# Patient Record
Sex: FEMALE | Race: BLACK OR AFRICAN AMERICAN | Employment: OTHER | URBAN - METROPOLITAN AREA
[De-identification: names, ages, dates, MRNs, and addresses within clinical notes are randomized per-mention and may not be internally consistent; named-entity substitution may affect disease eponyms.]

---

## 2017-01-05 ENCOUNTER — HOSPITAL ENCOUNTER (OUTPATIENT)
Dept: NUCLEAR MEDICINE | Age: 52
Discharge: HOME | End: 2017-01-05
Attending: INTERNAL MEDICINE
Payer: MEDICARE

## 2017-01-05 DIAGNOSIS — R10.9 ABDOMINAL PAIN: ICD-10-CM

## 2017-01-05 DIAGNOSIS — R10.13 EPIGASTRIC PAIN: ICD-10-CM

## 2017-01-05 DIAGNOSIS — K59.00 CONSTIPATION: ICD-10-CM

## 2017-01-05 DIAGNOSIS — R11.0 NAUSEA: ICD-10-CM

## 2017-01-05 DIAGNOSIS — R68.81 EARLY SATIETY: ICD-10-CM

## 2017-01-05 PROCEDURE — 78264 GASTRIC EMPTYING IMG STUDY: CPT

## 2017-01-09 ENCOUNTER — OFFICE VISIT (OUTPATIENT)
Dept: SURGERY | Age: 52
End: 2017-01-09

## 2017-01-09 VITALS
BODY MASS INDEX: 29.06 KG/M2 | HEIGHT: 63 IN | WEIGHT: 164 LBS | RESPIRATION RATE: 14 BRPM | OXYGEN SATURATION: 98 % | HEART RATE: 82 BPM | TEMPERATURE: 98.2 F | SYSTOLIC BLOOD PRESSURE: 122 MMHG | DIASTOLIC BLOOD PRESSURE: 57 MMHG

## 2017-01-09 DIAGNOSIS — L02.419 AXILLARY ABSCESS: Primary | ICD-10-CM

## 2017-01-09 RX ORDER — OXYCODONE AND ACETAMINOPHEN 5; 325 MG/1; MG/1
1 TABLET ORAL
Qty: 30 TAB | Refills: 0 | Status: SHIPPED | OUTPATIENT
Start: 2017-01-09 | End: 2017-04-13 | Stop reason: ALTCHOICE

## 2017-01-09 RX ORDER — CLINDAMYCIN HYDROCHLORIDE 300 MG/1
300 CAPSULE ORAL 3 TIMES DAILY
Qty: 30 CAP | Refills: 0 | Status: SHIPPED | OUTPATIENT
Start: 2017-01-09 | End: 2017-01-19

## 2017-01-09 NOTE — PROGRESS NOTES
Subjective:      Tamika Valdez is a 54 y.o. female  Well know to me who presents with a recurrent abscess in her right axilla. Came on suddenly 4 days ago. She complains of 8/10 pain. She had spontaneous drainage this morning. Feels better. Denies fevers and chills    Objective:     Visit Vitals    /57 (BP 1 Location: Left arm, BP Patient Position: Sitting)    Pulse 82    Temp 98.2 °F (36.8 °C) (Oral)    Resp 14    Ht 5' 3\" (1.6 m)    Wt 164 lb (74.4 kg)    SpO2 98%    BMI 29.05 kg/m2       General:  alert, cooperative, no distress, appears stated age   Right axilla : 1 cm raised tended subcutaneous mass with and open sinus. Minimal drainage. Tender. NO erythema, no heat     Assessment:     54year old diabetic female with hydradenitis who presents with recurrent abscess. The abscess is small and has drained on its own. There is no cellulitis or signs of systemic infection. Will try antibiotics and symptomatic treatment an see how she does. Don't think she needs I & D    Plan:     1. Clindamycin   2.  Percocet  3.  follow up next week or sooner if worsens

## 2017-01-09 NOTE — PROGRESS NOTES
1. Have you been to the ER, urgent care clinic since your last visit? Hospitalized since your last visit? [facility] for pain on side last month     2. Have you seen or consulted any other health care providers outside of the [address] [place] [address] since your last visit? Include any pap smears or colon screening.  no

## 2017-01-31 ENCOUNTER — HOSPITAL ENCOUNTER (OUTPATIENT)
Dept: LAB | Age: 52
Discharge: HOME | End: 2017-01-31
Payer: MEDICARE

## 2017-01-31 ENCOUNTER — OFFICE VISIT (OUTPATIENT)
Dept: MIDWIFE SERVICES | Age: 52
End: 2017-01-31

## 2017-01-31 VITALS
SYSTOLIC BLOOD PRESSURE: 139 MMHG | BODY MASS INDEX: 26.52 KG/M2 | HEART RATE: 78 BPM | HEIGHT: 66 IN | DIASTOLIC BLOOD PRESSURE: 80 MMHG | WEIGHT: 165 LBS

## 2017-01-31 DIAGNOSIS — Z01.419 WELL WOMAN EXAM WITH ROUTINE GYNECOLOGICAL EXAM: ICD-10-CM

## 2017-01-31 DIAGNOSIS — N95.1 HOT FLASHES, MENOPAUSAL: ICD-10-CM

## 2017-01-31 DIAGNOSIS — N89.8 VAGINAL DISCHARGE: ICD-10-CM

## 2017-01-31 DIAGNOSIS — Z12.4 SCREENING FOR MALIGNANT NEOPLASM OF CERVIX: ICD-10-CM

## 2017-01-31 DIAGNOSIS — Z12.4 SCREENING FOR CERVICAL CANCER: Primary | ICD-10-CM

## 2017-01-31 DIAGNOSIS — N76.0 ACUTE VAGINITIS: ICD-10-CM

## 2017-01-31 PROCEDURE — 87624 HPV HI-RISK TYP POOLED RSLT: CPT

## 2017-01-31 PROCEDURE — 87480 CANDIDA DNA DIR PROBE: CPT

## 2017-01-31 PROCEDURE — 88175 CYTOPATH C/V AUTO FLUID REDO: CPT

## 2017-01-31 NOTE — PROGRESS NOTES
Chief Complaint   Patient presents with    Well Woman     last pap - in 3/2015     Visit Vitals    /80    Pulse 78    Ht 5' 6\" (1.676 m)    Wt 165 lb (74.8 kg)    BMI 26.63 kg/m2     1. Have you been to the ER, urgent care clinic since your last visit? Hospitalized since your last visit? No    2. Have you seen or consulted any other health care providers outside of the [address] [place] [address] since your last visit? Include any pap smears or colon screening.  No     Here today for well woman exam    Verbal orders for pap and nuswab per dr Jania Dickens

## 2017-01-31 NOTE — PROGRESS NOTES
Subjective:   54 y.o. female for Well Woman Check. No LMP recorded (lmp unknown). Patient is postmenopausal.    Social History: single partner, contraception - post menopausal status. Pertinent past medical hstory: no history of  DVT, CAD,, liver disease, or smoking. Patient Active Problem List   Diagnosis Code    Hypertension I10    Kidney disease N28.9    Migraines G43.909    Diabetes mellitus ([facility] E11.9    Anemia D64.9    Hyperkalemia E87.5    [facility]di[facility] mellitus[facility] E11.9    CKD (chronic kidney disease) stage V requiring chronic dialysis (HCC) N18.6, Z99.2    Depression F32.9    Asthma J45.909    Dyslipidemia E78.5    Mitral regurgitation I34.0    Pre-kidney transplant, listed Z76.82        ROS:  Feeling well. No dyspnea or chest pain on exertion. No abdominal pain, change in bowel habits, black or bloody stools. No urinary tract symptoms. GYN ROS: no side effects of hormonal medications, no vaginal bleeding, she complains of mild hot flushes twice daily on HRT and itchy vaginal discharge. No neurological complaints. Objective:     Visit Vitals    /80    Pulse 78    Ht 5' 6\" (1.676 m)    Wt 165 lb (74.8 kg)    LMP  (LMP Unknown)    BMI 26.63 kg/m2     The patient appears well, alert, oriented x 3, in no distress. ENT normal.  Neck supple. No adenopathy or thyromegaly. Abdomen soft without tenderness, guarding, mass or organomegaly. Extremities show no edema, normal peripheral pulses. Neurological is normal, no focal findings.     BREAST EXAM: breasts appear normal, no suspicious masses, no skin or nipple changes or axillary nodes, right breast normal without mass, skin or nipple changes or axillary nodes, left breast normal without mass, skin or nipple changes or axillary nodes    PELVIC EXAM: VULVA: normal appearing vulva with no masses, tenderness or lesions, VAGINA: normal appearing vagina with normal color and discharge, no lesions, CERVIX: normal appearing cervix without discharge or lesions, cervical stenosis present, UTERUS: uterus is normal size, shape, consistency and nontender, retroverted, ADNEXA: non palpable ovaries, PAP: Pap smear done today, thin-prep method, saw liquid based cytology, HPV test    Assessment/Plan:   well woman  no contraindication to continue hormonal therapy  pap smear with HPV screen  counseled on breast self exam, mammography screening, use and side effects of HRT and menopause  return annually or prn  current treatment plan is effective, no change in therapy.

## 2017-02-03 ENCOUNTER — TELEPHONE (OUTPATIENT)
Dept: MIDWIFE SERVICES | Age: 52
End: 2017-02-03

## 2017-02-03 NOTE — TELEPHONE ENCOUNTER
Spoke with michael and let her know that we do not have her results of her nuswab but that if it is bothering her too bad to go ahead and start using monistat 7.

## 2017-02-03 NOTE — TELEPHONE ENCOUNTER
Patient called to follow up from phone call this morning. Read the email to the patient but she still would like to speak with someone to see how long test results take to come back.

## 2017-02-03 NOTE — PROGRESS NOTES
Normal pap smear and negative high risk HPV screen, age >30 yrs.  Please inform patient of normal pap and next pap due in 3 years

## 2017-02-04 LAB
A VAGINAE DNA VAG QL NAA+PROBE: ABNORMAL SCORE
BVAB2 DNA VAG QL NAA+PROBE: ABNORMAL SCORE
C ALBICANS DNA VAG QL NAA+PROBE: POSITIVE
C GLABRATA DNA VAG QL NAA+PROBE: NEGATIVE
C TRACH RRNA SPEC QL NAA+PROBE: NEGATIVE
MEGA1 DNA VAG QL NAA+PROBE: ABNORMAL SCORE
N GONORRHOEA RRNA SPEC QL NAA+PROBE: NEGATIVE
T VAGINALIS RRNA SPEC QL NAA+PROBE: NEGATIVE

## 2017-02-06 ENCOUNTER — TELEPHONE (OUTPATIENT)
Dept: MIDWIFE SERVICES | Age: 52
End: 2017-02-06

## 2017-02-06 RX ORDER — FLUCONAZOLE 150 MG/1
150 TABLET ORAL
Qty: 1 TAB | Refills: 1 | Status: SHIPPED | OUTPATIENT
Start: 2017-02-06 | End: 2017-02-07

## 2017-03-20 ENCOUNTER — TELEPHONE (OUTPATIENT)
Dept: INTERNAL MEDICINE CLINIC | Age: 52
End: 2017-03-20

## 2017-03-20 ENCOUNTER — OFFICE VISIT (OUTPATIENT)
Dept: INTERNAL MEDICINE CLINIC | Age: 52
End: 2017-03-20

## 2017-03-20 VITALS
HEART RATE: 90 BPM | WEIGHT: 165 LBS | DIASTOLIC BLOOD PRESSURE: 90 MMHG | RESPIRATION RATE: 16 BRPM | BODY MASS INDEX: 26.52 KG/M2 | TEMPERATURE: 98.6 F | HEIGHT: 66 IN | SYSTOLIC BLOOD PRESSURE: 180 MMHG

## 2017-03-20 DIAGNOSIS — J06.9 VIRAL UPPER RESPIRATORY TRACT INFECTION: ICD-10-CM

## 2017-03-20 DIAGNOSIS — J00 ACUTE NASOPHARYNGITIS: Primary | ICD-10-CM

## 2017-03-20 RX ORDER — HYDROCODONE POLISTIREX AND CHLORPHENIRAMINE POLISTIREX 10; 8 MG/5ML; MG/5ML
1 SUSPENSION, EXTENDED RELEASE ORAL
Qty: 473 ML | Refills: 0 | Status: SHIPPED | OUTPATIENT
Start: 2017-03-20 | End: 2017-04-13 | Stop reason: ALTCHOICE

## 2017-03-20 NOTE — PROGRESS NOTES
Chief Complaint   Patient presents with    Cold Symptoms     since saturday coughing, congestion, wheezing, body aches,      Subjective:      Patient : This patient is a .age, .sex that presents with a chief complaint of cough:  non productive, sore throat : which increases with swallowing  and hoarseness and sinus pain:  runny noses. The symptoms have been present for 3 days. They have remained unchanged. .     Objective:     Visit Vitals    /90    Pulse 90    Temp 98.6 °F (37 °C) (Oral)    Resp 16    Ht 5' 6\" (1.676 m)    Wt 165 lb (74.8 kg)    LMP  (LMP Unknown)    BMI 26.63 kg/m2          Skin:  warm  HEENT:  mucosal edema  Heart regular rhythm  Lungs: clear to auscultation and percussion throughout both lung fields  CV:normal  Abdomen  normal  Extremeties:no clubbing, cyanosis, edema  Neuro alert, oriented x 3      Past Medical History:   Diagnosis Date    Anemia     Arthritis     Asthma 4/8/2014    Rx for same    Beta blocker therapy     Chronic kidney disease     ESRD - dialysis MWF    Diabetes ([facility] 28yo    Rx to control    Dialysis patient ([facility]     M-W-F    GERD (gastroesophageal reflux disease)     Headache(784.0)     Hepatitis B infection     Hypertension     Rx for same    Pre-kidney transplant, listed 4/30/2015     Family History   Problem Relation Age of Onset    Hypertension Mother     Hypertension Sister     Diabetes Maternal Grandfather     Cancer Father      UNKNOWN    Hypertension Maternal Grandmother     Diabetes Son     Asthma Son     Cancer Maternal Aunt      UNKNOWN    Anesth Problems Neg Hx      Current Outpatient Prescriptions   Medication Sig Dispense Refill    acetaminophen (TYLENOL) 325 mg tablet Take 1 Tab by mouth every four (4) hours as needed for Pain. 30 Tab 0    acetaminophen-codeine (TYLENOL #3) 300-30 mg per tablet Take 1 Tab by mouth every four (4) hours as needed for Pain. Max Daily Amount: 6 Tabs.  40 Tab 0    chlorpheniramine-HYDROcodone (TUSSIONEX) 10-8 mg/5 mL suspension Take 5 mL by mouth every twelve (12) hours as needed for Cough. Max Daily Amount: 10 mL. 115 mL 1    pantoprazole (PROTONIX) 40 mg tablet TAKE ONE TABLET BY MOUTH ONCE DAILY 30 Tab 5    Insulin Needles, Disposable, (SUZY PEN NEEDLE) 32 gauge x 5/32\" ndle 1 Each by Does Not Apply route daily. 100 Pen Needle 3    insulin detemir (LEVEMIR FLEXTOUCH) 100 unit/mL (3 mL) inpn 0.1 mL by SubCUTAneous route daily. 15 mL 5    sitaGLIPtin (JANUVIA) 25 mg tablet Take 1 Tab by mouth daily. 30 Tab 10    albuterol (PROVENTIL VENTOLIN) 2.5 mg /3 mL (0.083 %) nebulizer solution USE ONE VIAL IN NEBULIZER EVERY 4 HOURS AS NEEDED FOR WHEEZING 300 Each 5    estrogen, conjugated,-medroxyPROGESTERone (PREMPRO) 0.625-2.5 mg per tablet Take 1 Tab by mouth daily. 30 Tab 6    oxyCODONE-acetaminophen (PERCOCET) 5-325 mg per tablet Take 1 Tab by mouth every four (4) hours as needed for Pain. Max Daily Amount: 6 Tabs. 30 Tab 0    linaclotide (LINZESS) 290 mcg cap capsule Take 290 mcg by mouth Daily (before breakfast).  ferric citrate (AURYXIA) 210 mg iron tablet Take  by mouth three (3) times daily (with meals).  cyclobenzaprine (FLEXERIL) 10 mg tablet TAKE 1 TABLET BY MOUTH 3 TIMES A DAY  0    LOPERAMIDE HCL (IMODIUM PO) Take  by mouth as needed.  amLODIPine-Olmesartan 10-40 mg tab Take 1 Tab by mouth daily.  bisacodyl (DULCOLAX) 5 mg EC tablet Take 5 mg by mouth daily as needed for Constipation.  labetalol (NORMODYNE) 200 mg tablet Take 400 mg by mouth two (2) times a day.  atorvastatin (LIPITOR) 10 mg tablet Take 1 Tab by mouth daily. 90 Tab 3    albuterol (PROVENTIL HFA, VENTOLIN HFA, PROAIR HFA) 90 mcg/actuation inhaler Take 2 Puffs by inhalation every four (4) hours as needed for Wheezing.  aspirin 81 mg chewable tablet Take 81 mg by mouth daily.  doxazosin (CARDURA) 8 mg tablet Take 8 mg by mouth nightly.       lidocaine 5 % topical cream Apply  to affected area as needed (prior to dialysis).  cinacalcet (SENSIPAR) 30 mg tablet Take 60 mg by mouth nightly.  furosemide (LASIX) 40 mg tablet Take 40 mg by mouth daily. Allergies   Allergen Reactions    Latex Shortness of Breath    Tramadol Itching     Social History     Social History    Marital status: SINGLE     Spouse name: N/A    Number of children: N/A    Years of education: N/A     Occupational History    Not on file. Social History Main Topics    Smoking status: Never Smoker    Smokeless tobacco: Never Used    Alcohol use No    Drug use: No    Sexual activity: Not Currently     Birth control/ protection: None     Other Topics Concern    Not on file     Social History Narrative           Assessment:     URI  Asthma  Can use nebulizer  Plan:     The patient seems to have a viral process. Will institute symptomatic therapy      Aurelio Bullock was seen today for cold symptoms. Diagnoses and all orders for this visit:    Acute nasopharyngitis    Viral upper respiratory tract infection  -     chlorpheniramine-HYDROcodone (TUSSIONEX) 10-8 mg/5 mL suspension; Take 5 mL by mouth every twelve (12) hours as needed for Cough. Max Daily Amount: 10 mL.

## 2017-03-20 NOTE — MR AVS SNAPSHOT
Visit Information     Date & Time Provider Department Dept.  Phone Encounter #    3/20/2017  2:45 PM Layo Castaneda MD [facility] [phone] [id number]      Your Appointments     5/23/2017 11:00 AM   ECHO CARDIOGRAMS 2D with HATTIE ECHAVARRIA   [facility] ([facility] SCHEDULING)   Appt Note: Per Nick Panda Echo dx MR, See Daly Hartmann after    [address] [facility] [address]           [address] [place] [address]            5/23/2017 11:40 AM   ESTABLISHED PATIENT with Anoop Andrade MD   [facility] ([facility] SCHEDULING)   Appt Note: Per Nick Amarilys Echo dx MR, See Daly Hartmann after    [address] [facility] [address]           [address] [place] [address]            7/20/2017 10:30 AM   ROUTINE CARE with Brennan Forde MD   [facility] [address] [facility])   Appt Note: 1yr f/u diabetes cp0.00    [address]  [address]  [place] [address]           [address] [place] [id number] Maintenance        Date Due    DTaP/Tdap/Td series (1 - Tdap) 4/26/1988    Pneumococcal 19-64 Highest Risk (2 of 3 - PCV13) 2/1/2014    HEMOGLOBIN A1C Q6M 7/16/2016    INFLUENZA AGE 9 TO ADULT 8/1/2016    FOOT EXAM Q1 1/19/2017    LIPID PANEL Q1 7/6/2017    EYE EXAM RETINAL OR DILATED Q1 11/22/2017    PAP AKA CERVICAL CYTOLOGY 1/31/2020      Allergies as of 3/20/2017  Review Complete On: 3/20/2017 By: Toby Dillon LPN       Severity Noted Reaction Type Reactions    Latex High 07/17/2012   Systemic Shortness of Breath    Tramadol  07/27/2015    Itching      Current Immunizations  Reviewed on 12/27/2013    Name Date    Influenza Vaccine 9/3/2014    Pneumococcal Polysaccharide (PPSV-23) 2/1/2013  1:47 PM    TB Skin Test (PPD) Intradermal 12/27/2013, 8/26/2013       Not reviewed this visit   You Were Diagnosed With        Codes Comments    Acute nasopharyngitis    -  Primary ICD-10-CM: J00  ICD-9-CM: 498     Viral upper respiratory tract infection     ICD-10-CM: J06.9, B97.89  ICD-9-CM: 465.9       Vitals     BP Pulse Temp Resp Height(growth percentile) Weight(growth percentile)    180/90 90 98.6 °F (37 °C) (Oral) 16 5' 6\" (1.676 m) 165 lb (74.8 kg)    LMP BMI OB Status Smoking Status          (LMP Unknown) 26.63 kg/m2 Postmenopausal Never Smoker      Vitals History      BMI and BSA Data     Body Mass Index Body Surface Area    26.63 kg/m 2 1.87 m 2         Preferred Pharmacy       Pharmacy Name Phone    [facility], [address] [place] [address] [phone]         Your Updated Medication List          This list is accurate as of: 3/20/17  3:07 PM.  Always use your most recent med list.                acetaminophen 325 mg tablet   Commonly known as:  TYLENOL   Take 1 Tab by mouth every four (4) hours as needed for Pain. acetaminophen-codeine 300-30 mg per tablet   Commonly known as:  TYLENOL #3   Take 1 Tab by mouth every four (4) hours as needed for Pain. Max Daily Amount: 6 Tabs. * albuterol 90 mcg/actuation inhaler   Commonly known as:  PROVENTIL HFA, VENTOLIN HFA, PROAIR HFA   Take 2 Puffs by inhalation every four (4) hours as needed for Wheezing. * albuterol 2.5 mg /3 mL (0.083 %) nebulizer solution   Commonly known as:  PROVENTIL VENTOLIN   USE ONE VIAL IN NEBULIZER EVERY 4 HOURS AS NEEDED FOR WHEEZING       amLODIPine-Olmesartan 10-40 mg Tab   Take 1 Tab by mouth daily. aspirin 81 mg chewable tablet   Take 81 mg by mouth daily. atorvastatin 10 mg tablet   Commonly known as:  LIPITOR   Take 1 Tab by mouth daily. AURYXIA 210 mg iron tablet   Generic drug:  ferric citrate   Take  by mouth three (3) times daily (with meals). bisacodyl 5 mg EC tablet   Commonly known as:  DULCOLAX   Take 5 mg by mouth daily as needed for Constipation. chlorpheniramine-HYDROcodone 10-8 mg/5 mL suspension   Commonly known as:  TUSSIONEX   Take 5 mL by mouth every twelve (12) hours as needed for Cough. Max Daily Amount: 10 mL. cinacalcet 30 mg tablet   Commonly known as:  SENSIPAR   Take 60 mg by mouth nightly. cyclobenzaprine 10 mg tablet   Commonly known as:  FLEXERIL   TAKE 1 TABLET BY MOUTH 3 TIMES A DAY       doxazosin 8 mg tablet   Commonly known as:  CARDURA   Take 8 mg by mouth nightly. estrogen (conjugated)-medroxyPROGESTERone 0.625-2.5 mg per tablet   Commonly known as:  PREMPRO   Take 1 Tab by mouth daily. IMODIUM PO   Take  by mouth as needed. insulin detemir 100 unit/mL (3 mL) Inpn   Commonly known as:  LEVEMIR FLEXTOUCH   0.1 mL by SubCUTAneous route daily. Insulin Needles (Disposable) 32 gauge x 5/32\" Ndle   Commonly known as:  Wanda Pen Needle   1 Each by Does Not Apply route daily. labetalol 200 mg tablet   Commonly known as:  NORMODYNE   Take 400 mg by mouth two (2) times a day. LASIX 40 mg tablet   Generic drug:  furosemide   Take 40 mg by mouth daily. lidocaine 5 % topical cream   Apply  to affected area as needed (prior to dialysis). LINZESS 290 mcg Cap capsule   Generic drug:  linaclotide   Take 290 mcg by mouth Daily (before breakfast). oxyCODONE-acetaminophen 5-325 mg per tablet   Commonly known as:  PERCOCET   Take 1 Tab by mouth every four (4) hours as needed for Pain. Max Daily Amount: 6 Tabs. pantoprazole 40 mg tablet   Commonly known as:  PROTONIX   TAKE ONE TABLET BY MOUTH ONCE DAILY       SITagliptin 25 mg tablet   Commonly known as:  JANUVIA   Take 1 Tab by mouth daily. * Notice: This list has 2 medication(s) that are the same as other medications prescribed for you. Read the directions carefully, and ask your doctor or other care provider to review them with you.             Prescriptions Printed        Refills    chlorpheniramine-HYDROcodone (TUSSIONEX) 10-8 mg/5 mL suspension 0    Sig: Take 5 mL by mouth every twelve (12) hours as needed for Cough. Max Daily Amount: 10 mL. Class: Print    Route: Oral      Introducing [facility]! Dear Fanny Foss: Thank you for requesting a [employer] account. Our records indicate that you already have an active [employer] account. You can access your account anytime at [email or web address]     Did you know that you can access your hospital and ER discharge instructions at any time in [employer]? You can also review all of your test results from your hospital stay or ER visit. Additional Information    If you have questions, please visit the Frequently Asked Questions section of the [employer] website at [email or web address] Remember, [employer] is NOT to be used for urgent needs. For medical emergencies, dial 911. Now available from your iPhone and Android! Please provide this summary of care documentation to your next provider. Your primary care clinician is listed as Caitlyn Vazquez. If you have any questions after today's visit, please call [phone].

## 2017-03-21 ENCOUNTER — TELEPHONE (OUTPATIENT)
Dept: INTERNAL MEDICINE CLINIC | Age: 52
End: 2017-03-21

## 2017-03-21 NOTE — TELEPHONE ENCOUNTER
Confirmed with pharmacist that patient is to take 5ml BID as written and the quantity was written 473ML.  No further concerns

## 2017-03-21 NOTE — TELEPHONE ENCOUNTER
Patient called stating she was unable to get Chlorpheniramine-Hydrocodone from pharmacy because the pharmacy states they need to speak with Dr. Mercy Rivera first. Please advise [phone]    She used [facility] on [address] [place] [address]

## 2017-04-05 ENCOUNTER — PATIENT OUTREACH (OUTPATIENT)
Dept: INTERNAL MEDICINE CLINIC | Age: 52
End: 2017-04-05

## 2017-04-05 NOTE — PROGRESS NOTES
Patient is listed on today's Passport report for admission to [facility] for kidney transplant. Plan to follow up with patient after discharge.

## 2017-04-13 ENCOUNTER — PATIENT OUTREACH (OUTPATIENT)
Dept: INTERNAL MEDICINE CLINIC | Age: 52
End: 2017-04-13

## 2017-04-13 RX ORDER — INSULIN LISPRO 100 [IU]/ML
1-5 INJECTION, SOLUTION INTRAVENOUS; SUBCUTANEOUS
COMMUNITY
End: 2017-04-20 | Stop reason: SDUPTHER

## 2017-04-13 RX ORDER — PRAVASTATIN SODIUM 20 MG/1
20 TABLET ORAL DAILY
COMMUNITY
End: 2018-05-17 | Stop reason: ALTCHOICE

## 2017-04-13 RX ORDER — ACETAMINOPHEN 325 MG/1
650 TABLET ORAL
COMMUNITY
End: 2017-07-25

## 2017-04-13 RX ORDER — TACROLIMUS 1 MG/1
2 CAPSULE ORAL
COMMUNITY

## 2017-04-13 RX ORDER — AMLODIPINE BESYLATE 5 MG/1
5 TABLET ORAL DAILY
COMMUNITY
End: 2017-06-30 | Stop reason: SDUPTHER

## 2017-04-13 RX ORDER — ACYCLOVIR 400 MG/1
400 TABLET ORAL 4 TIMES DAILY
COMMUNITY
End: 2017-07-25

## 2017-04-13 RX ORDER — HYDROCODONE BITARTRATE AND ACETAMINOPHEN 5; 325 MG/1; MG/1
1 TABLET ORAL
COMMUNITY
End: 2017-07-25

## 2017-04-13 RX ORDER — NYSTATIN 100000 [USP'U]/ML
1 SUSPENSION ORAL 4 TIMES DAILY
COMMUNITY
End: 2017-07-25

## 2017-04-13 RX ORDER — SULFAMETHOXAZOLE AND TRIMETHOPRIM 800; 160 MG/1; MG/1
1 TABLET ORAL
COMMUNITY
End: 2018-02-05

## 2017-04-13 RX ORDER — PREDNISONE 5 MG/1
5 TABLET ORAL DAILY
COMMUNITY
End: 2018-05-29

## 2017-04-13 RX ORDER — MYCOPHENOLATE MOFETIL 500 MG/1
1000 TABLET ORAL 2 TIMES DAILY
COMMUNITY
End: 2017-07-25

## 2017-04-13 RX ORDER — PROMETHAZINE HYDROCHLORIDE 25 MG/1
25 TABLET ORAL
COMMUNITY
End: 2017-04-20

## 2017-04-13 RX ORDER — ASPIRIN 325 MG
81 TABLET ORAL DAILY
COMMUNITY
End: 2017-07-25

## 2017-04-13 NOTE — PROGRESS NOTES
NNTOCIP    Patient listed on Passport report for admission to [facility] 4/4-4/11 for right kidney transplant. CKD: right renal transplant with left kidney from cadaver, tacro better at 6.3    Pulmonary Edema: resolved, mobilize    HTN: add amlodipine to labetolol    Chronic Anemia: overall stable, s/p 2 units intraop    PLAN: Discharged home where she resides with her spouse and 2 adult children. Daughter to provide primary care and son to transport to appointments. She denied any DME or HH needs. Instructed not to drive x 4 weeks, no lifting >5 lbs x 4 wks; diabetic heart healthy diet, no grapefruit or grapefruit juice; keep incision clean and dry, open to air, no lotions/powders/creams; no showering until staples are removed; ambulate daily; call for fevers, N/V/D, abd pain or painful urination; patient to have [facility]. Appointment with [facility]plant [facility] 4/13 at 9:15 am, nephrology Dr. Tashi Garcia 4/19 at 1 pm, Dr. Yudi Olguin 5/15 at 11 am, call to move up appt with endocrine Dr. Rizwan Ang. Stent removal scheduled with clinic next few weeks. NEW MEDICATIONS: amlodipine, acyclovir, nystatin, acetaminophen, prednisone, Cellcept, tacrolimus, Lortab, K-phos Neutral, Bactrim  MEDICATION CHANGES: Levemir increased from 8 to 10 units daily, labetalol increased from 150 mg to 400 mg BID, ASA increased from 81 mg to 325 mg  MEDICATIONS DISCONTINUED: furosemide, Sensipar, estrogen, Zofran, Cardura, losartan, albuterol nebs, lanthanum  OTHER MEDICATIONS LISTED AS CONTINUED, BUT NOT IN CURRENT MED RED: pravastatin in place of atorvastatin, Humalog, promethazine prn      Contacted patient for [facility] follow up. She is familiar with this NN from working with her son in the past, who is a patient of Dr. Fabian Barron as well. Reports she is doing well, just a little sore. Staples still intact. She is moving around and followed up with Transplant Clinic today.  Reports discharge instructions were reviewed with her in detail and various people have reviewed her medications. She has all medications at home, but the list is not within reach at present. Explained that the Transplant Clinic will manage her medications and care and will eventually transition her care back to Dr. Brett Cerda. For now, defer questions and medication needs to Transplant Clinic. She verbalized understanding. Denies any questions or concerns. Updated Med Rec according to discharge summary. Verbal order from Dr. Brett Cerda for NN to discontinue medications that are not current according to discharge summary.     Medications Discontinued During This Encounter   Medication Reason    atorvastatin (LIPITOR) 10 mg tablet Discontinued by Another Clinician    acetaminophen (TYLENOL) 325 mg tablet Dose Adjustment    acetaminophen-codeine (TYLENOL #3) 300-30 mg per tablet Discontinued by Another Clinician    albuterol (PROVENTIL VENTOLIN) 2.5 mg /3 mL (0.083 %) nebulizer solution Discontinued at Discharge    chlorpheniramine-HYDROcodone (TUSSIONEX) 10-8 mg/5 mL suspension Discontinued by Another Clinician    estrogen, conjugated,-medroxyPROGESTERone (PREMPRO) 0.625-2.5 mg per tablet Discontinued at Discharge    oxyCODONE-acetaminophen (PERCOCET) 5-325 mg per tablet Discontinued by Another Clinician    amLODIPine-Olmesartan 10-40 mg tab Discontinued by Another Clinician    aspirin 81 mg chewable tablet Discontinued at Discharge    cinacalcet (SENSIPAR) 30 mg tablet Discontinued at Discharge    doxazosin (CARDURA) 8 mg tablet Discontinued at Discharge    furosemide (LASIX) 40 mg tablet Discontinued at Discharge    LEVEMIR FLEXTOUCH 100 unit/mL (3 mL) inpn Dose Adjustment

## 2017-04-20 ENCOUNTER — OFFICE VISIT (OUTPATIENT)
Dept: ENDOCRINOLOGY | Age: 52
End: 2017-04-20

## 2017-04-20 VITALS
WEIGHT: 159 LBS | HEART RATE: 92 BPM | DIASTOLIC BLOOD PRESSURE: 49 MMHG | HEIGHT: 66 IN | BODY MASS INDEX: 25.55 KG/M2 | SYSTOLIC BLOOD PRESSURE: 148 MMHG

## 2017-04-20 DIAGNOSIS — E11.8 TYPE 2 DIABETES MELLITUS WITH COMPLICATION, WITH LONG-TERM CURRENT USE OF INSULIN (HCC): Primary | ICD-10-CM

## 2017-04-20 DIAGNOSIS — Z79.52 ON PREDNISONE THERAPY: ICD-10-CM

## 2017-04-20 DIAGNOSIS — Z79.4 TYPE 2 DIABETES MELLITUS WITH COMPLICATION, WITH LONG-TERM CURRENT USE OF INSULIN (HCC): Primary | ICD-10-CM

## 2017-04-20 DIAGNOSIS — Z94.0 H/O KIDNEY TRANSPLANT: ICD-10-CM

## 2017-04-20 RX ORDER — INSULIN LISPRO 100 [IU]/ML
INJECTION, SOLUTION INTRAVENOUS; SUBCUTANEOUS
Qty: 1 PACKAGE | Refills: 3 | Status: SHIPPED | OUTPATIENT
Start: 2017-04-20 | End: 2017-10-02 | Stop reason: ALTCHOICE

## 2017-04-20 RX ORDER — LANCETS 33 GAUGE
EACH MISCELLANEOUS
Qty: 100 LANCET | Refills: 10 | Status: SHIPPED | OUTPATIENT
Start: 2017-04-20 | End: 2017-04-21 | Stop reason: SDUPTHER

## 2017-04-20 RX ORDER — PEN NEEDLE, DIABETIC 31 GX3/16"
1 NEEDLE, DISPOSABLE MISCELLANEOUS
Qty: 150 PEN NEEDLE | Refills: 10 | Status: SHIPPED | OUTPATIENT
Start: 2017-04-20 | End: 2018-05-09 | Stop reason: SDUPTHER

## 2017-04-20 NOTE — MR AVS SNAPSHOT
Visit Information     Date & Time Provider Department Dept. Phone Encounter #    4/20/2017  2:10 PM Juma Reyes MD [facility] [phone] [id number]      Follow-up Instructions     Return as scheduled.       Your Appointments     5/23/2017 11:00 AM   ECHO CARDIOGRAMS 2D with HATTIE ECHAVARRIA   [facility] ([facility] SCHEDULING)   Appt Note: Per Merlin Pabon Echo dx MR, See Daryl Conway after    [address]           [address] [facility] [address]            5/23/2017 11:40 AM   ESTABLISHED PATIENT with Jair Gonzalez MD   [facility] ([facility] SCHEDULING)   Appt Note: Per Merlin Pabon Echo dx MR, See Daryl Conway after    [address] [facility] [address]           [address] [address] Maintenance        Date Due    DTaP/Tdap/Td series (1 - Tdap) 4/26/1988    Pneumococcal 19-64 Highest Risk (2 of 3 - PCV13) 2/1/2014    HEMOGLOBIN A1C Q6M 7/16/2016    INFLUENZA AGE 9 TO ADULT 8/1/2016    FOOT EXAM Q1 1/19/2017    LIPID PANEL Q1 7/6/2017    EYE EXAM RETINAL OR DILATED Q1 11/22/2017    PAP AKA CERVICAL CYTOLOGY 1/31/2020      Allergies as of 4/20/2017  Review Complete On: 4/20/2017 By: Juma Reyes MD       Severity Noted Reaction Type Reactions    Latex High 07/17/2012   Systemic Shortness of Breath    Tramadol  07/27/2015    Itching      Current Immunizations  Reviewed on 12/27/2013    Name Date    Influenza Vaccine 9/3/2014    Pneumococcal Polysaccharide (PPSV-23) 2/1/2013  1:47 PM    TB Skin Test (PPD) Intradermal 12/27/2013, 8/26/2013       Not reviewed this visit   You Were Diagnosed With        Codes Comments    Type 2 diabetes mellitus with complication, with long-term current use of insulin ([facility]    -  Primary ICD-10-CM: E11.8, Z79.4  ICD-9-CM: 250.90, V58.67     H/O kidney transplant ICD-10-CM: Z94.0  ICD-9-CM: V42.0     On prednisone therapy     ICD-10-CM: Z79.52  ICD-9-CM: V58.65       Vitals     BP Pulse Height(growth percentile) Weight(growth percentile) LMP BMI    148/49 92 5' 6\" (1.676 m) 159 lb (72.1 kg) (LMP Unknown) 25.66 kg/m2    OB Status Smoking Status                Postmenopausal Never Smoker        Vitals History      BMI and BSA Data     Body Mass Index Body Surface Area    25.66 kg/m 2 1.83 m 2         Preferred Pharmacy       Pharmacy Name Phone    [facility] [address] [facility] [phone]         Your Updated Medication List          This list is accurate as of: 4/20/17  3:24 PM.  Always use your most recent med list.                acetaminophen 325 mg tablet   Commonly known as:  TYLENOL   Take 650 mg by mouth every four (4) hours as needed for Pain. acyclovir 400 mg tablet   Commonly known as:  ZOVIRAX   Take 400 mg by mouth four (4) times daily. albuterol 90 mcg/actuation inhaler   Commonly known as:  PROVENTIL HFA, VENTOLIN HFA, PROAIR HFA   Take 2 Puffs by inhalation every four (4) hours as needed for Wheezing. amLODIPine 5 mg tablet   Commonly known as:  NORVASC   Take 5 mg by mouth daily. aspirin 325 mg tablet   Commonly known as:  ASPIRIN   Take 325 mg by mouth daily. BACTRIM -800 mg per tablet   Generic drug:  trimethoprim-sulfamethoxazole   Take 1 Tab by mouth every Monday, Wednesday, Friday. bisacodyl 5 mg EC tablet   Commonly known as:  DULCOLAX   Take 5 mg by mouth daily as needed for Constipation. CELLCEPT 500 mg tablet   Generic drug:  mycophenolate   Take 1,000 mg by mouth two (2) times a day. glucose blood VI test strips strip   Commonly known as:  ONETOUCH ULTRA TEST   4 times daily       HYDROcodone-acetaminophen 5-325 mg per tablet   Commonly known as:  NORCO   Take 1 Tab by mouth every four (4) hours as needed for Pain.        insulin detemir 100 unit/mL (3 mL) Inpn Commonly known as:  LEVEMIR FLEXPEN   20 Units by SubCUTAneous route Daily (before breakfast). insulin lispro 100 unit/mL kwikpen   Commonly known as:  HumaLOG KwikPen   As directed for high glucoses. 1 unit to lower glucose 30 mg/dl with target of < 150. Taking up to 20 units/day       Insulin Needles (Disposable) 32 gauge x 5/32\" Ndle   Commonly known as:  Wanda Pen Needle   1 Each by Does Not Apply route five (5) times daily. insulin  unit/mL (3 mL) Inpn   Commonly known as:  HUMULIN N   Inject 20 units once daily. Adjust dose as directed while taking prednisone. labetalol 200 mg tablet   Commonly known as:  NORMODYNE   Take 400 mg by mouth two (2) times a day. lancets 33 gauge Misc   Commonly known as: One Touch Delica   4 times daily       nystatin 100,000 unit/mL suspension   Commonly known as:  MYCOSTATIN   Take 1 tsp by mouth four (4) times daily. swish and spit       pantoprazole 40 mg tablet   Commonly known as:  PROTONIX   TAKE ONE TABLET BY MOUTH ONCE DAILY       pravastatin 20 mg tablet   Commonly known as:  PRAVACHOL   Take 20 mg by mouth daily. predniSONE 5 mg dose pack   Commonly known as:  STERAPRED   Take 25 mg by mouth See Admin Instructions. See administration instruction per 5mg dose pack       SITagliptin 100 mg tablet   Commonly known as:  JANUVIA   Take 1 Tab by mouth daily. tacrolimus 1 mg capsule   Commonly known as:  PROGRAF   Take 4 mg by mouth every twelve (12) hours. Prescriptions Sent to Pharmacy        Refills    SITagliptin (JANUVIA) 100 mg tablet 10    Sig: Take 1 Tab by mouth daily. Class: Normal    Pharmacy: [facility], [address] [facility] Ph #: [phone]    Route: Oral    insulin NPH (HUMULIN N) 100 unit/mL (3 mL) inpn 10    Sig: Inject 20 units once daily. Adjust dose as directed while taking prednisone.     Class: Normal    Pharmacy: [facility], [address] [facility] [place] Ph #: [phone]    Insulin Needles, Disposable, (SUZY PEN NEEDLE) 32 gauge x 5/32\" ndle 10    Si Each by Does Not Apply route five (5) times daily. Class: Normal    Pharmacy: [address] [place] [address]. Rd[address] [place] [address] Ph #: [phone]    Route: Does Not Apply    insulin lispro (HUMALOG KWIKPEN) 100 unit/mL kwikpen 3    Sig: As directed for high glucoses. 1 unit to lower glucose 30 mg/dl with target of < 150. Taking up to 20 units/day    Class: Normal    Pharmacy: [address] [place] [address]. Rd.,[address] [place] [address] 36, [address] [facility] Ph #: [phone]    glucose blood VI test strips (ONETOUCH ULTRA TEST) strip 10    Si times daily    Class: Normal    Pharmacy: [address] [place] [address]. Rd.,[address] [place] [address] 36, [address] [facility] Ph #: [phone]    lancets (ONE TOUCH DELICA) 33 gauge misc 10    Si times daily    Class: Normal    Pharmacy: [address] [place] [address]. Rd.,[address] [place] [address] 36, [address] [facility] Ph #: [phone]    insulin detemir (LEVEMIR FLEXPEN) 100 unit/mL (3 mL) inpn 10    Si Units by SubCUTAneous route Daily (before breakfast). Class: Normal    Pharmacy: [address] [place] [address]. Rd.,[address] [place] [address] Ph #: [phone]    Route: SubCUTAneous      Follow-up Instructions     Return as scheduled. Patient Instructions    Diabetes; Increase Januvia to 100 mg daily - based on improved kidney function and GFR  Is > 50 (56)    Levemir - increase to 20 units once daily in AM    NPH insulin (cloudy) Adjust dose based on prednisone dose    Prednisone dose NPH dose  25 mg   20 units  20 mg   15 units  15 mg   12 units  10 mg   10 units  5   Mg    Stop NPH. Humalog for high glucoses  151-180: 1 unit  181-210: 2 units  211-240: 3 units  241-270: 4 units  271-300: 5 units  301 +    : 6 units    Goals for blood glucose:  Fasting  (less than 150)  Lunch, Dinner, Bedtime -  (less than 180). Introducing [facility]! Dear Christa Cintron:   Thank you for requesting a [employer] account. Our records indicate that you already have an active [employer] account. You can access your account anytime at [email or web address]     Did you know that you can access your hospital and ER discharge instructions at any time in [employer]? You can also review all of your test results from your hospital stay or ER visit. Additional Information    If you have questions, please visit the Frequently Asked Questions section of the [employer] website at [email or web address] Remember, [employer] is NOT to be used for urgent needs. For medical emergencies, dial 911. Now available from your iPhone and Android! Please provide this summary of care documentation to your next provider. Your primary care clinician is listed as Kateryna Raza. If you have any questions after today's visit, please call [phone].

## 2017-04-20 NOTE — PROGRESS NOTES
History of Present Illness: Chris Sanchez is a 54 y.o. female presents for follow-up of diabetes. She has a history of ESRD and is now s/p renal transplant on 4/11/2017  Glucoses are high on prednisone taper    Currently taking 25 mg prednisone. To decrease to prednisone 20 mg daily on 4/22, then 15 mg on 5/2, t hen 10 mg on 5/12/2017  Spoke with renal transplant nurse, Chava, during visit. She confirmed GFR is currently 56. Metformin will be considered 6 months after transplant. Current diabetes regimen:  Levemir 10 units daily. Of note, Mrs Debria Ormond is often taking up to 30-35 units of Levemir daily - by taking extra 10-15 unit doses for hyperglycemia  januvia 25 mg daily. Glucoses:  Brings log. Lowest ones are fasting - 180s. Highest are in evening and can be in 300s. No substantial lows     Diet:continuing with smaller portions. Weight is stable    HTN  - managed by transplant team. Labetalol and amlodipine. Social:  Has son with diabetes who is 32years of age who is following with me        Past Medical History:   Diagnosis Date    Anemia     Arthritis     Asthma 4/8/2014    Rx for same    Beta blocker therapy     Chronic kidney disease     ESRD - dialysis MWF    Diabetes ([facility] 28yo    Rx to control    Dialysis patient ([facility]     M-W-F    GERD (gastroesophageal reflux disease)     Headache(784.0)     Hepatitis B infection     Hypertension     Rx for same    Pre-kidney transplant, listed 4/30/2015     Current Outpatient Prescriptions   Medication Sig    pravastatin (PRAVACHOL) 20 mg tablet Take 20 mg by mouth daily.  insulin lispro (HUMALOG KWIKPEN) 100 unit/mL kwikpen 1-5 Units by SubCUTAneous route.  promethazine (PHENERGAN) 25 mg tablet Take 25 mg by mouth every six (6) hours as needed for Nausea.  amLODIPine (NORVASC) 5 mg tablet Take 5 mg by mouth daily.  acyclovir (ZOVIRAX) 400 mg tablet Take 400 mg by mouth four (4) times daily.     nystatin (MYCOSTATIN) 100,000 unit/mL suspension Take 1 tsp by mouth four (4) times daily. swish and spit    acetaminophen (TYLENOL) 325 mg tablet Take 650 mg by mouth every four (4) hours as needed for Pain.  aspirin (ASPIRIN) 325 mg tablet Take 325 mg by mouth daily.  predniSONE (STERAPRED) 5 mg dose pack Take 25 mg by mouth See Admin Instructions. See administration instruction per 5mg dose pack     mycophenolate (CELLCEPT) 500 mg tablet Take 1,000 mg by mouth two (2) times a day.  tacrolimus (PROGRAF) 1 mg capsule Take 4 mg by mouth every twelve (12) hours.  HYDROcodone-acetaminophen (NORCO) 5-325 mg per tablet Take 1 Tab by mouth every four (4) hours as needed for Pain.  phosphorus (K PHOS NEUTRAL) 250 mg tablet Take 1 Tab by mouth two (2) times a day.  trimethoprim-sulfamethoxazole (BACTRIM DS) 160-800 mg per tablet Take 1 Tab by mouth every Monday, Wednesday, Friday.  insulin detemir (LEVEMIR FLEXPEN) 100 unit/mL (3 mL) inpn 10 Units by SubCUTAneous route Daily (before breakfast).  ferric citrate (AURYXIA) 210 mg iron tablet Take  by mouth three (3) times daily (with meals).  pantoprazole (PROTONIX) 40 mg tablet TAKE ONE TABLET BY MOUTH ONCE DAILY    bisacodyl (DULCOLAX) 5 mg EC tablet Take 5 mg by mouth daily as needed for Constipation.  Insulin Needles, Disposable, (SUZY PEN NEEDLE) 32 gauge x 5/32\" ndle 1 Each by Does Not Apply route daily.  sitaGLIPtin (JANUVIA) 25 mg tablet Take 1 Tab by mouth daily.  labetalol (NORMODYNE) 200 mg tablet Take 400 mg by mouth two (2) times a day.  albuterol (PROVENTIL HFA, VENTOLIN HFA, PROAIR HFA) 90 mcg/actuation inhaler Take 2 Puffs by inhalation every four (4) hours as needed for Wheezing.  linaclotide (LINZESS) 290 mcg cap capsule Take 290 mcg by mouth Daily (before breakfast).  cyclobenzaprine (FLEXERIL) 10 mg tablet TAKE 1 TABLET BY MOUTH 3 TIMES A DAY    LOPERAMIDE HCL (IMODIUM PO) Take  by mouth as needed.     lidocaine 5 % topical cream Apply  to affected area as needed (prior to dialysis). No current facility-administered medications for this visit. Allergies   Allergen Reactions    Latex Shortness of Breath    Tramadol Itching         Physical Examination:  Visit Vitals    /49    Pulse 92    Ht 5' 6\" (1.676 m)    Wt 159 lb (72.1 kg)    LMP  (LMP Unknown)    BMI 25.66 kg/m2   -   - General: pleasant, no distress, normal gait   HEENT: hearing intact, EOMI, clear sclera without icterus  - Cardiovascular: regular, normal rate   - Respiratory: normal effort  - Integumentary: no edema  - Psychiatric: normal mood and affect    Data Reviewed:   See HPi    Assessment/Plan:   1. Type 2 diabetes mellitus with complication, with long-term current use of insulin [facility])   - reviewed with Mrs Hari Hay that improved renal function increases insulin clearance and insulin needs. - Also discussed effect of prednisone on insulin needs. She will likely need about 7-10 extra units of insulin per 10 mg of prednisone used. Explained how prednisone's hyperglycemic effects last about 12 hours. For this reason, pairing the prednisone with 12 hour NPH insulin often works well  - Increase Levemir to 20 units to provide basal insulin needs. Encouraged monitoring from bedtime to fasting to assess  - Add NPH insulin to be dosed at same time as insulin  Prednisone dose NPH dose  25 mg   20 units  20 mg   15 units  15 mg   12 units  10 mg   10 units  5   Mg    Stop NPH.     - Humalog correctional scale - 1:50 for values > 150.  - recommended monitoring before meals and at bedtime  - encouraged her to contact me early next week for an update as adjsutments will likely be needed. - long term, think metformin will help to substantially decrease insulin needs and help with glucose control    2. H/O kidney transplant    3.  On prednisone therapy    Greater than 50% of 40 minute visit was spent counseling the patient about above, reviewing and discussing glucoses, talking with transplant team with Mrs Mimi Le present, reviewing and explaining rationale for above treatment plan. Patient Instructions   Diabetes; Increase Januvia to 100 mg daily - based on improved kidney function and GFR  Is > 50 (56)    Levemir - increase to 20 units once daily in AM    NPH insulin (cloudy) Adjust dose based on prednisone dose    Prednisone dose NPH dose  25 mg   20 units  20 mg   15 units  15 mg   12 units  10 mg   10 units  5   Mg    Stop NPH. Humalog for high glucoses  151-180: 1 unit  181-210: 2 units  211-240: 3 units  241-270: 4 units  271-300: 5 units  301 +    : 6 units    Goals for blood glucose:  Fasting  (less than 150)  Lunch, Dinner, Bedtime -  (less than 180). Follow-up Disposition:  Return as scheduled.     Copy sent to:

## 2017-04-20 NOTE — PATIENT INSTRUCTIONS
Diabetes; Increase Januvia to 100 mg daily - based on improved kidney function and GFR  Is > 50 (56)    Levemir - increase to 20 units once daily in AM    NPH insulin (cloudy) Adjust dose based on prednisone dose    Prednisone dose NPH dose  25 mg   20 units  20 mg   15 units  15 mg   12 units  10 mg   10 units  5   Mg    Stop NPH. Humalog for high glucoses  151-180: 1 unit  181-210: 2 units  211-240: 3 units  241-270: 4 units  271-300: 5 units  301 +    : 6 units    Goals for blood glucose:  Fasting  (less than 150)  Lunch, Dinner, Bedtime -  (less than 180).

## 2017-04-21 RX ORDER — LANCETS 33 GAUGE
EACH MISCELLANEOUS
Qty: 125 LANCET | Refills: 10 | Status: SHIPPED | OUTPATIENT
Start: 2017-04-21 | End: 2017-08-08 | Stop reason: SDUPTHER

## 2017-04-24 ENCOUNTER — PATIENT OUTREACH (OUTPATIENT)
Dept: INTERNAL MEDICINE CLINIC | Age: 52
End: 2017-04-24

## 2017-04-24 NOTE — PROGRESS NOTES
Patient was readmitted <30 days to [facility] 4/23 for diarrhea s/p recent kidney transplant. Plan to follow up with patient after discharge. Resolving current FRANCESCA episode.

## 2017-04-25 ENCOUNTER — TELEPHONE (OUTPATIENT)
Dept: ENDOCRINOLOGY | Age: 52
End: 2017-04-25

## 2017-04-25 RX ORDER — NAPROXEN SODIUM 220 MG
1 TABLET ORAL DAILY
Qty: 100 SYRINGE | Refills: 3 | Status: SHIPPED | OUTPATIENT
Start: 2017-04-25 | End: 2021-04-12

## 2017-04-25 NOTE — TELEPHONE ENCOUNTER
I returned patient's call. She needs a prescription for test strips and insulin needles and syringes sent to   [address]. Patient stated she transferred all her prescriptions from [facility] to [facility]. She stated they did not receive prescription for NPH insulin. I called Mary, they did receive this prescription but are waiting on her \"ok\" to process the prescriptions. Patient notified.

## 2017-04-26 ENCOUNTER — TELEPHONE (OUTPATIENT)
Dept: ENDOCRINOLOGY | Age: 52
End: 2017-04-26

## 2017-04-26 ENCOUNTER — PATIENT OUTREACH (OUTPATIENT)
Dept: INTERNAL MEDICINE CLINIC | Age: 52
End: 2017-04-26

## 2017-04-26 NOTE — TELEPHONE ENCOUNTER
I returned the call. Lluvia stated she was able to transfer the prescription for needles from [place] and to disregard the call.

## 2017-04-26 NOTE — TELEPHONE ENCOUNTER
Lluvia from [address] [place] [address] is requesting a call back in regards to a prescription for insulin syringes. She stated that she can be reached at [phone].

## 2017-05-23 ENCOUNTER — CLINICAL SUPPORT (OUTPATIENT)
Dept: CARDIOLOGY CLINIC | Age: 52
End: 2017-05-23

## 2017-05-23 ENCOUNTER — OFFICE VISIT (OUTPATIENT)
Dept: CARDIOLOGY CLINIC | Age: 52
End: 2017-05-23

## 2017-05-23 VITALS
OXYGEN SATURATION: 100 % | WEIGHT: 164 LBS | BODY MASS INDEX: 26.36 KG/M2 | SYSTOLIC BLOOD PRESSURE: 120 MMHG | HEART RATE: 89 BPM | DIASTOLIC BLOOD PRESSURE: 70 MMHG | RESPIRATION RATE: 16 BRPM | HEIGHT: 66 IN

## 2017-05-23 DIAGNOSIS — I10 ESSENTIAL HYPERTENSION: Primary | ICD-10-CM

## 2017-05-23 DIAGNOSIS — E78.5 DYSLIPIDEMIA: ICD-10-CM

## 2017-05-23 DIAGNOSIS — I10 ESSENTIAL HYPERTENSION: ICD-10-CM

## 2017-05-23 DIAGNOSIS — I34.0 MITRAL VALVE INSUFFICIENCY, UNSPECIFIED ETIOLOGY: ICD-10-CM

## 2017-05-23 DIAGNOSIS — I82.401 ACUTE DEEP VEIN THROMBOSIS (DVT) OF RIGHT LOWER EXTREMITY, UNSPECIFIED VEIN (HCC): ICD-10-CM

## 2017-05-23 DIAGNOSIS — I34.0 MITRAL VALVE INSUFFICIENCY, UNSPECIFIED ETIOLOGY: Primary | ICD-10-CM

## 2017-05-23 NOTE — MR AVS SNAPSHOT
Visit Information     Date & Time Provider Department Dept.  Phone Encounter #    5/23/2017 11:40 AM Geo Zimmer MD [facility] [phone] [id number]      Your Appointments     7/25/2017 10:30 AM   ROUTINE CARE with Nickie Tse MD   [facility] [address] [facility])   Appt Note: f/u diabetes cp0.00    [address]  [address] [address] Maintenance        Date Due    DTaP/Tdap/Td series (1 - Tdap) 4/26/1988    Pneumococcal 19-64 Highest Risk (2 of 3 - PCV13) 2/1/2014    HEMOGLOBIN A1C Q6M 7/16/2016    FOOT EXAM Q1 1/19/2017    FOBT Q 1 YEAR AGE 50-75 4/26/2017    LIPID PANEL Q1 7/6/2017    INFLUENZA AGE 9 TO ADULT 8/1/2017    EYE EXAM RETINAL OR DILATED Q1 11/22/2017    BREAST CANCER SCRN MAMMOGRAM 12/5/2018    PAP AKA CERVICAL CYTOLOGY 1/31/2020      Allergies as of 5/23/2017  Review Complete On: 5/23/2017 By: Abdelrahman Olvera       Severity Noted Reaction Type Reactions    Latex High 07/17/2012   Systemic Shortness of Breath    Tramadol  07/27/2015    Itching      Current Immunizations  Reviewed on 12/27/2013    Name Date    Influenza Vaccine 9/3/2014    Pneumococcal Polysaccharide (PPSV-23) 2/1/2013  1:47 PM    TB Skin Test (PPD) Intradermal 12/27/2013, 8/26/2013       Not reviewed this visit   You Were Diagnosed With        Codes Comments    Mitral valve insufficiency, unspecified etiology    -  Primary ICD-10-CM: I34.0  ICD-9-CM: 424.0     Essential hypertension     ICD-10-CM: I10  ICD-9-CM: 401.9     Acute deep vein thrombosis (DVT) of right lower extremity, unspecified vein (HCC)     ICD-10-CM: I82.401  ICD-9-CM: 453.40       Vitals     BP Pulse Resp Height(growth percentile) Weight(growth percentile) LMP    120/70 (BP 1 Location: Left arm, BP Patient Position: Sitting) 89 16 5' 6\" (1.676 m) 164 lb (74.4 kg) (LMP Unknown) SpO2 BMI OB Status Smoking Status          100% 26.47 kg/m2 Postmenopausal Never Smoker      Vitals History      BMI and BSA Data     Body Mass Index Body Surface Area    26.47 kg/m 2 1.86 m 2         Preferred Pharmacy       Pharmacy Name Phone    [facility], [address] [facility] [phone]         Your Updated Medication List          This list is accurate as of: 5/23/17 12:19 PM.  Always use your most recent med list.                acetaminophen 325 mg tablet   Commonly known as:  TYLENOL   Take 650 mg by mouth every four (4) hours as needed for Pain. acyclovir 400 mg tablet   Commonly known as:  ZOVIRAX   Take 400 mg by mouth four (4) times daily. albuterol 90 mcg/actuation inhaler   Commonly known as:  PROVENTIL HFA, VENTOLIN HFA, PROAIR HFA   Take 2 Puffs by inhalation every four (4) hours as needed for Wheezing. amLODIPine 5 mg tablet   Commonly known as:  NORVASC   Take 5 mg by mouth daily. aspirin 325 mg tablet   Commonly known as:  ASPIRIN   Take 81 mg by mouth daily. BACTRIM -800 mg per tablet   Generic drug:  trimethoprim-sulfamethoxazole   Take 1 Tab by mouth every Monday, Wednesday, Friday. bisacodyl 5 mg EC tablet   Commonly known as:  DULCOLAX   Take 5 mg by mouth daily as needed for Constipation. CELLCEPT 500 mg tablet   Generic drug:  mycophenolate   Take 1,000 mg by mouth two (2) times a day. glucose blood VI test strips strip   Commonly known as:  ONETOUCH ULTRA TEST   4 times daily. Diagnosis code: E11.8       HYDROcodone-acetaminophen 5-325 mg per tablet   Commonly known as:  Kat Schlichter   Take 1 Tab by mouth every four (4) hours as needed for Pain. insulin detemir 100 unit/mL (3 mL) Inpn   Commonly known as:  LEVEMIR FLEXPEN   20 Units by SubCUTAneous route Daily (before breakfast). insulin lispro 100 unit/mL kwikpen   Commonly known as:  HumaLOG KwikPen   As directed for high glucoses.   1 unit to lower glucose 30 mg/dl with target of < 150. Taking up to 20 units/day       Insulin Needles (Disposable) 32 gauge x 5/32\" Ndle   Commonly known as:  Wanda Pen Needle   1 Each by Does Not Apply route five (5) times daily. insulin  unit/mL (3 mL) Inpn   Commonly known as:  HUMULIN N   Inject 20 units once daily. Adjust dose as directed while taking prednisone. Insulin Syringe-Needle U-100 0.5 mL 31 gauge x 5/16 Syrg   1 Each by SubCUTAneous route daily. labetalol 200 mg tablet   Commonly known as:  NORMODYNE   Take 400 mg by mouth two (2) times a day. lancets 33 gauge Misc   Commonly known as: One Touch Delica   4 times daily. Diagnosis code: E11.8       nystatin 100,000 unit/mL suspension   Commonly known as:  MYCOSTATIN   Take 1 tsp by mouth four (4) times daily. swish and spit       pantoprazole 40 mg tablet   Commonly known as:  PROTONIX   TAKE ONE TABLET BY MOUTH ONCE DAILY       pravastatin 20 mg tablet   Commonly known as:  PRAVACHOL   Take 20 mg by mouth daily. predniSONE 5 mg dose pack   Commonly known as:  STERAPRED   Take 25 mg by mouth See Admin Instructions. See administration instruction per 5mg dose pack       SITagliptin 100 mg tablet   Commonly known as:  JANUVIA   Take 1 Tab by mouth daily. tacrolimus 1 mg capsule   Commonly known as:  PROGRAF   Take 4 mg by mouth every twelve (12) hours. We Performed the Following     AMB POC EKG ROUTINE W/ 12 LEADS, INTER & REP [46470 CPT(R)]       Introducing [employer]! Dear ePdro Steele: Thank you for requesting a [employer] account. Our records indicate that you already have an active [employer] account. You can access your account anytime at [email or web address]     Did you know that you can access your hospital and ER discharge instructions at any time in [employer]? You can also review all of your test results from your hospital stay or ER visit.     Additional Information    If you have questions, please visit the Frequently Asked Questions section of the [employer] website at [email or web address] Remember, [employer] is NOT to be used for urgent needs. For medical emergencies, dial 911. Now available from your iPhone and Android! Please provide this summary of care documentation to your next provider. Your primary care clinician is listed as Gunnar Bustillos. If you have any questions after today's visit, please call [phone].

## 2017-05-23 NOTE — PROGRESS NOTES
[facility] [place]:   [phone]    HPI: Guido Young, a 50y.o. year-old who presents for follow up regarding her uncontrolled HTN and mitral regurgitation. TTE today showed moderate MR which is stable  She had right sided kidney transplant on 4/5/17, currently followed by Dr. Mirna Mercado and Dr. Leo Barton   Feeling good and doing well   She developed a right leg DVT on 5/15/17 and was started on Coumadin per Dr. Raúl Blunt at transplant center  INR 2.0 on last check, INR being followed by transplant center  Denies any blood in her stool or urine  Still having some right leg edema and pain  Denies any dyspnea with exertion or chest pain  Says she has some mild heart racing with her cellcept but no syncope  Only has dizziness with position changes  Sleeps better now, can sleep flat if needed  Had sleep study which did not show LOUIS  Energy level is doing ok    Still has outstanding bill with [facility] so cannot get labs there    **Office note dated 5/31/17 received from Dr. Leo Barton after patient's last visit - scanned into [facility]    **Office noted dated 8/2/17 received from transplant surgery - scanned into [facility]  Hgb 10.7  K 4.2, BUN 23, CR 1.2  Mg 1.5  INR 2.2   Hgb A1c 7.2%    **Office note dated 8/23/17 received from Dr. Leo Barton - scanned into [facility]  /100 but BP ok at home, weight gain plateau, walking BID    Assessment/Plan:  1. Overweight Body mass index is 26.47 kg/(m^2). encouraged regular exercise    2. HTN- malignant, better controlled post renal transplant on labetalol 400mg BID and amlodipine 5mg daily  3. Dyslipidemia - Lipids 10/16 - , TG 68, LDL 52, HDL 41, continue pravastatin 20mg daily   4. Diabetes Mellitus - Hgb A1C 5.7%in 10/16, on insulin and Januvia, managed by PCP  5. Mitral Regurgitation - moderate on echo today, will repeat TTE in 1 year at follow up for surveillance     6.  Stage V CKD - now s/p right renal transplant 4/5/17, last creatinine 1.2 on 5/18/17, on cellcept, prograf, prednisone, followed by Dr. Gisselle Campos, Dr. Kirk Fairbanks and Dr. Dary Dawson  7. CAD prevention- on ASA 81mg daily and atorvastatin, no angina symptoms  8. Vitamin D deficiency -on 2000 units daily now  9. Right leg DVT - on coumadin, INR 2.0 on 5/18/17, followed by Dr. Dary Dawson at transplant center  10. Hx of gastritis/esophageal stricture  - on protonix, last EGD and colonoscopy showed diverticulosis, gastritis without hemorrhage and her esophagus was dilated, followed by Dr. Huey Ledesma    5/17 TTE - normal LVEF, mod MR (prelim)  2/16 TTE - LVEF 55 % to 60 %, no WMA, grade 1 dd, mod MR  3/14 TTE LVEF 55 % to 60 %, no WMA, mild cLVH, mild to mod MR  1/13 TTE LVEF 55%, no WMA, moderate increased wall thickness, grade 1 diastolic dysfunction, moderate MR    Soc no tob, no etoh  Fhx no early CAD    She  has a past medical history of Anemia; Arthritis; Asthma (4/8/2014); Beta blocker therapy; Chronic kidney disease; Diabetes ([facility] [address] (26yo); Dialysis patient [facility]); GERD (gastroesophageal reflux disease); Headache; Hepatitis B infection; Hypertension; and Pre-kidney transplant, listed (4/30/2015). She also has no past medical history of Adverse effect of anesthesia; Glomerulosclerosis due to secondary diabetes ([facility] [address] or Sleep apnea. Cardiovascular ROS: no chest pain or dyspnea  Respiratory ROS: denies cough, wheezing  Neurological ROS: no TIA or stroke symptoms  All other systems negative except as above. PE  Vitals:    05/23/17 1143   BP: 120/70   Pulse: 89   Resp: 16   SpO2: 100%   Weight: 164 lb (74.4 kg)   Height: 5' 6\" (1.676 m)    Body mass index is 26.47 kg/(m^2).   General appearance - alert, well appearing, and in no distress  Mental status - affect appropriate to mood  Eyes - sclera anicteric, moist mucous membranes  Neck - supple  Lymphatics - not assessed   Chest - clear to auscultation, no wheezes, rales or rhonchi  Heart - normal rate, regular rhythm, normal S1, S2, 2/6 TIANNA   Abdomen - soft, nontender, nondistended  Back exam - full range of motion, no tenderness  Neurological - cranial nerves II through XII grossly intact, no focal deficit  Musculoskeletal - no muscular tenderness noted, normal strength  Extremities - peripheral pulses normal, right leg with 1+ LE edema, left leg without edema    Skin - normal coloration  no rashes    12 lead ECG: NSR    Recent Labs:  Lab Results   Component Value Date/Time    Cholesterol, total 177 12/06/2013 11:05 AM     No results found for: ZANE  Lab Results   Component Value Date/Time    BUN 25 12/05/2016 08:29 PM     Lab Results   Component Value Date/Time    Potassium 4.1 12/05/2016 08:29 PM     Lab Results   Component Value Date/Time    Hemoglobin A1c 6.1 12/28/2015 08:01 PM    Hemoglobin A1c, External 5.7 01/16/2016     No components found for: HGBI,  IHGB,  HGB,  HGBP,  WBHGB  Lab Results   Component Value Date/Time    PLATELET 568 69/85/1086 08:29 PM       Reviewed:  Past Medical History:   Diagnosis Date    Anemia     Arthritis     Asthma 4/8/2014    Rx for same    Beta blocker therapy     Chronic kidney disease     ESRD - dialysis MWF    Diabetes ([facility] [address] 28yo    Rx to control    Dialysis patient ([facility] [address]     M-W-F    GERD (gastroesophageal reflux disease)     Headache     Hepatitis B infection     Hypertension     Rx for same    Pre-kidney transplant, listed 4/30/2015     History   Smoking Status    Never Smoker   Smokeless Tobacco    Never Used     History   Alcohol Use No     Allergies   Allergen Reactions    Latex Shortness of Breath    Tramadol Itching       Current Outpatient Prescriptions   Medication Sig    glucose blood VI test strips (ONETOUCH ULTRA TEST) strip 4 times daily. Diagnosis code: E11.8    Insulin Syringe-Needle U-100 0.5 mL 31 gauge x 5/16 syrg 1 Each by SubCUTAneous route daily.  lancets (ONE TOUCH DELICA) 33 gauge misc 4 times daily.  Diagnosis code: E11.8    SITagliptin (JANUVIA) 100 mg tablet Take 1 Tab by mouth daily.    insulin NPH (HUMULIN N) 100 unit/mL (3 mL) inpn Inject 20 units once daily. Adjust dose as directed while taking prednisone.  Insulin Needles, Disposable, (SUZY PEN NEEDLE) 32 gauge x 5/32\" ndle 1 Each by Does Not Apply route five (5) times daily.  insulin lispro (HUMALOG KWIKPEN) 100 unit/mL kwikpen As directed for high glucoses. 1 unit to lower glucose 30 mg/dl with target of < 150. Taking up to 20 units/day    insulin detemir (LEVEMIR FLEXPEN) 100 unit/mL (3 mL) inpn 20 Units by SubCUTAneous route Daily (before breakfast). (Patient taking differently: 30 Units by SubCUTAneous route Daily (before breakfast). )    pravastatin (PRAVACHOL) 20 mg tablet Take 20 mg by mouth daily.  amLODIPine (NORVASC) 5 mg tablet Take 5 mg by mouth daily.  acyclovir (ZOVIRAX) 400 mg tablet Take 400 mg by mouth four (4) times daily.  nystatin (MYCOSTATIN) 100,000 unit/mL suspension Take 1 tsp by mouth four (4) times daily. swish and spit    acetaminophen (TYLENOL) 325 mg tablet Take 650 mg by mouth every four (4) hours as needed for Pain.  aspirin (ASPIRIN) 325 mg tablet Take 81 mg by mouth daily.  predniSONE (STERAPRED) 5 mg dose pack Take 25 mg by mouth See Admin Instructions. See administration instruction per 5mg dose pack     mycophenolate (CELLCEPT) 500 mg tablet Take 1,000 mg by mouth two (2) times a day.  tacrolimus (PROGRAF) 1 mg capsule Take 4 mg by mouth every twelve (12) hours.  HYDROcodone-acetaminophen (NORCO) 5-325 mg per tablet Take 1 Tab by mouth every four (4) hours as needed for Pain.  trimethoprim-sulfamethoxazole (BACTRIM DS) 160-800 mg per tablet Take 1 Tab by mouth every Monday, Wednesday, Friday.  pantoprazole (PROTONIX) 40 mg tablet TAKE ONE TABLET BY MOUTH ONCE DAILY    bisacodyl (DULCOLAX) 5 mg EC tablet Take 5 mg by mouth daily as needed for Constipation.  labetalol (NORMODYNE) 200 mg tablet Take 400 mg by mouth two (2) times a day.     albuterol (PROVENTIL HFA, VENTOLIN HFA, PROAIR HFA) 90 mcg/actuation inhaler Take 2 Puffs by inhalation every four (4) hours as needed for Wheezing. No current facility-administered medications for this visit.         Pamala Bumpers, MD [facility] and [facility] [place]  [facility] [address] [facility] [address]  [address]

## 2017-06-09 RX ORDER — LABETALOL 200 MG/1
400 TABLET, FILM COATED ORAL 2 TIMES DAILY
Qty: 360 TAB | Refills: 1 | Status: SHIPPED | OUTPATIENT
Start: 2017-06-09 | End: 2017-12-12 | Stop reason: SDUPTHER

## 2017-06-30 RX ORDER — AMLODIPINE BESYLATE 5 MG/1
5 TABLET ORAL DAILY
Qty: 90 TAB | Refills: 3 | Status: SHIPPED | OUTPATIENT
Start: 2017-06-30 | End: 2018-07-03 | Stop reason: SDUPTHER

## 2017-07-25 ENCOUNTER — OFFICE VISIT (OUTPATIENT)
Dept: ENDOCRINOLOGY | Age: 52
End: 2017-07-25

## 2017-07-25 VITALS
HEART RATE: 78 BPM | SYSTOLIC BLOOD PRESSURE: 116 MMHG | WEIGHT: 182 LBS | BODY MASS INDEX: 29.25 KG/M2 | HEIGHT: 66 IN | DIASTOLIC BLOOD PRESSURE: 64 MMHG

## 2017-07-25 DIAGNOSIS — Z94.0 S/P KIDNEY TRANSPLANT: ICD-10-CM

## 2017-07-25 DIAGNOSIS — I10 ESSENTIAL HYPERTENSION: ICD-10-CM

## 2017-07-25 RX ORDER — CINACALCET 30 MG/1
30 TABLET, FILM COATED ORAL DAILY
COMMUNITY

## 2017-07-25 RX ORDER — MYCOPHENOLATE MOFETIL 250 MG/1
250 CAPSULE ORAL
COMMUNITY

## 2017-07-25 RX ORDER — WARFARIN 1 MG/1
1 TABLET ORAL DAILY
COMMUNITY
End: 2018-09-10

## 2017-07-25 RX ORDER — LANOLIN ALCOHOL/MO/W.PET/CERES
800 CREAM (GRAM) TOPICAL 2 TIMES DAILY
COMMUNITY
End: 2018-05-08

## 2017-07-25 RX ORDER — CEPHALEXIN 500 MG/1
500 CAPSULE ORAL 2 TIMES DAILY
COMMUNITY
End: 2017-07-25

## 2017-07-25 RX ORDER — ASPIRIN 81 MG/1
TABLET ORAL DAILY
COMMUNITY

## 2017-07-25 NOTE — MR AVS SNAPSHOT
Visit Information     Date & Time Provider Department Dept. Phone Encounter #    7/25/2017 10:30 AM Idalia Moore MD [facility] [phone] [id number]      Follow-up Instructions     Return in about 2 months (around 9/25/2017).       Your Appointments     5/29/2018 11:00 AM   ECHO CARDIOGRAMS 2D with HATTIE ECHAVARRIA   [facility] ([facility])   Appt Note: Per Dr. Kike Villela Echo dx MR, See Kike Beverage after    [address] [facility] [address]           [address] [id number]            5/29/2018 11:40 AM   ESTABLISHED PATIENT with Juliana Lu MD   [facility] ([facility])   Appt Note: Per Dr. Kike Villela Echo dx MR, See Kike Beverage after    [address] [facility] [address]           [address] Maintenance        Date Due    DTaP/Tdap/Td series (1 - Tdap) 4/26/1988    Pneumococcal 19-64 Highest Risk (2 of 3 - PCV13) 2/1/2014    HEMOGLOBIN A1C Q6M 7/16/2016    FOOT EXAM Q1 1/19/2017    FOBT Q 1 YEAR AGE 50-75 4/26/2017    LIPID PANEL Q1 7/6/2017    INFLUENZA AGE 9 TO ADULT 8/1/2017    EYE EXAM RETINAL OR DILATED Q1 11/22/2017    BREAST CANCER SCRN MAMMOGRAM 12/5/2018    PAP AKA CERVICAL CYTOLOGY 1/31/2020      Allergies as of 7/25/2017  Review Complete On: 7/25/2017 By: Idalia Moore MD       Severity Noted Reaction Type Reactions    Latex High 07/17/2012   Systemic Shortness of Breath    Tramadol  07/27/2015    Itching      Current Immunizations  Reviewed on 12/27/2013    Name Date    Influenza Vaccine 9/3/2014    Pneumococcal Polysaccharide (PPSV-23) 2/1/2013  1:47 PM    TB Skin Test (PPD) Intradermal 12/27/2013, 8/26/2013       Not reviewed this visit   You Were Diagnosed With        Codes Comments    Uncontrolled type 1 diabetes mellitus with complication ([address]    - Primary ICD-10-CM: E10.8, E10.65  ICD-9-CM: 250.93     Essential hypertension     ICD-10-CM: I10  ICD-9-CM: 401.9     S/P kidney transplant     ICD-10-CM: Z94.0  ICD-9-CM: V42.0       Vitals     BP Pulse Height(growth percentile) Weight(growth percentile) LMP BMI    116/64 78 5' 6\" (1.676 m) 182 lb (82.6 kg) (LMP Unknown) 29.38 kg/m2    OB Status Smoking Status                Postmenopausal Never Smoker        Vitals History      BMI and BSA Data     Body Mass Index Body Surface Area    29.38 kg/m 2 1.96 m 2         Preferred Pharmacy       Pharmacy Name Phone    [address], [place] [phone]         Your Updated Medication List          This list is accurate as of: 17 11:18 AM.  Always use your most recent med list.                albuterol 90 mcg/actuation inhaler   Commonly known as:  PROVENTIL HFA, VENTOLIN HFA, PROAIR HFA   Take 2 Puffs by inhalation every four (4) hours as needed for Wheezing. amLODIPine 5 mg tablet   Commonly known as:  NORVASC   Take 1 Tab by mouth daily. aspirin delayed-release 81 mg tablet   Take  by mouth daily. BACTRIM -800 mg per tablet   Generic drug:  trimethoprim-sulfamethoxazole   Take 1 Tab by mouth every Monday, Wednesday, Friday. CELLCEPT 250 mg capsule   Generic drug:  mycophenolate mofetil   Take 750 mg by mouth two (2) times a day. COUMADIN 1 mg tablet   Generic drug:  warfarin   Take 1 mg by mouth daily. glucose blood VI test strips strip   Commonly known as:  ONETOUCH ULTRA TEST   4 times daily. Diagnosis code: E11.8       insulin detemir 100 unit/mL (3 mL) Inpn   Commonly known as:  LEVEMIR FLEXTOUCH   Use up to 60 units daily       insulin lispro 100 unit/mL kwikpen   Commonly known as:  HumaLOG KwikPen   As directed for high glucoses. 1 unit to lower glucose 30 mg/dl with target of < 150.  Taking up to 20 units/day       Insulin Needles (Disposable) 32 gauge x /32\" Ndle   Commonly known as:  Wanda Pen Needle   1 Each by Does Not Apply route five (5) times daily. Insulin Syringe-Needle U-100 0.5 mL 31 gauge x 5/16 Syrg   1 Each by SubCUTAneous route daily. labetalol 200 mg tablet   Commonly known as:  NORMODYNE   Take 2 Tabs by mouth two (2) times a day. lancets 33 gauge Misc   Commonly known as: One Touch Delica   4 times daily. Diagnosis code: E11.8       magnesium oxide 400 mg tablet   Commonly known as:  MAG-OX   Take 800 mg by mouth two (2) times a day. pantoprazole 40 mg tablet   Commonly known as:  PROTONIX   TAKE ONE TABLET BY MOUTH ONCE DAILY       PHOSPHA 250 NEUTRAL PO   Take  by mouth.       pravastatin 20 mg tablet   Commonly known as:  PRAVACHOL   Take 20 mg by mouth daily. predniSONE 5 mg dose pack   Commonly known as:  STERAPRED   Take 5 mg by mouth daily. See administration instruction per 5mg dose pack       SENSIPAR 30 mg tablet   Generic drug:  cinacalcet   Take 30 mg by mouth daily. SITagliptin 100 mg tablet   Commonly known as:  JANUVIA   Take 1 Tab by mouth daily. tacrolimus 1 mg capsule   Commonly known as:  PROGRAF   Take 2 mg by mouth two (2) times a day. Prescriptions Sent to Pharmacy        Refills    insulin detemir (LEVEMIR FLEXTOUCH) 100 unit/mL (3 mL) inpn 10    Sig: Use up to 60 units daily    Class: Normal    Pharmacy: [address] [place] [address], [address] [place] [address] #: [phone]      Follow-up Instructions     Return in about 2 months (around 9/25/2017). Patient Instructions    Diabetes;    Continue Januvia to 100 mg daily - based on improved kidney function and GFR  Is > 50 (56)    Stop NPH insulin    Increase Levemir to 50 units daily. Follow trend overnight to assess.     Humalog for high glucoses  151-180: 1 unit  181-210: 2 units  211-240: 3 units  241-270: 4 units  271-300: 5 units  301 +    : 6 units    Goals for blood glucose:  Fasting  (less than 150)  Lunch, Dinner, Bedtime -  (less than 180). ** Ask about resuming metformin -> Dr Sara Peace. Metformin may substantially decrease insulin needs, which would allow you to lose some of the weight you had gained from the insulin           Introducing hospitals & HEALTH SERVICES! Dear Zach Huang: Thank you for requesting a [employer] account. Our records indicate that you already have an active [employer] account. You can access your account anytime at [email or web address]     Did you know that you can access your hospital and ER discharge instructions at any time in [employer]? You can also review all of your test results from your hospital stay or ER visit. Additional Information    If you have questions, please visit the Frequently Asked Questions section of the [employer] website at [email or web address] Remember, [employer] is NOT to be used for urgent needs. For medical emergencies, dial 911. Now available from your iPhone and Android! Please provide this summary of care documentation to your next provider. Your primary care clinician is listed as Mandy Woodard. If you have any questions after today's visit, please call [phone].

## 2017-07-25 NOTE — PATIENT INSTRUCTIONS
Diabetes;    Continue Januvia to 100 mg daily - based on improved kidney function and GFR  Is > 50 (56)    Stop NPH insulin    Increase Levemir to 50 units daily. Follow trend overnight to assess. Humalog for high glucoses  151-180: 1 unit  181-210: 2 units  211-240: 3 units  241-270: 4 units  271-300: 5 units  301 +    : 6 units    Goals for blood glucose:  Fasting  (less than 150)  Lunch, Dinner, Bedtime -  (less than 180). ** Ask about resuming metformin -> Dr Danae Fiore.  Metformin may substantially decrease insulin needs, which would allow you to lose some of the weight you had gained from the insulin

## 2017-07-25 NOTE — PROGRESS NOTES
History of Present Illness: Estevan Mclean is a 50 y.o. female presents for follow-up of diabetes. She has a history of ESRD and is now s/p renal transplant on 4/11/2017  Glucoses are high on prednisone taper  Last week prednisone decreased to 5 mg daily    Current diabetes regimen:  Levemir 30 units daily  NPH 20 units daily - still taking. Was to decrease and stop as prednisone dose was decreased, but has not done so  januvia 100 mg daily. Humalog 1:30 for highs    Glucoses: reviewed meter  Lowest was 77 - around 11:30 am after having value in 200s at breakfast  Glucoses are often best in evening. Glucoses tend to increase overnight most of the time. Fasting values can often be in low 200s. Best fasting values were in the 150-160ss around July 3rd-5th. Diet:h has had some trouble adjusting to increased options for food after coming off dialysis. Plans to decrease portions and improve diet. .     Weight has increased by about 20 lbs. HTN  - managed by transplant team. Labetalol and amlodipine. BP controlled    Social:  Has son with diabetes who is 32years of age who is following with me      Past Medical History:   Diagnosis Date    Anemia     Arthritis     Asthma 4/8/2014    Rx for same    Beta blocker therapy     Chronic kidney disease     ESRD - dialysis MWF    Diabetes ([facility] [address] 26yo    Rx to control    Dialysis patient ([facility] [address]     M-W-F    GERD (gastroesophageal reflux disease)     Headache     Hepatitis B infection     Hypertension     Rx for same    Pre-kidney transplant, listed 4/30/2015     Current Outpatient Prescriptions   Medication Sig    warfarin (COUMADIN) 1 mg tablet Take 1 mg by mouth daily.  SOD PHOS DI, MONO/K PHOS MONO (PHOSPHA 250 NEUTRAL PO) Take  by mouth.  cinacalcet (SENSIPAR) 30 mg tablet Take 30 mg by mouth daily.  magnesium oxide (MAG-OX) 400 mg tablet Take 800 mg by mouth two (2) times a day.  aspirin delayed-release 81 mg tablet Take  by mouth daily.  mycophenolate mofetil (CELLCEPT) 250 mg capsule Take 750 mg by mouth two (2) times a day.  amLODIPine (NORVASC) 5 mg tablet Take 1 Tab by mouth daily.  labetalol (NORMODYNE) 200 mg tablet Take 2 Tabs by mouth two (2) times a day.  glucose blood VI test strips (ONETOUCH ULTRA TEST) strip 4 times daily. Diagnosis code: E11.8    Insulin Syringe-Needle U-100 0.5 mL 31 gauge x 5/16 syrg 1 Each by SubCUTAneous route daily.  lancets (ONE TOUCH DELICA) 33 gauge misc 4 times daily. Diagnosis code: E11.8    SITagliptin (JANUVIA) 100 mg tablet Take 1 Tab by mouth daily.  insulin NPH (HUMULIN N) 100 unit/mL (3 mL) inpn Inject 20 units once daily. Adjust dose as directed while taking prednisone.  Insulin Needles, Disposable, (SUZY PEN NEEDLE) 32 gauge x 5/32\" ndle 1 Each by Does Not Apply route five (5) times daily.  insulin lispro (HUMALOG KWIKPEN) 100 unit/mL kwikpen As directed for high glucoses. 1 unit to lower glucose 30 mg/dl with target of < 150. Taking up to 20 units/day    insulin detemir (LEVEMIR FLEXPEN) 100 unit/mL (3 mL) inpn 20 Units by SubCUTAneous route Daily (before breakfast). (Patient taking differently: 30 Units by SubCUTAneous route Daily (before breakfast). )    pravastatin (PRAVACHOL) 20 mg tablet Take 20 mg by mouth daily.  predniSONE (STERAPRED) 5 mg dose pack Take 5 mg by mouth daily. See administration instruction per 5mg dose pack     tacrolimus (PROGRAF) 1 mg capsule Take 2 mg by mouth two (2) times a day.  trimethoprim-sulfamethoxazole (BACTRIM DS) 160-800 mg per tablet Take 1 Tab by mouth every Monday, Wednesday, Friday.  pantoprazole (PROTONIX) 40 mg tablet TAKE ONE TABLET BY MOUTH ONCE DAILY    albuterol (PROVENTIL HFA, VENTOLIN HFA, PROAIR HFA) 90 mcg/actuation inhaler Take 2 Puffs by inhalation every four (4) hours as needed for Wheezing. No current facility-administered medications for this visit.       Allergies   Allergen Reactions    Latex Shortness of Breath    Tramadol Itching     Physical Examination:  Visit Vitals    /64    Pulse 78    Ht 5' 6\" (1.676 m)    Wt 182 lb (82.6 kg)    LMP  (LMP Unknown)    BMI 29.38 kg/m2   -   - General: pleasant, no distress, normal gait   HEENT: hearing intact, EOMI, clear sclera without icterus  - Cardiovascular: regular, normal rate   - Respiratory: normal effort  - Integumentary: no edema  - Psychiatric: normal mood and affect    Data Reviewed:   Creatinine has been around 1.1 with recent labs. GFR is in the 55 range. Assessment/Plan:   1. Uncontrolled type 1 diabetes mellitus with complication ([facility] [address]   - unsure truly if she has type I or long-standing type II diabetes. - check fasting c-peptide with next labs. If she has considerable beta-cell function, may consider other medication options  As she appears to have considerable insulin resistance currently, would add metformin  mg twice daily, if approved by renal transplant team.  GFR has consistently been > 45 and this is considered safe for starting. This would hopefully decrease insulin needs substantially and help her lose some of the weight she has gained with insulin increases. - stop NPH as this is no longer needed with 5 mg prednisone daily   - Increase Levemir to 50 units to better control insulin needs overnight - follow trend overnight to assess. - continue Januvia and Humalog correction. Glucose monitoring needed to determine if Humalog is needed for higher carbohydrate meals. 2. Essential hypertension   - controlled. Per transplant team   3. S/P kidney transplant   - will verify if metformin is acceptable to start. * Appears empagliflozin (brand name Jardiance) - SGLT2 inhibitor used for type II diabetes may be helpful to improve diabetes control and renal function after transplant:  Jcarlos Castillo would help with weight loss via glucosuric effect and would also help to decrease insulin needs.   Would be happy to start this as well if ok from Dr Filiberto Hernández. Study below was in lab animals, but Jasmin Acosta has been shown to also have favorable CV and renal effects in large cardiovascular outcomes trial in humans - EMPA-REG trial - NEJM 2016    Am J Transplant. 2017 Apr 19. doi: 10.1111/ajt.64743. [Epub ahead of print]   Effect of Empagliflozin on Tacrolimus-Induced Pancreas Islet Dysfunction and Renal Injury. Ajit Trejo K1,2, Lino SW2, Rafy BH1,2, Keiry Ambrosio [facility] information  Abstract  An inhibitor of sodium glucose co-transporter type 2 (SGLT-2) is recommended in type 2 diabetes mellitus (DM) but its use is still undetermined in tacrolimus (TAC)-induced DM. We evaluated the effect of empagliflozin (Em) on TAC-induced pancreatic islet dysfunction and renal injury in an experimental model of TAC-induced DM and in vitro. TAC induced a twofold increase in SGLT-2 expression, while Em decreased SGLT-2 expression and further increased urinary glucose excretion compared to the TAC group. Em reduced hyperglycemia and increased plasma insulin level, pancreatic islet size, and glucose-stimulated insulin secretion compared to the TAC group. In kidney, Em alleviated TAC-induced renal dysfunction and decreased albumin excretion and histological injury compared with the TAC group. Increased oxidative stress and apoptotic cell death by TAC was remarkably decreased with Em in serum and pancreatic and renal tissues. In in vitro study, TAC decreased cell viability and increased reactive oxygen species (ROS) production in both insulin-secreting beta-cell derived (INS-1) and human kidney-2 (HK-2) cell lines. Addition of Em increased cell viability and decreased ROS production in HK-2 but not in INS-1 cell lines. This suggests that Em is effective in controlling TAC-induced hyperglycemia and has direct protective effect on TAC-induced renal injury.       Study in humans - type II diabetics  N Engl J Med. 2016 Jul 28;375(4):323-34. doi: 10.1056/XBRYot4521221. Epub 2016 Jun 14. Empagliflozin and Progression of Kidney Disease in Type 2 Diabetes. Rick C1, Silver SE1, Mainor REIS1, Tobias D1, shelbie Monahan M1, Yehuda M1, Rick Parisamary OE1, Sunny HJ1, Davis UC1, Pattie B1; EMPA-REG OUTCOME Investigators. Author information  Abstract  BACKGROUND:   Diabetes confers an increased risk of adverse cardiovascular and renal events. In the EMPA-REG OUTCOME trial, empagliflozin, a sodium-glucose cotransporter 2 inhibitor, reduced the risk of major adverse cardiovascular events in patients with type 2 diabetes at high risk for cardiovascular events. We wanted to determine the long-term renal effects of empagliflozin, an analysis that was a prespecified component of the secondary microvascular outcome of that trial.  METHODS:   We randomly assigned patients with type 2 diabetes and an estimated glomerular filtration rate of at least 30 ml per minute per 1.73 m(2) of body-surface area to receive either empagliflozin (at a dose of 10 mg or 25 mg) or placebo once daily. Prespecified renal outcomes included incident or worsening nephropathy (progression to macroalbuminuria, doubling of the serum creatinine level, initiation of renal-replacement therapy, or death from renal disease) and incident albuminuria. RESULTS:   Incident or worsening nephropathy occurred in 525 of 4124 patients (12.7%) in the empagliflozin group and in 388 of 2061 (18.8%) in the placebo group (hazard ratio in the empagliflozin group, 0.61; 95% confidence interval, 0.53 to 0.70; P<0.001). Doubling of the serum creatinine level occurred in 70 of 4645 patients (1.5%) in the empagliflozin group and in 60 of 2323 (2.6%) in the placebo group, a significant relative risk reduction of 44%.  Renal-replacement therapy was initiated in 13 of 4687 patients (0.3%) in the empagliflozin group and in 14 of 2333 patients (0.6%) in the placebo group, representing a 55% lower relative risk in the empagliflozin group. There was no significant between-group difference in the rate of incident albuminuria. The adverse-event profile of empagliflozin in patients with impaired kidney function at baseline was similar to that reported in the overall trial population. CONCLUSIONS:   In patients with type 2 diabetes at high cardiovascular risk, empagliflozin was associated with slower progression of kidney disease and lower rates of clinically relevant renal events than was placebo when added to standard care. (Funded by the [employer]; [address] [facility]. gov number, [id number].). Greater than 50% of 25 minute visit was spent counseling the patient about above. Patient Instructions   Diabetes;    Continue Januvia to 100 mg daily - based on improved kidney function and GFR  Is > 50 (56)    Stop NPH insulin    Increase Levemir to 50 units daily. Follow trend overnight to assess. Humalog for high glucoses  151-180: 1 unit  181-210: 2 units  211-240: 3 units  241-270: 4 units  271-300: 5 units  301 +    : 6 units    Goals for blood glucose:  Fasting  (less than 150)  Lunch, Dinner, Bedtime -  (less than 180). ** Ask about resuming metformin -> Dr Michele France. Metformin may substantially decrease insulin needs, which would allow you to lose some of the weight you had gained from the insulin      Follow-up Disposition:  Return in about 2 months (around 9/25/2017).     Copy sent to:

## 2017-08-04 ENCOUNTER — OFFICE VISIT (OUTPATIENT)
Dept: INTERNAL MEDICINE CLINIC | Age: 52
End: 2017-08-04

## 2017-08-04 VITALS
WEIGHT: 182 LBS | HEART RATE: 86 BPM | DIASTOLIC BLOOD PRESSURE: 55 MMHG | BODY MASS INDEX: 29.25 KG/M2 | SYSTOLIC BLOOD PRESSURE: 114 MMHG | HEIGHT: 66 IN | RESPIRATION RATE: 16 BRPM | OXYGEN SATURATION: 98 %

## 2017-08-04 DIAGNOSIS — G44.219 EPISODIC TENSION-TYPE HEADACHE, NOT INTRACTABLE: Primary | ICD-10-CM

## 2017-08-04 PROBLEM — I82.431 ACUTE DEEP VEIN THROMBOSIS (DVT) OF POPLITEAL VEIN OF RIGHT LOWER EXTREMITY (HCC): Status: ACTIVE | Noted: 2017-08-04

## 2017-08-04 RX ORDER — ALBUTEROL SULFATE 90 UG/1
2 AEROSOL, METERED RESPIRATORY (INHALATION)
Qty: 1 INHALER | Refills: 3 | Status: SHIPPED | OUTPATIENT
Start: 2017-08-04 | End: 2018-08-30 | Stop reason: SDUPTHER

## 2017-08-04 RX ORDER — ACETAMINOPHEN AND CODEINE PHOSPHATE 300; 30 MG/1; MG/1
1 TABLET ORAL
Qty: 40 TAB | Refills: 0 | Status: SHIPPED | OUTPATIENT
Start: 2017-08-04 | End: 2017-12-12 | Stop reason: ALTCHOICE

## 2017-08-04 NOTE — MR AVS SNAPSHOT
Visit Information     Date & Time Provider Department Dept.  Phone Encounter #    8/4/2017  8:15 AM Bryanna Soria MD [facility] [phone] [id number]      Your Appointments     10/2/2017 10:10 AM   ROUTINE CARE with Kinsey Crystal MD   [facility] [address] [facility])   Appt Note: f/u diabetes cp0.00    [address]  [address]  [address]           [address] [facility] [address]            5/29/2018 11:00 AM   ECHO CARDIOGRAMS 2D with HATTIE ECHAVARRIA   [facility] ([facility] SCHEDULING)   Appt Note: Per Dr. Caryle Chol Echo dx MR, See Caryle Chol after    [address] [facility] [address]           [address] [address]            5/29/2018 11:40 AM   ESTABLISHED PATIENT with Donald Arvizu MD   [facility] ([facility] [address])   Appt Note: Per Dr. Caryle Chol Echo dx MR, See Caryle Chol after    [address] [facility] [address]           [address] [address] Maintenance        Date Due    MEDICARE [place]  4/26/1985    DTaP/Tdap/Td series (1 - Tdap) 4/26/1988    Pneumococcal 19-64 Highest Risk (2 of 3 - PCV13) 2/1/2014    HEMOGLOBIN A1C Q6M 7/16/2016    FOOT EXAM Q1 1/19/2017    FOBT Q 1 YEAR AGE 50-75 4/26/2017    LIPID PANEL Q1 7/6/2017    INFLUENZA AGE 9 TO ADULT 8/1/2017    EYE EXAM RETINAL OR DILATED Q1 11/22/2017    BREAST CANCER SCRN MAMMOGRAM 12/5/2018    PAP AKA CERVICAL CYTOLOGY 1/31/2020      Allergies as of 8/4/2017  Review Complete On: 7/25/2017 By: Kinsey Crystal MD       Severity Noted Reaction Type Reactions    Latex High 07/17/2012   Systemic Shortness of Breath    Tramadol  07/27/2015    Itching      Current Immunizations  Reviewed on 12/27/2013    Name Date    Influenza Vaccine 9/3/2014    Pneumococcal Polysaccharide (PPSV-23) 2/1/2013  1:47 PM    TB Skin Test (PPD) Intradermal 12/27/2013, 8/26/2013       Not reviewed this visit   You Were Diagnosed With        Codes Comments    Episodic tension-type headache, not intractable    -  Primary ICD-10-CM: G44.219  ICD-9-CM: 339.11       Vitals     BP Pulse Resp Height(growth percentile) Weight(growth percentile) LMP    114/55 86 16 5' 6\" (1.676 m) 182 lb (82.6 kg) (LMP Unknown)    SpO2 BMI OB Status Smoking Status          98% 29.38 kg/m2 Postmenopausal Never Smoker      Vitals History      BMI and BSA Data     Body Mass Index Body Surface Area    29.38 kg/m 2 1.96 m 2         Preferred Pharmacy       Pharm[facility] Name Phone    [facility] [address] [place] [address] [phone]         Your Updated Medication List          This list is accurate as of: 8/4/17  9:04 AM.  Always use your most recent med list.                acetaminophen-codeine 300-30 mg per tablet   Commonly known as:  TYLENOL #3   Take 1 Tab by mouth every four (4) hours as needed for Pain. Max Daily Amount: 6 Tabs. albuterol 90 mcg/actuation inhaler   Commonly known as:  PROVENTIL HFA, VENTOLIN HFA, PROAIR HFA   Take 2 Puffs by inhalation every four (4) hours as needed for Wheezing. amLODIPine 5 mg tablet   Commonly known as:  NORVASC   Take 1 Tab by mouth daily. aspirin delayed-release 81 mg tablet   Take  by mouth daily. BACTRIM -800 mg per tablet   Generic drug:  trimethoprim-sulfamethoxazole   Take 1 Tab by mouth every Monday, Wednesday, Friday. CELLCEPT 250 mg capsule   Generic drug:  mycophenolate mofetil   Take 750 mg by mouth two (2) times a day. COUMADIN 1 mg tablet   Generic drug:  warfarin   Take 1 mg by mouth daily. glucose blood VI test strips strip   Commonly known as:  ONETOUCH ULTRA TEST   4 times daily.  Diagnosis code: E11.8       insulin detemir 100 unit/mL (3 mL) Inpn   Commonly known as:  LEVEMIR FLEXTOUCH   Use up to 60 units daily       insulin lispro 100 unit/mL kwikpen   Commonly known as:  HumaLOG KwikPen   As directed for high glucoses. 1 unit to lower glucose 30 mg/dl with target of < 150. Taking up to 20 units/day       Insulin Needles (Disposable) 32 gauge x 5/32\" Ndle   Commonly known as:  Wanda Pen Needle   1 Each by Does Not Apply route five (5) times daily. Insulin Syringe-Needle U-100 0.5 mL 31 gauge x 5/16 Syrg   1 Each by SubCUTAneous route daily. labetalol 200 mg tablet   Commonly known as:  NORMODYNE   Take 2 Tabs by mouth two (2) times a day. lancets 33 gauge Misc   Commonly known as: One Touch Delica   4 times daily. Diagnosis code: E11.8       magnesium oxide 400 mg tablet   Commonly known as:  MAG-OX   Take 800 mg by mouth two (2) times a day. pantoprazole 40 mg tablet   Commonly known as:  PROTONIX   TAKE ONE TABLET BY MOUTH ONCE DAILY       PHOSPHA 250 NEUTRAL PO   Take  by mouth.       pravastatin 20 mg tablet   Commonly known as:  PRAVACHOL   Take 20 mg by mouth daily. predniSONE 5 mg dose pack   Commonly known as:  STERAPRED   Take 5 mg by mouth daily. See administration instruction per 5mg dose pack       SENSIPAR 30 mg tablet   Generic drug:  cinacalcet   Take 30 mg by mouth daily. SITagliptin 100 mg tablet   Commonly known as:  JANUVIA   Take 1 Tab by mouth daily. tacrolimus 1 mg capsule   Commonly known as:  PROGRAF   Take 2 mg by mouth two (2) times a day. Prescriptions Printed        Refills    albuterol (PROVENTIL HFA, VENTOLIN HFA, PROAIR HFA) 90 mcg/actuation inhaler 3    Sig: Take 2 Puffs by inhalation every four (4) hours as needed for Wheezing. Class: Print    Route: Inhalation    acetaminophen-codeine (TYLENOL #3) 300-30 mg per tablet 0    Sig: Take 1 Tab by mouth every four (4) hours as needed for Pain. Max Daily Amount: 6 Tabs. Class: Print    Route: Oral      Introducing hospitals & HEALTH SERVICES! Dear Castillo Avilez:   Thank you for requesting a [employer] account. Our records indicate that you already have an active [employer] account. You can access your account anytime at [email or web address]     Did you know that you can access your hospital and ER discharge instructions at any time in [employer]? You can also review all of your test results from your hospital stay or ER visit. Additional Information    If you have questions, please visit the Frequently Asked Questions section of the [employer] website at [email or web address] Remember, [employer] is NOT to be used for urgent needs. For medical emergencies, dial 911. Now available from your iPhone and Android! Please provide this summary of care documentation to your next provider. Your primary care clinician is listed as Nicho Flood. If you have any questions after today's visit, please call [phone].

## 2017-08-04 NOTE — PROGRESS NOTES
Coordination of Care Questions    1. Have you been to the ER, urgent care clinic since your last visit? No       Hospitalized since your last visit? No    2. Have you seen or consulted any other health care providers outside of the [address] [place] [address] since your last visit? Include any pap smears or colon screening.  No

## 2017-08-04 NOTE — PROGRESS NOTES
Chief Complaint   Patient presents with    Cold Symptoms    Headache     Rock Adame is a 50 y.o. female with the following Problems and Medications. Patient Active Problem List   Diagnosis Code    Hypertension I10    Kidney disease N28.9    Migraines G43.909    Diabetes mellitus ([facility] [address] E11.9    Anemia D64.9    Hyperkalemia E87.5    DM (diabetes mellitus) ([facility] [address] E11.9    CKD (chronic kidney disease) stage V requiring chronic dialysis (HCC) N18.6, Z99.2    Depression F32.9    Asthma J45.909    Dyslipidemia E78.5    Mitral regurgitation I34.0    Acute deep vein thrombosis (DVT) of popliteal vein of right lower extremity ([facility]) I82.431     Current Outpatient Prescriptions   Medication Sig Dispense Refill    warfarin (COUMADIN) 1 mg tablet Take 1 mg by mouth daily.  SOD PHOS DI, MONO/K PHOS MONO (PHOSPHA 250 NEUTRAL PO) Take  by mouth.  cinacalcet (SENSIPAR) 30 mg tablet Take 30 mg by mouth daily.  magnesium oxide (MAG-OX) 400 mg tablet Take 800 mg by mouth two (2) times a day.  aspirin delayed-release 81 mg tablet Take  by mouth daily.  mycophenolate mofetil (CELLCEPT) 250 mg capsule Take 750 mg by mouth two (2) times a day.  insulin detemir (LEVEMIR FLEXTOUCH) 100 unit/mL (3 mL) inpn Use up to 60 units daily 30 mL 10    amLODIPine (NORVASC) 5 mg tablet Take 1 Tab by mouth daily. 90 Tab 3    labetalol (NORMODYNE) 200 mg tablet Take 2 Tabs by mouth two (2) times a day. 360 Tab 1    glucose blood VI test strips (ONETOUCH ULTRA TEST) strip 4 times daily. Diagnosis code: E11.8 125 Strip 10    Insulin Syringe-Needle U-100 0.5 mL 31 gauge x 5/16 syrg 1 Each by SubCUTAneous route daily. 100 Syringe 3    lancets (ONE TOUCH DELICA) 33 gauge misc 4 times daily. Diagnosis code: E11.8 125 Lancet 10    SITagliptin (JANUVIA) 100 mg tablet Take 1 Tab by mouth daily.  30 Tab 10    Insulin Needles, Disposable, (SUZY PEN NEEDLE) 32 gauge x 5/32\" ndle 1 Each by Does Not Apply route five (5) times daily. 150 Pen Needle 10    insulin lispro (HUMALOG KWIKPEN) 100 unit/mL kwikpen As directed for high glucoses. 1 unit to lower glucose 30 mg/dl with target of < 150. Taking up to 20 units/day 1 Package 3    pravastatin (PRAVACHOL) 20 mg tablet Take 20 mg by mouth daily.  predniSONE (STERAPRED) 5 mg dose pack Take 5 mg by mouth daily. See administration instruction per 5mg dose pack       tacrolimus (PROGRAF) 1 mg capsule Take 2 mg by mouth two (2) times a day.  trimethoprim-sulfamethoxazole (BACTRIM DS) 160-800 mg per tablet Take 1 Tab by mouth every Monday, Wednesday, Friday.  pantoprazole (PROTONIX) 40 mg tablet TAKE ONE TABLET BY MOUTH ONCE DAILY 30 Tab 5    albuterol (PROVENTIL HFA, VENTOLIN HFA, PROAIR HFA) 90 mcg/actuation inhaler Take 2 Puffs by inhalation every four (4) hours as needed for Wheezing. Headaches last 2 days vision sensitive    Diabetes controlled      BP controlled  Subjective:  Doing well post renal transplant. Had a DVT about a month post renal transplant in the right lower leg. She's on Coumadin now. She's not sure how long they're going to leave her on it, that's up to Dr. Darion Hernandez, the vascular surgeon. She is doing well. She's been having some headaches that respond only to Tylenol with codeine. Plain Tylenol doesn't help. She cannot take NSAIDs. They are more migrainous and she gets photophobia with them. Her diabetes control has been good. Blood pressure control has been good. Stress levels are lower. She is going to be going on vacation soon. She also wanted me to know about the DVT and Coumadin. We went over her meds. Physical Examination:  /55. S1, S2 was regular. Lungs clear. Pupils reactive to light. She was somewhat photophobic as she was having a right sided slight headaches at the time. Plan:  1. I refilled Tylenol with codeine, which she's used in the past for headaches.         Vitals:    08/04/17 0831   BP: 114/55   Pulse: 86   Resp: 16   SpO2: 98%   Weight: 182 lb (82.6 kg)   Height: 5' 6\" (1.676 m)     Nasal congestion and allergies I advise using Flonase OTC as a symptomatic reliever  Diagnoses and all orders for this visit:    1. Episodic tension-type headache, not intractable    Other orders  -     albuterol (PROVENTIL HFA, VENTOLIN HFA, PROAIR HFA) 90 mcg/actuation inhaler; Take 2 Puffs by inhalation every four (4) hours as needed for Wheezing.  -     acetaminophen-codeine (TYLENOL #3) 300-30 mg per tablet; Take 1 Tab by mouth every four (4) hours as needed for Pain. Max Daily Amount: 6 Tabs.       Nasal congestion and allergies I advise using Flonase OTC as a symptomatic reliever

## 2017-08-08 RX ORDER — LANCETS 33 GAUGE
EACH MISCELLANEOUS
Qty: 125 LANCET | Refills: 10 | Status: SHIPPED | OUTPATIENT
Start: 2017-08-08 | End: 2018-08-21 | Stop reason: SDUPTHER

## 2017-08-09 ENCOUNTER — TELEPHONE (OUTPATIENT)
Dept: ENDOCRINOLOGY | Age: 52
End: 2017-08-09

## 2017-08-09 RX ORDER — METFORMIN HYDROCHLORIDE 500 MG/1
500 TABLET, EXTENDED RELEASE ORAL
Qty: 60 TAB | Refills: 10 | Status: SHIPPED | OUTPATIENT
Start: 2017-08-09 | End: 2018-09-11 | Stop reason: SDUPTHER

## 2017-08-09 NOTE — TELEPHONE ENCOUNTER
I did talk to her renal transplant team. They were ok with starting metformin. I'll send metformin  mg twice daily to her pharmacy. Recommend she start by taking this twice daily after breakfast and supper. She should follow glucoses closely for first 2 weeks after starting as insulin adjustments may be needed . I recommend she lower Levemir dose by 10-15 units when starting.

## 2017-08-09 NOTE — TELEPHONE ENCOUNTER
----- Message from Noe Carrington sent at 8/9/2017 10:49 AM EDT -----  Regarding: Dr. Lincoln Deras telephone  Contact: [phone]  Pt is requesting a call back regarding a prescription for metformin that's suppose to be sent to [facility] pharmacy on [facility] and they still have not received it .  Pt's best contact number is [phone]

## 2017-08-09 NOTE — TELEPHONE ENCOUNTER
Patient would like prescription for metformin sent to [id number] [place] [address]. This is not on her medication list. Please advise.

## 2017-09-14 ENCOUNTER — OFFICE VISIT (OUTPATIENT)
Dept: OBGYN CLINIC | Age: 52
End: 2017-09-14

## 2017-09-14 VITALS — HEART RATE: 94 BPM | RESPIRATION RATE: 18 BRPM | WEIGHT: 183.4 LBS | BODY MASS INDEX: 29.6 KG/M2

## 2017-09-14 DIAGNOSIS — N95.1 VASOMOTOR SYMPTOMS DUE TO MENOPAUSE: Primary | ICD-10-CM

## 2017-09-14 RX ORDER — PAROXETINE 7.5 MG/1
7.5 CAPSULE ORAL DAILY
Qty: 30 CAP | Refills: 12 | Status: SHIPPED | OUTPATIENT
Start: 2017-09-14 | End: 2017-10-02

## 2017-09-14 NOTE — PROGRESS NOTES
Problem Visit    Eusebio Romero is a 50 y.o. postmenopausal female presenting for discussion of hot flashes. LMP 2008, no PMB, denies vaginal dryness. Reports VMS are extremely bothersome to her. She was on HRT in the past, however, this was discontinued prior to kidney transplant and other medical comorbidities (DM, HTN, etc). Pt reports she has had thyroid function checked and it has been normal. Pt would like to discuss other options. Also transferring routine GYN care, as she previously saw Dr. Peace Party. Ob/Gyn Hx:    Menopause- age ~38 ()  ? VMS- yes  ? Vag dryness-denies  ? HRT-denies  STI- denies  ? SA-denies    Health maintenance:  Pap- NILM HPV neg --> repeat   Mammo-2016  Colonoscopy- , normal   Dexa-denies      Past Medical History:   Diagnosis Date    Anemia     Arthritis     Asthma 2014    Rx for same    Beta blocker therapy     Chronic kidney disease     ESRD - dialysis MWF    Diabetes ([facility] [address] 28[facility]    Rx to control    Dialysis patient ([facility] [address]     M-W-F    GERD (gastroesophageal reflux disease)     Headache     Hepatitis B infection     Hypertension     Rx for same    Pre-kidney transplant, listed 2015       Past Surgical History:   Procedure Laterality Date    COLONOSCOPY N/A 2016    COLONOSCOPY performed by Mario Rios MD at [address]    laparoscopic    HX COLONOSCOPY      HX ENDOSCOPY      HX MOHS PROCEDURES Left     HX OTHER SURGICAL  7/30/15    excision of left Axilla Hidradenitis    HX RENAL BIOPSY      HX RENAL TRANSPLANT  2017    HX UROLOGICAL  2012    KIDNEY BX RIGHT - pt states she has only had one kidney bx as of 10/31/2016    HX VASCULAR ACCESS  2013    CHEST CATHETER FOR DIALYSIS    HX VASCULAR ACCESS Right     A-V fistula    HX WISDOM TEETH EXTRACTION         Family History   Problem Relation Age of Onset    Hypertension Mother     Hypertension Sister  Diabetes Maternal Grandfather     Cancer Father      UNKNOWN    Hypertension Maternal Grandmother     Diabetes Son     Asthma Son     Cancer Maternal Aunt      UNKNOWN    Anesth Problems Neg Hx        Social History     Social History    Marital status: SINGLE     Spouse name: N/A    Number of children: N/A    Years of education: N/A     Occupational History    Not on file. Social History Main Topics    Smoking status: Never Smoker    Smokeless tobacco: Never Used    Alcohol use No    Drug use: No    Sexual activity: Not Currently     Birth control/ protection: None     Other Topics Concern    Not on file     Social History Narrative       Current Outpatient Prescriptions   Medication Sig Dispense Refill    lancets (ONE TOUCH DELICA) 33 gauge misc 4 times daily. Diagnosis code: E11.8 125 Lancet 10    albuterol (PROVENTIL HFA, VENTOLIN HFA, PROAIR HFA) 90 mcg/actuation inhaler Take 2 Puffs by inhalation every four (4) hours as needed for Wheezing. 1 Inhaler 3    warfarin (COUMADIN) 1 mg tablet Take 1 mg by mouth daily.  SOD PHOS DI, MONO/K PHOS MONO (PHOSPHA 250 NEUTRAL PO) Take  by mouth.  cinacalcet (SENSIPAR) 30 mg tablet Take 30 mg by mouth daily.  magnesium oxide (MAG-OX) 400 mg tablet Take 800 mg by mouth two (2) times a day.  aspirin delayed-release 81 mg tablet Take  by mouth daily.  mycophenolate mofetil (CELLCEPT) 250 mg capsule Take 750 mg by mouth two (2) times a day.  insulin detemir (LEVEMIR FLEXTOUCH) 100 unit/mL (3 mL) inpn Use up to 60 units daily 30 mL 10    amLODIPine (NORVASC) 5 mg tablet Take 1 Tab by mouth daily. 90 Tab 3    labetalol (NORMODYNE) 200 mg tablet Take 2 Tabs by mouth two (2) times a day. 360 Tab 1    glucose blood VI test strips (ONETOUCH ULTRA TEST) strip 4 times daily. Diagnosis code: E11.8 125 Strip 10    Insulin Syringe-Needle U-100 0.5 mL 31 gauge x 5/16 syrg 1 Each by SubCUTAneous route daily.  100 Syringe 3    SITagliptin (JANUVIA) 100 mg tablet Take 1 Tab by mouth daily. 30 Tab 10    Insulin Needles, Disposable, (SUZY PEN NEEDLE) 32 gauge x 5/32\" ndle 1 Each by Does Not Apply route five (5) times daily. 150 Pen Needle 10    insulin lispro (HUMALOG KWIKPEN) 100 unit/mL kwikpen As directed for high glucoses. 1 unit to lower glucose 30 mg/dl with target of < 150. Taking up to 20 units/day 1 Package 3    pravastatin (PRAVACHOL) 20 mg tablet Take 20 mg by mouth daily.  predniSONE (STERAPRED) 5 mg dose pack Take 5 mg by mouth daily. See administration instruction per 5mg dose pack       tacrolimus (PROGRAF) 1 mg capsule Take 2 mg by mouth two (2) times a day.  trimethoprim-sulfamethoxazole (BACTRIM DS) 160-800 mg per tablet Take 1 Tab by mouth every Monday, Wednesday, Friday.  pantoprazole (PROTONIX) 40 mg tablet TAKE ONE TABLET BY MOUTH ONCE DAILY 30 Tab 5    metFORMIN ER (GLUCOPHAGE XR) 500 mg tablet Take 1 Tab by mouth two (2) times daily (after meals). 60 Tab 10    acetaminophen-codeine (TYLENOL #3) 300-30 mg per tablet Take 1 Tab by mouth every four (4) hours as needed for Pain. Max Daily Amount: 6 Tabs.  40 Tab 0       Allergies   Allergen Reactions    Latex Shortness of Breath    Tramadol Itching       Review of Systems - History obtained from the patient  Constitutional: negative for weight loss, fever, +hot flashes/night sweats  HEENT: negative for hearing loss, earache, congestion, snoring, sorethroat  CV: negative for chest pain, palpitations, edema  Resp: negative for cough, shortness of breath, wheezing  GI: negative for change in bowel habits, abdominal pain, black or bloody stools  : negative for frequency, dysuria, hematuria, vaginal discharge  MSK: negative for back pain, joint pain, muscle pain  Breast: negative for breast lumps, nipple discharge, galactorrhea  Skin :negative for itching, rash, hives  Neuro: negative for dizziness, headache, confusion, weakness  Psych: negative for anxiety, depression, change in mood  Heme/lymph: negative for bleeding, bruising, pallor    Physical Exam    Visit Vitals    Pulse 94    Resp 18    Wt 183 lb 6.4 oz (83.2 kg)    LMP  (LMP Unknown)    BMI 29.6 kg/m2       Constitutional  · Appearance: well-nourished, well developed, alert, in no acute distress    HENT  · Head and Face: appears normal    Chest  · Respiratory Effort: non-labored breathing    Skin  · General Inspection: no rash, no lesions identified    Neurologic/Psychiatric  · Mental Status:  · Orientation: grossly oriented to person, place and time  · Mood and Affect: mood normal, affect appropriate      Assessment/Plan:  50 y.o. postmenopausal female presenting for management of VMS.     -discussed pt is not good candidate for HRT given kidney transplant and other medical comorbidities  -feel that non-hormonal options more appropriate in this patient  -Rx Brisdelle provided (pt to check with kidney transplant doctor for approval prior to starting this medication)    RTC: 4 months for follow up on [address] [place] [address] and for annual wellness assessment, or sooner prn for problems or concerns  -handouts and instructions provided    Roshan Perez MD  9/14/2017  11:19 AM

## 2017-09-21 ENCOUNTER — TELEPHONE (OUTPATIENT)
Dept: OBGYN CLINIC | Age: 52
End: 2017-09-21

## 2017-09-21 RX ORDER — PAROXETINE 7.5 MG/1
7.5 CAPSULE ORAL
Qty: 30 CAP | Refills: 2 | Status: SHIPPED | OUTPATIENT
Start: 2017-09-21 | End: 2017-10-02

## 2017-09-21 NOTE — TELEPHONE ENCOUNTER
Voicemail received from [id number] stating Linell Roche is not covered by insurance, they prefer paroxetine, citalopram or venlafaxine.

## 2017-09-21 NOTE — TELEPHONE ENCOUNTER
Abdirashid Burgess is being denied by patients insurance. Offered alternative of venlafaxine tablet or reasons as to why patient cannot this medication. Please advise.

## 2017-10-02 ENCOUNTER — OFFICE VISIT (OUTPATIENT)
Dept: ENDOCRINOLOGY | Age: 52
End: 2017-10-02

## 2017-10-02 VITALS
SYSTOLIC BLOOD PRESSURE: 113 MMHG | HEIGHT: 66 IN | WEIGHT: 188 LBS | HEART RATE: 76 BPM | BODY MASS INDEX: 30.22 KG/M2 | DIASTOLIC BLOOD PRESSURE: 63 MMHG

## 2017-10-02 DIAGNOSIS — I10 ESSENTIAL HYPERTENSION: ICD-10-CM

## 2017-10-02 DIAGNOSIS — Z79.4 TYPE 2 DIABETES MELLITUS WITH COMPLICATION, WITH LONG-TERM CURRENT USE OF INSULIN (HCC): Primary | ICD-10-CM

## 2017-10-02 DIAGNOSIS — E11.8 TYPE 2 DIABETES MELLITUS WITH COMPLICATION, WITH LONG-TERM CURRENT USE OF INSULIN (HCC): Primary | ICD-10-CM

## 2017-10-02 DIAGNOSIS — Z94.0 HX OF KIDNEY TRANSPLANT: ICD-10-CM

## 2017-10-02 RX ORDER — INSULIN ASPART 100 [IU]/ML
0-10 INJECTION, SOLUTION INTRAVENOUS; SUBCUTANEOUS
Refills: 3 | COMMUNITY
Start: 2017-06-27 | End: 2018-06-06 | Stop reason: SDUPTHER

## 2017-10-02 NOTE — PROGRESS NOTES
Patient had labs done 2 weeks ago at kidney specialist, a1c last checked in August and was 7.0 per patient. Patient brings paperwork for diabetic shoes to be filled out today. Last eye exam scanned to chart, upcoming appointment November/December.

## 2017-10-02 NOTE — PROGRESS NOTES
History of Present Illness: Luz Brantley is a 50 y.o. female presents for follow-up of diabetes. She has a history of ESRD and is now s/p renal transplant on 4/11/2017    Current diabetes regimen:  Levemir 20 units  januvia 100 mg daily. Metformin  mg twice daily. Novolog as directed for hyperglycemia. Glucoses: reviewed meter  Fasting values - higher. 160-270 fasting  AC dinner: 225. Does not monitor at bedtime. Diet: trying to watch carbohydrate intake. Weight gain continues. Current BMI is 30. She does not feel weight gain is justified based on her dietary efforts. She does feel hungrier than she did when she was on dialysis. HTN  - managed by transplant team. Labetalol and amlodipine. BP controlled    Social:  Has son with diabetes who is 32years of age who is following with me        Past Medical History:   Diagnosis Date    Anemia     Arthritis     Asthma 4/8/2014    Rx for same    Beta blocker therapy     Chronic kidney disease     ESRD - dialysis MWF    Diabetes ([facility] [address] 26yo    Rx to control    Dialysis patient ([facility] [address]     M-W-F    GERD (gastroesophageal reflux disease)     Headache(784.0)     Hepatitis B infection     Hypertension     Rx for same    Pre-kidney transplant, listed 4/30/2015     Current Outpatient Prescriptions   Medication Sig    metFORMIN ER (GLUCOPHAGE XR) 500 mg tablet Take 1 Tab by mouth two (2) times daily (after meals).  lancets (ONE TOUCH DELICA) 33 gauge misc 4 times daily. Diagnosis code: E11.8    acetaminophen-codeine (TYLENOL #3) 300-30 mg per tablet Take 1 Tab by mouth every four (4) hours as needed for Pain. Max Daily Amount: 6 Tabs.  warfarin (COUMADIN) 1 mg tablet Take 1 mg by mouth daily.  SOD PHOS DI, MONO/K PHOS MONO (PHOSPHA 250 NEUTRAL PO) Take  by mouth.  cinacalcet (SENSIPAR) 30 mg tablet Take 30 mg by mouth daily.  magnesium oxide (MAG-OX) 400 mg tablet Take 800 mg by mouth two (2) times a day.     aspirin delayed-release 81 mg tablet Take  by mouth daily.  mycophenolate mofetil (CELLCEPT) 250 mg capsule Take 750 mg by mouth two (2) times a day.  insulin detemir (LEVEMIR FLEXTOUCH) 100 unit/mL (3 mL) inpn Use up to 60 units daily (Patient taking differently: 20 units in the morning)    amLODIPine (NORVASC) 5 mg tablet Take 1 Tab by mouth daily.  labetalol (NORMODYNE) 200 mg tablet Take 2 Tabs by mouth two (2) times a day.  glucose blood VI test strips (ONETOUCH ULTRA TEST) strip 4 times daily. Diagnosis code: E11.8    Insulin Syringe-Needle U-100 0.5 mL 31 gauge x 5/16 syrg 1 Each by SubCUTAneous route daily.  SITagliptin (JANUVIA) 100 mg tablet Take 1 Tab by mouth daily.  Insulin Needles, Disposable, (SUZY PEN NEEDLE) 32 gauge x 5/32\" ndle 1 Each by Does Not Apply route five (5) times daily.  insulin lispro (HUMALOG KWIKPEN) 100 unit/mL kwikpen As directed for high glucoses. 1 unit to lower glucose 30 mg/dl with target of < 150. Taking up to 20 units/day    pravastatin (PRAVACHOL) 20 mg tablet Take 20 mg by mouth daily.  predniSONE (STERAPRED) 5 mg dose pack Take 5 mg by mouth daily. See administration instruction per 5mg dose pack     tacrolimus (PROGRAF) 1 mg capsule Take 2 mg by mouth two (2) times a day.  trimethoprim-sulfamethoxazole (BACTRIM DS) 160-800 mg per tablet Take 1 Tab by mouth every Monday, Wednesday, Friday.  pantoprazole (PROTONIX) 40 mg tablet TAKE ONE TABLET BY MOUTH ONCE DAILY    PARoxetine mesylate 7.5 mg cap Take 7.5 mg by mouth nightly.  PARoxetine mesylate (BRISDELLE) 7.5 mg cap Take 7.5 Caps by mouth daily for 30 days.  albuterol (PROVENTIL HFA, VENTOLIN HFA, PROAIR HFA) 90 mcg/actuation inhaler Take 2 Puffs by inhalation every four (4) hours as needed for Wheezing. No current facility-administered medications for this visit.       Allergies   Allergen Reactions    Latex Shortness of Breath    Tramadol Itching       Review of Systems:  - Eyes: no blurry vision or double vision  - Cardiovascular: no chest pain  - Respiratory: no shortness of breath  - Musculoskeletal: no myalgias  - Neurological: She does have some symptoms of neuropathy in the evening sometimes and when she walks. Physical Examination:  Visit Vitals    /63    Pulse 76    Ht 5' 6\" (1.676 m)    Wt 188 lb (85.3 kg)    LMP  (LMP Unknown)    BMI 30.34 kg/m2   -   - General: pleasant, no distress, normal gait   HEENT: hearing intact, EOMI, clear sclera without icterus  - Cardiovascular: regular, normal rate   - Respiratory: normal effort  - Integumentary: no edema   Diabetic foot exam:     Left: Filament test normal sensation with micro filament   Pulse DP: 2+ (normal)   Deformities: Mild - flat foot deformity  For diabetic shoes/orthotics. Required documentation: I am treating the patient under a comprehensive plan of care for diabetes. The patient would benefit from diabetic footwear to protect her feet    - Psychiatric: normal mood and affect    Data Reviewed:   C-peptide 1.4    Assessment/Plan:   1. Type 2 diabetes mellitus with complication, with long-term current use of insulin (HCC)   Not controlled  Encourage dietary and exercise efforts  Levemir 20 units once daily does not appear adequate. Add Levemir 10 units at bedtime as well. Recommend she follow glucoses overnight and increase Levemir in the evening until fasting glucoses are less than 130 mg deciliter.,  Or she is taking 20 units twice daily. Continue metformin  Continue Januvia, but we will see if we can change this to Victoza, which would be more potent, associated weight loss, and associated with decreased cardiovascular and renal events. In time, also feel that Jardiance would be helpful with glucose and weight control. This medication has also shown to decrease cardiovascular events and substantially decreased renal events as well. 2. Essential hypertension   Controlled.   Continue current medications 3. BMI 30.0-30.9,adult   See above. Encourage dietary and exercise efforts. Trying to optimize medications for her weight. 4. Hx of kidney transplant   Medications for immunosuppression likely are increasing insulin resistance. Greater than 50% of 25 minute visit was spent counseling the patient about above. Patient Instructions   Diabetes; Watch carbohydrate intake with meals and try to keep this less than 30 grams    Continue Januvia 100 mg daily    Increase levemir to 20 units in AM and 10 units in PM.   - follow glucose trend overnight to adjust.  Would increase PM dose to 15 and then 20 units every few days to achieve fasting glucose. Once you achieve 20 units twice daily, would increase each dose evenly. Novolog for high glucoses  151-175: 1 unit  176-200: 2 units  201-225: 3 units  226-250: 4 units  251-275: 5 units  276-300: 6 units  301 +     : 7 units    Goals for blood glucose:  Fasting  (less than 150)  Lunch, Dinner, Bedtime -  (less than 180). Ask about using Victoza (once daily injection) in place of Januvia as Victoza is more potent and associated with less insulin needs and weight loss. Ask about adding Jardiance to help with glucose control and lower insulin needs. Follow-up Disposition:  Return in about 3 months (around 1/2/2018).     Copy sent to:

## 2017-10-02 NOTE — PATIENT INSTRUCTIONS
Diabetes; Watch carbohydrate intake with meals and try to keep this less than 30 grams    Continue Januvia 100 mg daily    Increase levemir to 20 units in AM and 10 units in PM.   - follow glucose trend overnight to adjust.  Would increase PM dose to 15 and then 20 units every few days to achieve fasting glucose. Once you achieve 20 units twice daily, would increase each dose evenly. Novolog for high glucoses  151-175: 1 unit  176-200: 2 units  201-225: 3 units  226-250: 4 units  251-275: 5 units  276-300: 6 units  301 +     : 7 units    Goals for blood glucose:  Fasting  (less than 150)  Lunch, Dinner, Bedtime -  (less than 180). Ask about using Victoza (once daily injection) in place of Januvia as Victoza is more potent and associated with less insulin needs and weight loss. Ask about adding Jardiance to help with glucose control and lower insulin needs.

## 2017-10-02 NOTE — MR AVS SNAPSHOT
Visit Information     Date & Time Provider Department Dept. Phone Encounter #    10/2/2017 10:10 AM Salazar Soares MD [facility] [phone] [id number]      Follow-up Instructions     Return in about 3 months (around 1/2/2018).       Your Appointments     5/29/2018 11:00 AM   ECHO CARDIOGRAMS 2D with HATTIE ECHAVARRIA   [facility] ([facility])   Appt Note: Per Dr. Coletta Krabbe Echo dx MR, See Coletta Krabbe after    [address] [facility] [address]           [address] [address]            5/29/2018 11:40 AM   ESTABLISHED PATIENT with Blessing Solorzano MD   [facility] ([facility]   Appt Note: Per Dr. Coletta Krabbe Echo dx MR, See Coletta Krabbe after    [address] [facility] [address]           [id number] Maintenance        Date Due    MEDICARE Briseida Sury 4/26/1985    DTaP/Tdap/Td series (1 - Tdap) 4/26/1988    Pneumococcal 19-64 Highest Risk (2 of 3 - PCV13) 2/1/2014    FOOT EXAM Q1 1/19/2017    FOBT Q 1 YEAR AGE 50-75 4/26/2017    LIPID PANEL Q1 7/6/2017    INFLUENZA AGE 9 TO ADULT 8/1/2017    EYE EXAM RETINAL OR DILATED Q1 11/22/2017    HEMOGLOBIN A1C Q6M 11/30/2017    BREAST CANCER SCRN MAMMOGRAM 12/5/2018    PAP AKA CERVICAL CYTOLOGY 1/31/2020      Allergies as of 10/2/2017  Review Complete On: 10/2/2017 By: Salazar Soares MD       Severity Noted Reaction Type Reactions    Latex High 07/17/2012   Systemic Shortness of Breath    Tramadol  07/27/2015    Itching      Current Immunizations  Reviewed on 12/27/2013    Name Date    Influenza Vaccine 9/3/2014    Pneumococcal Polysaccharide (PPSV-23) 2/1/2013  1:47 PM    TB Skin Test (PPD) Intradermal 12/27/2013, 8/26/2013       Not reviewed this visit   You Were Diagnosed With        Codes Comments    Type 2 diabetes mellitus with complication, with long-term current use of insulin (HCC)    -  Primary ICD-10-CM: E11.8, Z79.4  ICD-9-CM: 250.90, V58.67     Essential hypertension     ICD-10-CM: I10  ICD-9-CM: 401.9     BMI 30.0-30.9,adult     ICD-10-CM: Z68.30  ICD-9-CM: V85.30     Hx of kidney transplant     ICD-10-CM: Z94.0  ICD-9-CM: V42.0       Vitals     BP Pulse Height(growth percentile) Weight(growth percentile) LMP BMI    113/63 76 5' 6\" (1.676 m) 188 lb (85.3 kg) (LMP Unknown) 30.34 kg/m2    OB Status Smoking Status                Postmenopausal Never Smoker        Vitals History      BMI and BSA Data     Body Mass Index Body Surface Area    30.34 kg/m 2 1.99 m 2         Preferred Pharmacy       Ph[address] Name Phone    [address] [place] [address] 540 [phone]         Your Updated Medication List          This list is accurate as of: 10/2/17 11:22 AM.  Always use your most recent med list.                acetaminophen-codeine 300-30 mg per tablet   Commonly known as:  TYLENOL #3   Take 1 Tab by mouth every four (4) hours as needed for Pain. Max Daily Amount: 6 Tabs. albuterol 90 mcg/actuation inhaler   Commonly known as:  PROVENTIL HFA, VENTOLIN HFA, PROAIR HFA   Take 2 Puffs by inhalation every four (4) hours as needed for Wheezing. amLODIPine 5 mg tablet   Commonly known as:  NORVASC   Take 1 Tab by mouth daily. aspirin delayed-release 81 mg tablet   Take  by mouth daily. BACTRIM -800 mg per tablet   Generic drug:  trimethoprim-sulfamethoxazole   Take 1 Tab by mouth every Monday, Wednesday, Friday. CELLCEPT 250 mg capsule   Generic drug:  mycophenolate mofetil   Take 750 mg by mouth two (2) times a day. COUMADIN 1 mg tablet   Generic drug:  warfarin   Take 1 mg by mouth daily. glucose blood VI test strips strip   Commonly known as:  ONETOUCH ULTRA TEST   4 times daily.  Diagnosis code: E11.8       insulin detemir 100 unit/mL (3 mL) Inpn   Commonly known as: LEVEMIR FLEXTOUCH   Use up to 60 units daily       Insulin Needles (Disposable) 32 gauge x 5/32\" Ndle   Commonly known as:  Wanda Pen Needle   1 Each by Does Not Apply route five (5) times daily. Insulin Syringe-Needle U-100 0.5 mL 31 gauge x 5/16 Syrg   1 Each by SubCUTAneous route daily. labetalol 200 mg tablet   Commonly known as:  NORMODYNE   Take 2 Tabs by mouth two (2) times a day. lancets 33 gauge Misc   Commonly known as: One Touch Delica   4 times daily. Diagnosis code: E11.8       magnesium oxide 400 mg tablet   Commonly known as:  MAG-OX   Take 800 mg by mouth two (2) times a day. metFORMIN  mg tablet   Commonly known as:  GLUCOPHAGE XR   Take 1 Tab by mouth two (2) times daily (after meals). NovoLOG Flexpen 100 unit/mL Inpn   Generic drug:  insulin aspart   0-10 Units by SubCUTAneous route three (3) times daily as needed. pantoprazole 40 mg tablet   Commonly known as:  PROTONIX   TAKE ONE TABLET BY MOUTH ONCE DAILY       PHOSPHA 250 NEUTRAL PO   Take  by mouth.       pravastatin 20 mg tablet   Commonly known as:  PRAVACHOL   Take 20 mg by mouth daily. predniSONE 5 mg dose pack   Commonly known as:  STERAPRED   Take 5 mg by mouth daily. See administration instruction per 5mg dose pack       SENSIPAR 30 mg tablet   Generic drug:  cinacalcet   Take 30 mg by mouth daily. SITagliptin 100 mg tablet   Commonly known as:  JANUVIA   Take 1 Tab by mouth daily. tacrolimus 1 mg capsule   Commonly known as:  PROGRAF   Take 2 mg by mouth two (2) times a day. We Performed the Following      DIABETES FOOT EXAM [[facility]]       Follow-up Instructions     Return in about 3 months (around 1/2/2018). Patient Instructions    Diabetes;   Watch carbohydrate intake with meals and try to keep this less than 30 grams    Continue Januvia 100 mg daily    Increase levemir to 20 units in AM and 10 units in PM.   - follow glucose trend overnight to adjust.  Would increase PM dose to 15 and then 20 units every few days to achieve fasting glucose. Once you achieve 20 units twice daily, would increase each dose evenly. Novolog for high glucoses  151-175: 1 unit  176-200: 2 units  201-225: 3 units  226-250: 4 units  251-275: 5 units  276-300: 6 units  301 +     : 7 units    Goals for blood glucose:  Fasting  (less than 150)  Lunch, Dinner, Bedtime -  (less than 180). Ask about using Victoza (once daily injection) in place of Januvia as Victoza is more potent and associated with less insulin needs and weight loss. Ask about adding Jardiance to help with glucose control and lower insulin needs. Introducing [facility]! Dear Oralee Files: Thank you for requesting a [employer] account. Our records indicate that you already have an active [employer] account. You can access your account anytime at [email or web address]     Did you know that you can access your hospital and ER discharge instructions at any time in [employer]? You can also review all of your test results from your hospital stay or ER visit. Additional Information    If you have questions, please visit the Frequently Asked Questions section of the [employer] website at [email or web address] Remember, [employer] is NOT to be used for urgent needs. For medical emergencies, dial 911. Now available from your iPhone and Android! Please provide this summary of care documentation to your next provider. Your primary care clinician is listed as Mavis Cox. If you have any questions after today's visit, please call [phone].

## 2017-10-04 NOTE — TELEPHONE ENCOUNTER
Rambo Neely MD   to Zan Hinojosa        9/21/17 5:38 PM   That's ok, we sent an alternative medication (paroxitine 7.5mg daily - generic) to patient's pharmacy. Thanks!      Rambo Neely MD  9/21/2017  5:39 PM

## 2017-10-04 NOTE — TELEPHONE ENCOUNTER
Patient calling stating that she was not aware that a second medication was called in and was told that her insurance was not covering her medication and patient states that she will contact her pharmacy and then contact her nephrologist to determine if the medication will be safe to take since patient is a kidney transplant recipient and will let us know.

## 2017-11-07 ENCOUNTER — TELEPHONE (OUTPATIENT)
Dept: ENDOCRINOLOGY | Age: 52
End: 2017-11-07

## 2017-11-07 NOTE — TELEPHONE ENCOUNTER
Spoke to patient. Blood sugars have been around 120 in the morning, and 200-300 throughout the day. Confirmed she is taking Levemir 25 units, and Novolog 8-12 units with meals. She asks about increasing metformin to 2 tabs twice daily. Currently taking 1 tab twice daily.

## 2017-11-07 NOTE — TELEPHONE ENCOUNTER
This would be fine with me. Does she need to have this cleared with nephrologists or transplant doctors? Would those doctors be ok with adding Victoza. If she has a number I can call I can discuss with the appropriate physician. Is she taking Levemir in AM or PM, or is she taking 25 units twice daily?

## 2017-11-07 NOTE — TELEPHONE ENCOUNTER
----- Message from Nemo Marques MD sent at 10/31/2017  4:48 PM EDT -----  Carmina Johnson,  Please see how Mrs Crowley's glucoses are doing. Her nephrologist, Dr Gina Craven they were elevated and around 200.     Thank you

## 2017-11-08 NOTE — TELEPHONE ENCOUNTER
I called patient. She takes 25 units of Levemir at 8am prior to eating. If blood sugar is 300+, she will take another 10 units Levemir at dinner time. She does not do this often. For nephrology she follows with Dr. Anamaria Loco [phone]. For kidney transplant, she sees nurse practitioner Genevieve Sweet [phone]. Please let me know if you would like either of these providers to contact you.

## 2017-11-08 NOTE — TELEPHONE ENCOUNTER
Spoke Alex harris. Dr Paul Self preferred to avoid SGLT2 inhibitors due to potential concerns about increased risk for bladder infections, but was agreeable to using Victoza  Will start victoza 0.6 with her to increase to 1.2 mg daily    Dejaha December,  Please tell her she can start Victoza  Directions for her: Add Victoza. Start with 0.6 mg daily x 7 days. Increase to 1.2 mg dose in a week. When you increase to the 1.2 mg dose, this will lower your blood sugars some and may cause nausea. Adding Victoza should help glucoses not increase as much during the daytime. She should no longer need Novolog once the victoza is started.

## 2017-12-12 ENCOUNTER — HOSPITAL ENCOUNTER (OUTPATIENT)
Dept: MAMMOGRAPHY | Age: 52
Discharge: HOME | End: 2017-12-12
Attending: INTERNAL MEDICINE
Payer: MEDICARE

## 2017-12-12 ENCOUNTER — OFFICE VISIT (OUTPATIENT)
Dept: INTERNAL MEDICINE CLINIC | Age: 52
End: 2017-12-12

## 2017-12-12 VITALS
TEMPERATURE: 98.5 F | HEIGHT: 66 IN | OXYGEN SATURATION: 99 % | RESPIRATION RATE: 20 BRPM | BODY MASS INDEX: 30.05 KG/M2 | DIASTOLIC BLOOD PRESSURE: 76 MMHG | HEART RATE: 90 BPM | WEIGHT: 187 LBS | SYSTOLIC BLOOD PRESSURE: 128 MMHG

## 2017-12-12 DIAGNOSIS — E78.5 DYSLIPIDEMIA: ICD-10-CM

## 2017-12-12 DIAGNOSIS — Z00.00 INITIAL MEDICARE ANNUAL WELLNESS VISIT: ICD-10-CM

## 2017-12-12 DIAGNOSIS — Z12.39 SCREENING BREAST EXAMINATION: ICD-10-CM

## 2017-12-12 DIAGNOSIS — Z23 ENCOUNTER FOR IMMUNIZATION: Primary | ICD-10-CM

## 2017-12-12 DIAGNOSIS — Z94.0 RENAL TRANSPLANT RECIPIENT: ICD-10-CM

## 2017-12-12 DIAGNOSIS — N28.9 KIDNEY DISEASE: ICD-10-CM

## 2017-12-12 DIAGNOSIS — G47.00 INSOMNIA, UNSPECIFIED TYPE: ICD-10-CM

## 2017-12-12 PROCEDURE — 77067 SCR MAMMO BI INCL CAD: CPT

## 2017-12-12 RX ORDER — TRAZODONE HYDROCHLORIDE 50 MG/1
50 TABLET ORAL
Qty: 30 TAB | Refills: 3 | Status: SHIPPED | OUTPATIENT
Start: 2017-12-12 | End: 2019-06-25 | Stop reason: SDUPTHER

## 2017-12-12 RX ORDER — SITAGLIPTIN 100 MG/1
TABLET, FILM COATED ORAL
Refills: 9 | COMMUNITY
Start: 2017-10-10 | End: 2018-05-08

## 2017-12-12 RX ORDER — MUPIROCIN 20 MG/G
OINTMENT TOPICAL
Refills: 2 | COMMUNITY
Start: 2017-11-02 | End: 2018-02-05

## 2017-12-12 RX ORDER — LABETALOL 200 MG/1
400 TABLET, FILM COATED ORAL 2 TIMES DAILY
Qty: 360 TAB | Refills: 1 | Status: SHIPPED | OUTPATIENT
Start: 2017-12-12 | End: 2018-06-06 | Stop reason: SDUPTHER

## 2017-12-12 NOTE — MR AVS SNAPSHOT
Visit Information     Date & Time Provider Department Dept.  Phone Encounter #    12/12/2017  8:30 AM Jennifer Roth MD [facility] [phone] [id number]      Your Appointments     1/4/2018  9:50 AM   ROUTINE CARE with Nemo Marques MD   [facility] [address] [facility])   Appt Note: f/u diabetes cp0.00    [address]  [address]  [address]           [address] [facility] [address]            5/29/2018 11:00 AM   ECHO CARDIOGRAMS 2D with HATTIE ECHAVARRIA   [facility] ([facility])   Appt Note: Per Dr. Yash Degroot Echo dx MR, See Yash Degroot after    [address] [facility] [address]           [address] [address]            5/29/2018 11:40 AM   ESTABLISHED PATIENT with India Houston MD   [facility] ([facility] [place])   Appt Note: Per Dr. Yash Degroot Echo dx MR, See Yash Degroot after    [address] [facility] [address]           [address] [address] Maintenance        Date Due    MEDICARE [place] Solid 4/26/1985    DTaP/Tdap/Td series (1 - Tdap) 4/26/1988    Pneumococcal 19-64 Highest Risk (2 of 3 - PCV13) 2/1/2014    FOBT Q 1 YEAR AGE 50-75 4/26/2017    LIPID PANEL Q1 7/6/2017    Influenza Age 5 to Adult 8/1/2017    EYE EXAM RETINAL OR DILATED Q1 11/22/2017    HEMOGLOBIN A1C Q6M 11/30/2017    FOOT EXAM Q1 10/2/2018    BREAST CANCER SCRN MAMMOGRAM 12/5/2018    PAP AKA CERVICAL CYTOLOGY 1/31/2020      Allergies as of 12/12/2017  In Progress On: 12/12/2017 By: Rajiv Hicks LPN       Severity Noted Reaction Type Reactions    Latex High 07/17/2012   Systemic Shortness of Breath    Tramadol  07/27/2015    Itching      Current Immunizations  Reviewed on 12/27/2013    Name Date    Influenza Vaccine 9/3/2014    Influenza Vaccine (Quad) PF  Incomplete    Pneumococcal Polysaccharide (PPSV-23) 2013  1:47 PM    TB Skin Test (PPD) Intradermal 2013, 2013       Not reviewed this visit   You Were Diagnosed With        Codes Comments    CKD (chronic kidney disease) stage V requiring chronic dialysis ([facility] [address]    -  Primary ICD-10-CM: N18.6, Z99.2  ICD-9-CM: 585.6, V45.11     Encounter for immunization     ICD-10-CM: Z23  ICD-9-CM: V03.89     Initial Medicare annual wellness visit     ICD-10-CM: Z00.00  ICD-9-CM: V70.0     Insomnia, unspecified type     ICD-10-CM: G47.00  ICD-9-CM: 780.52       Vitals     BP Pulse Temp Resp Height(growth percentile) Weight(growth percentile)    128/76 90 98.5 °F (36.9 °C) (Oral) 20 5' 6\" (1.676 m) 187 lb (84.8 kg)    LMP SpO2 BMI OB Status Smoking Status       (LMP Unknown) 99% 30.18 kg/m2 Postmenopausal Never Smoker     Vitals History      BMI and BSA Data     Body Mass Index Body Surface Area    30.18 kg/m 2 1.99 m 2         Preferred Pharmacy       Pharmacy Name Phone    [address], Norma Schmidt [address] [place] [address] [phone]         Your Updated Medication List          This list is accurate as of: 17  9:06 AM.  Always use your most recent med list.                albuterol 90 mcg/actuation inhaler   Commonly known as:  PROVENTIL HFA, VENTOLIN HFA, PROAIR HFA   Take 2 Puffs by inhalation every four (4) hours as needed for Wheezing. amLODIPine 5 mg tablet   Commonly known as:  NORVASC   Take 1 Tab by mouth daily. aspirin delayed-release 81 mg tablet   Take  by mouth daily. BACTRIM -800 mg per tablet   Generic drug:  trimethoprim-sulfamethoxazole   Take 1 Tab by mouth every Monday, Wednesday, Friday. CELLCEPT 250 mg capsule   Generic drug:  mycophenolate mofetil   Take 750 mg by mouth two (2) times a day. COUMADIN 1 mg tablet   Generic drug:  warfarin   Take 1 mg by mouth daily.        glucose blood VI test strips strip   Commonly known as: ONETOUCH ULTRA TEST   4 times daily. Diagnosis code: E11.8       insulin detemir 100 unit/mL (3 mL) Inpn   Commonly known as:  LEVEMIR FLEXTOUCH   Use up to 60 units daily       Insulin Needles (Disposable) 32 gauge x 5/32\" Ndle   Commonly known as:  Wanda Pen Needle   1 Each by Does Not Apply route five (5) times daily. Insulin Syringe-Needle U-100 0.5 mL 31 gauge x 5/16 Syrg   1 Each by SubCUTAneous route daily. JANUVIA 100 mg tablet   Generic drug:  SITagliptin   TAKE 1 TABLET BY MOUTH EVERY DAY       labetalol 200 mg tablet   Commonly known as:  NORMODYNE   Take 2 Tabs by mouth two (2) times a day. lancets 33 gauge Misc   Commonly known as: One Touch Delica   4 times daily. Diagnosis code: E11.8       Liraglutide 0.6 mg/0.1 mL (18 mg/3 mL) Pnij   Commonly known as:  VICTOZA   1.2 mg by SubCUTAneous route daily. magnesium oxide 400 mg tablet   Commonly known as:  MAG-OX   Take 800 mg by mouth two (2) times a day. metFORMIN  mg tablet   Commonly known as:  GLUCOPHAGE XR   Take 1 Tab by mouth two (2) times daily (after meals). mupirocin 2 % ointment   Commonly known as:  BACTROBAN   APPLY THIN LAYER ONCE A DAY TO RIGHT HEEL       NovoLOG Flexpen 100 unit/mL Inpn   Generic drug:  insulin aspart   0-10 Units by SubCUTAneous route three (3) times daily as needed. pantoprazole 40 mg tablet   Commonly known as:  PROTONIX   TAKE ONE TABLET BY MOUTH ONCE DAILY       PHOSPHA 250 NEUTRAL PO   Take  by mouth.       pravastatin 20 mg tablet   Commonly known as:  PRAVACHOL   Take 20 mg by mouth daily. predniSONE 5 mg dose pack   Commonly known as:  STERAPRED   Take 5 mg by mouth daily. See administration instruction per 5mg dose pack       SENSIPAR 30 mg tablet   Generic drug:  cinacalcet   Take 30 mg by mouth daily. tacrolimus 1 mg capsule   Commonly known as:  PROGRAF   Take 2 mg by mouth two (2) times a day.        traZODone 50 mg tablet   Commonly known as: DESYREL   Take 1 Tab by mouth nightly. Prescriptions Sent to Pharmacy        Refills    traZODone (DESYREL) 50 mg tablet 3    Sig: Take 1 Tab by mouth nightly. Class: Normal    Pharmacy: [address] [place] [address] Norm[address]  #: [phone]    Route: Oral      We Performed the Following     INFLUENZA VIRUS VAC QUAD,SPLIT,PRESV FREE SYRINGE IM [89892 CPT(R)]       To-Do List     2017 10:30 AM     Appointment with [facility] 6 at [facility] ([phone])   Shower or bathe using soap and water. Do not use deodorant, powder, perfumes, or lotion the day of your exam.  If your prior mammograms were not performed at [place] [address] please bring films with you or forward prior images 2 days before your procedure. Check in at registration 15min before your appointment time unless you were instructed to do otherwise. A script is not necessary, but if you have one, please bring it on the day of the mammogram or have it faxed to the department. [facility] [phone] [facility]  [phone] [facility] [phone] [facility]  [phone] [facility] [phone] [address] [phone] [facility]         Patient Instructions      Medicare Wellness Visit, Female    The best way to live healthy is to have a healthy lifestyle by eating a well-balanced diet, exercising regularly, limiting alcohol and stopping smoking. Regular physical exams and screening tests are another way to keep healthy. Preventive exams provided by your health care provider can find health problems before they become diseases or illnesses. Preventive services including immunizations, screening tests, monitoring and exams can help you take care of your own health. All people over age 72 should have a pneumovax  and and a prevnar shot to prevent pneumonia. These are once in a lifetime unless you and your provider decide differently.     All people over 65 should have a yearly flu shot and a tetanus vaccine every 10 years.    A bone mass density to screen for osteoporosis or thinning of the bones should be done every 2 years after 65. Screening for diabetes mellitus with a blood sugar test should be done every year. Glaucoma is a disease of the eye due to increased ocular pressure that can lead to blindness and it should be done every year by an eye professional.    Cardiovascular screening tests that check for elevated lipids (fatty part of blood) which can lead to heart disease and strokes should be done every 5 years. Colorectal screening that evaluates for blood or polyps in your colon should be done yearly as a stool test or every five years as a flexible sigmoidoscope or every 10 years as a colonoscopy up to age 76. Breast cancer screening with a mammogram is recommended biennially  for women age 54-69. Screening for cervical cancer with a pap smear and pelvic exam is recommended for women after age 72 years every 2 years up to age 79 or when the provider and patient decide to stop. If there is a history of cervical abnormalities or other increased risk for cancer then the test is recommended yearly. Hepatitis C screening is also recommended for anyone born between 80 through [address]. A shingles vaccine is also recommended once in a lifetime after age 61. Your Medicare Wellness Exam is recommended annually. Here is a list of your current Health Maintenance items with a due date:  Health Maintenance Due   Topic Date Due    Annual Well Visit  04/26/1985    DTaP/Tdap/Td  (1 - Tdap) 04/26/1988    Pneumococcal Vaccine (2 of 3 - PCV13) 02/01/2014    Stool testing for trace blood  04/26/2017    Cholesterol Test   07/06/2017    Flu Vaccine  08/01/2017    Eye Exam  11/22/2017    Hemoglobin A1C    11/30/2017              Introducing Dashbook! Dear Ethan Bernal: Thank you for requesting a [employer] account. Our records indicate that you already have an active [employer] account.   You can access your account anytime at [email or web address]     Did you know that you can access your hospital and ER discharge instructions at any time in [email or web address]? You can also review all of your test results from your hospital stay or ER visit. Additional Information    If you have questions, please visit the Frequently Asked Questions section of the [email or web address] website at [email or web address] Remember, [email or web address] is NOT to be used for urgent needs. For medical emergencies, dial 911. Now available from your iPhone and Android! Please provide this summary of care documentation to your next provider. Your primary care clinician is listed as Sandra Jiang. If you have any questions after today's visit, please call [phone].

## 2017-12-12 NOTE — PATIENT INSTRUCTIONS

## 2017-12-12 NOTE — PROGRESS NOTES
This is an Initial Medicare Annual Wellness Exam (AWV) (Performed 12 months after IPPE or effective date of Medicare Part B enrollment, Once in a lifetime)    I have reviewed the patient's medical history in detail and updated the computerized patient record. History   We get no information from transplant unit  Past Medical History:   Diagnosis Date    Anemia     Arthritis     Asthma 4/8/2014    Rx for same    Beta blocker therapy     Chronic kidney disease     ESRD - dialysis MWF    Diabetes ([facility] [address] 26yo    Rx to control    Dialysis patient ([facility] [address]     M-W-F    GERD (gastroesophageal reflux disease)     Headache(784.0)     Hepatitis B infection     Hypertension     Rx for same    Pre-kidney transplant, listed 4/30/2015      Past Surgical History:   Procedure Laterality Date    COLONOSCOPY N/A 11/1/2016    COLONOSCOPY performed by Tamara Nelson MD at [address]    laparoscopic    HX COLONOSCOPY      HX ENDOSCOPY      HX MOHS PROCEDURES Left     HX OTHER SURGICAL  7/30/15    excision of left Axilla Hidradenitis    HX RENAL BIOPSY  2011    HX RENAL TRANSPLANT  04/05/2017    HX UROLOGICAL  2012    KIDNEY BX RIGHT - pt states she has only had one kidney bx as of 10/31/2016    HX UROLOGICAL  2017    transplant    HX VASCULAR ACCESS  1/2013    CHEST CATHETER FOR DIALYSIS    HX VASCULAR ACCESS Right     A-V fistula    HX WISDOM TEETH EXTRACTION       Current Outpatient Prescriptions   Medication Sig Dispense Refill    JANUVIA 100 mg tablet TAKE 1 TABLET BY MOUTH EVERY DAY  9    mupirocin (BACTROBAN) 2 % ointment APPLY THIN LAYER ONCE A DAY TO RIGHT HEEL  2    Liraglutide (VICTOZA) 0.6 mg/0.1 mL (18 mg/3 mL) pnij 1.2 mg by SubCUTAneous route daily. 2 Pen 10    NOVOLOG FLEXPEN 100 unit/mL inpn 0-10 Units by SubCUTAneous route three (3) times daily as needed.   3    metFORMIN ER (GLUCOPHAGE XR) 500 mg tablet Take 1 Tab by mouth two (2) times daily (after meals). 60 Tab 10    lancets (ONE TOUCH DELICA) 33 gauge misc 4 times daily. Diagnosis code: E11.8 125 Lancet 10    albuterol (PROVENTIL HFA, VENTOLIN HFA, PROAIR HFA) 90 mcg/actuation inhaler Take 2 Puffs by inhalation every four (4) hours as needed for Wheezing. 1 Inhaler 3    warfarin (COUMADIN) 1 mg tablet Take 1 mg by mouth daily.  SOD PHOS DI, MONO/K PHOS MONO (PHOSPHA 250 NEUTRAL PO) Take  by mouth.  cinacalcet (SENSIPAR) 30 mg tablet Take 30 mg by mouth daily.  magnesium oxide (MAG-OX) 400 mg tablet Take 800 mg by mouth two (2) times a day.  aspirin delayed-release 81 mg tablet Take  by mouth daily.  mycophenolate mofetil (CELLCEPT) 250 mg capsule Take 750 mg by mouth two (2) times a day.  insulin detemir (LEVEMIR FLEXTOUCH) 100 unit/mL (3 mL) inpn Use up to 60 units daily (Patient taking differently: 20 units in the morning) 30 mL 10    amLODIPine (NORVASC) 5 mg tablet Take 1 Tab by mouth daily. 90 Tab 3    labetalol (NORMODYNE) 200 mg tablet Take 2 Tabs by mouth two (2) times a day. 360 Tab 1    glucose blood VI test strips (ONETOUCH ULTRA TEST) strip 4 times daily. Diagnosis code: E11.8 125 Strip 10    Insulin Syringe-Needle U-100 0.5 mL 31 gauge x 5/16 syrg 1 Each by SubCUTAneous route daily. 100 Syringe 3    Insulin Needles, Disposable, (SUZY PEN NEEDLE) 32 gauge x 5/32\" ndle 1 Each by Does Not Apply route five (5) times daily. 150 Pen Needle 10    pravastatin (PRAVACHOL) 20 mg tablet Take 20 mg by mouth daily.  predniSONE (STERAPRED) 5 mg dose pack Take 5 mg by mouth daily. See administration instruction per 5mg dose pack       tacrolimus (PROGRAF) 1 mg capsule Take 2 mg by mouth two (2) times a day.  trimethoprim-sulfamethoxazole (BACTRIM DS) 160-800 mg per tablet Take 1 Tab by mouth every Monday, Wednesday, Friday.       pantoprazole (PROTONIX) 40 mg tablet TAKE ONE TABLET BY MOUTH ONCE DAILY 30 Tab 5     Allergies   Allergen Reactions    Latex Shortness of Breath    Tramadol Itching     Family History   Problem Relation Age of Onset    Hypertension Mother     Hypertension Sister     Diabetes Maternal Grandfather     Cancer Father      UNKNOWN    Hypertension Maternal Grandmother     Diabetes Son     Asthma Son     Cancer Maternal Aunt      UNKNOWN    Anesth Problems Neg Hx      Social History   Substance Use Topics    Smoking status: Never Smoker    Smokeless tobacco: Never Used    Alcohol use No     Patient Active Problem List   Diagnosis Code    Hypertension I10    Kidney disease N28.9    Migraines G43.909    Diabetes mellitus ([facility] [address] E11.9    Anemia D64.9    Hyperkalemia E87.5    DM (diabetes mellitus) ([address] E11.9    CKD (chronic kidney disease) stage V requiring chronic dialysis (HCC) N18.6, Z99.2    Depression F32.9    Asthma J45.909    Dyslipidemia E78.5    Mitral regurgitation I34.0    Acute deep vein thrombosis (DVT) of popliteal vein of right lower extremity ([facility]) I82.431    Renal transplant recipient Z94.0       Depression Risk Factor Screening:   No flowsheet data found. Alcohol Risk Factor Screening: You do not drink alcohol or very rarely. Functional Ability and Level of Safety:     Hearing Loss  Hearing is good. Activities of Daily Living  The home contains: no safety equipment. Patient does total self care    Fall RiskNo flowsheet data found.     Abuse Screen  Patient is not abused    Cognitive Screening   Evaluation of Cognitive Function:  Has your family/caregiver stated any concerns about your memory: no  Normal    Patient Care Team   Patient Care Team:  Cristy Homlan MD as PCP - General (Internal Medicine)  Rupinder Barnes MD as Physician (Cardiology)  Angel Romero RN as Nurse Navigator (Cardiology)  Baldemar Braga RN as Ambulatory Care Navigator (Internal Medicine)  Charles Mera MD as Surgeon (General Surgery)  Ani Krishnamurthy MD as Consulting Provider (Endocrinology)  Jaimee Segovia NP (Nurse Practitioner)  Deedee Duenas MD (Obstetrics & Gynecology)    Assessment/Plan   Education and counseling provided:  Are appropriate based on today's review and evaluation    Diagnoses and all orders for this visit:    1. CKD (chronic kidney disease) stage V requiring chronic dialysis ([facility] [address]    2. Encounter for immunization  -     Influenza virus vaccine (QUADRIVALENT PRES FREE SYRINGE) IM (91565)    3. Initial Medicare annual wellness visit         Health Maintenance Due   Topic Date Due    MEDICARE YEARLY EXAM  04/26/1985    DTaP/Tdap/Td series (1 - Tdap) 04/26/1988    Pneumococcal 19-64 Highest Risk (2 of 3 - PCV13) 02/01/2014    FOBT Q 1 YEAR AGE 50-75  04/26/2017    LIPID PANEL Q1  07/06/2017    Influenza Age 5 to Adult  08/01/2017    EYE EXAM RETINAL OR DILATED Q1  11/22/2017    HEMOGLOBIN A1C Q6M  11/30/2017   she needs to discuss prevnar 15 with transplant team      Patient Active Problem List    Diagnosis    Renal transplant recipient    Acute deep vein thrombosis (DVT) of popliteal vein of right lower extremity (HCC)    Dyslipidemia    Mitral regurgitation    Asthma    Depression    CKD (chronic kidney disease) stage V requiring chronic dialysis ([facility] [address]    Anemia    Hyperkalemia    DM (diabetes mellitus) ([facility] [address] [facility]    Diabetes mellitus ([facility] [address]    Hypertension    Kidney disease    Migraines     Has myalgia  Wants pain meds sleeps poorly  All else managed by transplant team    Vitals:    12/12/17 0837 12/12/17 0850   BP: 161/75 128/76   Pulse: 90    Resp: 20    Temp: 98.5 °F (36.9 °C)    TempSrc: Oral    SpO2: 99%    Weight: 187 lb (84.8 kg)    Height: 5' 6\" (1.676 m)        S1 and S2 normal, no murmurs, clicks, gallops or rubs. Regular rate and rhythm. Chest is clear; no wheezes or rales. No edema or JVD. 1. Encounter for immunization    - Influenza virus vaccine (QUADRIVALENT PRES FREE SYRINGE) IM (95049)    2.  Initial Medicare annual wellness visit  Status reviewed    3. Insomnia, unspecified type  trazadone    4. Kidney disease      5. Renal transplant recipient  Followed [facility]    6. Dyslipidemia  Followed [facility]  Advance Care Planning (ACP) Provider Conversation Snapshot    Date of ACP Conversation: 12/12/17  Persons included in Conversation:  patient  Length of ACP Conversation in minutes:  <16 minutes (Non-Billable)    Authorized Decision Maker (if patient is incapable of making informed decisions): This person is:   Court-Appointed Guardian          For Patients with Decision Making Capacity:   Values/Goals: Exploration of values, goals, and preferences if recovery is not expected, even with continued medical treatment in the event of:  Imminent death    Conversation Outcomes / Follow-Up Plan:   Recommended completion of Advance Directive form after review of ACP materials and conversation with prospective healthcare agent       Diagnoses and all orders for this visit:    1. Encounter for immunization  -     Influenza virus vaccine (QUADRIVALENT PRES FREE SYRINGE) IM (82950)    2. Initial Medicare annual wellness visit    3. Insomnia, unspecified type    4. Kidney disease    5. Renal transplant recipient    6. Dyslipidemia    Other orders  -     traZODone (DESYREL) 50 mg tablet; Take 1 Tab by mouth nightly.       No opiods indicated

## 2017-12-20 ENCOUNTER — HOSPITAL ENCOUNTER (OUTPATIENT)
Dept: MAMMOGRAPHY | Age: 52
Discharge: HOME | End: 2017-12-20
Attending: INTERNAL MEDICINE
Payer: MEDICARE

## 2017-12-20 DIAGNOSIS — R92.8 ABNORMAL MAMMOGRAM OF LEFT BREAST: ICD-10-CM

## 2017-12-20 PROCEDURE — 76642 ULTRASOUND BREAST LIMITED: CPT

## 2017-12-20 PROCEDURE — 77065 DX MAMMO INCL CAD UNI: CPT

## 2018-01-08 DIAGNOSIS — N95.1 HOT FLASHES DUE TO MENOPAUSE: Primary | ICD-10-CM

## 2018-01-08 RX ORDER — PAROXETINE 7.5 MG/1
7.5 CAPSULE ORAL DAILY
Qty: 30 CAP | Refills: 12 | Status: SHIPPED | OUTPATIENT
Start: 2018-01-08 | End: 2019-02-05

## 2018-01-08 NOTE — PROGRESS NOTES
Rx for [facility] sent to pharmacy for management of vasomotor symptoms associated with menopause. This medication was cleared by her transplant physician.     Mimi Jimenez MD  1/8/2018  9:21 AM

## 2018-01-12 ENCOUNTER — TELEPHONE (OUTPATIENT)
Dept: OBGYN CLINIC | Age: 53
End: 2018-01-12

## 2018-01-12 NOTE — TELEPHONE ENCOUNTER
Patient is calling to say that the [facility] needs authorization [address]. Awaiting rejection from pharmacy to send to [facility] to start Auth process. Patient aware.

## 2018-02-05 ENCOUNTER — OFFICE VISIT (OUTPATIENT)
Dept: ENDOCRINOLOGY | Age: 53
End: 2018-02-05

## 2018-02-05 VITALS
BODY MASS INDEX: 29.41 KG/M2 | DIASTOLIC BLOOD PRESSURE: 65 MMHG | WEIGHT: 183 LBS | HEIGHT: 66 IN | HEART RATE: 94 BPM | SYSTOLIC BLOOD PRESSURE: 137 MMHG

## 2018-02-05 DIAGNOSIS — Z79.4 TYPE 2 DIABETES MELLITUS WITH OTHER SPECIFIED COMPLICATION, WITH LONG-TERM CURRENT USE OF INSULIN (HCC): Primary | ICD-10-CM

## 2018-02-05 DIAGNOSIS — Z94.0 RENAL TRANSPLANT RECIPIENT: ICD-10-CM

## 2018-02-05 DIAGNOSIS — I10 ESSENTIAL HYPERTENSION: ICD-10-CM

## 2018-02-05 DIAGNOSIS — E11.69 TYPE 2 DIABETES MELLITUS WITH OTHER SPECIFIED COMPLICATION, WITH LONG-TERM CURRENT USE OF INSULIN (HCC): Primary | ICD-10-CM

## 2018-02-05 LAB
CREATININE, EXTERNAL: 1.15
INR, EXTERNAL: 2.8
PT, EXTERNAL: 31.3

## 2018-02-05 NOTE — MR AVS SNAPSHOT
[address] [facility]  [address]  [address] [address]  [phone]     Patient: Vianey Vanegas  MRN: [id number]  :1965               Visit Information     Date & Time Provider Department Dept. Phone Encounter #    2018  3:30 PM MD Lawrence [facility] [phone] [id number]      Follow-up Instructions     Return in about 3 months (around 2018).       Your Appointments     2018  8:00 AM   ESTABLISHED PATIENT with Sujatha Call MD   [facility] AT [address] [place] [address])   Appt Note: ae    [address] [place] [address] [facility] 53           [address], [address] [place] [address]            2018 11:00 AM   ECHO CARDIOGRAMS 2D with HATTIE ECHAVARRIA   [facility] ([facility])   Appt Note: Per Dr. Aquiles Vidal Echo dx MR, See Aquiles Vidal after    [address] [place] [id number]  [address]           [address] [address]            2018 11:40 AM   ESTABLISHED PATIENT with Michelle Massey MD   [facility] ([facility])   Appt Note: Per Dr. Aquiles Vidal Echo dx MR, See Aquiles Vidal after    [address]  Formerly [facility], [facility] [address]           [address] [address] Maintenance        Date Due    DTaP/Tdap/Td series (1 - Tdap) 1988    Pneumococcal 19-64 Highest Risk (2 of 3 - PCV13) 2014    EYE EXAM RETINAL OR DILATED Q1 2017    HEMOGLOBIN A1C Q6M 2017    FOOT EXAM Q1 10/2/2018    LIPID PANEL Q1 2018    MEDICARE YEARLY EXAM 2018    BREAST CANCER SCRN MAMMOGRAM 2019    PAP AKA CERVICAL CYTOLOGY 2020    COLONOSCOPY 2026      Allergies as of 2018  Review Complete On: 2018 By: Kaleigh Isabel MD       Severity Noted Reaction Type Reactions    Latex High 2012 Systemic Shortness of Breath    Tramadol  07/27/2015    Itching      Current Immunizations  Reviewed on 12/27/2013    Name Date    Influenza Vaccine 9/3/2014    Influenza Vaccine (Quad) PF 12/12/2017    Pneumococcal Polysaccharide (PPSV-23) 2/1/2013  1:47 PM    TB Skin Test (PPD) Intradermal 12/27/2013, 8/26/2013       Not reviewed this visit   Vitals     BP Pulse Height(growth percentile) Weight(growth percentile) LMP BMI    137/65 94 5' 6\" (1.676 m) 183 lb (83 kg) (LMP Unknown) 29.54 kg/m2    OB Status Smoking Status                Postmenopausal Never Smoker        Vitals History      BMI and BSA Data     Body Mass Index Body Surface Area    29.54 kg/m 2 1.97 m 2         Preferred Pharmacy       Pharmacy Name Phone    [address], [address] [place] [address] [phone]         Your Updated Medication List          This list is accurate as of: 2/5/18  4:12 PM.  Always use your most recent med list.                albuterol 90 mcg/actuation inhaler   Commonly known as:  PROVENTIL HFA, VENTOLIN HFA, PROAIR HFA   Take 2 Puffs by inhalation every four (4) hours as needed for Wheezing. amLODIPine 5 mg tablet   Commonly known as:  NORVASC   Take 1 Tab by mouth daily. aspirin delayed-release 81 mg tablet   Take  by mouth daily. CELLCEPT 250 mg capsule   Generic drug:  mycophenolate mofetil   Take 750 mg by mouth two (2) times a day. COUMADIN 1 mg tablet   Generic drug:  warfarin   Take 1 mg by mouth daily. glucose blood VI test strips strip   Commonly known as:  ONETOUCH ULTRA TEST   4 times daily. Diagnosis code: E11.8       insulin detemir 100 unit/mL (3 mL) Inpn   Commonly known as:  LEVEMIR FLEXTOUCH   Use up to 60 units daily       Insulin Needles (Disposable) 32 gauge x 5/32\" Ndle   Commonly known as:  Wanda Pen Needle   1 Each by Does Not Apply route five (5) times daily.        Insulin Syringe-Needle U-100 0.5 mL 31 gauge x 5/16 Syrg   1 Each by SubCUTAneous route daily.       JANUVIA 100 mg tablet   Generic drug:  SITagliptin   TAKE 1 TABLET BY MOUTH EVERY DAY       labetalol 200 mg tablet   Commonly known as:  NORMODYNE   Take 2 Tabs by mouth two (2) times a day. lancets 33 gauge Misc   Commonly known as: One Touch Delica   4 times daily. Diagnosis code: E11.8       Liraglutide 0.6 mg/0.1 mL (18 mg/3 mL) Pnij   Commonly known as:  VICTOZA   1.2 mg by SubCUTAneous route daily. magnesium oxide 400 mg tablet   Commonly known as:  MAG-OX   Take 800 mg by mouth two (2) times a day. metFORMIN  mg tablet   Commonly known as:  GLUCOPHAGE XR   Take 1 Tab by mouth two (2) times daily (after meals). NovoLOG Flexpen 100 unit/mL Inpn   Generic drug:  insulin aspart   0-10 Units by SubCUTAneous route three (3) times daily as needed. pantoprazole 40 mg tablet   Commonly known as:  PROTONIX   TAKE ONE TABLET BY MOUTH ONCE DAILY       PARoxetine mesylate 7.5 mg Cap   Commonly known as:  BRISDELLE   Take 7.5 Caps by mouth daily. pravastatin 20 mg tablet   Commonly known as:  PRAVACHOL   Take 20 mg by mouth daily. predniSONE 5 mg dose pack   Commonly known as:  STERAPRED   Take 5 mg by mouth daily. See administration instruction per 5mg dose pack       SENSIPAR 30 mg tablet   Generic drug:  cinacalcet   Take 30 mg by mouth daily. tacrolimus 1 mg capsule   Commonly known as:  PROGRAF   Take 2 mg by mouth two (2) times a day. traZODone 50 mg tablet   Commonly known as:  DESYREL   Take 1 Tab by mouth nightly. Follow-up Instructions     Return in about 3 months (around 5/5/2018). Patient Instructions    Diabetes; Watch carbohydrate intake with meals and try to keep this less than 30 grams    Victoza 1.2 mg.   Could increase this to 1.8 mg, but I am afraid nausea may become worse    Increase levemir to 20 units in AM and 10 units in PM.   - follow glucose trend overnight (bedtime to fasting) to adjust.  Would increase PM dose to 15 and then 20 units every few days to achieve fasting glucose goal (100-150). Once you achieve 20 units twice daily    Novolog for high glucoses  151-175: 1 unit  176-200: 2 units  201-225: 3 units  226-250: 4 units  251-275: 5 units  276-300: 6 units  301 +     : 7 units    Goals for blood glucose:  Fasting  (less than 150)  Lunch, Dinner, Bedtime -  (less than 180). Introducing [facility] & HEALTH SERVICES! Dear Viraj Cox: Thank you for requesting a [employer] account. Our records indicate that you already have an active [employer] account. You can access your account anytime at [email or web address]. Join The [email or web address]     Did you know that you can access your hospital and ER discharge instructions at any time in [employer]? You can also review all of your test results from your hospital stay or ER visit. Additional Information    If you have questions, please visit the Frequently Asked Questions section of the [employer] website at [email or web address]. Join The [email or web address] Remember, [employer] is NOT to be used for urgent needs. For medical emergencies, dial 911. Now available from your iPhone and Android! Please provide this summary of care documentation to your next provider. Your primary care clinician is listed as Eather Goodell. If you have any questions after today's visit, please call [phone].

## 2018-02-05 NOTE — PATIENT INSTRUCTIONS
Diabetes; Watch carbohydrate intake with meals and try to keep this less than 30 grams    Victoza 1.2 mg. Could increase this to 1.8 mg, but I am afraid nausea may become worse    Increase levemir to 20 units in AM and 10 units in PM.   - follow glucose trend overnight (bedtime to fasting) to adjust.  Would increase PM dose to 15 and then 20 units every few days to achieve fasting glucose goal (100-150). Once you achieve 20 units twice daily    Novolog for high glucoses  151-175: 1 unit  176-200: 2 units  201-225: 3 units  226-250: 4 units  251-275: 5 units  276-300: 6 units  301 +     : 7 units    Goals for blood glucose:  Fasting  (less than 150)  Lunch, Dinner, Bedtime -  (less than 180).

## 2018-02-05 NOTE — PROGRESS NOTES
History of Present Illness: Sharlene Buckley is a 50 y.o. female presents for follow-up of diabetes. She has a history of ESRD and is now s/p renal transplant on 4/11/2017  Diabetes control worsened after transplant due to increased clearance of insulin, anti-rejection medications, and increase in appetite. Current diabetes regimen:  Levemir 20 units - did not increase dose. Victoza 1.2 mg daily. This was added since last visit. She remains on Januvia. She does have some nausea. Metformin  mg twice daily. Glucoses: reviewed meter  Monitors breakfast and bedtime  189 yesterday AM.  199 last night  - most in high 100s. Lowest 89. She does have some values in the mid 200s and will take Novolog. Diet:  Cut back on her bread intake  Breakfast: turkey sinha and grits or eggs and turkey sinha. 2 pancakes and eggs yesterday. Evening: sandwich. May not eat in evening. Cuts back on bread. Weight is down 5 lbs since last visit. HTN  - managed by transplant team. Labetalol and amlodipine. BP controlled     Social:  Has son with diabetes who is 32years of age who is following with me    Past Medical History:   Diagnosis Date    Anemia     Arthritis     Asthma 4/8/2014    Rx for same    Beta blocker therapy     Chronic kidney disease     ESRD - dialysis MWF    Diabetes ([facility] [address] 26yo    Rx to control    Dialysis patient ([facility] [address]     M-W-F    GERD (gastroesophageal reflux disease)     Headache(784.0)     Hepatitis B infection     Hypertension     Rx for same    Pre-kidney transplant, listed 4/30/2015     Current Outpatient Prescriptions   Medication Sig    PARoxetine mesylate (BRISDELLE) 7.5 mg cap Take 7.5 Caps by mouth daily.  JANUVIA 100 mg tablet TAKE 1 TABLET BY MOUTH EVERY DAY    traZODone (DESYREL) 50 mg tablet Take 1 Tab by mouth nightly.  labetalol (NORMODYNE) 200 mg tablet Take 2 Tabs by mouth two (2) times a day.     Liraglutide (VICTOZA) 0.6 mg/0.1 mL (18 mg/3 mL) pnij 1.2 mg by SubCUTAneous route daily.  NOVOLOG FLEXPEN 100 unit/mL inpn 0-10 Units by SubCUTAneous route three (3) times daily as needed.  metFORMIN ER (GLUCOPHAGE XR) 500 mg tablet Take 1 Tab by mouth two (2) times daily (after meals).  lancets (ONE TOUCH DELICA) 33 gauge misc 4 times daily. Diagnosis code: E11.8    albuterol (PROVENTIL HFA, VENTOLIN HFA, PROAIR HFA) 90 mcg/actuation inhaler Take 2 Puffs by inhalation every four (4) hours as needed for Wheezing.  warfarin (COUMADIN) 1 mg tablet Take 1 mg by mouth daily.  cinacalcet (SENSIPAR) 30 mg tablet Take 30 mg by mouth daily.  magnesium oxide (MAG-OX) 400 mg tablet Take 800 mg by mouth two (2) times a day.  aspirin delayed-release 81 mg tablet Take  by mouth daily.  mycophenolate mofetil (CELLCEPT) 250 mg capsule Take 750 mg by mouth two (2) times a day.  insulin detemir (LEVEMIR FLEXTOUCH) 100 unit/mL (3 mL) inpn Use up to 60 units daily (Patient taking differently: 20 units in the morning)    amLODIPine (NORVASC) 5 mg tablet Take 1 Tab by mouth daily.  glucose blood VI test strips (ONETOUCH ULTRA TEST) strip 4 times daily. Diagnosis code: E11.8    Insulin Syringe-Needle U-100 0.5 mL 31 gauge x 5/16 syrg 1 Each by SubCUTAneous route daily.  Insulin Needles, Disposable, (SUZY PEN NEEDLE) 32 gauge x 5/32\" ndle 1 Each by Does Not Apply route five (5) times daily.  pravastatin (PRAVACHOL) 20 mg tablet Take 20 mg by mouth daily.  predniSONE (STERAPRED) 5 mg dose pack Take 5 mg by mouth daily. See administration instruction per 5mg dose pack     tacrolimus (PROGRAF) 1 mg capsule Take 2 mg by mouth two (2) times a day.  pantoprazole (PROTONIX) 40 mg tablet TAKE ONE TABLET BY MOUTH ONCE DAILY     No current facility-administered medications for this visit.       Allergies   Allergen Reactions    Latex Shortness of Breath    Tramadol Itching       Physical Examination:  Visit Vitals    /65    Pulse 94    Ht 5' 6\" (1.676 m)    Wt 183 lb (83 kg)    LMP  (LMP Unknown)    BMI 29.54 kg/m2   -   - General: pleasant, no distress, normal gait   HEENT: hearing intact, EOMI, clear sclera without icterus  - Cardiovascular: regular, normal rate   - Respiratory: normal effort  - Integumentary: no edema  - Psychiatric: normal mood and affect    Data Reviewed:   Do not have recent labs      Assessment/Plan:   1. Type 2 diabetes mellitus with other specified complication, with long-term current use of insulin (HCC)   - not at goal, but improving  - Recommend she increase Levemir to 30 units/day by adding second dose of 10 units at bedtime (20 units in AM, 10 units in PM). Advised her to increase dose if glucoses remain > 150 fasting in several days. Continue Victoza 1.2 mg daily. Do not feel she would tolerate 1.8 mg dose  Continue metformin 500 mg twice daily   2. Essential hypertension   - controlled. Defer to transplant team and nephrologists   3. Renal transplant recipient   - she is aware of importance of glucose control to maintain renal function/kidney health    4. BMI 29.0-29.9,adult   - encouraged continued efforts with diet and encouraged her to increase activity level   Greater than 50% of 25 minute visit was spent counseling the patient about above, discussing diet, medications and glucose goals    Patient Instructions   Diabetes; Watch carbohydrate intake with meals and try to keep this less than 30 grams    Victoza 1.2 mg. Could increase this to 1.8 mg, but I am afraid nausea may become worse    Increase levemir to 20 units in AM and 10 units in PM.   - follow glucose trend overnight (bedtime to fasting) to adjust.  Would increase PM dose to 15 and then 20 units every few days to achieve fasting glucose goal (100-150).  Once you achieve 20 units twice daily    Novolog for high glucoses  151-175: 1 unit  176-200: 2 units  201-225: 3 units  226-250: 4 units  251-275: 5 units  276-300: 6 units  301 +     : 7 units    Goals for blood glucose:  Fasting  (less than 150)  Lunch, Dinner, Bedtime -  (less than 180). Follow-up Disposition:  Return in about 3 months (around 5/5/2018).     Copy sent to:

## 2018-02-15 ENCOUNTER — TELEPHONE (OUTPATIENT)
Dept: OBGYN CLINIC | Age: 53
End: 2018-02-15

## 2018-02-15 NOTE — TELEPHONE ENCOUNTER
Mj Navarro MD  [facility], RN        Caller: Unspecified (Today, 10:01 AM)                     Please advise her to discontinue the Brisdelle at this time and see if symptoms resolve. If not, she probably should follow up with her transplant doctor. Unfortunately given her other medical comorbidities there are not a lot of other options available for management of hot flashes. Thanks.      Mj Navarro MD  2/15/2018  10:38 AM

## 2018-02-15 NOTE — TELEPHONE ENCOUNTER
Call received at 9:55am      50year old patient last seen in the office on 9/14/17 and was prescribed Brisdelle on 1/8/18. Patient calling to say she has the following side effects:  1) stomach upset, nausea  2) light headed ness  3)loss of appetite  4) weak, want to lay in bed all day.        Patient seems to think the side effects are related to the [facility]        Patient is wondering how to proceed      Please advise

## 2018-02-27 ENCOUNTER — OFFICE VISIT (OUTPATIENT)
Dept: OBGYN CLINIC | Age: 53
End: 2018-02-27

## 2018-02-27 ENCOUNTER — TELEPHONE (OUTPATIENT)
Dept: INTERNAL MEDICINE CLINIC | Age: 53
End: 2018-02-27

## 2018-02-27 VITALS
SYSTOLIC BLOOD PRESSURE: 128 MMHG | BODY MASS INDEX: 29.25 KG/M2 | WEIGHT: 182 LBS | DIASTOLIC BLOOD PRESSURE: 82 MMHG | HEIGHT: 66 IN

## 2018-02-27 DIAGNOSIS — N90.89 VULVAR LESION: Primary | ICD-10-CM

## 2018-02-27 DIAGNOSIS — Z01.411 ENCOUNTER FOR GYNECOLOGICAL EXAMINATION WITH ABNORMAL FINDING: ICD-10-CM

## 2018-02-27 NOTE — PATIENT INSTRUCTIONS
Pelvic Exam: Care Instructions  Your Care Instructions    When your doctor examines all of your pelvic organs, it's called a pelvic exam. Two good reasons to have this kind of exam are to check for sexually transmitted infections (STIs) and to get a Pap test. A Pap test is also called a Pap smear. It checks for early changes that can lead to cancer of the cervix. Sometimes a pelvic exam is part of a regular checkup. In this case, you can do some things to make your test results as accurate as possible. · Try to schedule the exam when you don't have your period. · Don't use douches, tampons, or vaginal medicines, sprays, or powders for 24 hours before your exam.  · Don't have sex for 24 hours before your exam.  Other times, women have this kind of exam at any time of the month. This is because they have pelvic pain, bleeding, or discharge. Or they may have another pelvic problem. Before your exam, it's important to share some information with your doctor. For example, if you are a survivor of rape or sexual abuse, you can talk about any concerns you may have. Your doctor will also want to know if you are pregnant or use birth control. And he or she will want to hear about any problems, surgeries, or procedures you have had in your pelvic area. You will also need to tell your doctor when your last period was. Follow-up care is a key part of your treatment and safety. Be sure to make and go to all appointments, and call your doctor if you are having problems. It's also a good idea to know your test results and keep a list of the medicines you take. How is a pelvic exam done? · During a pelvic exam, you will:  ¨ Take off your clothes below the waist. You will get a paper or cloth cover to put over the lower half of your body. Larinda Sifuentes on your back on an exam table. Your feet will be raised above you. Stirrups will support your feet. · The doctor will:  Nathaniel Dance you to relax your knees.  Your knees need to lean out, toward the walls. ¨ Check the opening of your vagina for sores or swelling. ¨ Gently put a tool called a speculum into your vagina. It opens the vagina a little bit. You will feel some pressure. But if you are relaxed, it will not hurt. It lets your doctor see inside the vagina. ¨ Use a small brush, spatula, or swab to get a sample of cells, if you are having a Pap test or culture. The doctor then removes the speculum. ¨ Put on gloves and put one or two fingers of one hand into your vagina. The other hand goes on your lower belly. This lets your doctor feel your pelvic organs. You will probably feel some pressure. Try to stay relaxed. ¨ Put one gloved finger into your rectum and one into your vagina, if needed. This can also help check your pelvic organs. This exam takes about 10 minutes. At the end, you will get a washcloth or tissue to clean your vaginal area. It's normal to have some discharge after this exam. You can then get dressed. Some test results may be ready right away. But results from a culture or a Pap test may take several days or a few weeks. Why should you have a pelvic exam?  · You want to have recommended screening tests. This includes a Pap test.  · You think you have a vaginal infection. Signs include itching, burning, or unusual discharge. · You might have been exposed to a sexually transmitted infection (STI), such as chlamydia or herpes. · You have vaginal bleeding that is not part of your normal menstrual period. · You have pain in your belly or pelvis. · You have been sexually assaulted. A pelvic exam lets your doctor collect evidence and check for STIs. · You are pregnant. · You are having trouble getting pregnant. What are the risks of a pelvic exam?  There are no risks from a pelvic exam.  When should you call for help? Watch closely for changes in your health, and be sure to contact your doctor if you have any problems. Where can you learn more?   Go to http://bruce-halima.info/. Enter H799 in the search box to learn more about \"Pelvic Exam: Care Instructions. \"  Current as of: October 13, 2016  Content Version: 11.4  © 2974-4245 [employer]. Care instructions adapted under license by [employer] (which disclaims liability or warranty for this information). If you have questions about a medical condition or this instruction, always ask your healthcare professional. [place] [address] any warranty or liability for your use of this information.

## 2018-02-27 NOTE — TELEPHONE ENCOUNTER
Patient recently had neck surgery and would like to see Dr. Jose Nayak asap. Would like to be contacted for possible appt or prescription for neck pain.

## 2018-02-27 NOTE — PROGRESS NOTES
Annual exam    Anni Gomez is a 50 y.o. postmenopausal female presenting for annual exam. On Brisdelle for VMS, but stopped taking this due to symptoms of nausea. She would like to try taking it every other day for relief of hot flashes, but to minimize side effects. +small lesion on left labia minora present x 1 week. She is not sexually active, no new partners in last year. Does not shave. Denies issues with vaginal dryness. Ob/Gyn Hx:    Menopause- age ~38 (), no PMB  ? VMS- yes  ? Vag dryness-denies  ? HRT-denies  STI- denies  ? SA-denies     Health maintenance:  Pap- NILM HPV neg --> repeat   Mammo-17, normal  Colonoscopy- , normal   Dexa-denies    Past Medical History:   Diagnosis Date    Anemia     Arthritis     Asthma 2014    Rx for same    Beta blocker therapy     Chronic kidney disease     ESRD - dialysis MWF    Diabetes ([facility] 28yo    Rx to control    Dialysis patient ([facility]     M-W-F    GERD (gastroesophageal reflux disease)     Headache(784.0)     Hepatitis B infection     Hypertension     Rx for same    Pre-kidney transplant, listed 2015       Past Surgical History:   Procedure Laterality Date    COLONOSCOPY N/A 2016    COLONOSCOPY performed by Delon Manuel MD at [address]    laparoscopic    HX COLONOSCOPY      HX ENDOSCOPY      HX MOHS PROCEDURES Left     HX OTHER SURGICAL  7/30/15    excision of left Axilla Hidradenitis    HX RENAL BIOPSY      HX RENAL TRANSPLANT  2017    HX UROLOGICAL      KIDNEY BX RIGHT - pt states she has only had one kidney bx as of 10/31/2016    HX UROLOGICAL  2017    transplant    HX VASCULAR ACCESS  2013    CHEST CATHETER FOR DIALYSIS    HX VASCULAR ACCESS Right     A-V fistula    HX WISDOM TEETH EXTRACTION         Family History   Problem Relation Age of Onset    Hypertension Mother     Hypertension Sister     Diabetes Maternal Grandfather     Cancer Father      UNKNOWN    Hypertension Maternal Grandmother     Diabetes Son     Asthma Son     Cancer Maternal Aunt      UNKNOWN    Anesth Problems Neg Hx        Social History     Social History    Marital status: SINGLE     Spouse name: N/A    Number of children: N/A    Years of education: N/A     Occupational History    Not on file. Social History Main Topics    Smoking status: Never Smoker    Smokeless tobacco: Never Used    Alcohol use No    Drug use: No    Sexual activity: Not Currently     Birth control/ protection: None     Other Topics Concern    Not on file     Social History Narrative       Current Outpatient Prescriptions   Medication Sig Dispense Refill    PARoxetine mesylate (BRISDELLE) 7.5 mg cap Take 7.5 Caps by mouth daily. 30 Cap 12    traZODone (DESYREL) 50 mg tablet Take 1 Tab by mouth nightly. 30 Tab 3    labetalol (NORMODYNE) 200 mg tablet Take 2 Tabs by mouth two (2) times a day. 360 Tab 1    Liraglutide (VICTOZA) 0.6 mg/0.1 mL (18 mg/3 mL) pnij 1.2 mg by SubCUTAneous route daily. 2 Pen 10    NOVOLOG FLEXPEN 100 unit/mL inpn 0-10 Units by SubCUTAneous route three (3) times daily as needed. 3    metFORMIN ER (GLUCOPHAGE XR) 500 mg tablet Take 1 Tab by mouth two (2) times daily (after meals). 60 Tab 10    lancets (ONE TOUCH DELICA) 33 gauge misc 4 times daily. Diagnosis code: E11.8 125 Lancet 10    albuterol (PROVENTIL HFA, VENTOLIN HFA, PROAIR HFA) 90 mcg/actuation inhaler Take 2 Puffs by inhalation every four (4) hours as needed for Wheezing. 1 Inhaler 3    warfarin (COUMADIN) 1 mg tablet Take 1 mg by mouth daily.  cinacalcet (SENSIPAR) 30 mg tablet Take 30 mg by mouth daily.  magnesium oxide (MAG-OX) 400 mg tablet Take 800 mg by mouth two (2) times a day.  aspirin delayed-release 81 mg tablet Take  by mouth daily.  mycophenolate mofetil (CELLCEPT) 250 mg capsule Take 750 mg by mouth two (2) times a day.       insulin detemir (LEVEMIR FLEXTOUCH) 100 unit/mL (3 mL) inpn Use up to 60 units daily (Patient taking differently: 20 units in the morning) 30 mL 10    amLODIPine (NORVASC) 5 mg tablet Take 1 Tab by mouth daily. 90 Tab 3    glucose blood VI test strips (ONETOUCH ULTRA TEST) strip 4 times daily. Diagnosis code: E11.8 125 Strip 10    Insulin Syringe-Needle U-100 0.5 mL 31 gauge x 5/16 syrg 1 Each by SubCUTAneous route daily. 100 Syringe 3    Insulin Needles, Disposable, (SUZY PEN NEEDLE) 32 gauge x 5/32\" ndle 1 Each by Does Not Apply route five (5) times daily. 150 Pen Needle 10    pravastatin (PRAVACHOL) 20 mg tablet Take 20 mg by mouth daily.  predniSONE (STERAPRED) 5 mg dose pack Take 5 mg by mouth daily. See administration instruction per 5mg dose pack       tacrolimus (PROGRAF) 1 mg capsule Take 2 mg by mouth two (2) times a day.       pantoprazole (PROTONIX) 40 mg tablet TAKE ONE TABLET BY MOUTH ONCE DAILY 30 Tab 5    JANUVIA 100 mg tablet TAKE 1 TABLET BY MOUTH EVERY DAY  9       Allergies   Allergen Reactions    Latex Shortness of Breath    Tramadol Itching       Review of Systems - History obtained from the patient  Constitutional: negative for weight loss, fever, night sweats, +hot flashes  HEENT: negative for hearing loss, earache, congestion, snoring, sorethroat  CV: negative for chest pain, palpitations, edema  Resp: negative for cough, shortness of breath, wheezing  GI: negative for change in bowel habits, abdominal pain, black or bloody stools  : negative for frequency, dysuria, hematuria, vaginal discharge  MSK: negative for back pain, joint pain, muscle pain  Breast: negative for breast lumps, nipple discharge, galactorrhea  Skin :negative for itching, rash, hives  Neuro: negative for dizziness, headache, confusion, weakness  Psych: negative for anxiety, depression, change in mood  Heme/lymph: negative for bleeding, bruising, pallor    Physical Exam    Visit Vitals    /82 (BP 1 Location: Left arm, BP Patient Position: Sitting)    Ht 5' 6\" (1.676 m)    Wt 182 lb (82.6 kg)    LMP  (LMP Unknown)    BMI 29.38 kg/m2       Constitutional  · Appearance: well-nourished, well developed, alert, in no acute distress    HENT  · Head and Face: appears normal    Neck  · Inspection/Palpation: normal appearance, no masses or tenderness  · Lymph Nodes: no lymphadenopathy present  · Thyroid: gland size normal, nontender, no nodules or masses present on palpation    Chest  · Respiratory Effort: non-labored breathing  · Auscultation: CTAB, normal breath sounds    Cardiovascular  · Heart:  · Auscultation: regular rate and rhythm without murmur  · Extremities: no peripheral edema    Breasts  · Inspection of Breasts: breasts symmetrical, no skin changes, no discharge present, nipple appearance normal, no skin retraction present  · Palpation of Breasts and Axillae: no masses present on palpation, no breast tenderness  · Axillary Lymph Nodes: no lymphadenopathy present    Gastrointestinal  · Abdominal Examination: abdomen non-tender to palpation, normal bowel sounds, no masses present, mildly distended  · Liver and spleen: no hepatomegaly present, spleen not palpable  · Hernias: no hernias identified    Genitourinary  · External Genitalia: normal appearance for age, no discharge present, no tenderness present, +small ulcerative lesion left labia minora measuring 0.5 x 0.5cm (HSV swab obtained)  · Vagina: normal vaginal vault without central or paravaginal defects, no discharge present, no inflammatory lesions present, no masses present, +mild UG atrophy  · Bladder: non-tender to palpation  · Urethra: appears normal  · Cervix: normal   · Uterus: normal size, shape and consistency, small, mobile  · Adnexa: no adnexal tenderness present, no adnexal masses present  · Perineum: perineum within normal limits, no evidence of trauma, no rashes or skin lesions present    Skin  · General Inspection: no rash, no lesions identified    Neurologic/Psychiatric  · Mental Status:  · Orientation: grossly oriented to person, place and time  · Mood and Affect: mood normal, affect appropriate      Assessment/Plan:  50 y.o. postmenopausal female presenting for annual exam. Overall doing well.      Health Maintenance:  -counseled re: diet, exercise, healthy lifestyle  -pap - next cotesting due in 2022  -declines STI screening  -HSV swab performed of ulcerated area left labia  -mammo in Dec wnl, repeat 1 year  -colonscopy utd  -continue Brisdelle every other day for management of hot flashes    RTC: 1 year for AE, or sooner prjovi Mejia MD  2/27/2018  8:55 AM

## 2018-03-01 NOTE — TELEPHONE ENCOUNTER
Writer spoke with patient and she states she did not have neck surgery she was having muscle spasms , but she did go to the ER yesterday and was given muscle relaxer.  Patient is scheduled for March 20th with Dr Alvino Kaplan

## 2018-03-02 LAB — HSV SPEC CULT: ABNORMAL

## 2018-03-07 RX ORDER — VALACYCLOVIR HYDROCHLORIDE 1 G/1
1000 TABLET, FILM COATED ORAL 2 TIMES DAILY
Qty: 14 TAB | Refills: 0 | Status: SHIPPED | OUTPATIENT
Start: 2018-03-07 | End: 2018-03-14

## 2018-03-07 NOTE — PROGRESS NOTES
Patient advised of results and recommendations. Patient verbalized understanding. Requests that prescription be sent to [facility].

## 2018-03-20 ENCOUNTER — OFFICE VISIT (OUTPATIENT)
Dept: INTERNAL MEDICINE CLINIC | Age: 53
End: 2018-03-20

## 2018-03-20 VITALS
HEART RATE: 94 BPM | WEIGHT: 183 LBS | RESPIRATION RATE: 16 BRPM | DIASTOLIC BLOOD PRESSURE: 72 MMHG | BODY MASS INDEX: 29.41 KG/M2 | HEIGHT: 66 IN | OXYGEN SATURATION: 98 % | SYSTOLIC BLOOD PRESSURE: 134 MMHG

## 2018-03-20 DIAGNOSIS — R51.9 CHRONIC NONINTRACTABLE HEADACHE, UNSPECIFIED HEADACHE TYPE: ICD-10-CM

## 2018-03-20 DIAGNOSIS — R23.2 HOT FLASHES NOT DUE TO MENOPAUSE: Primary | ICD-10-CM

## 2018-03-20 DIAGNOSIS — G89.29 CHRONIC NONINTRACTABLE HEADACHE, UNSPECIFIED HEADACHE TYPE: ICD-10-CM

## 2018-03-20 RX ORDER — BUTALBITAL, ACETAMINOPHEN AND CAFFEINE 50; 325; 40 MG/1; MG/1; MG/1
1 TABLET ORAL
Qty: 60 TAB | Refills: 1 | Status: SHIPPED | OUTPATIENT
Start: 2018-03-20 | End: 2019-06-25 | Stop reason: SDUPTHER

## 2018-03-20 NOTE — PROGRESS NOTES
Coordination of Care Questions    1. Have you been to the ER, urgent care clinic since your last visit? No       Hospitalized since your last visit? No    2. Have you seen or consulted any other health care providers outside of the [address] [place] [address] since your last visit? Include any pap smears or colon screening.  No

## 2018-03-20 NOTE — MR AVS SNAPSHOT
[address] [place] [id number]  [phone]     Patient: Kenton Priest  MRN: [id number]  :1965               Visit Information     Date & Time Provider Department Dept.  Phone Encounter #    3/20/2018  8:30 AM Nato Foss MD [facility] [phone] [id number]      Your Appointments     2018  9:50 AM   ROUTINE CARE with Yue Flores MD   [facility]   Appt Note: f/u diabetes cp0.00    [address]  [address]           [address] [place] [address]            2018 11:00 AM   ECHO CARDIOGRAMS 2D with HATTIE ECHAVARRIA   [facility]   Appt Note: Per Dr. Mary Davis Echo dx MR, See Mary Davis after    [address] [facility] [address]           [address] [address]            2018 11:40 AM   ESTABLISHED PATIENT with Neal Robles MD   [facility] ([facility])   Appt Note: Per Dr. Mary Davis Echo dx MR, See Mary Davis after    [address]  [facility] [address]           [address] [address] Maintenance        Date Due    DTaP/Tdap/Td series (1 - Tdap) 1988    Pneumococcal 19-64 Highest Risk (2 of 3 - PCV13) 2014    EYE EXAM RETINAL OR DILATED Q1 2017    HEMOGLOBIN A1C Q6M 2017    FOOT EXAM Q1 10/2/2018    LIPID PANEL Q1 2018    MEDICARE YEARLY EXAM 2018    BREAST CANCER SCRN MAMMOGRAM 2019    PAP AKA CERVICAL CYTOLOGY 2020    COLONOSCOPY 2026      Allergies as of 3/20/2018  Review Complete On: 3/20/2018 By: Fernanda Huntley LPN       Severity Noted Reaction Type Reactions    Latex High 2012   Systemic Shortness of Breath    Tramadol 07/27/2015    Itching      Current Immunizations  Reviewed on 12/27/2013    Name Date    Influenza Vaccine 9/3/2014    Influenza Vaccine (Quad) PF 12/12/2017    Pneumococcal Polysaccharide (PPSV-23) 2/1/2013  1:47 PM    TB Skin Test (PPD) Intradermal 12/27/2013, 8/26/2013       Not reviewed this visit   You Were Diagnosed With        Codes Comments    Hot flashes not due to menopause    -  Primary ICD-10-CM: R23.2  ICD-9-CM: 782.62     Chronic nonintractable headache, unspecified headache type     ICD-10-CM: R51  ICD-9-CM: 688. 0       Vitals     BP Pulse Resp Height(growth percentile) Weight(growth percentile) LMP    134/72 94 16 5' 6\" (1.676 m) 183 lb (83 kg) (LMP Unknown)    SpO2 BMI OB Status Smoking Status          98% 29.54 kg/m2 Postmenopausal Never Smoker      Vitals History      BMI and BSA Data     Body Mass Index Body Surface Area    29.54 kg/m 2 1.97 m 2         Preferred Pharmacy       Pharmacy Name Phone    [address], [address] [address] [phone]         Your Updated Medication List          This list is accurate as of 3/20/18  8:59 AM.  Always use your most recent med list.                albuterol 90 mcg/actuation inhaler   Commonly known as:  PROVENTIL HFA, VENTOLIN HFA, PROAIR HFA   Take 2 Puffs by inhalation every four (4) hours as needed for Wheezing. amLODIPine 5 mg tablet   Commonly known as:  NORVASC   Take 1 Tab by mouth daily. aspirin delayed-release 81 mg tablet   Take  by mouth daily. butalbital-acetaminophen-caffeine -40 mg per tablet   Commonly known as:  FIORICET, ESGIC   Take 1 Tab by mouth three (3) times daily as needed for Pain. Indications: TENSION-TYPE HEADACHE       CELLCEPT 250 mg capsule   Generic drug:  mycophenolate mofetil   Take 750 mg by mouth two (2) times a day. COUMADIN 1 mg tablet   Generic drug:  warfarin   Take 1 mg by mouth daily.        glucose blood VI test strips strip   Commonly known as:  ONETOUCH ULTRA TEST   4 times daily. Diagnosis code: E11.8       insulin detemir U-100 100 unit/mL (3 mL) Inpn   Commonly known as:  LEVEMIR FLEXTOUCH   Use up to 60 units daily       Insulin Needles (Disposable) 32 gauge x 5/32\" Ndle   Commonly known as:  Wanda Pen Needle   1 Each by Does Not Apply route five (5) times daily. Insulin Syringe-Needle U-100 0.5 mL 31 gauge x 5/16 Syrg   1 Each by SubCUTAneous route daily. JANUVIA 100 mg tablet   Generic drug:  SITagliptin   TAKE 1 TABLET BY MOUTH EVERY DAY       labetalol 200 mg tablet   Commonly known as:  NORMODYNE   Take 2 Tabs by mouth two (2) times a day. lancets 33 gauge Misc   Commonly known as: One Touch Delica   4 times daily. Diagnosis code: E11.8       Liraglutide 0.6 mg/0.1 mL (18 mg/3 mL) Pnij   Commonly known as:  VICTOZA   1.2 mg by SubCUTAneous route daily. magnesium oxide 400 mg tablet   Commonly known as:  MAG-OX   Take 800 mg by mouth two (2) times a day. metFORMIN  mg tablet   Commonly known as:  GLUCOPHAGE XR   Take 1 Tab by mouth two (2) times daily (after meals). NovoLOG Flexpen U-100 Insulin 100 unit/mL Inpn   Generic drug:  insulin aspart U-100   0-10 Units by SubCUTAneous route three (3) times daily as needed. pantoprazole 40 mg tablet   Commonly known as:  PROTONIX   TAKE ONE TABLET BY MOUTH ONCE DAILY       PARoxetine mesylate(menop.sym) 7.5 mg Cap   Commonly known as:  BRISDELLE   Take 7.5 Caps by mouth daily. pravastatin 20 mg tablet   Commonly known as:  PRAVACHOL   Take 20 mg by mouth daily. predniSONE 5 mg dose pack   Commonly known as:  STERAPRED   Take 5 mg by mouth daily. See administration instruction per 5mg dose pack       SENSIPAR 30 mg tablet   Generic drug:  cinacalcet   Take 30 mg by mouth daily. tacrolimus 1 mg capsule   Commonly known as:  PROGRAF   Take 2 mg by mouth two (2) times a day.        traZODone 50 mg tablet   Commonly known as:  DESYREL   Take 1 Tab by mouth nightly. Prescriptions Printed        Refills    butalbital-acetaminophen-caffeine (FIORICET, ESGIC) -40 mg per tablet 1    Sig: Take 1 Tab by mouth three (3) times daily as needed for Pain. Indications: TENSION-TYPE HEADACHE    Class: Print    Route: Oral      Introducing MYCHART! Dear Alba Mejia: Thank you for requesting a [facility] account. Our records indicate that you already have an active [facility] account. You can access your account anytime at [email or web address]     Did you know that you can access your hospital and ER discharge instructions at any time in [facility]? You can also review all of your test results from your hospital stay or ER visit. Additional Information    If you have questions, please visit the Frequently Asked Questions section of the [facility] website at [email or web address] Remember, [facility] is NOT to be used for urgent needs. For medical emergencies, dial 911. Now available from your iPhone and Android! Please provide this summary of care documentation to your next provider. Your primary care clinician is listed as Pam Larkin. If you have any questions after today's visit, please call [phone].

## 2018-03-20 NOTE — PROGRESS NOTES
Chief Complaint   Patient presents with    Follow-up     Patient Active Problem List    Diagnosis    Renal transplant recipient    Acute deep vein thrombosis (DVT) of popliteal vein of right lower extremity (HCC)    Dyslipidemia    Mitral regurgitation    Asthma    Depression    CKD (chronic kidney disease) stage V requiring chronic [facility] ([facility]    Anemia    Hyperkalemia    DM (diabetes mellitus) ([facility]    Diabetes mellitus ([facility]    Hypertension    Kidney disease    Migraines     On brisdelle for hot flashes, now every other day for a few weeks  Still nausea and daily headache  Neg study for sleep apnea  Nausea is multifactorial from victoza and metformin    No migraines  Sleep fair  Stress from son still ongoing      Vitals:    03/20/18 0834   BP: 134/72   Pulse: 94   Resp: 16   SpO2: 98%   Weight: 183 lb (83 kg)   Height: 5' 6\" (1.676 m)     no apparent distress  S1 and S2 normal, no murmurs, clicks, gallops or rubs. Regular rate and rhythm. Chest is clear; no wheezes or rales. No edema or JVD. No TA tenderness  rom c spine OK      Neuro intact      Hypertension controlled for age based on guidelines  Diabetes controlled adequately  Lab Results   Component Value Date/Time    Hemoglobin A1c 6.1 12/28/2015 08:01 PM    Hemoglobin A1c, External 5.7 01/16/2016     1. Hot flashes not due to menopause  Cannot use estrogens due to DVT  paxil every other day   Poor alternate    2. Chronic nonintractable headache, unspecified headache type  Try 2 ttylenol with cup of coffee  Stress reduction  Sleep hygiene    Rare use fioricet n warn drug interaction warfarin and tacrolimus        Diagnoses and all orders for this visit:    1. Hot flashes not due to menopause    2. Chronic nonintractable headache, unspecified headache type    Other orders  -     butalbital-acetaminophen-caffeine (FIORICET, ESGIC) -40 mg per tablet; Take 1 Tab by mouth three (3) times daily as needed for Pain.  Indications: TENSION-TYPE HEADACHE

## 2018-05-08 ENCOUNTER — OFFICE VISIT (OUTPATIENT)
Dept: ENDOCRINOLOGY | Age: 53
End: 2018-05-08

## 2018-05-08 VITALS
DIASTOLIC BLOOD PRESSURE: 74 MMHG | WEIGHT: 182 LBS | HEART RATE: 85 BPM | BODY MASS INDEX: 29.25 KG/M2 | SYSTOLIC BLOOD PRESSURE: 132 MMHG | HEIGHT: 66 IN

## 2018-05-08 DIAGNOSIS — I10 ESSENTIAL HYPERTENSION: ICD-10-CM

## 2018-05-08 DIAGNOSIS — Z79.4 TYPE 2 DIABETES MELLITUS WITH COMPLICATION, WITH LONG-TERM CURRENT USE OF INSULIN (HCC): Primary | ICD-10-CM

## 2018-05-08 DIAGNOSIS — E11.8 TYPE 2 DIABETES MELLITUS WITH COMPLICATION, WITH LONG-TERM CURRENT USE OF INSULIN (HCC): Primary | ICD-10-CM

## 2018-05-08 DIAGNOSIS — E78.5 DYSLIPIDEMIA: ICD-10-CM

## 2018-05-08 DIAGNOSIS — Z94.0 RENAL TRANSPLANT RECIPIENT: ICD-10-CM

## 2018-05-08 NOTE — PROGRESS NOTES
Has labs routinely at [facility]. Believes a1c was checked in April. Will call for results ([phone]). Had recent eye exam, release form signed.

## 2018-05-08 NOTE — MR AVS SNAPSHOT
[address] [facility]  [address]  [address] [address]  [phone]     Patient: Kya Mix  MRN: [id number]  :1965               Visit Information     Date & Time Provider Department Dept. Phone Encounter #    2018  9:50 AM Cody Peterson MD [facility] [phone] [id number]      Follow-up Instructions     Return in about 3 months (around 2018).       Your Appointments     5/10/2018  8:15 AM   PHYSICAL PRE OP with Harpal Bang NP   [facility] [facility] ([address])   Appt Note: NP EST PCP    [address] [address]           [address] [address]            5/15/2018  4:30 PM   ROUTINE CARE with Umesh Castillo MD   [facility])   Appt Note: Hair thinning\falling out from poss meds; R/s appointment $0CP, $0PB eyt 2018    [address] [address]            2018 11:00 AM   ECHO CARDIOGRAMS 2D with HATTIE ECHAVARRIA   [facility] ([facility])   Appt Note: Per Dr. Elder Doe Echo dx MR, See Elder Doe after    [address] [facility] [phone]           [address] [address]            2018 11:40 AM   ESTABLISHED PATIENT with Jessica Posada MD   [facility] ([facility])   Appt Note: Per Dr. Elder Doe Echo dx MR, See Elder Doe after    [address]  [place] [phone]           [address] [facility] [address] [address] Maintenance        Date Due    DTaP/Tdap/Td series (1 - Tdap) 1988    Pneumococcal 19-64 Highest Risk (2 of 3 - PCV13) 2014    EYE EXAM RETINAL OR DILATED Q1 2017    HEMOGLOBIN A1C Q6M 2017    Influenza Age 9 to Adult 2018    FOOT EXAM Q1 10/2/2018    LIPID PANEL Q1 2018    MEDICARE YEARLY EXAM 2018    BREAST CANCER SCRN MAMMOGRAM 2019    PAP AKA CERVICAL CYTOLOGY 2020    COLONOSCOPY 2026      Allergies as of 2018  Review Complete On: 2018 By: Jericho Moncada MD       Severity Noted Reaction Type Reactions    Latex High 2012   Systemic Shortness of Breath    Tramadol  2015    Itching      Current Immunizations  Reviewed on 2013    Name Date    Influenza Vaccine 9/3/2014    Influenza Vaccine (Quad) PF 2017    Pneumococcal Polysaccharide (PPSV-23) 2013  1:47 PM    TB Skin Test (PPD) Intradermal 2013, 2013       Not reviewed this visit   You Were Diagnosed With        Codes Comments    Type 2 diabetes mellitus with complication, with long-term current use of insulin ([facility]    -  Primary ICD-10-CM: E11.8, Z79.4  ICD-9-CM: 250.90, V58.67     Essential hypertension     ICD-10-CM: I10  ICD-9-CM: 401.9     Dyslipidemia     ICD-10-CM: E78.5  ICD-9-CM: 272.4     Renal transplant recipient     ICD-10-CM: Z94.0  ICD-9-CM: V42.0     BMI 29.0-29.9,adult     ICD-10-CM: Z68.29  ICD-9-CM: V85.25       Vitals     BP Pulse Height(growth percentile) Weight(growth percentile) LMP BMI    132/74 85 5' 6\" (1.676 m) 182 lb (82.6 kg) (LMP Unknown) 29.38 kg/m2    OB Status Smoking Status                Postmenopausal Never Smoker        Vitals History      BMI and BSA Data     Body Mass Index Body Surface Area    29.38 kg/m 2 1.96 m 2         Preferred Pharmacy       Pharmacy Name Phone    [address], [place] [address] [facility] 108 [phone]         Your Updated Medication List          This list is accurate as of 18 10:53 AM.  Always use your most recent med list.                albuterol 90 mcg/actuation inhaler   Commonly known as:  PROVENTIL HFA, VENTOLIN HFA, PROAIR HFA   Take 2 Puffs by inhalation every four (4) hours as needed for Wheezing. amLODIPine 5 mg tablet   Commonly known as:  NORVASC   Take 1 Tab by mouth daily. aspirin delayed-release 81 mg tablet   Take  by mouth daily. butalbital-acetaminophen-caffeine -40 mg per tablet   Commonly known as:  FIORICET, ESGIC   Take 1 Tab by mouth three (3) times daily as needed for Pain. Indications: TENSION-TYPE HEADACHE       CELLCEPT 250 mg capsule   Generic drug:  mycophenolate mofetil   Take 750 mg by mouth two (2) times a day. COUMADIN 1 mg tablet   Generic drug:  warfarin   Take 1 mg by mouth daily. glucose blood VI test strips strip   Commonly known as:  ONETOUCH ULTRA TEST   4 times daily. Diagnosis code: E11.8       insulin detemir U-100 100 unit/mL (3 mL) Inpn   Commonly known as:  LEVEMIR FLEXTOUCH   Use up to 60 units daily       Insulin Needles (Disposable) 32 gauge x 5/32\" Ndle   Commonly known as:  Wanda Pen Needle   1 Each by Does Not Apply route five (5) times daily. Insulin Syringe-Needle U-100 0.5 mL 31 gauge x 5/16 Syrg   1 Each by SubCUTAneous route daily. labetalol 200 mg tablet   Commonly known as:  NORMODYNE   Take 2 Tabs by mouth two (2) times a day. lancets 33 gauge Misc   Commonly known as: One Touch Delica   4 times daily. Diagnosis code: E11.8       Liraglutide 0.6 mg/0.1 mL (18 mg/3 mL) Pnij   Commonly known as:  VICTOZA   1.2 mg by SubCUTAneous route daily. metFORMIN  mg tablet   Commonly known as:  GLUCOPHAGE XR   Take 1 Tab by mouth two (2) times daily (after meals). NovoLOG Flexpen U-100 Insulin 100 unit/mL Inpn   Generic drug:  insulin aspart U-100   0-10 Units by SubCUTAneous route three (3) times daily as needed. pantoprazole 40 mg tablet   Commonly known as:  PROTONIX   TAKE ONE TABLET BY MOUTH ONCE DAILY       PARoxetine mesylate(menop.sym) 7.5 mg Cap   Commonly known as:  BRISDELLE   Take 7.5 Caps by mouth daily.        pravastatin 20 mg tablet Commonly known as:  PRAVACHOL   Take 20 mg by mouth daily. predniSONE 5 mg dose pack   Commonly known as:  STERAPRED   Take 5 mg by mouth daily. See administration instruction per 5mg dose pack       SENSIPAR 30 mg tablet   Generic drug:  cinacalcet   Take 30 mg by mouth daily. tacrolimus 1 mg capsule   Commonly known as:  PROGRAF   Take 2 mg by mouth two (2) times a day. traZODone 50 mg tablet   Commonly known as:  DESYREL   Take 1 Tab by mouth nightly. Follow-up Instructions     Return in about 3 months (around 8/8/2018). Patient Instructions    Diabetes; Watch carbohydrate intake with meals and try to keep this less than 30 grams    Victoza 1.2 mg. Increase levemir to 20 units in AM and 20 units in PM.  - follow glucose trend overnight (bedtime to fasting) to adjust.    IF fasting values are > 130 in 3 days, then increase PM dose to 25 units    Ask about starting Jardiance 10 mg daily. IF this is added, let me know and we may decrease Levemir doses. Goals for blood glucose:  Fasting  (less than 150)  Lunch, Dinner, Bedtime -  (less than 180). Parathyroid hormone elevated:  Recommend taking vitamin D (cholecalciferol) 5,000 units daily x 2 months, then 15,000 units once weekly thereafter. Introducing [facility] & HEALTH SERVICES! Kalee Lunsford: Thank you for requesting a [employer] account. Our records indicate that you already have an active [employer] account. You can access your account anytime at [email or web address]     Did you know that you can access your hospital and ER discharge instructions at any time in [employer]? You can also review all of your test results from your hospital stay or ER visit. Additional Information    If you have questions, please visit the Frequently Asked Questions section of the [employer] website at [email or web address] Remember, [employer] is NOT to be used for urgent needs.  For medical emergencies, dial 911. Now available from your iPhone and Android! Please provide this summary of care documentation to your next provider. Your primary care clinician is listed as Tracee Quan. If you have any questions after today's visit, please call [phone].

## 2018-05-08 NOTE — PATIENT INSTRUCTIONS
Diabetes; Watch carbohydrate intake with meals and try to keep this less than 30 grams    Victoza 1.2 mg. Increase levemir to 20 units in AM and 20 units in PM.  - follow glucose trend overnight (bedtime to fasting) to adjust.    IF fasting values are > 130 in 3 days, then increase PM dose to 25 units    Ask about starting Jardiance 10 mg daily. IF this is added, let me know and we may decrease Levemir doses. Goals for blood glucose:  Fasting  (less than 150)  Lunch, Dinner, Bedtime -  (less than 180). Parathyroid hormone elevated:  Recommend taking vitamin D (cholecalciferol) 5,000 units daily x 2 months, then 15,000 units once weekly thereafter.

## 2018-05-08 NOTE — PROGRESS NOTES
History of Present Illness: Major Baugh is a 48 y.o. female presents for follow-up of diabetes. She has a history of ESRD and is now s/p renal transplant on 4/11/2017  Diabetes control worsened after transplant due to increased clearance of insulin, anti-rejection medications, and increase in appetite. Weight increased by almost 30 lbs. Current diabetes regimen:  Levemir 25 units in AM and 15 units in evening    Victoza 1.2 mg daily. Tolerating better  Metformin  mg twice daily. Glucoses: reviewed meter  Monitors breakfast and bedtime  180 this AM. 150 in evening. 205 at bedtime. Diet:  Drinking diet Pepsi. Drinking water  Breakfast - turkey sinha, or grits or turkey sinha. occ juice. 1/2 bagel. Dinner: 2 slices of pizza. Exercise - active with grandchildren. Weight is down 5 lbs since last visit. HTN  - managed by transplant team. Labetalol and amlodipine. BP controlled     Social:  Has son with diabetes who is 32years of age who is following with me    Past Medical History:   Diagnosis Date    Anemia     Arthritis     Asthma 4/8/2014    Rx for same    Beta blocker therapy     Chronic kidney disease     ESRD - dialysis MWF    Diabetes ([facility] 26yo    Rx to control    Dialysis patient ([facility]     M-W-F    GERD (gastroesophageal reflux disease)     Headache(784.0)     Hepatitis B infection     Hypertension     Rx for same    Pre-kidney transplant, listed 4/30/2015     Current Outpatient Prescriptions   Medication Sig    butalbital-acetaminophen-caffeine (FIORICET, ESGIC) -40 mg per tablet Take 1 Tab by mouth three (3) times daily as needed for Pain. Indications: TENSION-TYPE HEADACHE    PARoxetine mesylate (BRISDELLE) 7.5 mg cap Take 7.5 Caps by mouth daily.  traZODone (DESYREL) 50 mg tablet Take 1 Tab by mouth nightly.  labetalol (NORMODYNE) 200 mg tablet Take 2 Tabs by mouth two (2) times a day.     Liraglutide (VICTOZA) 0.6 mg/0.1 mL (18 mg/3 mL) pnij 1.2 mg by SubCUTAneous route daily.  NOVOLOG FLEXPEN 100 unit/mL inpn 0-10 Units by SubCUTAneous route three (3) times daily as needed.  metFORMIN ER (GLUCOPHAGE XR) 500 mg tablet Take 1 Tab by mouth two (2) times daily (after meals).  lancets (ONE TOUCH DELICA) 33 gauge misc 4 times daily. Diagnosis code: E11.8    warfarin (COUMADIN) 1 mg tablet Take 1 mg by mouth daily.  cinacalcet (SENSIPAR) 30 mg tablet Take 30 mg by mouth daily.  aspirin delayed-release 81 mg tablet Take  by mouth daily.  mycophenolate mofetil (CELLCEPT) 250 mg capsule Take 750 mg by mouth two (2) times a day.  insulin detemir (LEVEMIR FLEXTOUCH) 100 unit/mL (3 mL) inpn Use up to 60 units daily (Patient taking differently: 20 units in the morning, 10 units at night)    amLODIPine (NORVASC) 5 mg tablet Take 1 Tab by mouth daily.  glucose blood VI test strips (ONETOUCH ULTRA TEST) strip 4 times daily. Diagnosis code: E11.8    Insulin Syringe-Needle U-100 0.5 mL 31 gauge x 5/16 syrg 1 Each by SubCUTAneous route daily.  Insulin Needles, Disposable, (SUZY PEN NEEDLE) 32 gauge x 5/32\" ndle 1 Each by Does Not Apply route five (5) times daily.  pravastatin (PRAVACHOL) 20 mg tablet Take 20 mg by mouth daily.  predniSONE (STERAPRED) 5 mg dose pack Take 5 mg by mouth daily. See administration instruction per 5mg dose pack     tacrolimus (PROGRAF) 1 mg capsule Take 2 mg by mouth two (2) times a day.  pantoprazole (PROTONIX) 40 mg tablet TAKE ONE TABLET BY MOUTH ONCE DAILY    albuterol (PROVENTIL HFA, VENTOLIN HFA, PROAIR HFA) 90 mcg/actuation inhaler Take 2 Puffs by inhalation every four (4) hours as needed for Wheezing. No current facility-administered medications for this visit.       Allergies   Allergen Reactions    Latex Shortness of Breath    Tramadol Itching       Review of Systems:  - Eyes: no blurry vision or double vision  - Cardiovascular: no chest pain  - Respiratory: no shortness of breath  - Musculoskeletal: no myalgias  - Neurological: no numbness/tingling in extremities    Physical Examination:  Visit Vitals    /74    Pulse 85    Ht 5' 6\" (1.676 m)    Wt 182 lb (82.6 kg)    LMP  (LMP Unknown)    BMI 29.38 kg/m2   -   - General: pleasant, no distress, normal gait   HEENT: hearing intact, EOMI, clear sclera without icterus  - Cardiovascular: regular, normal rate   - Respiratory: normal effort  - Integumentary: no edema  - Psychiatric: normal mood and affect    Data Reviewed:     A1c 9.5. Creatinine 1.1  Calcium was normal.  Parathyroid hormone elevated    Assessment/Plan:   1. Type 2 diabetes mellitus with complication, with long-term current use of insulin (HCC)   -Not controlled  Continue metformin  Continue Victoza  Increase Levemir to 25 units in the morning and 20 units at night. Encouraged her to make further adjustments if glucoses remain above goal.  Ideally would add SGL T2 inhibitor, such as Jardiance, which would further decrease insulin needs and help control glucoses. She will discuss this with renal transplant team   2. Essential hypertension   Controlled. Continue labetalol and amlodipine   3. Dyslipidemia   Continue atorvastatin   4. Renal transplant recipient      5. BMI 29.0-29.9,adult   Ideally would use less insulin and medications associated weight loss, like Victoza and like SGL T2 inhibitors. Considering adding SGL T2 inhibitor due to decreased insulin needs and help with weight loss     Patient Instructions   Diabetes; Watch carbohydrate intake with meals and try to keep this less than 30 grams    Victoza 1.2 mg. Increase levemir to 20 units in AM and 20 units in PM.  - follow glucose trend overnight (bedtime to fasting) to adjust.    IF fasting values are > 130 in 3 days, then increase PM dose to 25 units    Ask about starting Jardiance 10 mg daily. IF this is added, let me know and we may decrease Levemir doses.     Goals for blood glucose:  Fasting  (less than 150)  Lunch, Dinner, Bedtime -  (less than 180). Parathyroid hormone elevated:  Recommend taking vitamin D (cholecalciferol) 5,000 units daily x 2 months, then 15,000 units once weekly thereafter. Follow-up Disposition:  Return in about 3 months (around 8/8/2018).     Copy sent to:

## 2018-05-17 ENCOUNTER — OFFICE VISIT (OUTPATIENT)
Dept: INTERNAL MEDICINE CLINIC | Age: 53
End: 2018-05-17

## 2018-05-17 VITALS
BODY MASS INDEX: 29.51 KG/M2 | DIASTOLIC BLOOD PRESSURE: 68 MMHG | HEIGHT: 66 IN | OXYGEN SATURATION: 92 % | WEIGHT: 183.6 LBS | TEMPERATURE: 98.1 F | RESPIRATION RATE: 16 BRPM | SYSTOLIC BLOOD PRESSURE: 114 MMHG | HEART RATE: 89 BPM

## 2018-05-17 DIAGNOSIS — L65.9 ALOPECIA: ICD-10-CM

## 2018-05-17 DIAGNOSIS — E78.5 DYSLIPIDEMIA: Primary | ICD-10-CM

## 2018-05-17 RX ORDER — ATORVASTATIN CALCIUM 40 MG/1
40 TABLET, FILM COATED ORAL DAILY
Qty: 90 TAB | Refills: 3 | Status: SHIPPED | OUTPATIENT
Start: 2018-05-17 | End: 2019-06-25 | Stop reason: SDUPTHER

## 2018-05-17 NOTE — MR AVS SNAPSHOT
[address] [place] [address]  [phone]     Patient: Hailey Erickson  MRN: [id number]  :1965               Visit Information     Date & Time Provider Department Dept.  Phone Encounter #    2018 11:15 AM Barbara Driver MD [facility] [phone] [id number]      Your Appointments     2018  9:45 AM   PHYSICAL PRE OP with Michail Heimlich, NP   [facility] [facility] ([facility])   Appt Note: NP EST PCP; NP EST PCP    [address]           [address] [address]            2018 11:00 AM   ECHO CARDIOGRAMS 2D with HATTIE ECHAVARRIA   [facility] ([facility])   Appt Note: Per Dr. Miguel Flowers Echo dx MR, See Miguel Case after    [address] [address]            2018 11:40 AM   ESTABLISHED PATIENT with Ghanshyam Rivera MD   [facility] ([facility])   Appt Note: Per Dr. Miguel Flowers Echo dx MR, See Miguel Case after    [address] [address]            2018 10:10 AM   ROUTINE CARE with Stefano Fleischer, MD   [facility])   Appt Note: f/u diabetes cp0.00    [address]  [address]  [address] [address]   [address] [facility] [address] [address] Maintenance        Date Due    DTaP/Tdap/Td series (1 - Tdap) 1988    Pneumococcal 19-64 Highest Risk (2 of 3 - PCV13) 2014    EYE EXAM RETINAL OR DILATED Q1 2017    HEMOGLOBIN A1C Q6M 2017    Influenza Age 9 to Adult 2018    FOOT EXAM Q1 10/2/2018    LIPID PANEL Q1 2018    MEDICARE YEARLY EXAM 2018    BREAST CANCER SCRN MAMMOGRAM 2019    PAP AKA CERVICAL CYTOLOGY 2020    COLONOSCOPY 2026      Allergies as of 2018  Review Complete On: 2018 By: Ramiro Palma LPN       Severity Noted Reaction Type Reactions    Latex High 2012   Systemic Shortness of Breath    Tramadol  2015    Itching      Current Immunizations  Reviewed on 2013    Name Date    Influenza Vaccine 9/3/2014    Influenza Vaccine (Quad) PF 2017    Pneumococcal Polysaccharide (PPSV-23) 2013  1:47 PM    TB Skin Test (PPD) Intradermal 2013, 2013       Not reviewed this visit   You Were Diagnosed With        Codes Comments    Dyslipidemia    -  Primary ICD-10-CM: E78.5  ICD-9-CM: 272.4     Alopecia     ICD-10-CM: L65.9  ICD-9-CM: 704.00       Vitals     BP Pulse Temp Resp Height(growth percentile) Weight(growth percentile)    114/68 (BP 1 Location: Left arm, BP Patient Position: Sitting) 89 98.1 °F (36.7 °C) (Oral) 16 5' 6\" (1.676 m) 183 lb 9.6 oz (83.3 kg)    LMP SpO2 BMI OB Status Smoking Status       (LMP Unknown) 92% 29.63 kg/m2 Postmenopausal Never Smoker     Vitals History      BMI and BSA Data     Body Mass Index Body Surface Area    29.63 kg/m 2 1.97 m 2         Preferred Pharmacy       Pharmacy Name Phone    [address] [facility], [place] [address] [phone]         Your Updated Medication List          This list is accurate as of 18 11:21 AM.  Always use your most recent med list.                albuterol 90 mcg/actuation inhaler   Commonly known as:  PROVENTIL HFA, VENTOLIN HFA, PROAIR HFA   Take 2 Puffs by inhalation every four (4) hours as needed for Wheezing. amLODIPine 5 mg tablet   Commonly known as:  NORVASC   Take 1 Tab by mouth daily. aspirin delayed-release 81 mg tablet   Take  by mouth daily. atorvastatin 40 mg tablet   Commonly known as:  LIPITOR   Take 1 Tab by mouth daily.  Indications: atherosclerotic cardiovascular disease       butalbital-acetaminophen-caffeine -40 mg per tablet   Commonly known as:  FIORICET, ESGIC   Take 1 Tab by mouth three (3) times daily as needed for Pain. Indications: TENSION-TYPE HEADACHE       CELLCEPT 250 mg capsule   Generic drug:  mycophenolate mofetil   Take 750 mg by mouth two (2) times a day. COUMADIN 1 mg tablet   Generic drug:  warfarin   Take 1 mg by mouth daily. insulin detemir U-100 100 unit/mL (3 mL) Inpn   Commonly known as:  LEVEMIR FLEXTOUCH   Use up to 60 units daily       Insulin Syringe-Needle U-100 0.5 mL 31 gauge x 5/16 Syrg   1 Each by SubCUTAneous route daily. labetalol 200 mg tablet   Commonly known as:  NORMODYNE   Take 2 Tabs by mouth two (2) times a day. lancets 33 gauge Misc   Commonly known as: One Touch Delica   4 times daily. Diagnosis code: E11.8       Liraglutide 0.6 mg/0.1 mL (18 mg/3 mL) Pnij   Commonly known as:  VICTOZA   1.2 mg by SubCUTAneous route daily. metFORMIN  mg tablet   Commonly known as:  GLUCOPHAGE XR   Take 1 Tab by mouth two (2) times daily (after meals). Wanda Pen Needle 32 gauge x 5/32\" Ndle   Generic drug:  Insulin Needles (Disposable)   USE TO INJECT INSULIN 5 TIMES DAILY       NovoLOG Flexpen U-100 Insulin 100 unit/mL Inpn   Generic drug:  insulin aspart U-100   0-10 Units by SubCUTAneous route three (3) times daily as needed. ONETOUCH ULTRA BLUE TEST STRIP strip   Generic drug:  glucose blood VI test strips   TEST FOUR TIMES DAILY       pantoprazole 40 mg tablet   Commonly known as:  PROTONIX   TAKE ONE TABLET BY MOUTH ONCE DAILY       PARoxetine mesylate(menop.sym) 7.5 mg Cap   Commonly known as:  BRISDELLE   Take 7.5 Caps by mouth daily. predniSONE 5 mg dose pack   Commonly known as:  STERAPRED   Take 5 mg by mouth daily. See administration instruction per 5mg dose pack       SENSIPAR 30 mg tablet   Generic drug:  cinacalcet   Take 30 mg by mouth daily. tacrolimus 1 mg capsule   Commonly known as:  PROGRAF   Take 2 mg by mouth two (2) times a day. traZODone 50 mg tablet   Commonly known as:  DESYREL   Take 1 Tab by mouth nightly. Prescriptions Sent to Pharmacy        Refills    atorvastatin (LIPITOR) 40 mg tablet 3    Sig: Take 1 Tab by mouth daily. Indications: atherosclerotic cardiovascular disease    Class: Normal    Pharmacy: [address] [place] [address] #: [phone]    Route: Oral      We Performed the Following     REFERRAL TO DERMATOLOGY [REF19 Custom]       Referral Information     Referral ID Referred By Referred To       [id number] Lucinda Hill MD       [address], [address] [place] [address]       Phone: [phone]       Fax: [phone]         Visits Status Start Date End Date    1 New Request 18    If your referral has a status of pending review or denied, additional information will be sent to support the outcome of this decision. Introducing [facility] & HEALTH SERVICES! Dear Kyree Morris: Thank you for requesting a [employer] account. Our records indicate that you already have an active [employer] account. You can access your account anytime at [email or web address]     Did you know that you can access your hospital and ER discharge instructions at any time in [employer]? You can also review all of your test results from your hospital stay or ER visit. Additional Information    If you have questions, please visit the Frequently Asked Questions section of the [employer] website at [email or web address] Remember, [employer] is NOT to be used for urgent needs. For medical emergencies, dial 911. Now available from your iPhone and Android! Please provide this summary of care documentation to your next provider. Your primary care clinician is listed as Valentin Collado.  If you have any questions after today's visit, please call [phone].

## 2018-05-17 NOTE — PROGRESS NOTES
Kimber Tiwari is a 48 y.o. female    Chief Complaint   Patient presents with    Hair/Scalp Problem     hair started falling out one month ago would like referal     Cholesterol Problem     dr Escobar August at transplant center refered pt for cholesterol medication     1. Have you been to the ER, urgent care clinic since your last visit? Hospitalized since your last visit? no    2. Have you seen or consulted any other health care providers outside of the [address] [place] [address] since your last visit? Include any pap smears or colon screening.  no    Visit Vitals    /68 (BP 1 Location: Left arm, BP Patient Position: Sitting)    Pulse 89    Temp 98.1 °F (36.7 °C) (Oral)    Resp 16    Ht 5' 6\" (1.676 m)    Wt 183 lb 9.6 oz (83.3 kg)    LMP  (LMP Unknown)    SpO2 92%  Comment: long finger nail with dark polish    BMI 29.63 kg/m2

## 2018-05-17 NOTE — PROGRESS NOTES
Chief Complaint   Patient presents with    Hair/Scalp Problem     hair started falling out one month ago would like referal     Cholesterol Problem     dr Gil Andrade at transplant center refered pt for cholesterol medication     Hair loss multifacorial from renal transplant drugs and stress    Told LDL not at goal on pravachol 20 so will try lipitor 40 mg high dose statin due to diabetes    Vitals:    05/17/18 1044   BP: 114/68   Pulse: 89   Resp: 16   Temp: 98.1 °F (36.7 °C)   TempSrc: Oral   SpO2: 92%   Weight: 183 lb 9.6 oz (83.3 kg)   Height: 5' 6\" (1.676 m)     no apparent distress  Scalp severely thinned mostly crown    Diagnoses and all orders for this visit:    1. Dyslipidemia  -     atorvastatin (LIPITOR) 40 mg tablet; Take 1 Tab by mouth daily. Indications: atherosclerotic cardiovascular disease    2.  Alopecia  -     REFERRAL TO DERMATOLOGY

## 2018-05-21 ENCOUNTER — TELEPHONE (OUTPATIENT)
Dept: INTERNAL MEDICINE CLINIC | Age: 53
End: 2018-05-21

## 2018-05-21 NOTE — TELEPHONE ENCOUNTER
[facility] pharmacy called stated that patient has been getting ATORVASTATIN 20mg from [address] refilled 4/2018. Do yo still want her to stay on 40mg.

## 2018-05-21 NOTE — TELEPHONE ENCOUNTER
Notified the pharmacy per Dr Stacie Boykin that the LDL is not at goal and she is a diabetic, so he does want her on 40 mg. She states understanding.

## 2018-05-29 ENCOUNTER — OFFICE VISIT (OUTPATIENT)
Dept: CARDIOLOGY CLINIC | Age: 53
End: 2018-05-29

## 2018-05-29 ENCOUNTER — CLINICAL SUPPORT (OUTPATIENT)
Dept: CARDIOLOGY CLINIC | Age: 53
End: 2018-05-29

## 2018-05-29 VITALS
HEIGHT: 66 IN | RESPIRATION RATE: 16 BRPM | SYSTOLIC BLOOD PRESSURE: 148 MMHG | HEART RATE: 84 BPM | BODY MASS INDEX: 29.38 KG/M2 | DIASTOLIC BLOOD PRESSURE: 76 MMHG | WEIGHT: 182.8 LBS

## 2018-05-29 DIAGNOSIS — Z94.0 RENAL TRANSPLANT RECIPIENT: ICD-10-CM

## 2018-05-29 DIAGNOSIS — I10 ESSENTIAL HYPERTENSION: ICD-10-CM

## 2018-05-29 DIAGNOSIS — I36.1 NON-RHEUMATIC TRICUSPID VALVE INSUFFICIENCY: ICD-10-CM

## 2018-05-29 DIAGNOSIS — I34.0 NON-RHEUMATIC MITRAL REGURGITATION: Primary | ICD-10-CM

## 2018-05-29 DIAGNOSIS — E78.5 DYSLIPIDEMIA: ICD-10-CM

## 2018-05-29 DIAGNOSIS — I34.0 MITRAL VALVE INSUFFICIENCY, UNSPECIFIED ETIOLOGY: Primary | ICD-10-CM

## 2018-05-29 RX ORDER — PREDNISONE 5 MG/1
1 TABLET ORAL DAILY
Refills: 11 | COMMUNITY
Start: 2018-04-20

## 2018-05-29 NOTE — PROGRESS NOTES
[facility] [place]:   [phone]    This note will not be viewable in MyChart. HPI: Aureliano Littlejohn, a 48y.o. year-old who presents for follow up regarding her HTN and mitral regurgitation. TTE today showed normal LVEF, mild LVH, mild TR, mild MR    Has dyspnea with exertion, even with just walking a block   Denies PND, sleeps on 2 pillows but not as high as it used to be   Overall she is feeling better  No recent chest pain   Has rare palpitations at night for a few minutes, no associated symptoms with episodes   Has rare LE edema   Walks 2 days/week for about an hour   Weight is up 18 lbs since her last visit due to dietary indiscretions, adjustments to her insulin, prednisone   On Coumadin, recent duplex still showed DVT so she remains on Coumadin, denies any blood in her stool or urine  Only has dizziness with position changes, no syncope  Had sleep study which did not show LOUIS  BP at home well controlled - -127mmHg  Still has outstanding bill with [facility] so cannot get labs there    **Labs received from transplant center after her last visit - scanned into "[employer]" care  Mg 1.7 5/16/18  A1C 9.5% 4/19/18  , , , HDL 44 4/19/18    Assessment/Plan:  1. Overweight Body mass index is 29.5 kg/(m^2). encouraged regular exercise  - 45 minutes/day 5 days/week   2. HTN- malignant, better controlled post renal transplant on labetalol 400mg BID and amlodipine 5mg daily  3. Dyslipidemia - now on atorvastatin 40mg daily, will get lipid results from Dr. Brooklynn Diaz    4. Diabetes Mellitus - Hgb A1C 76.2%, on insulin and oral agents, managed by PCP  5. Mitral Regurgitation - mild by TTE today, will repeat TTE in 1 year at follow up for surveillance     6. Stage V CKD - s/p right renal transplant 4/5/17, on cellcept, prograf, prednisone, followed by Dr. Kayla Mathias, Dr. Galina Springer and Dr. Brooklynn Diaz  7.  CAD prevention- on ASA 81mg daily and atorvastatin, no angina symptoms, will continue to assess MUSTAFA   8. Vitamin D deficiency -on supplementation   9. Right leg DVT - on coumadin, followed by Dr. Lenora Whatley at transplant center  10. Hx of gastritis/esophageal stricture  - on protonix, last EGD and colonoscopy showed diverticulosis, gastritis without hemorrhage and her esophagus was dilated, followed by Dr. Sherman Garcia    5/18 TTE - normal LVEF, mild MR   5/17 TTE - normal LVEF, mod MR   2/16 TTE - LVEF 55 % to 60 %, no WMA, grade 1 dd, mod MR  3/14 TTE LVEF 55 % to 60 %, no WMA, mild cLVH, mild to mod MR  1/13 TTE LVEF 55%, no WMA, moderate increased wall thickness, grade 1 diastolic dysfunction, moderate MR    Soc no tob, no etoh  Fhx no early CAD    She  has a past medical history of Anemia; Arthritis; Asthma (4/8/2014); Beta blocker therapy; Chronic kidney disease; Diabetes ([facility] [address] (26yo); Dialysis patient [facility]); GERD (gastroesophageal reflux disease); Headache(784.0); Hepatitis B infection; Hypertension; and Pre-kidney transplant, listed (4/30/2015). She also has no past medical history of Adverse effect of anesthesia; Gl[facility]ulos[facility] to [facility] or Sleep apnea. Cardiovascular ROS: no chest pain   Respiratory ROS: denies cough, wheezing  Neurological ROS: no TIA or stroke symptoms  All other systems negative except as above. PE  Vitals:    05/29/18 1111   BP: 148/76   Pulse: 84   Resp: 16   Weight: 182 lb 12.8 oz (82.9 kg)   Height: 5' 6\" (1.676 m)    Body mass index is 29.5 kg/(m^2).   General appearance - alert, well appearing, and in no distress  Mental status - affect appropriate to mood  Eyes - sclera anicteric, moist mucous membranes  Neck - supple  Lymphatics - not assessed   Chest - clear to auscultation, no wheezes, rales or rhonchi  Heart - normal rate, regular rhythm, normal S1, S2, 2/6 TIANNA   Abdomen - soft, nontender, nondistended  Back exam - full range of motion, no tenderness  Neurological - cranial nerves II through XII grossly intact, no focal deficit  Musculoskeletal - no muscular tenderness noted, normal strength  Extremities - peripheral pulses normal, no LE edema    Skin - normal coloration  no rashes    Recent Labs:  Lab Results   Component Value Date/Time    Cholesterol, total 177 12/06/2013 11:05 AM     No results found for: ZANE  Lab Results   Component Value Date/Time    BUN 25 (H) 12/05/2016 08:29 PM     Lab Results   Component Value Date/Time    Potassium 4.1 12/05/2016 08:29 PM     Lab Results   Component Value Date/Time    Hemoglobin A1c 6.1 12/28/2015 08:01 PM    Hemoglobin A1c, External 5.7 01/16/2016     No components found for: HGBI,  IHGB,  HGB,  HGBP,  WBHGB  Lab Results   Component Value Date/Time    PLATELET 817 04/95/5985 08:29 PM       Reviewed:  Past Medical History:   Diagnosis Date    Anemia     Arthritis     Asthma 4/8/2014    Rx for same    Beta blocker therapy     Chronic kidney disease     ESRD - dialysis MWF    Diabetes ([facility] [address] 26yo    Rx to control    Dialysis patient ([facility] [address]     M-W-F    GERD (gastroesophageal reflux disease)     Headache(784.0)     Hepatitis B infection     Hypertension     Rx for same    Pre-kidney transplant, listed 4/30/2015     History   Smoking Status    Never Smoker   Smokeless Tobacco    Never Used     History   Alcohol Use No     Allergies   Allergen Reactions    Latex Shortness of Breath    Tramadol Itching       Current Outpatient Prescriptions   Medication Sig    predniSONE (DELTASONE) 5 mg tablet Take 1 Tab by mouth daily.  atorvastatin (LIPITOR) 40 mg tablet Take 1 Tab by mouth daily. Indications: atherosclerotic cardiovascular disease    ONETOUCH ULTRA BLUE TEST STRIP strip TEST FOUR TIMES DAILY    SUZY PEN NEEDLE 32 gauge x 5/32\" ndle USE TO INJECT INSULIN 5 TIMES DAILY    butalbital-acetaminophen-caffeine (FIORICET, ESGIC) -40 mg per tablet Take 1 Tab by mouth three (3) times daily as needed for Pain.  Indications: TENSION-TYPE HEADACHE    PARoxetine mesylate (BRISDELLE) 7.5 mg cap Take 7.5 Caps by mouth daily.  traZODone (DESYREL) 50 mg tablet Take 1 Tab by mouth nightly.  labetalol (NORMODYNE) 200 mg tablet Take 2 Tabs by mouth two (2) times a day.  Liraglutide (VICTOZA) 0.6 mg/0.1 mL (18 mg/3 mL) pnij 1.2 mg by SubCUTAneous route daily.  NOVOLOG FLEXPEN 100 unit/mL inpn 0-10 Units by SubCUTAneous route three (3) times daily as needed.  metFORMIN ER (GLUCOPHAGE XR) 500 mg tablet Take 1 Tab by mouth two (2) times daily (after meals).  lancets (ONE TOUCH DELICA) 33 gauge misc 4 times daily. Diagnosis code: E11.8    albuterol (PROVENTIL HFA, VENTOLIN HFA, PROAIR HFA) 90 mcg/actuation inhaler Take 2 Puffs by inhalation every four (4) hours as needed for Wheezing.  warfarin (COUMADIN) 1 mg tablet Take 1 mg by mouth daily.  cinacalcet (SENSIPAR) 30 mg tablet Take 30 mg by mouth daily.  aspirin delayed-release 81 mg tablet Take  by mouth daily.  mycophenolate mofetil (CELLCEPT) 250 mg capsule Take 750 mg by mouth two (2) times a day.  insulin detemir (LEVEMIR FLEXTOUCH) 100 unit/mL (3 mL) inpn Use up to 60 units daily (Patient taking differently: 20 units in the morning, 10 units at night)    amLODIPine (NORVASC) 5 mg tablet Take 1 Tab by mouth daily.  Insulin Syringe-Needle U-100 0.5 mL 31 gauge x 5/16 syrg 1 Each by SubCUTAneous route daily.  tacrolimus (PROGRAF) 1 mg capsule Take 2 mg by mouth two (2) times a day.  pantoprazole (PROTONIX) 40 mg tablet TAKE ONE TABLET BY MOUTH ONCE DAILY    predniSONE (STERAPRED) 5 mg dose pack Take 5 mg by mouth daily. See administration instruction per 5mg dose pack      No current facility-administered medications for this visit.         Jasvir العراقي MD  [facility] and [facility] [place]  [address] [facility] [address]  [address]

## 2018-05-29 NOTE — MR AVS SNAPSHOT
[address] [facility] [address] [place] [id number]     Patient: Kya Mix  MRN: [id number]  :1965               Visit Information     Date & Time Provider Department Dept.  Phone Encounter #    2018 11:40 AM Jessica Posada MD [facility] [phone] [id number]      Your Appointments     2018 10:10 AM   ROUTINE CARE with Cody Peterson MD   [facility] [address])   Appt Note: f/u diabetes cp0.00    [address]  [address]  [address]           [address] [address]            2019  9:00 AM   ECHO CARDIOGRAMS 2D with [address], [place]   [facility] (KEIPIERRE CERDA)   Appt Note: echo for MR 9:00 Dr. Elder Doe 10:20 [facility] [address] [facility]           [address] [facility] [address]            2019 10:20 AM   ESTABLISHED PATIENT with Jessica Posada MD   [facility] (KEI Roland)   Appt Note: echo for MR 9:00 Dr. Elder Doe 10:20 kmr    [address] [facility] [address]   [address] [facility] [address] Maintenance        Date Due    DTaP/Tdap/Td series (1 - Tdap) 1988    Pneumococcal 19-64 Highest Risk (2 of 3 - PCV13) 2014    EYE EXAM RETINAL OR DILATED Q1 2017    HEMOGLOBIN A1C Q6M 2017    Influenza Age 9 to Adult 2018    FOOT EXAM Q1 10/2/2018    LIPID PANEL Q1 2018    MEDICARE YEARLY EXAM 2018    BREAST CANCER SCRN MAMMOGRAM 2019    PAP AKA CERVICAL CYTOLOGY 2020    COLONOSCOPY 2026      Allergies as of 2018  Review Complete On: 2018 By: Arcadio Benton       Severity Noted Reaction Type Reactions    Latex High 2012   Systemic Shortness of Breath Tramadol  2015    Itching      Current Immunizations  Reviewed on 2013    Name Date    Influenza Vaccine 9/3/2014    Influenza Vaccine (Quad) PF 2017    Pneumococcal Polysaccharide (PPSV-23) 2013  1:47 PM    TB Skin Test (PPD) Intradermal 2013, 2013       Not reviewed this visit   You Were Diagnosed With        Codes Comments    Essential hypertension    -  Primary ICD-10-CM: I10  ICD-9-CM: 401.9     Dyslipidemia     ICD-10-CM: E78.5  ICD-9-CM: 272.4     Non-rheumatic mitral regurgitation     ICD-10-CM: I34.0  ICD-9-CM: 424.0     Renal transplant recipient     ICD-10-CM: Z94.0  ICD-9-CM: V42.0     Non-rheumatic tricuspid valve insufficiency     ICD-10-CM: I36.1  ICD-9-CM: 424.2       Vitals     BP Pulse Resp Height(growth percentile) Weight(growth percentile) LMP    148/76 (BP 1 Location: Left arm, BP Patient Position: Sitting) 84 16 5' 6\" (1.676 m) 182 lb 12.8 oz (82.9 kg) (LMP Unknown)    BMI OB Status Smoking Status             29.5 kg/m2 Postmenopausal Never Smoker       Vitals History      BMI and BSA Data     Body Mass Index Body Surface Area    29.5 kg/m 2 1.96 m 2         Preferred Pharmacy       Pharmacy Name Phone    [address] [facility], Norma Schmidt [address] [place] [address] 499 [phone]         Your Updated Medication List          This list is accurate as of 18 12:27 PM.  Always use your most recent med list.                albuterol 90 mcg/actuation inhaler   Commonly known as:  PROVENTIL HFA, VENTOLIN HFA, PROAIR HFA   Take 2 Puffs by inhalation every four (4) hours as needed for Wheezing. amLODIPine 5 mg tablet   Commonly known as:  NORVASC   Take 1 Tab by mouth daily. aspirin delayed-release 81 mg tablet   Take  by mouth daily. atorvastatin 40 mg tablet   Commonly known as:  LIPITOR   Take 1 Tab by mouth daily.  Indications: atherosclerotic cardiovascular disease       butalbital-acetaminophen-caffeine -40 mg per tablet   Commonly known as: FIORICET, ESGIC   Take 1 Tab by mouth three (3) times daily as needed for Pain. Indications: TENSION-TYPE HEADACHE       CELLCEPT 250 mg capsule   Generic drug:  mycophenolate mofetil   Take 750 mg by mouth two (2) times a day. COUMADIN 1 mg tablet   Generic drug:  warfarin   Take 1 mg by mouth daily. insulin detemir U-100 100 unit/mL (3 mL) Inpn   Commonly known as:  LEVEMIR FLEXTOUCH   Use up to 60 units daily       Insulin Syringe-Needle U-100 0.5 mL 31 gauge x 5/16 Syrg   1 Each by SubCUTAneous route daily. labetalol 200 mg tablet   Commonly known as:  NORMODYNE   Take 2 Tabs by mouth two (2) times a day. lancets 33 gauge Misc   Commonly known as: One Touch Delica   4 times daily. Diagnosis code: E11.8       Liraglutide 0.6 mg/0.1 mL (18 mg/3 mL) Pnij   Commonly known as:  VICTOZA   1.2 mg by SubCUTAneous route daily. metFORMIN  mg tablet   Commonly known as:  GLUCOPHAGE XR   Take 1 Tab by mouth two (2) times daily (after meals). Wanda Pen Needle 32 gauge x 5/32\" Ndle   Generic drug:  Insulin Needles (Disposable)   USE TO INJECT INSULIN 5 TIMES DAILY       NovoLOG Flexpen U-100 Insulin 100 unit/mL Inpn   Generic drug:  insulin aspart U-100   0-10 Units by SubCUTAneous route three (3) times daily as needed. ONETOUCH ULTRA BLUE TEST STRIP strip   Generic drug:  glucose blood VI test strips   TEST FOUR TIMES DAILY       pantoprazole 40 mg tablet   Commonly known as:  PROTONIX   TAKE ONE TABLET BY MOUTH ONCE DAILY       PARoxetine mesylate(menop.sym) 7.5 mg Cap   Commonly known as:  BRISDELLE   Take 7.5 Caps by mouth daily. predniSONE 5 mg tablet   Commonly known as:  DELTASONE   Take 1 Tab by mouth daily. SENSIPAR 30 mg tablet   Generic drug:  cinacalcet   Take 30 mg by mouth daily. tacrolimus 1 mg capsule   Commonly known as:  PROGRAF   Take 2 mg by mouth two (2) times a day.        traZODone 50 mg tablet   Commonly known as:  DESYREL   Take 1 Tab by mouth nightly. We Performed the Following     2D ECHO COMPLETE ADULT (TTE) W OR WO CONTR [71075 CPT(R)]       Patient Instructions    Please increase your exercise to 45 minutes/day 5 days/week and continue to work on weight loss            Introducing [facility] & [facility] SERVICES! Dear Stiven Montano: Thank you for requesting a [employer] account. Our records indicate that you already have an active [employer] account. You can access your account anytime at [email or web address]     Did you know that you can access your hospital and ER discharge instructions at any time in [employer]? You can also review all of your test results from your hospital stay or ER visit. Additional Information    If you have questions, please visit the Frequently Asked Questions section of the [employer] website at [email or web address] Remember, [employer] is NOT to be used for urgent needs. For medical emergencies, dial 911. Now available from your iPhone and Android! Please provide this summary of care documentation to your next provider. Your primary care clinician is listed as Kya Martinez. If you have any questions after today's visit, please call [phone].

## 2018-06-06 RX ORDER — INSULIN ASPART 100 [IU]/ML
0-10 INJECTION, SOLUTION INTRAVENOUS; SUBCUTANEOUS
Qty: 15 ML | Refills: 10 | Status: SHIPPED | OUTPATIENT
Start: 2018-06-06 | End: 2019-04-22

## 2018-06-07 RX ORDER — LABETALOL 200 MG/1
400 TABLET, FILM COATED ORAL 2 TIMES DAILY
Qty: 360 TAB | Refills: 1 | Status: SHIPPED | OUTPATIENT
Start: 2018-06-07 | End: 2019-06-25 | Stop reason: SDUPTHER

## 2018-06-12 ENCOUNTER — TELEPHONE (OUTPATIENT)
Dept: CARDIOLOGY CLINIC | Age: 53
End: 2018-06-12

## 2018-06-12 RX ORDER — FENOFIBRATE 145 MG/1
145 TABLET, COATED ORAL DAILY
Qty: 30 TAB | Refills: 11 | Status: SHIPPED | OUTPATIENT
Start: 2018-06-12 | End: 2019-06-13 | Stop reason: SDUPTHER

## 2018-06-12 RX ORDER — FENOFIBRATE 145 MG/1
TABLET, COATED ORAL DAILY
COMMUNITY
End: 2018-06-12 | Stop reason: SDUPTHER

## 2018-06-12 NOTE — TELEPHONE ENCOUNTER
The following message given to patient per NP Danielle Gonzalez    Received recent labs: ,       Keimichelle Cano           Please call her and ask her to begin tricor 145mg daily for her elevated TG level.  She should continue atorvastatin as well.  This was recommended by Dr. Debbie Smalls.     Please ask her to consider having a coronary calcium scan to look for evidence of early plaque in the arteries of her heart as well to help guide her lipid therapy.          Mar[place]     Requested Prescriptions     Signed Prescriptions Disp Refills    fenofibrate nanocrystallized (TRICOR) 145 mg tablet 30 Tab 11     Sig: Take 1 Tab by mouth daily.      Authorizing Provider: Alyse Mckeon     Ordering User: [facility]     Per orders

## 2018-06-28 ENCOUNTER — HOSPITAL ENCOUNTER (OUTPATIENT)
Dept: CT IMAGING | Age: 53
Discharge: HOME | End: 2018-06-28
Payer: SELF-PAY

## 2018-06-28 DIAGNOSIS — Z00.00 PREVENTATIVE HEALTH CARE: ICD-10-CM

## 2018-06-28 PROCEDURE — 75571 CT HRT W/O DYE W/CA TEST: CPT

## 2018-07-03 RX ORDER — AMLODIPINE BESYLATE 5 MG/1
5 TABLET ORAL DAILY
Qty: 90 TAB | Refills: 3 | Status: SHIPPED | OUTPATIENT
Start: 2018-07-03 | End: 2020-03-09 | Stop reason: ALTCHOICE

## 2018-07-05 ENCOUNTER — TELEPHONE (OUTPATIENT)
Dept: CARDIOLOGY CLINIC | Age: 53
End: 2018-07-05

## 2018-07-05 NOTE — TELEPHONE ENCOUNTER
----- Message from Hola Ahn NP sent at 7/5/2018  9:34 AM EDT -----  Calcium scan = 0. This is great! No evidence of plaque. No need to change medications at this time. Above results given to patient via my chart e-mail.

## 2018-08-22 RX ORDER — LANCETS 33 GAUGE
EACH MISCELLANEOUS
Qty: 125 LANCET | Refills: 0 | Status: SHIPPED | OUTPATIENT
Start: 2018-08-22 | End: 2019-01-16 | Stop reason: SDUPTHER

## 2018-08-30 RX ORDER — ALBUTEROL SULFATE 90 UG/1
2 AEROSOL, METERED RESPIRATORY (INHALATION)
Qty: 1 INHALER | Refills: 3 | Status: SHIPPED | OUTPATIENT
Start: 2018-08-30

## 2018-08-30 NOTE — TELEPHONE ENCOUNTER
Last seen: 05/17/2018    Requested Prescriptions     Pending Prescriptions Disp Refills    albuterol (PROVENTIL HFA, VENTOLIN HFA, PROAIR HFA) 90 mcg/actuation inhaler 1 Inhaler 3     Sig: Take 2 Puffs by inhalation every four (4) hours as needed for Wheezing.

## 2018-08-30 NOTE — TELEPHONE ENCOUNTER
Pt requested a refill on Rx(\"Ventolin Inhaler\" and \"Albuterol\" for her machine at home) to [facility] on [address].  Pt stated she is out of medication, and would like to know if it can called in this morning, because she has it delivered to her and the last delivery time is 1:00 p.m.  Best contact number [phone].

## 2018-09-10 ENCOUNTER — OFFICE VISIT (OUTPATIENT)
Dept: ENDOCRINOLOGY | Age: 53
End: 2018-09-10

## 2018-09-10 VITALS
OXYGEN SATURATION: 100 % | SYSTOLIC BLOOD PRESSURE: 149 MMHG | WEIGHT: 176.6 LBS | HEIGHT: 66 IN | HEART RATE: 93 BPM | BODY MASS INDEX: 28.38 KG/M2 | DIASTOLIC BLOOD PRESSURE: 74 MMHG

## 2018-09-10 DIAGNOSIS — Z94.0 RENAL TRANSPLANT RECIPIENT: ICD-10-CM

## 2018-09-10 DIAGNOSIS — I10 ESSENTIAL HYPERTENSION: ICD-10-CM

## 2018-09-10 DIAGNOSIS — E11.29 TYPE 2 DIABETES MELLITUS WITH OTHER DIABETIC KIDNEY COMPLICATION, WITH LONG-TERM CURRENT USE OF INSULIN (HCC): Primary | ICD-10-CM

## 2018-09-10 DIAGNOSIS — E78.5 DYSLIPIDEMIA: ICD-10-CM

## 2018-09-10 DIAGNOSIS — E55.9 VITAMIN D DEFICIENCY: ICD-10-CM

## 2018-09-10 DIAGNOSIS — Z79.4 TYPE 2 DIABETES MELLITUS WITH OTHER DIABETIC KIDNEY COMPLICATION, WITH LONG-TERM CURRENT USE OF INSULIN (HCC): Primary | ICD-10-CM

## 2018-09-10 RX ORDER — LANOLIN ALCOHOL/MO/W.PET/CERES
CREAM (GRAM) TOPICAL
Refills: 11 | COMMUNITY
Start: 2018-08-06 | End: 2019-01-21

## 2018-09-10 RX ORDER — APIXABAN 5 MG/1
TABLET, FILM COATED ORAL
Refills: 11 | COMMUNITY
Start: 2018-09-06 | End: 2020-04-03

## 2018-09-10 NOTE — PROGRESS NOTES
Chief Complaint   Patient presents with    Diabetes         1. Have you been to the ER, urgent care clinic since your last visit? Hospitalized since your last visit? no    2. Have you seen or consulted any other health care providers outside of the [address] [place] [address] since your last visit? Include any pap smears or colon screening.   no

## 2018-09-10 NOTE — MR AVS SNAPSHOT
[address] [address]  [phone]     Patient: Tamika Valdez  MRN: [id number]  :1965               Visit Information     Date & Time Provider Department Dept. Phone Encounter #    9/10/2018  2:10 PM Faraz Henry, [address] [place] [address] [facility] [phone] [id number]      Follow-up Instructions     Return in about 3 months (around 12/10/2018).       Your Appointments     2019  9:00 AM   ECHO CARDIOGRAMS 2D with HATTIE ECHAVARRIA   [facility] ([facility] SCHEDULING)   Appt Note: echo for MR 9:00 Dr. Hayes Clark 10:20 99 [facility] 37 [facility] 14 [facility] [address]           [address] [facility] [address]            2019 10:20 AM   ESTABLISHED PATIENT with Ledy Mcclendon MD   [facility] ([facility] [place])   Appt Note: echo for MR 9:00 Dr. Hayes Clark 10:20 kmr    [address] 14 [facility] [address]           [address] Maintenance        Date Due    DTaP/Tdap/Td series (1 - Tdap) 1988    Pneumococcal 19-64 Highest Risk (2 of 3 - PCV13) 2014    EYE EXAM RETINAL OR DILATED Q1 2017    Influenza Age 9 to Adult 2018    FOOT EXAM Q1 10/2/2018    HEMOGLOBIN A1C Q6M 10/19/2018    MEDICARE YEARLY EXAM 2018    LIPID PANEL Q1 2019    BREAST CANCER SCRN MAMMOGRAM 2019    PAP AKA CERVICAL CYTOLOGY 2020    COLONOSCOPY 2026      Allergies as of 9/10/2018  Review Complete On: 9/10/2018 By: Faraz Henry MD       Severity Noted Reaction Type Reactions    Latex High 2012   Systemic Shortness of Breath    Tramadol  2015    Itching      Current Immunizations  Reviewed on 2013    Name Date    Influenza Vaccine 9/3/2014    Influenza Vaccine (Quad) PF 2017    Pneumococcal Polysaccharide (PPSV-23) 2/1/2013  1:47 PM    TB Skin Test (PPD) Intradermal 12/27/2013, 8/26/2013       Not reviewed this visit   Vitals     BP Pulse Height(growth percentile) Weight(growth percentile) LMP SpO2    149/74 93 5' 6\" (1.676 m) 176 lb 9.6 oz (80.1 kg) (LMP Unknown) 100%    BMI OB Status Smoking Status             28.5 kg/m2 Postmenopausal Never Smoker       Vitals History      BMI and BSA Data     Body Mass Index Body Surface Area    28.5 kg/m 2 1.93 m 2         Preferred Pharmacy       Pharmacy Name Phone    [address], [address] [address] [phone]         Your Updated Medication List          This list is accurate as of 9/10/18  3:00 PM.  Always use your most recent med list.                albuterol 90 mcg/actuation inhaler   Commonly known as:  PROVENTIL HFA, VENTOLIN HFA, PROAIR HFA   Take 2 Puffs by inhalation every four (4) hours as needed for Wheezing. amLODIPine 5 mg tablet   Commonly known as:  NORVASC   Take 1 Tab by mouth daily. aspirin delayed-release 81 mg tablet   Take  by mouth daily. atorvastatin 40 mg tablet   Commonly known as:  LIPITOR   Take 1 Tab by mouth daily. Indications: atherosclerotic cardiovascular disease       butalbital-acetaminophen-caffeine -40 mg per tablet   Commonly known as:  FIORICET, ESGIC   Take 1 Tab by mouth three (3) times daily as needed for Pain. Indications: TENSION-TYPE HEADACHE       CELLCEPT 250 mg capsule   Generic drug:  mycophenolate mofetil   Take 750 mg by mouth two (2) times a day. ELIQUIS 5 mg tablet   Generic drug:  apixaban   TAKE 1 TABLET BY MOUTH TWICE DAILY       fenofibrate nanocrystallized 145 mg tablet   Commonly known as:  TRICOR   Take 1 Tab by mouth daily. insulin detemir U-100 100 unit/mL (3 mL) Inpn   Commonly known as:  LEVEMIR FLEXTOUCH   Use up to 60 units daily       Insulin Syringe-Needle U-100 0.5 mL 31 gauge x 5/16 Syrg   1 Each by SubCUTAneous route daily.        labetalol 200 mg tablet Commonly known as:  NORMODYNE   Take 2 Tabs by mouth two (2) times a day. lancets 33 gauge Misc   Commonly known as: One Touch Delica   4 times daily. Diagnosis code: E11.8       Liraglutide 0.6 mg/0.1 mL (18 mg/3 mL) Pnij   Commonly known as:  VICTOZA   1.2 mg by SubCUTAneous route daily. magnesium oxide 400 mg tablet   Commonly known as:  MAG-OX   TAKE 2 TABLETS BY MOUTH TWICE DAILY       metFORMIN  mg tablet   Commonly known as:  GLUCOPHAGE XR   Take 1 Tab by mouth two (2) times daily (after meals). Wanda Pen Needle 32 gauge x /32\" Ndle   Generic drug:  Insulin Needles (Disposable)   USE TO INJECT INSULIN 5 TIMES DAILY       NovoLOG Flexpen U-100 Insulin 100 unit/mL Inpn   Generic drug:  insulin aspart U-100   0-10 Units by SubCUTAneous route three (3) times daily as needed. ONETOUCH ULTRA BLUE TEST STRIP strip   Generic drug:  glucose blood VI test strips   TEST FOUR TIMES DAILY       pantoprazole 40 mg tablet   Commonly known as:  PROTONIX   TAKE ONE TABLET BY MOUTH ONCE DAILY       PARoxetine mesylate(menop.sym) 7.5 mg Cap   Commonly known as:  BRISDELLE   Take 7.5 Caps by mouth daily. predniSONE 5 mg tablet   Commonly known as:  DELTASONE   Take 1 Tab by mouth daily. SENSIPAR 30 mg tablet   Generic drug:  cinacalcet   Take 30 mg by mouth daily. tacrolimus 1 mg capsule   Commonly known as:  PROGRAF   Take 2 mg by mouth two (2) times a day. Indications: taking 1 mg in am and 2 mg at QHS       traZODone 50 mg tablet   Commonly known as:  DESYREL   Take 1 Tab by mouth nightly. Prescriptions Sent to Pharmacy        Refills    Liraglutide (VICTOZA) 0.6 mg/0.1 mL (18 mg/3 mL) pnij 10    Si.2 mg by SubCUTAneous route daily. Class: Normal    Pharmacy: [address] [place] [address] [address] Ph #: [phone]    Route: SubCUTAneous      Follow-up Instructions     Return in about 3 months (around 12/10/2018).          Patient Instructions    Diabetes; Watch carbohydrate intake with meals and try to keep this less than 30 grams. Continue efforts with weight loss. Victoza 1.2 mg. Increase levemir to 25 units in AM and 15 units in PM.  - follow glucose trend overnight (bedtime to fasting) to adjust.    IF fasting values are > 130 in 5 days, then increase PM dose to 20 units    Ask about starting Jardiance 10 mg daily. IF this is added, let me know and we may decrease Levemir doses. Goals for blood glucose:  Fasting  (less than 150)  Lunch, Dinner, Bedtime -  (less than 180). Parathyroid hormone elevated:  Recommend taking vitamin D3 (cholecalciferol) 5,000 units daily x 2 months, then 15,000 units once weekly thereafter. Introducing [facility] & [facility] SERVICES! Dear Martina Mao: Thank you for requesting a [employer] account. Our records indicate that you already have an active [employer] account. You can access your account anytime at [email or web address]     Did you know that you can access your hospital and ER discharge instructions at any time in [employer]? You can also review all of your test results from your hospital stay or ER visit. Additional Information    If you have questions, please visit the Frequently Asked Questions section of the [employer] website at [email or web address]/. Remember, [employer] is NOT to be used for urgent needs. For medical emergencies, dial 911. Now available from your iPhone and Android! Please provide this summary of care documentation to your next provider. Your primary care clinician is listed as Armond Yoo. If you have any questions after today's visit, please call [phone].

## 2018-09-10 NOTE — PATIENT INSTRUCTIONS
Diabetes; Watch carbohydrate intake with meals and try to keep this less than 30 grams. Continue efforts with weight loss. Victoza 1.2 mg. Increase levemir to 25 units in AM and 15 units in PM.  - follow glucose trend overnight (bedtime to fasting) to adjust.    IF fasting values are > 130 in 5 days, then increase PM dose to 20 units    Ask about starting Jardiance 10 mg daily. IF this is added, let me know and we may decrease Levemir doses. Goals for blood glucose:  Fasting  (less than 150)  Lunch, Dinner, Bedtime -  (less than 180). Parathyroid hormone elevated:  Recommend taking vitamin D3 (cholecalciferol) 5,000 units daily x 2 months, then 15,000 units once weekly thereafter.

## 2018-09-10 NOTE — PROGRESS NOTES
History of Present Illness: Lopezkaren Castillo is a 48 y.o. female presents for follow-up of diabetes. She has a history of ESRD and is now s/p renal transplant on 4/11/2017  Diabetes control worsened after transplant due to increased clearance of insulin, anti-rejection medications, and increase in appetite. Weight increased by almost 30 lbs. Current diabetes regimen:  Levemir 20 units in AM and 15 units in evening    Victoza 1.2 mg daily. Tolerating better  Metformin  mg twice daily. Novolog sliding scale - estimates she takes about 6 units/day. Glucoses: reviewed meter  Monitors breakfast and bedtime  180-190s fasting. Low 200s bedtime. No lows. Diet:  Watching portions and trying to lose weight. Weight is down 6 lbs since last visit and down 12 lbs from highest last fall. HTN  - managed by transplant team. Labetalol and amlodipine. BP controlled     Social:  Has son with diabetes who is 32years of age who is following with me      Past Medical History:   Diagnosis Date    Anemia     Arthritis     Asthma 4/8/2014    Rx for same    Beta blocker therapy     Chronic kidney disease     ESRD - dialysis MWF    Diabetes ([facility] [address] 28yo    Rx to control    Dialysis patient ([facility] [address]     M-W-F    GERD (gastroesophageal reflux disease)     Headache(784.0)     Hepatitis B infection     Hypertension     Rx for same    Pre-kidney transplant, listed 4/30/2015     Current Outpatient Prescriptions   Medication Sig    albuterol (PROVENTIL HFA, VENTOLIN HFA, PROAIR HFA) 90 mcg/actuation inhaler Take 2 Puffs by inhalation every four (4) hours as needed for Wheezing.  lancets (ONE TOUCH DELICA) 33 gauge misc 4 times daily. Diagnosis code: E11.8    amLODIPine (NORVASC) 5 mg tablet Take 1 Tab by mouth daily.  fenofibrate nanocrystallized (TRICOR) 145 mg tablet Take 1 Tab by mouth daily.  labetalol (NORMODYNE) 200 mg tablet Take 2 Tabs by mouth two (2) times a day.     NOVOLOG FLEXPEN U-100 INSULIN 100 unit/mL inpn 0-10 Units by SubCUTAneous route three (3) times daily as needed.  predniSONE (DELTASONE) 5 mg tablet Take 1 Tab by mouth daily.  atorvastatin (LIPITOR) 40 mg tablet Take 1 Tab by mouth daily. Indications: atherosclerotic cardiovascular disease    ONETOUCH ULTRA BLUE TEST STRIP strip TEST FOUR TIMES DAILY    SUZY PEN NEEDLE 32 gauge x 5/32\" ndle USE TO INJECT INSULIN 5 TIMES DAILY    butalbital-acetaminophen-caffeine (FIORICET, ESGIC) -40 mg per tablet Take 1 Tab by mouth three (3) times daily as needed for Pain. Indications: TENSION-TYPE HEADACHE    PARoxetine mesylate (BRISDELLE) 7.5 mg cap Take 7.5 Caps by mouth daily.  traZODone (DESYREL) 50 mg tablet Take 1 Tab by mouth nightly.  Liraglutide (VICTOZA) 0.6 mg/0.1 mL (18 mg/3 mL) pnij 1.2 mg by SubCUTAneous route daily.  metFORMIN ER (GLUCOPHAGE XR) 500 mg tablet Take 1 Tab by mouth two (2) times daily (after meals).  cinacalcet (SENSIPAR) 30 mg tablet Take 30 mg by mouth daily.  aspirin delayed-release 81 mg tablet Take  by mouth daily.  mycophenolate mofetil (CELLCEPT) 250 mg capsule Take 750 mg by mouth two (2) times a day.  insulin detemir (LEVEMIR FLEXTOUCH) 100 unit/mL (3 mL) inpn Use up to 60 units daily (Patient taking differently: 20 units in the morning, 10 units at night)    Insulin Syringe-Needle U-100 0.5 mL 31 gauge x 5/16 syrg 1 Each by SubCUTAneous route daily.  tacrolimus (PROGRAF) 1 mg capsule Take 2 mg by mouth two (2) times a day. Indications: taking 1 mg in am and 2 mg at QHS    pantoprazole (PROTONIX) 40 mg tablet TAKE ONE TABLET BY MOUTH ONCE DAILY    ELIQUIS 5 mg tablet TAKE 1 TABLET BY MOUTH TWICE DAILY    magnesium oxide (MAG-OX) 400 mg tablet TAKE 2 TABLETS BY MOUTH TWICE DAILY    warfarin (COUMADIN) 1 mg tablet Take 1 mg by mouth daily. No current facility-administered medications for this visit.       Allergies   Allergen Reactions    Latex Shortness of Breath  Tramadol Itching       Review of Systems:  - Eyes: no blurry vision or double vision  - Cardiovascular: no chest pain  - Respiratory: no shortness of breath  - Musculoskeletal: + back pain. Pulled muscle  - Neurological: no numbness/tingling in extremities    Physical Examination:  Visit Vitals    /74    Pulse 93    Ht 5' 6\" (1.676 m)    Wt 176 lb 9.6 oz (80.1 kg)    LMP  (LMP Unknown)    SpO2 100%    BMI 28.5 kg/m2   -   - General: pleasant, no distress, normal gait   HEENT: hearing intact, EOMI, clear sclera without icterus  - Cardiovascular: regular, normal rate   - Respiratory: normal effort  - Integumentary: no edema  Diabetic foot exam:     Left Foot:   Visual Exam: normal    Pulse DP: 2+ (normal)   Filament test: normal sensation       Psychiatric: normal mood and affect    Data Reviewed:     8/20/2018. Creatinine 1.45, glucose was 234 on chemistry  Last A1c we have was 9.5 in 4/2018    Assessment/Plan:   1. Type 2 diabetes mellitus with other diabetic kidney complication, with long-term current use of insulin (HCC)   - not controlled  - increase Levemir to 25 units in AM, 15 units at night to help get fasting values down. If fasting values not < 130 in 5 days, then increase to 25 units in AM, 20 units in PM.   - continue Victoza and metformin  - would like to add SGLT2 inhibitor, but need ok from transplant team.    2. Essential hypertension   - per nephrology   3. Renal transplant recipient   - defer addition of SGLT2 to transplant team   4. BMI 28.0-28.9,adult   - encouraged dietary efforts   5. Dyslipidemia   - encouraged continued wt loss. Taking fenofibrate only   6. Vit D def - PTH was elevated in spring. She has not started taking vit D. Recommended 5,000 units/day x 2 months, then 15,000 units/week thereafter  Patient Instructions   Diabetes; Watch carbohydrate intake with meals and try to keep this less than 30 grams. Continue efforts with weight loss.    Victoza 1.2 mg. Increase levemir to 25 units in AM and 15 units in PM.  - follow glucose trend overnight (bedtime to fasting) to adjust.    IF fasting values are > 130 in 5 days, then increase PM dose to 20 units    Ask about starting Jardiance 10 mg daily. IF this is added, let me know and we may decrease Levemir doses. Goals for blood glucose:  Fasting  (less than 150)  Lunch, Dinner, Bedtime -  (less than 180). Parathyroid hormone elevated:  Recommend taking vitamin D3 (cholecalciferol) 5,000 units daily x 2 months, then 15,000 units once weekly thereafter. Follow-up Disposition:  Return in about 3 months (around 12/10/2018).     Copy sent to:

## 2018-09-11 RX ORDER — METFORMIN HYDROCHLORIDE 500 MG/1
500 TABLET, EXTENDED RELEASE ORAL
Qty: 60 TAB | Refills: 10 | Status: SHIPPED | OUTPATIENT
Start: 2018-09-11 | End: 2019-10-09 | Stop reason: SDUPTHER

## 2018-09-11 NOTE — TELEPHONE ENCOUNTER
Pt is requesting a Rx refill for muscle relaxers (she did not know the name of mg of this medication) to go to the pharmacy on file. [address] [facility] is [address]  [address] [address].  Best contact number for the Pt is [phone].              FROM [facility]

## 2018-09-17 ENCOUNTER — OFFICE VISIT (OUTPATIENT)
Dept: INTERNAL MEDICINE CLINIC | Age: 53
End: 2018-09-17

## 2018-09-17 VITALS
DIASTOLIC BLOOD PRESSURE: 80 MMHG | SYSTOLIC BLOOD PRESSURE: 186 MMHG | WEIGHT: 178 LBS | RESPIRATION RATE: 18 BRPM | HEIGHT: 66 IN | HEART RATE: 86 BPM | OXYGEN SATURATION: 99 % | BODY MASS INDEX: 28.61 KG/M2 | TEMPERATURE: 97.6 F

## 2018-09-17 DIAGNOSIS — M54.32 SCIATICA OF LEFT SIDE: Primary | ICD-10-CM

## 2018-09-17 RX ORDER — GABAPENTIN 100 MG/1
100 CAPSULE ORAL
Qty: 60 CAP | Refills: 0 | Status: SHIPPED | OUTPATIENT
Start: 2018-09-17 | End: 2019-01-21

## 2018-09-17 RX ORDER — PREDNISONE 10 MG/1
5 TABLET ORAL SEE ADMIN INSTRUCTIONS
Qty: 21 TAB | Refills: 0 | Status: SHIPPED | OUTPATIENT
Start: 2018-09-17 | End: 2018-10-02 | Stop reason: ALTCHOICE

## 2018-09-17 NOTE — PROGRESS NOTES
SUBJECTIVE:   Earnestine Holm is a 48 y.o. female who complains of low back pain for 5 day(s), positional with bending or lifting, with radiation down the legs. Precipitating factors: recent heavy lifting. Prior history of back problems: no prior back problems. There is no numbness in the legs. OBJECTIVE:  Visit Vitals    /80 (BP 1 Location: Right arm, BP Patient Position: Lying left side)    Pulse 86    Temp 97.6 °F (36.4 °C) (Oral)    Resp 18    Ht 5' 6\" (1.676 m)    Wt 178 lb (80.7 kg)    LMP  (LMP Unknown)    SpO2 99%    BMI 28.73 kg/m2      Patient appears to be in mild to moderate pain, antalgic gait noted. Lumbosacral spine area reveals no local tenderness or mass. Painful and reduced LS ROM noted. Straight leg raise is positive at left degrees on left. DTR's, motor strength and sensation normal, including heel and toe gait. Peripheral pulses are palpable. X-Ray: not available, ordered, but results not yet available. Seen er gave   ASSESSMENT:   degenerative disc disease without herniated disc and with radiculopathy    PLAN:  For acute pain, rest, intermittent application of heat (do not sleep on heating pad), analgesics and muscle relaxants are recommended. Discussed longer term treatment plan of prn NSAID's and discussed a home back care exercise program with flexion exercise routine. Proper lifting with avoidance of heavy lifting discussed. Consider Physical Therapy and XRay studies if not improving. Call or return to clinic prn if these symptoms worsen or fail to improve as anticipated. 1. Sciatica of left side  Try sterioid burst and naomi for pain  - XR SPINE LUMB 2 OR 3 V; Future  - predniSONE (STERAPRED DS) 10 mg dose pack; Take 1 Tab by mouth See Admin Instructions. See administration instruction per 10mg dose pack  Dispense: 21 Tab; Refill: 0  - gabapentin (NEURONTIN) 100 mg capsule; Take 1 Cap by mouth three (3) times daily as needed.  Indications: NEUROPATHIC PAIN  Dispense: 60 Cap; Refill: 0

## 2018-09-17 NOTE — PROGRESS NOTES
All health maintenance and other pertinent information has been reviewed in preparation for today's office visit. Patient presents in the office today for:    Chief Complaint   Patient presents with   Major Hospital Follow Up     Left sided back, hip and leg pain, Hx of blood clots(right leg)     1. Have you been to the ER, urgent care clinic since your last visit? Hospitalized since your last visit? No    2. Have you seen or consulted any other health care providers outside of the [address] [place] [address] since your last visit? Include any pap smears or colon screening.  No

## 2018-09-17 NOTE — MR AVS SNAPSHOT
[address] [place] [address] Drive  [place] [address]  [phone]     Patient: Deane Gottron  MRN: [id number]  :1965               Visit Information     Date & Time Provider Department Dept.  Phone Encounter #    2018  2:15 PM Jennifer Roth MD [facility] [phone] [id number]      Your Appointments     12/10/2018  9:10 AM   Follow Up with Nemo Marques MD   [facility])   Appt Note: f/u thyroid cp0.00    [address] [facility] [address]           [address] [facility] [address]            2019  9:00 AM   ECHO CARDIOGRAMS 2D with HATTIE ECHAVARRIA   [facility])   Appt Note: echo for MR 9:00 Dr. Yash Degroot 10:20 3125 [facility]    7001 [address] 14 [facility] 810 [facility] 71           330 [facility] [address] [facility] [address]            2019 10:20 AM   ESTABLISHED PATIENT with India Houston MD   [facility] ([facility])   Appt Note: echo for MR 9:00 Dr. Yash Degroot 10:20 kmr    [facility] [address] [address]   One [facility] Suite [facility] [address] [address] Maintenance        Date Due    DTaP/Tdap/Td series (1 - Tdap) 1988    Pneumococcal 19-64 Highest Risk (2 of 3 - PCV13) 2014    EYE EXAM RETINAL OR DILATED Q1 2017    Influenza Age 9 to Adult 2018    HEMOGLOBIN A1C Q6M 10/19/2018    MEDICARE YEARLY EXAM 2018    LIPID PANEL Q1 2019    FOOT EXAM Q1 9/10/2019    BREAST CANCER SCRN MAMMOGRAM 2019    PAP AKA CERVICAL CYTOLOGY 2020    COLONOSCOPY 2026      Allergies as of 2018  Review Complete On: 2018 By: Syed Bell LPN       Severity Noted Reaction Type Reactions    Latex High 2012   Systemic Shortness of Breath Tramadol  2015    Itching      Current Immunizations  Reviewed on 2013    Name Date    Influenza Vaccine 9/3/2014    Influenza Vaccine (Quad) PF 2017    Pneumococcal Polysaccharide (PPSV-23) 2013  1:47 PM    TB Skin Test (PPD) Intradermal 2013, 2013       Not reviewed this visit   You Were Diagnosed With        Codes Comments    Sciatica of left side    -  Primary ICD-10-CM: M54.32  ICD-9-CM: 724.3       Vitals     BP Pulse Temp Resp Height(growth percentile) Weight(growth percentile)    186/80 (BP 1 Location: Right arm, BP Patient Position: Lying left side) 86 97.6 °F (36.4 °C) (Oral) 18 5' 6\" (1.676 m) 178 lb (80.7 kg)    LMP SpO2 BMI OB Status Smoking Status       (LMP Unknown) 99% 28.73 kg/m2 Postmenopausal Never Smoker     Vitals History      BMI and BSA Data     Body Mass Index Body Surface Area    28.73 kg/m 2 1.94 m 2         Preferred Pharmacy       Pharmacy Name Phone    [address], Norma Schmidt [address] [place] [address] [phone]         Your Updated Medication List          This list is accurate as of 18  2:56 PM.  Always use your most recent med list.                albuterol 90 mcg/actuation inhaler   Commonly known as:  PROVENTIL HFA, VENTOLIN HFA, PROAIR HFA   Take 2 Puffs by inhalation every four (4) hours as needed for Wheezing. amLODIPine 5 mg tablet   Commonly known as:  NORVASC   Take 1 Tab by mouth daily. aspirin delayed-release 81 mg tablet   Take  by mouth daily. atorvastatin 40 mg tablet   Commonly known as:  LIPITOR   Take 1 Tab by mouth daily. Indications: atherosclerotic cardiovascular disease       butalbital-acetaminophen-caffeine -40 mg per tablet   Commonly known as:  FIORICET, ESGIC   Take 1 Tab by mouth three (3) times daily as needed for Pain. Indications: TENSION-TYPE HEADACHE       CELLCEPT 250 mg capsule   Generic drug:  mycophenolate mofetil   Take 750 mg by mouth two (2) times a day.        ELIQUIS 5 mg tablet   Generic drug:  apixaban   TAKE 1 TABLET BY MOUTH TWICE DAILY       fenofibrate nanocrystallized 145 mg tablet   Commonly known as:  TRICOR   Take 1 Tab by mouth daily. gabapentin 100 mg capsule   Commonly known as:  NEURONTIN   Take 1 Cap by mouth three (3) times daily as needed. Indications: NEUROPATHIC PAIN       insulin detemir U-100 100 unit/mL (3 mL) Inpn   Commonly known as:  LEVEMIR FLEXTOUCH   Use up to 60 units daily       Insulin Syringe-Needle U-100 0.5 mL 31 gauge x 5/16 Syrg   1 Each by SubCUTAneous route daily. labetalol 200 mg tablet   Commonly known as:  NORMODYNE   Take 2 Tabs by mouth two (2) times a day. lancets 33 gauge Misc   Commonly known as: One Touch Delica   4 times daily. Diagnosis code: E11.8       Liraglutide 0.6 mg/0.1 mL (18 mg/3 mL) Pnij   Commonly known as:  VICTOZA   1.2 mg by SubCUTAneous route daily. magnesium oxide 400 mg tablet   Commonly known as:  MAG-OX   TAKE 2 TABLETS BY MOUTH TWICE DAILY       metFORMIN  mg tablet   Commonly known as:  GLUCOPHAGE XR   Take 1 Tab by mouth two (2) times daily (after meals). Wanda Pen Needle 32 gauge x 5/32\" Ndle   Generic drug:  Insulin Needles (Disposable)   USE TO INJECT INSULIN 5 TIMES DAILY       NovoLOG Flexpen U-100 Insulin 100 unit/mL Inpn   Generic drug:  insulin aspart U-100   0-10 Units by SubCUTAneous route three (3) times daily as needed. ONETOUCH ULTRA BLUE TEST STRIP strip   Generic drug:  glucose blood VI test strips   TEST FOUR TIMES DAILY       pantoprazole 40 mg tablet   Commonly known as:  PROTONIX   TAKE ONE TABLET BY MOUTH ONCE DAILY       PARoxetine mesylate(menop.sym) 7.5 mg Cap   Commonly known as:  BRISDELLE   Take 7.5 Caps by mouth daily. * predniSONE 5 mg tablet   Commonly known as:  DELTASONE   Take 1 Tab by mouth daily. * predniSONE 10 mg dose pack   Commonly known as:  STERAPRED DS   Take 1 Tab by mouth See Admin Instructions.  See administration instruction per 10mg dose pack       SENSIPAR 30 mg tablet   Generic drug:  cinacalcet   Take 30 mg by mouth daily. tacrolimus 1 mg capsule   Commonly known as:  PROGRAF   Take 2 mg by mouth two (2) times a day. Indications: taking 1 mg in am and 2 mg at QHS       traZODone 50 mg tablet   Commonly known as:  DESYREL   Take 1 Tab by mouth nightly. * Notice: This list has 2 medication(s) that are the same as other medications prescribed for you. Read the directions carefully, and ask your doctor or other care provider to review them with you. Prescriptions Sent to Pharmacy        Refills    predniSONE (STERAPRED DS) 10 mg dose pack 0    Sig: Take 1 Tab by mouth See Admin Instructions. See administration instruction per 10mg dose pack    Class: Normal    Pharmacy:   86  [address] Ph #: [phone]    Route: Oral    gabapentin (NEURONTIN) 100 mg capsule 0    Sig: Take 1 Cap by mouth three (3) times daily as needed. Indications: NEUROPATHIC PAIN    Class: Normal    Pharmacy: [address] [place] [address] [address] Ph #: [phone]    Route: Oral      To-Do List     2018     Imaging:  XR SPINE LUMB 2 OR 3 V          Introducing [employer]! Dear Alexander Morales: Thank you for requesting a Awesome[employer]me account. Our records indicate that you already have an active Awesome.me account. You can access your account anytime at [email or web address]     Did you know that you can access your hospital and ER discharge instructions at any time in Awesome.me? You can also review all of your test results from your hospital stay or ER visit. Additional Information    If you have questions, please visit the Frequently Asked Questions section of the [email or web address] website at [email or web address]/. Remember, Awesome.me is NOT to be used for urgent needs. For medical emergencies, dial 911.     Now available from your iPhone and Android! Please provide this summary of care documentation to your next provider. Your primary care clinician is listed as Jania Harley. If you have any questions after today's visit, please call [phone].

## 2018-09-18 ENCOUNTER — HOSPITAL ENCOUNTER (OUTPATIENT)
Dept: GENERAL RADIOLOGY | Age: 53
Discharge: HOME | End: 2018-09-18
Attending: INTERNAL MEDICINE
Payer: MEDICARE

## 2018-09-18 DIAGNOSIS — M54.32 SCIATICA OF LEFT SIDE: ICD-10-CM

## 2018-09-18 PROCEDURE — 72100 X-RAY EXAM L-S SPINE 2/3 VWS: CPT

## 2018-09-27 ENCOUNTER — TELEPHONE (OUTPATIENT)
Dept: CARDIOLOGY CLINIC | Age: 53
End: 2018-09-27

## 2018-09-27 RX ORDER — CYCLOBENZAPRINE HCL 5 MG
5 TABLET ORAL
Qty: 30 TAB | Refills: 0 | Status: SHIPPED | OUTPATIENT
Start: 2018-09-27 | End: 2018-10-02 | Stop reason: ALTCHOICE

## 2018-09-27 NOTE — TELEPHONE ENCOUNTER
----- Message from Bimal Dickson MD sent at 9/26/2018 12:18 PM EDT -----  ok      XR of spine (Ok) per Dr. Chelsey Zapata- Results given via my chart e-mail.

## 2018-10-02 ENCOUNTER — OFFICE VISIT (OUTPATIENT)
Dept: INTERNAL MEDICINE CLINIC | Age: 53
End: 2018-10-02

## 2018-10-02 VITALS
HEIGHT: 63 IN | TEMPERATURE: 98 F | RESPIRATION RATE: 16 BRPM | DIASTOLIC BLOOD PRESSURE: 69 MMHG | SYSTOLIC BLOOD PRESSURE: 155 MMHG | OXYGEN SATURATION: 97 % | WEIGHT: 172 LBS | HEART RATE: 92 BPM | BODY MASS INDEX: 30.48 KG/M2

## 2018-10-02 DIAGNOSIS — M54.32 CHRONIC SCIATICA, LEFT: Primary | ICD-10-CM

## 2018-10-02 NOTE — MR AVS SNAPSHOT
[address] [facility]  [address] [place] [address]  [phone]     Patient: Katiuska Contreras  MRN: [id number]  :1965               Visit Information     Date & Time Provider Department Dept.  Phone Encounter #    10/2/2018  3:45 PM Bob Gee MD [facility] [phone] [id number]      Your Appointments     12/10/2018 11:30 AM   Follow Up with Mattie Boyce MD   [facility] [address] [place] [address])   Appt Note: f/u diabetes cp0.00    6019 [facility]  [address]  [id number]           6019 [facility] [address]            2019  9:00 AM   ECHO CARDIOGRAMS 2D with 600 [facility] [place]   [facility]   Appt Note: echo for MR 9:00 Dr. Shemar Pate 10:20 3125 [facility]    [address]           330 [facility] [address] [facility] [address]            2019 10:20 AM   ESTABLISHED PATIENT with Barb Kimball MD   [facility] ([facility])   Appt Note: echo for MR 9:00 Dr. Shemar Pate 10:20 kmr    [address]  [id number]   [address] [facility] [address] [address] Maintenance        Date Due    DTaP/Tdap/Td series (1 - Tdap) 1988    Pneumococcal 19-64 Highest Risk (2 of 3 - PCV13) 2014    Shingrix Vaccine Age 50> (1 of 2) 2017    EYE EXAM RETINAL OR DILATED Q1 2017    Influenza Age 9 to Adult 2018    HEMOGLOBIN A1C Q6M 10/19/2018    MEDICARE YEARLY EXAM 2018    LIPID PANEL Q1 2019    FOOT EXAM Q1 9/10/2019    BREAST CANCER SCRN MAMMOGRAM 2019    PAP AKA CERVICAL CYTOLOGY 2020    COLONOSCOPY 2026      Allergies as of 10/2/2018  Review Complete On: 10/2/2018 By: Ronaldo Harmon LPN       Severity Noted Reaction Type Reactions    Latex High 2012   Systemic Shortness of Breath    Tramadol  2015    Itching      Current Immunizations  Reviewed on 2013    Name Date    Influenza Vaccine 9/3/2014    Influenza Vaccine (Quad) PF 2017    Pneumococcal Polysaccharide (PPSV-23) 2013  1:47 PM    TB Skin Test (PPD) Intradermal 2013, 2013       Not reviewed this visit   You Were Diagnosed With        Codes Comments    Chronic sciatica, left    -  Primary ICD-10-CM: M54.32  ICD-9-CM: 724.3       Vitals     BP Pulse Temp Resp Height(growth percentile) Weight(growth percentile)    155/69 (BP 1 Location: Left arm, BP Patient Position: Sitting) 92 98 °F (36.7 °C) (Oral) 16 5' 3\" (1.6 m) 172 lb (78 kg)    LMP SpO2 BMI OB Status Smoking Status       (LMP Unknown) 97% 30.47 kg/m2 Postmenopausal Never Smoker     Vitals History      BMI and BSA Data     Body Mass Index Body Surface Area    30.47 kg/m 2 1.86 m 2         Preferred Pharmacy       Pharmacy Name Phone    [address], Norma Schmidt [address] [place] [phone]         Your Updated Medication List          This list is accurate as of 10/2/18  4:17 PM.  Always use your most recent med list.                albuterol 90 mcg/actuation inhaler   Commonly known as:  PROVENTIL HFA, VENTOLIN HFA, PROAIR HFA   Take 2 Puffs by inhalation every four (4) hours as needed for Wheezing. amLODIPine 5 mg tablet   Commonly known as:  NORVASC   Take 1 Tab by mouth daily. aspirin delayed-release 81 mg tablet   Take  by mouth daily. atorvastatin 40 mg tablet   Commonly known as:  LIPITOR   Take 1 Tab by mouth daily. Indications: atherosclerotic cardiovascular disease       butalbital-acetaminophen-caffeine -40 mg per tablet   Commonly known as:  FIORICET, ESGIC   Take 1 Tab by mouth three (3) times daily as needed for Pain.  Indications: TENSION-TYPE HEADACHE       CELLCEPT 250 mg capsule   Generic drug:  mycophenolate mofetil   Take 750 mg by mouth two (2) times a day. ELIQUIS 5 mg tablet   Generic drug:  apixaban   TAKE 1 TABLET BY MOUTH TWICE DAILY       fenofibrate nanocrystallized 145 mg tablet   Commonly known as:  TRICOR   Take 1 Tab by mouth daily. gabapentin 100 mg capsule   Commonly known as:  NEURONTIN   Take 1 Cap by mouth three (3) times daily as needed. Indications: NEUROPATHIC PAIN       insulin detemir U-100 100 unit/mL (3 mL) Inpn   Commonly known as:  LEVEMIR FLEXTOUCH   Use up to 60 units daily       Insulin Syringe-Needle U-100 0.5 mL 31 gauge x 5/16 Syrg   1 Each by SubCUTAneous route daily. labetalol 200 mg tablet   Commonly known as:  NORMODYNE   Take 2 Tabs by mouth two (2) times a day. lancets 33 gauge Misc   Commonly known as: One Touch Delica   4 times daily. Diagnosis code: E11.8       Liraglutide 0.6 mg/0.1 mL (18 mg/3 mL) Pnij   Commonly known as:  VICTOZA   1.2 mg by SubCUTAneous route daily. magnesium oxide 400 mg tablet   Commonly known as:  MAG-OX   TAKE 2 TABLETS BY MOUTH TWICE DAILY       metFORMIN  mg tablet   Commonly known as:  GLUCOPHAGE XR   Take 1 Tab by mouth two (2) times daily (after meals). Wanda Pen Needle 32 gauge x 5/32\" Ndle   Generic drug:  Insulin Needles (Disposable)   USE TO INJECT INSULIN 5 TIMES DAILY       NovoLOG Flexpen U-100 Insulin 100 unit/mL Inpn   Generic drug:  insulin aspart U-100   0-10 Units by SubCUTAneous route three (3) times daily as needed. ONETOUCH ULTRA BLUE TEST STRIP strip   Generic drug:  glucose blood VI test strips   TEST FOUR TIMES DAILY       pantoprazole 40 mg tablet   Commonly known as:  PROTONIX   TAKE ONE TABLET BY MOUTH ONCE DAILY       PARoxetine mesylate(menop.sym) 7.5 mg Cap   Commonly known as:  BRISDELLE   Take 7.5 Caps by mouth daily. predniSONE 5 mg tablet   Commonly known as:  DELTASONE   Take 1 Tab by mouth daily. SENSIPAR 30 mg tablet   Generic drug:  cinacalcet   Take 30 mg by mouth daily.        tacrolimus 1 mg capsule   Commonly known as:  PROGRAF   Take 2 mg by mouth two (2) times a day. Indications: taking 1 mg in am and 2 mg at QHS       traZODone 50 mg tablet   Commonly known as:  DESYREL   Take 1 Tab by mouth nightly. We Performed the Following     REFERRAL TO [address]       To-Do List     10/05/2018 9:00 AM     Appointment with Marck Beltran PT at [facility] ([phone])         Referral Information     Referral ID Referred By Referred To       [id number] 288 [facility], 285 [facility] 100 [facility], [address] [place] [address]         Visits Status Start Date End Date    1 New Request 10/2/18 10/2/19    If your referral has a status of pending review or denied, additional information will be sent to support the outcome of this decision. Introducing [facility]! Dear Argie Denver: Thank you for requesting a [employer] account. Our records indicate that you already have an active [employer] account. You can access your account anytime at [email or web address]     Did you know that you can access your hospital and ER discharge instructions at any time in [employer]? You can also review all of your test results from your hospital stay or ER visit. Additional Information    If you have questions, please visit the Frequently Asked Questions section of the [employer] website at [email or web address] Remember, [employer] is NOT to be used for urgent needs. For medical emergencies, dial 911. Now available from your iPhone and Android! Please provide this summary of care documentation to your next provider. Your primary care clinician is listed as Nicolle Wilburn. If you have any questions after today's visit, please call [phone].

## 2018-10-02 NOTE — PROGRESS NOTES
Chief Complaint   Patient presents with    Back Pain     Sciatica Back Pain: Pt c/o back pain radiating down left thigh to ankke. Seen here 18 days ago  Seen ER 3 days ago gave small amount pain meds  XR Results (most recent):    Results from Hospital Encounter encounter on 09/18/18   XR SPINE LUMB 2 OR 3 V   Narrative INDICATION:   pain Sciatica, left side    COMPARISON: September 3, 2015    FINDINGS:    AP, lateral, and coned down lateral views of the lumbar spine demonstrate no  acute fracture or subluxation. Minimal endplate osteophytes L3, L4, L5 without  significant disc space narrowing. The sacroiliac joints are intact. Impression IMPRESSION:  Minimal degenerative changes as above, similar to prior study. No acute  abnormality. [place] prescription monitoring program reviewed and no discrepancies are noted, this is reviewed with patient  Got 31 oxycodone  5 from ER  And 9 hydrocodone  5  On Saturday  But on system yesterday  Jeovanny and flexeril did nothing    Vitals:    10/02/18 1545   BP: 155/69   Pulse: 92   Resp: 16   Temp: 98 °F (36.7 °C)   TempSrc: Oral   SpO2: 97%   Weight: 172 lb (78 kg)   Height: 5' 3\" (1.6 m)     Left hip rom with pain  slrt pos on left  In wheelchair today        1.  Chronic sciatica, left  Can use oprtho on call urgent care or see urgently  DR India Padilla  - REFERRAL TO ORTHOPEDIC SURGERY  She has pain meds from er so none from me

## 2018-10-05 ENCOUNTER — HOSPITAL ENCOUNTER (OUTPATIENT)
Dept: PHYSICAL THERAPY | Age: 53
Discharge: HOME | End: 2018-10-05
Payer: MEDICARE

## 2018-10-05 DIAGNOSIS — M25.559 HIP PAIN, ACUTE, UNSPECIFIED LATERALITY: ICD-10-CM

## 2018-10-05 DIAGNOSIS — M54.89 OTHER BACK PAIN, UNSPECIFIED CHRONICITY: ICD-10-CM

## 2018-10-05 PROCEDURE — 97161 PT EVAL LOW COMPLEX 20 MIN: CPT | Performed by: PHYSICAL THERAPIST

## 2018-10-05 PROCEDURE — G8978 MOBILITY CURRENT STATUS: HCPCS | Performed by: PHYSICAL THERAPIST

## 2018-10-05 PROCEDURE — 97110 THERAPEUTIC EXERCISES: CPT | Performed by: PHYSICAL THERAPIST

## 2018-10-05 PROCEDURE — G8979 MOBILITY GOAL STATUS: HCPCS | Performed by: PHYSICAL THERAPIST

## 2018-10-05 NOTE — PROGRESS NOTES
[facility]  [address]  [place], [address]69 [facility]  Phone: [phone]  Fax: [phone]    Plan of Care/Statement of Necessity for Physical Therapy Services  2-15    Patient name: Angella Marti  : 1965  Provider#: [id number]  Referral source: Ling Rose MD      Medical/Treatment Diagnosis: Other back pain, unspecified chronicity [M54.89]  Hip pain, acute, unspecified laterality [M25.559]     Prior Hospitalization: see medical history     Comorbidities: see evaluation  Prior Level of Function: see evaluation  Medications: Verified on Patient Summary List    Start of Care: 10/5/18      Onset Date: approx 1 month ago       The Plan of Care and following information is based on the information from the initial evaluation. Assessment/ key information: Pt is a 48year old female presenting with lumbar pain, L hip/LE pain.  She will benefit from PT to address problem list below    Evaluation Complexity History HIGH Complexity :3+ comorbidities / personal factors will impact the outcome/ POC ; Examination LOW Complexity : 1-2 Standardized tests and measures addressing body structure, function, activity limitation and / or participation in recreation  ;Presentation LOW Complexity : Stable, uncomplicated  ;Clinical Decision Making MEDIUM Complexity : FOTO score of 26-74  Overall Complexity Rating: LOW     Problem List: pain affecting function, decrease ROM, decrease strength, impaired gait/ balance, decrease ADL/ functional abilitiies, decrease activity tolerance, decrease flexibility/ joint mobility and decrease transfer abilities   Treatment Plan may include any combination of the following: Therapeutic exercise, Therapeutic activities, Neuromuscular re-education, Physical agent/modality, Gait/balance training, Manual therapy, Patient education, Self Care training, Functional mobility training, Home safety training and Stair training  Patient / Family readiness to learn indicated by: asking questions, trying to perform skills and interest  Persons(s) to be included in education: patient (P)  Barriers to Learning/Limitations: None  Patient Goal (s): see evaluation  Patient Self Reported Health Status: good  Rehabilitation Potential: good    Short Term Goals: To be accomplished in 2-3 weeks:  1) Pt will be independent in initial HEP  2) Pt will report >/=25% decrease in exacerbation of symptoms  3) Pt will ambulate >/=100 ft in clinic with symmetrical WBing bilat, upright trunk    Long Term Goals: To be accomplished in 4-6 weeks:  1) Pt will be able to perform 100% supine bridge without symptoms in order to aid in daily chores  2) Pt will be able to perform L supine SLR without symptoms in order to aid in ambulation, daily activities  3) Pt will perform sit-->stand transfer without UE support  4) Pt will improve FOTO score to >/=69/100 in order to demonstrate improvement in functional mobility    Frequency / Duration: Patient to be seen 1-2 times per week for 4-6 weeks. Patient/ Caregiver education and instruction: self care, activity modification and exercises    [x]  Plan of care has been reviewed with PTA    G-Codes (GP)  Mobility   Current  CK= 40-59%   Goal  CJ= 20-39%    The severity rating is based on clinical judgment and the FOTO Score score. Certification Period: 10/5/18- 1/1/19  Rodger Ling PT 10/5/2018 10:45 AM    ________________________________________________________________________    I certify that the above Therapy Services are being furnished while the patient is under my care. I agree with the treatment plan and certify that this therapy is necessary.     [de-identified] Signature:____________________  Date:____________Time: _________

## 2018-10-05 NOTE — PROGRESS NOTES
[facility]  [address], [place], [address]  Phone: [phone]  Fax: [phone]    PT INITIAL EVALUATION NOTE - [facility] 2-15    Patient Name: Tor Blank  Date:10/5/2018  : 1965  [x]  Patient  Verified   Payor: Zina Craft / Plan: [place] MEDICARE PART A & B / Product Type: Medicare /    In time: 9:05 AM  Out time:9:50 AM  Total Treatment Time (min): 45  Total Timed Codes (min): 15  1:1 Treatment Time [facility] only):45   Visit #: 1     Treatment Area: Other back pain, unspecified chronicity [M54.89]  Hip pain, acute, unspecified laterality [M25.559]    SUBJECTIVE    Any medication changes, allergies to medications, adverse drug reactions, diagnosis change, or new procedure performed?: [] No    [x] Yes (see summary sheet for update)    Current symptoms/chief complaint and date of onset/injury:    Pt presents to PT with back pain, acute hip pain from Dr. Gage Quigley; also has report from [facility] MD that states L piriformis syndrome. She states that approx 1 month ago she was in the grocery store and lifted a 24 pack of water. Noticed pain in lower back the next day and 3 days later went to the ER-- took blood- \"I was told a pulled muscle\", given muscle relaxers and pain pills. No significant relief of symptoms. 1 week later f/u with PCP-- another prescription of muscle relaxers. Pain continued to worsen. Went back to the ER-- she has hx of blood clots-- did tests to rule out blood clots and took x-ray of low back- \"they said my back looked fine but maybe a pinched nerve\". She went f/u with PCP again-- gave referral to [facility]. She goes to see Dr. Obi Soriano ortho MD on 10/11/18. She has not been prescribed steroids bc she cannot take them due to kidney transplant  Symptoms include \"sharp, shooting pain running from my butt all the way down to my feet\".  Inc'd pain with all movement, weight bearing, lying on R side  Eased by lying on L side, heat, rest    Aggravated by:  See above  Eased by: see above    Pain Level (0-10 scale): 10/10 max  7/10 min  10/10 today    Location of symptoms: L hip/low back     PMH: Significant for kidney transplant April 2017; high BP; latex allergy; diabetes; L RTC surgery 3 years ago    Social/Recreation/Work: Pt not employed. Prior level of function: She states that prior to onset of injury she was getting around \"completely fine\" without hx of low back pain or leg pain    Patient goal(s): \"hoping this pain will go away\"     Objective:      Posture:   Pt stands with dec'd weight through L LE, trunk laterally flexed to the R for compensation     Gait:   As pt walking back to treatment room (approx 100 ft), she demonstrates significantly reduced aide, need for UE support along wall, dec'd WBing/stance time through L LE, trunk forward flexed and pt holding L lateral/post hip    Transfers:  Pt demonstrates inc'd time to perform all transfers  Sit<-->stand: significant weight shift onto R LE, significant use of UE for push-off armrests  Rolling on table: pt unable to roll to R or ly on R side 2/2 p! Supine<-->sit: instructed on log roll technique    Active Movements:  ROM  AROM Comments:pain, area   Forward flexion  Fingers to knees Significant inc'd symptoms; pt reaching for UE support for assistance back to upright position   Extension  50% inc'd symptoms   SB right Fingers mid patella No change in symptoms; slow movement   SB left Mid femur inc'd symptoms     Strength:       R (0-5) L (0-5)   Hip Flexion (L1,2) At least 3/5 < 3/5 (p!)   Knee Extension (L3,4) 3/5 (p!) in L hip < 3/5 (p!)   Knee Flexion (S1,2) 4 4   Ankle Dorsiflexion (L4) 4+ 4+   Sidelying hip abduction NT NT   Pt unable to perform supine bridge  Pt unable to perform L supine SLR  Pt able to perform approx 50-75% R supine SLR    Palpation:    Severe TTP L HS, glute max with light touch         Outcome Measure: Patient presents with a FOTO score of 57/100. 15 min Therapeutic Exercise:  [x] See flow sheet : advised and instructed on HEP   Rationale: increase ROM to improve the patients ability to perform daily chores    10 min-- ice.  R sidelying, pillow between knees          With   [x] TE   [] TA   [] neuro   [] other: Patient Education: [x] Review HEP    [] Progressed/Changed HEP based on:   [x] positioning   [] body mechanics   [x] transfers   [x] heat/ice application    [x] other: katy/phys; PT POC; importance of performing HEP in order to maximize benefit of PT; use of heat for 10-15 min prior to stretching for tissue extensibility, then use of ice for 10-15 min after stretching to decrease inflammation; pt instructed on log roll technique when performing supine<-->sit transfers; encouraged pt to continue placing as much weight as tolerated through L LE while ambulating     Pain Level (0-10 scale) post treatment:  \"cold\"    Assessment:   [x] See POC  [] Other:  Plan:   [x] See POC  [] Other  [] Discharge due to:     Sparkle Cantrell PT, DPT     10/5/2018     9:09 AM

## 2018-10-08 ENCOUNTER — HOSPITAL ENCOUNTER (OUTPATIENT)
Dept: PHYSICAL THERAPY | Age: 53
Discharge: HOME | End: 2018-10-08
Payer: MEDICARE

## 2018-10-08 PROCEDURE — 97110 THERAPEUTIC EXERCISES: CPT | Performed by: PHYSICAL THERAPIST

## 2018-10-08 NOTE — PROGRESS NOTES
PT DAILY TREATMENT NOTE - [facility] 2-15    Patient Name: Katina Christine  Date:10/8/2018  : 1965  [x]  Patient  Verified  Payor: VA MEDICARE / Plan: VA MEDICARE PART A & B / Product Type: Medicare /    In time: 56 AM  Out time: 11:35 AM  Total Treatment Time (min): 65   Total Timed Codes (min): 45  1:1 Treatment Time ( [address] only): 45   Visit #: 2     Treatment Area: Low back pain [M54.5]    SUBJECTIVE  Pain Level (0-10 scale): 7/10  Any medication changes, allergies to medications, adverse drug reactions, diagnosis change, or new procedure performed?: [x] No    [] Yes (see summary sheet for update)  Subjective functional status/changes:   [] No changes reported  Pt states good compliance with HEP. She felt a little better over the weekend.  She feels better today than she did last week    OBJECTIVE    Modality rationale: decrease inflammation, decrease pain and increase tissue extensibility to improve the patients ability to ashwini sitting, standing, walking with dec'd symptoms   Min Type Additional Details    [] Estim: []Att   []Unatt        []TENS instruct                  []IFC  []Premod   []NMES                     []Other:  []w/US   []w/ice   []w/heat  Position:  Location:    []  Traction: [] Cervical       []Lumbar                       [] Prone          []Supine                       []Intermittent   []Continuous Lbs:  [] before manual  [] after manual  []w/heat    []  Ultrasound: []Continuous   [] Pulsed at:                           []1MHz   []3MHz Location:  W/cm2:    [] Paraffin         Location:   []w/heat   10/10 [x]  Ice     [x]  Heat  []  Ice massage Position:  Location:    []  Laser  []  Other: Position:  Location:      []  Vasopneumatic Device Pressure:       [] lo [] med [] hi   Temperature:      [x] Skin assessment post-treatment:  [x]intact []redness- no adverse reaction    []redness  adverse reaction:     45 min Therapeutic Exercise:  [x] See flow sheet : reviewed HEP; added per flow sheet Rationale: increase ROM and increase strength to improve the patients ability to exercise, walk, perform daily chores with dec'd symptoms     min Manual Therapy:     Rationale    Other Objective/Functional Measures:   Pt ambulated into clinic without need for UE support against wall, upright trunk      Pain Level (0-10 scale) post treatment: 7/10    ASSESSMENT/Changes in Function:   Overall tolerated therapy session well. Encouraged pt to f/u with PT after MD appointment on 10/11    Patient will continue to benefit from skilled PT services to modify and progress therapeutic interventions, address functional mobility deficits, address ROM deficits, address strength deficits, analyze and address soft tissue restrictions, analyze and cue movement patterns, analyze and modify body mechanics/ergonomics and assess and modify postural abnormalities to attain remaining goals.      []  See Plan of Care  []  See progress note/recertification  []  See Discharge Summary         Progress towards goals / Updated goals:  nt    PLAN  []  Upgrade activities as tolerated     []  Continue plan of care  []  Update interventions per flow sheet       []  Discharge due to:_  []  Other:_      Angelika Pace PT 10/8/2018  10:39 AM

## 2018-10-08 NOTE — TELEPHONE ENCOUNTER
From: Bibi Nelson  To: Melba Prakash MD  Sent: 10/6/2018 2:39 PM EDT  Subject: Medication Renewal Request    Original authorizing provider: MD Geni Plata. Carline would like a refill of the following medications:  insulin detemir (LEVEMIR FLEXTOUCH) 100 unit/mL (3 mL) inpn Melba Prakash MD]    Preferred pharmacy: [address], [address] [place] [address]    Comment:  Can you please call [facility] for a refills on this medication please thank you.

## 2018-10-16 ENCOUNTER — APPOINTMENT (OUTPATIENT)
Dept: PHYSICAL THERAPY | Age: 53
End: 2018-10-16
Payer: MEDICARE

## 2018-10-24 LAB
CREATININE, EXTERNAL: 1.42
HBA1C MFR BLD HPLC: 10.4 %
INR, EXTERNAL: 1.3
LDL-C, EXTERNAL: 45
PT, EXTERNAL: 14.6

## 2018-12-13 ENCOUNTER — OFFICE VISIT (OUTPATIENT)
Dept: INTERNAL MEDICINE CLINIC | Age: 53
End: 2018-12-13

## 2018-12-13 VITALS
OXYGEN SATURATION: 100 % | DIASTOLIC BLOOD PRESSURE: 68 MMHG | RESPIRATION RATE: 16 BRPM | HEIGHT: 63 IN | TEMPERATURE: 98.4 F | SYSTOLIC BLOOD PRESSURE: 149 MMHG | BODY MASS INDEX: 31.64 KG/M2 | HEART RATE: 84 BPM | WEIGHT: 178.6 LBS

## 2018-12-13 DIAGNOSIS — J20.9 BRONCHITIS WITH BRONCHOSPASM: Primary | ICD-10-CM

## 2018-12-13 RX ORDER — AMOXICILLIN AND CLAVULANATE POTASSIUM 875; 125 MG/1; MG/1
1 TABLET, FILM COATED ORAL EVERY 12 HOURS
Qty: 20 TAB | Refills: 0 | Status: SHIPPED | OUTPATIENT
Start: 2018-12-13 | End: 2019-01-21

## 2018-12-13 NOTE — PROGRESS NOTES
1. Have you been to the ER, urgent care clinic since your last visit? Hospitalized since your last visit? No    2. Have you seen or consulted any other health care providers outside of the [address] [place] [address] since your last visit? Include any pap smears or colon screening.  No

## 2018-12-13 NOTE — PROGRESS NOTES
SUBJECTIVE:   Rudy Betts is a 48 y.o. female who complains of productive cough and cough described as productive of creamy sputum for 20 days. She denies a history of chills, dizziness and fevers and denies a history of asthma. Patient denies smoke cigarettes. Patient Active Problem List    Diagnosis    Renal transplant recipient    Acute deep vein thrombosis (DVT) of popliteal vein of right lower extremity (HCC)    Dyslipidemia    Mitral regurgitation    Asthma    Depression    Anemia    Hyperkalemia    DM [facility]di[facility] mellitus) [facility]    Diabetes mellitus [facility]    Hypertension    Kidney disease    Migraines     OBJECTIVE:  She appears well, vital signs are as noted. Ears normal.  Throat and pharynx normal.  Neck supple. No adenopathy in the neck. . Sinuses non tender. The chest is clear, without wheezes or rales. ASSESSMENT:   viral upper respiratory illness and bronchospasm    PLAN:  Symptomatic therapy suggested: push fluids, rest and return office visit prn if symptoms persist or worsen. Lack of antibiotic effectiveness discussed with her. Call or return to clinic prn if these symptoms worsen or fail to improve as anticipated. Diagnoses and all orders for this visit:    1. Bronchitis with bronchospasm  -     XR CHEST PA LAT; Future    Other orders  -     amoxicillin-clavulanate (AUGMENTIN) 875-125 mg per tablet; Take 1 Tab by mouth every twelve (12) hours.       Trial  Antibiotic as immunosuppressed

## 2018-12-14 ENCOUNTER — HOSPITAL ENCOUNTER (OUTPATIENT)
Dept: GENERAL RADIOLOGY | Age: 53
Discharge: HOME | End: 2018-12-14
Attending: INTERNAL MEDICINE
Payer: MEDICARE

## 2018-12-14 DIAGNOSIS — J20.9 BRONCHITIS WITH BRONCHOSPASM: ICD-10-CM

## 2018-12-14 PROCEDURE — 71046 X-RAY EXAM CHEST 2 VIEWS: CPT

## 2018-12-28 ENCOUNTER — HOSPITAL ENCOUNTER (OUTPATIENT)
Dept: MAMMOGRAPHY | Age: 53
Discharge: HOME | End: 2018-12-28
Attending: INTERNAL MEDICINE
Payer: MEDICARE

## 2018-12-28 DIAGNOSIS — Z12.39 SCREENING BREAST EXAMINATION: ICD-10-CM

## 2018-12-28 PROCEDURE — 77067 SCR MAMMO BI INCL CAD: CPT

## 2019-01-07 ENCOUNTER — CLINICAL SUPPORT (OUTPATIENT)
Dept: INTERNAL MEDICINE CLINIC | Age: 54
End: 2019-01-07

## 2019-01-07 VITALS
OXYGEN SATURATION: 99 % | HEART RATE: 100 BPM | HEIGHT: 63 IN | WEIGHT: 177 LBS | TEMPERATURE: 97.9 F | RESPIRATION RATE: 20 BRPM | DIASTOLIC BLOOD PRESSURE: 69 MMHG | BODY MASS INDEX: 31.36 KG/M2 | SYSTOLIC BLOOD PRESSURE: 134 MMHG

## 2019-01-07 DIAGNOSIS — Z23 ENCOUNTER FOR IMMUNIZATION: Primary | ICD-10-CM

## 2019-01-07 NOTE — PATIENT INSTRUCTIONS
Vaccine Information Statement    Influenza (Flu) Vaccine (Inactivated or Recombinant): What you need to know    Many Vaccine Information Statements are available in [place]ic and other languages. See www.immunize.org/vis  Hojas de Información Sobre Vacunas están disponibles en Español y en muchos otros idiomas. Visite www.immunize.org/vis    1. Why get vaccinated? Influenza (flu) is a contagious disease that spreads around the [place] every year, usually between October and May. Flu is caused by influenza viruses, and is spread mainly by coughing, sneezing, and close contact. Anyone can get flu. Flu strikes suddenly and can last several days. Symptoms vary by age, but can include:   fever/chills   sore throat   muscle aches   fatigue   cough   headache    runny or stuffy nose    Flu can also lead to pneumonia and blood infections, and cause diarrhea and seizures in children. If you have a medical condition, such as heart or lung disease, flu can make it worse. Flu is more dangerous for some people. Infants and young children, people 72years of age and older, pregnant women, and people with certain health conditions or a weakened immune system are at greatest risk. Each year thousands of people in the [facility] die from flu, and many more are hospitalized. Flu vaccine can:   keep you from getting flu,   make flu less severe if you do get it, and   keep you from spreading flu to your family and other people. 2. Inactivated and recombinant flu vaccines    A dose of flu vaccine is recommended every flu season. Children 6 months through 6years of age may need two doses during the same flu season. Everyone else needs only one dose each flu season.        Some inactivated flu vaccines contain a very small amount of a mercury-based preservative called thimerosal. Studies have not shown thimerosal in vaccines to be harmful, but flu vaccines that do not contain thimerosal are available. There is no live flu virus in flu shots. They cannot cause the flu. There are many flu viruses, and they are always changing. Each year a new flu vaccine is made to protect against three or four viruses that are likely to cause disease in the upcoming flu season. But even when the vaccine doesnt exactly match these viruses, it may still provide some protection    Flu vaccine cannot prevent:   flu that is caused by a virus not covered by the vaccine, or   illnesses that look like flu but are not. It takes about 2 weeks for protection to develop after vaccination, and protection lasts through the flu season. 3. Some people should not get this vaccine    Tell the person who is giving you the vaccine:     If you have any severe, life-threatening allergies. If you ever had a life-threatening allergic reaction after a dose of flu vaccine, or have a severe allergy to any part of this vaccine, you may be advised not to get vaccinated. Most, but not all, types of flu vaccine contain a small amount of egg protein.  If you ever had Guillain-Barré Syndrome (also called GBS). Some people with a history of GBS should not get this vaccine. This should be discussed with your doctor.  If you are not feeling well. It is usually okay to get flu vaccine when you have a mild illness, but you might be asked to come back when you feel better. 4. Risks of a vaccine reaction    With any medicine, including vaccines, there is a chance of reactions. These are usually mild and go away on their own, but serious reactions are also possible. Most people who get a flu shot do not have any problems with it.      Minor problems following a flu shot include:    soreness, redness, or swelling where the shot was given     hoarseness   sore, red or itchy eyes   cough   fever   aches   headache   itching   fatigue  If these problems occur, they usually begin soon after the shot and last 1 or 2 days. More serious problems following a flu shot can include the following:     There may be a small increased risk of Guillain-Barré Syndrome (GBS) after inactivated flu vaccine. This risk has been estimated at 1 or 2 additional cases per million people vaccinated. This is much lower than the risk of severe complications from flu, which can be prevented by flu vaccine.  Young children who get the flu shot along with pneumococcal vaccine (PCV13) and/or DTaP vaccine at the same time might be slightly more likely to have a seizure caused by fever. Ask your doctor for more information. Tell your doctor if a child who is getting flu vaccine has ever had a seizure. Problems that could happen after any injected vaccine:      People sometimes faint after a medical procedure, including vaccination. Sitting or lying down for about 15 minutes can help prevent fainting, and injuries caused by a fall. Tell your doctor if you feel dizzy, or have vision changes or ringing in the ears.  Some people get severe pain in the shoulder and have difficulty moving the arm where a shot was given. This happens very rarely.  Any medication can cause a severe allergic reaction. Such reactions from a vaccine are very rare, estimated at about 1 in a million doses, and would happen within a few minutes to a few hours after the vaccination. As with any medicine, there is a very remote chance of a vaccine causing a serious injury or death. The safety of vaccines is always being monitored. For more information, visit: www.cdc.gov/vaccinesafety/    5. What if there is a serious reaction? What should I look for?  Look for anything that concerns you, such as signs of a severe allergic reaction, very high fever, or unusual behavior.     Signs of a severe allergic reaction can include hives, swelling of the face and throat, difficulty breathing, a fast heartbeat, dizziness, and weakness - usually within a few minutes to a few hours after the vaccination. What should I do?  If you think it is a severe allergic reaction or other emergency that cant wait, call 9-1-1 and get the person to the nearest hospital. Otherwise, call your doctor.  Reactions should be reported to the Vaccine Adverse Event Reporting System (VAERS). Your doctor should file this report, or you can do it yourself through  the VAERS web site at www.vaers. [facility].gov, or by calling 9-864.575.3771. VAERS does not give medical advice. 6. The National Vaccine Injury Compensation Program    The [facility] Vaccine Injury Compensation Program (VICP) is a federal program that was created to compensate people who may have been injured by certain vaccines. Persons who believe they may have been injured by a vaccine can learn about the program and about filing a claim by calling 5-800.269.7399 or visiting the [employer] website at www.[facility].gov/vaccinecompensation. There is a time limit to file a claim for compensation. 7. How can I learn more?  Ask your healthcare provider. He or she can give you the vaccine package insert or suggest other sources of information.  Call your local or state health department.  Contact the Centers for Disease Control and Prevention (CDC):  - Call 1-497.882.8401 (8-103-GWG-INFO) or  - Visit CDCs website at www.cdc.gov/flu    Vaccine Information Statement   Inactivated Influenza Vaccine   8/7/2015  42 TRUPTI [place] [id number]    Department of Health and Human Services  Centers for Disease Control and Prevention    Office Use Only

## 2019-01-16 RX ORDER — LANCETS 33 GAUGE
EACH MISCELLANEOUS
Qty: 125 LANCET | Refills: 11 | Status: SHIPPED | OUTPATIENT
Start: 2019-01-16 | End: 2022-08-26

## 2019-01-21 ENCOUNTER — OFFICE VISIT (OUTPATIENT)
Dept: ENDOCRINOLOGY | Age: 54
End: 2019-01-21

## 2019-01-21 VITALS
HEART RATE: 81 BPM | SYSTOLIC BLOOD PRESSURE: 157 MMHG | HEIGHT: 63 IN | DIASTOLIC BLOOD PRESSURE: 90 MMHG | BODY MASS INDEX: 31.47 KG/M2 | WEIGHT: 177.6 LBS

## 2019-01-21 DIAGNOSIS — Z79.4 TYPE 2 DIABETES MELLITUS WITH OTHER SPECIFIED COMPLICATION, WITH LONG-TERM CURRENT USE OF INSULIN (HCC): Primary | ICD-10-CM

## 2019-01-21 DIAGNOSIS — Z94.0 RENAL TRANSPLANT RECIPIENT: ICD-10-CM

## 2019-01-21 DIAGNOSIS — E11.69 TYPE 2 DIABETES MELLITUS WITH OTHER SPECIFIED COMPLICATION, WITH LONG-TERM CURRENT USE OF INSULIN (HCC): Primary | ICD-10-CM

## 2019-01-21 DIAGNOSIS — I10 ESSENTIAL HYPERTENSION: ICD-10-CM

## 2019-01-21 PROBLEM — E11.9 TYPE 2 DIABETES MELLITUS WITHOUT COMPLICATION, WITH LONG-TERM CURRENT USE OF INSULIN (HCC): Status: ACTIVE | Noted: 2019-01-21

## 2019-01-21 NOTE — PROGRESS NOTES
History of Present Illness: Yasmine Curry is a 48 y.o. female presents for follow-up of diabetes. She has a history of ESRD and is now s/p renal transplant on 4/11/2017  Diabetes control worsened after transplant due to increased clearance of insulin, anti-rejection medications, and increase in appetite. Weight increased by almost 30 lbs and has been stable for last 6 months or so. A1c was 11.0, up from 10.4 in fall 2018. Current diabetes regimen:  Levemir 30 units twice daily. Victoza 1.2 mg daily. Tolerating better  Metformin  mg twice daily. Novolog sliding scale - taking for elevated values. Glucoses: forgot meter  325 this AM.   250, 199   Typically in 200s around bedtime. Glucoses typically improve some overnight  Lowest she has seen was 185 recently though. Diet:  Breakfast Oatmeal or eggs and toast  Lunch:    Dinner - shrimp and pasta. Little over 1 cup last night. She asks about seeing dietician/nutritionist.     HTN  - managed by transplant team. Labetalol and amlodipine. BP higher. She was stressed because her son was late for appt. Social:  Has son with diabetes who is 32years of age who is following with me  Has had some stress. Past Medical History:   Diagnosis Date    Anemia     Arthritis     Asthma 4/8/2014    Rx for same    Beta blocker therapy     Chronic kidney disease     ESRD - dialysis MWF    Diabetes ([facility] [address] 26yo    Rx to control    Dialysis patient ([facility] [address]     M-W-F    GERD (gastroesophageal reflux disease)     Headache(784.0)     Hepatitis B infection     Hypertension     Rx for same    Pre-kidney transplant, listed 4/30/2015     Current Outpatient Medications   Medication Sig    lancets (ONE TOUCH DELICA) 33 gauge misc 4 times daily.  Diagnosis code: E11.8    insulin detemir U-100 (LEVEMIR FLEXTOUCH) 100 unit/mL (3 mL) inpn Use up to 60 units daily    metFORMIN ER (GLUCOPHAGE XR) 500 mg tablet Take 1 Tab by mouth two (2) times daily (after meals).  ELIQUIS 5 mg tablet TAKE 1 TABLET BY MOUTH TWICE DAILY    Liraglutide (VICTOZA) 0.6 mg/0.1 mL (18 mg/3 mL) pnij 1.2 mg by SubCUTAneous route daily.  albuterol (PROVENTIL HFA, VENTOLIN HFA, PROAIR HFA) 90 mcg/actuation inhaler Take 2 Puffs by inhalation every four (4) hours as needed for Wheezing.  fenofibrate nanocrystallized (TRICOR) 145 mg tablet Take 1 Tab by mouth daily.  labetalol (NORMODYNE) 200 mg tablet Take 2 Tabs by mouth two (2) times a day. (Patient taking differently: Take 200 mg by mouth two (2) times a day.)    NOVOLOG FLEXPEN U-100 INSULIN 100 unit/mL inpn 0-10 Units by SubCUTAneous route three (3) times daily as needed.  predniSONE (DELTASONE) 5 mg tablet Take 1 Tab by mouth daily.  atorvastatin (LIPITOR) 40 mg tablet Take 1 Tab by mouth daily. Indications: atherosclerotic cardiovascular disease    ONETOUCH ULTRA BLUE TEST STRIP strip TEST FOUR TIMES DAILY    SUZY PEN NEEDLE 32 gauge x 5/32\" ndle USE TO INJECT INSULIN 5 TIMES DAILY    butalbital-acetaminophen-caffeine (FIORICET, ESGIC) -40 mg per tablet Take 1 Tab by mouth three (3) times daily as needed for Pain. Indications: TENSION-TYPE HEADACHE    PARoxetine mesylate (BRISDELLE) 7.5 mg cap Take 7.5 Caps by mouth daily.  traZODone (DESYREL) 50 mg tablet Take 1 Tab by mouth nightly.  cinacalcet (SENSIPAR) 30 mg tablet Take 30 mg by mouth daily.  aspirin delayed-release 81 mg tablet Take  by mouth daily.  mycophenolate mofetil (CELLCEPT) 250 mg capsule Take 750 mg by mouth two (2) times a day.  Insulin Syringe-Needle U-100 0.5 mL 31 gauge x 5/16 syrg 1 Each by SubCUTAneous route daily.  tacrolimus (PROGRAF) 1 mg capsule Take 2 mg by mouth two (2) times a day.  Indications: taking 1 mg in am and 2 mg at QHS    pantoprazole (PROTONIX) 40 mg tablet TAKE ONE TABLET BY MOUTH ONCE DAILY    amoxicillin-clavulanate (AUGMENTIN) 875-125 mg per tablet Take 1 Tab by mouth every twelve (12) hours.    gabapentin (NEURONTIN) 100 mg capsule Take 1 Cap by mouth three (3) times daily as needed. Indications: NEUROPATHIC PAIN    magnesium oxide (MAG-OX) 400 mg tablet TAKE 2 TABLETS BY MOUTH TWICE DAILY    amLODIPine (NORVASC) 5 mg tablet Take 1 Tab by mouth daily. No current facility-administered medications for this visit. Allergies   Allergen Reactions    Latex Shortness of Breath    Tramadol Itching       Review of Systems:  - Eyes: no blurry vision or double vision  - Cardiovascular: no chest pain  - Respiratory: no shortness of breath  - Musculoskeletal: no myalgias  - Neurological: no numbness/tingling in extremities    Physical Examination:  Visit Vitals  /90 (BP 1 Location: Left arm, BP Patient Position: Sitting)   Pulse 81   Ht 5' 3\" (1.6 m)   Wt 177 lb 9.6 oz (80.6 kg)   BMI 31.46 kg/m²   -   - General: pleasant, no distress, normal gait   HEENT: hearing intact, EOMI, clear sclera without icterus  - Cardiovascular: regular, normal rate   - Respiratory: normal effort  - Integumentary: no edema  - Psychiatric: normal mood and affect    Data Reviewed:   See HPI   LDL 45     Assessment/Plan:   1. Type 2 diabetes mellitus with other specified complication, with long-term current use of insulin (HCC)   Not controlled. Increase Levemir to 40 units twice daily. If glucoses remain higher than 130 fasting, recommend he increase to 45 units twice daily, then 50 units twice daily. 2. Renal transplant recipient      3. Essential hypertension   Defer to nephrology. 4. BMI 31.0-31.9,adult   She is going to work once again on improving lifestyle efforts     Patient Instructions   Diabetes; Watch carbohydrate intake with meals and try to keep this less than 30 grams. Continue efforts with weight loss. Victoza 1.2 mg.     Continue metformin  Increase Levemir to 40 units twice daily    - follow glucose trend from bedtime to fasting  IF fasting values are > 130 fasting most days in 1 week, then increase to 45 units twice daily x 1 week and then 50 units twice / daily, if needed and until fasting values are < 130. Brielle León Goals for blood glucose:  Fasting  (less than 150)  Lunch, Dinner, Bedtime -  (less than 180). Follow-up Disposition:  Return in about 3 months (around 4/21/2019).     Copy sent to:

## 2019-01-21 NOTE — PATIENT INSTRUCTIONS
Diabetes; Watch carbohydrate intake with meals and try to keep this less than 30 grams. Continue efforts with weight loss. Victoza 1.2 mg. Continue metformin  Increase Levemir to 40 units twice daily    - follow glucose trend from bedtime to fasting  IF fasting values are > 130 fasting most days in 1 week, then increase to 45 units twice daily x 1 week and then 50 units twice / daily, if needed and until fasting values are < 130. Shannon Narayan Goals for blood glucose:  Fasting  (less than 150)  Lunch, Dinner, Bedtime -  (less than 180).

## 2019-02-05 ENCOUNTER — TELEPHONE (OUTPATIENT)
Dept: OBGYN CLINIC | Age: 54
End: 2019-02-05

## 2019-02-05 RX ORDER — PAROXETINE 7.5 MG/1
7.5 CAPSULE ORAL DAILY
Qty: 30 CAP | Refills: 0 | Status: SHIPPED | OUTPATIENT
Start: 2019-02-05 | End: 2019-03-21 | Stop reason: SDUPTHER

## 2019-02-05 NOTE — TELEPHONE ENCOUNTER
Message left at 11:51am      48year old patient last seen in the office on  2/27/18. Patient left a message requesting a refill on her [address]. This nurse called the patient back and advised of need to set up appointment for AE. Patient placed on the schedule to be seen on 2/28/19 at 8:20am    Prescription refill sent as per MD order to patient preferred pharmacy for one month supply to get patient to her scheduled appointment.     Patient advised of need to ask for refill for the year at her appointment on 2/28/29     Patient verbalized understanding

## 2019-02-11 ENCOUNTER — PATIENT OUTREACH (OUTPATIENT)
Dept: ENDOCRINOLOGY | Age: 54
End: 2019-02-11

## 2019-02-11 ENCOUNTER — HOSPITAL ENCOUNTER (OUTPATIENT)
Dept: NUTRITION | Age: 54
End: 2019-02-11

## 2019-02-11 NOTE — PROGRESS NOTES
2/11/19  Attempted to contact patient, no answer, mailbox full, unable to leave message. Sending NN letter. Diabetes Care Management Note    Was asked by Dr. Saw Goldberg to see patient for Diabetes management. Last A1C was 10.4 on 10/25/18, prior was 9.5 on 4/19/18. Provided a brief overview of:  explanation of what an A1C measures, how diet effects blood glucose, explanation of carbohydrates, proteins, and fats (macronutrients), the impact of sugars and starches, carbohydrate counting, simple exercises    Presentation/Accompanied by:  ambulatory    Social History: patient lives alone, but has two adult children    History/Family History: patient has a history of ESRD with a renal transplant in 4/11/2017; HTN, HLD    Symptoms of high blood sugar: none noted    Motivation: patient wants to control her blood sugar, weight and learn how to carbohydrate count    Self Care Behaviors      1) Healthy Eating/Food Recall-   BK - pt states usually skips; but will have egg, sinha, grits  LN - tossed salad or sub sandwich  DN - potatoes, meats (steak, ribs, roast)  SN - n/a  DR - ginger ale (occasional), diet soda, water    2) Being Active- patient states she is not active at this time due to sciatic pain in leg and hip    3) Self Monitoring Blood Glucose (SMBG)- patient states she tests her blood sugar 1 time daily, every other day and it is always before breakfast; patient states her last blood sugar readings was this morning, 260. Patient will test her blood sugar 1-2 times daily, prior to breakfast and at bedtime, record and send to office for review. Patient will  her new glucometer from pharmacy today, patient requesting a AccuChek mini.       How to treat a low blood sugar - n/a    4) Taking Medication- patient adherent with medication and taking Metformin  mg, 1 tablet, 2 times daily, Victoza 1.2 mg daily, Levemir 40 units, 2 times daily    5) Problem Solving- patient is willing to learn how to manage her high blood sugar by making adjustments to her treatment plan; patient states she is scheduled to meet with a register dietitian next week. 6) Reducing Risk-   Tobacco - n/a  HTN - taking medication  HLD - taking medication  Aspirin - takes    Discussed possible complications of uncontrolled diabetes. 7) Healthy Coping-   Support system - patient states her family is supportive, but are a stressor in her life, especially her son who has diabetes as well; patient will take advantage of the support and education provided today and of the support of her health care team    Resources provided during this call: Let's Count Carbs, home blood sugar monitoring record    Future Appointments:   Future Appointments   Date Time Provider Trisha Culver   2/27/2019 10:30 AM MD Azar Turcios   2/28/2019  8:20 AM Ajay Winter MD [facility]   4/22/2019 10:10 AM Rosangela Vizcaino MD RDE [address]   5/30/2019  9:00  [facility], [id number] [place] [address]   5/30/2019 10:20 AM Shaka Fuchs  [address] diet recommendations and achieves/maintains goal weight      2/12/19  Patient instructed to limit carbohydrate intake to 30 grams per meal for diabetes and weight control. Patient will review education materials on carbohydrate counting with NN at next contact. Patient will learn what is a carbohydrate, its worth in grams and plan her meals accordingly in the next month.   Kris Lr

## 2019-02-11 NOTE — LETTER
2/11/2019 11:42 AM    Ms. Estevan Berrios [address] [phone]      Dear Ms. Carline:    I am a RN Nurse Navigator working with [address] [place] [address]. Part of my job is to follow up with patients who have been in the emergency department, hospital, or have a chronic disease. I check to see how you are feeling, answer any questions you may have about your health and check to see if you have a follow-up appointment to see your primary care physician. If you have changed your Primary Care Provider, let us know so we can update our records. Otherwise, I am available to help provide education and resources for your needs. I can be reached directly Monday thru Friday at [phone] or [phone]. Your next appointment with Dr. Kena Franco is scheduled for Monday, April 22, 2019 at 10:10 am.    Thank you for allowing me to participate in your care!       Sincerely,          George Grier RN

## 2019-02-22 ENCOUNTER — PATIENT OUTREACH (OUTPATIENT)
Dept: ENDOCRINOLOGY | Age: 54
End: 2019-02-22

## 2019-02-22 NOTE — LETTER
2/27/2019 3:07 PM    Ms[address] Marques [address]rios [phone]      Dear Ms. Crowley:    I am a RN Nurse Navigator working with [address] [place] [address] Endocrinology. Part of my job is to follow up with patients who have been in the emergency department, hospital, or have a chronic disease. I check to see how you are feeling, answer any questions you may have about your health and check to see if you have a follow-up appointment to see your primary care physician. If you have changed your Primary Care Provider, let us know so we can update our records. Otherwise, I am available to help provide education and resources for your needs. I can be reached directly Monday thru Friday at [phone] or [phone]. Your next appointment with Dr. Chetna Dominguez is scheduled for Monday, April 22, 2019 at 10:10 am.    Thank you for allowing me to participate in your care!         Sincerely,                Wes Alcantar RN

## 2019-02-22 NOTE — PROGRESS NOTES
19  Nurse navigator (NN) called patient, verified patient's name, address and . Patient states she is unable to talk at this time and to give her a call by Monday morning of next week. 19  NN called patient today, no answer, left voicemail message to return telephone call. 19  NN called patient today, no answer, left voicemail message to return telephone call. NN sending letter via mail.

## 2019-03-07 ENCOUNTER — OFFICE VISIT (OUTPATIENT)
Dept: INTERNAL MEDICINE CLINIC | Age: 54
End: 2019-03-07

## 2019-03-07 VITALS
RESPIRATION RATE: 18 BRPM | HEART RATE: 78 BPM | HEIGHT: 63 IN | WEIGHT: 174.6 LBS | BODY MASS INDEX: 30.94 KG/M2 | TEMPERATURE: 97 F | OXYGEN SATURATION: 99 % | SYSTOLIC BLOOD PRESSURE: 116 MMHG | DIASTOLIC BLOOD PRESSURE: 52 MMHG

## 2019-03-07 DIAGNOSIS — Z00.00 MEDICARE ANNUAL WELLNESS VISIT, SUBSEQUENT: ICD-10-CM

## 2019-03-07 DIAGNOSIS — M54.32 SCIATICA OF LEFT SIDE: Primary | ICD-10-CM

## 2019-03-07 DIAGNOSIS — Z13.31 SCREENING FOR DEPRESSION: ICD-10-CM

## 2019-03-07 RX ORDER — ACETAMINOPHEN AND CODEINE PHOSPHATE 300; 30 MG/1; MG/1
1 TABLET ORAL
Qty: 30 TAB | Refills: 0 | Status: SHIPPED | OUTPATIENT
Start: 2019-03-07 | End: 2019-11-18 | Stop reason: SDUPTHER

## 2019-03-07 NOTE — PATIENT INSTRUCTIONS
Medicare Wellness Visit, Female     The best way to live healthy is to have a lifestyle where you eat a well-balanced diet, exercise regularly, limit alcohol use, and quit all forms of tobacco/nicotine, if applicable. Regular preventive services are another way to keep healthy. Preventive services (vaccines, screening tests, monitoring & exams) can help personalize your care plan, which helps you manage your own care. Screening tests can find health problems at the earliest stages, when they are easiest to treat. [facility] follows the current, evidence-based guidelines published by the [facility] ([facility]) when recommending preventive services for our patients. Because we follow these guidelines, sometimes recommendations change over time as research supports it. (For example, mammograms used to be recommended annually. Even though Medicare will still pay for an annual mammogram, the newer guidelines recommend a mammogram every two years for women of average risk.)  Of course, you and your doctor may decide to screen more often for some diseases, based on your risk and your health status. Preventive services for you include:  - Medicare offers their members a free annual wellness visit, which is time for you and your primary care provider to discuss and plan for your preventive service needs. Take advantage of this benefit every year!  -All adults over the age of 72 should receive the recommended pneumonia vaccines. Current USPSTF guidelines recommend a series of two vaccines for the best pneumonia protection.   -All adults should have a flu vaccine yearly and a tetanus vaccine every 10 years. All adults age 61 and older should receive a shingles vaccine once in their lifetime.    -A bone mass density test is recommended when a woman turns 65 to screen for osteoporosis. This test is only recommended one time, as a screening.  Some providers will use this same test as a disease monitoring tool if you already have osteoporosis. -All adults age 38-68 who are overweight should have a diabetes screening test once every three years.   -Other screening tests and preventive services for persons with diabetes include: an eye exam to screen for diabetic retinopathy, a kidney function test, a foot exam, and stricter control over your cholesterol.   -Cardiovascular screening for adults with routine risk involves an electrocardiogram (ECG) at intervals determined by your doctor.   -Colorectal cancer screenings should be done for adults age 54-65 with no increased risk factors for colorectal cancer. There are a number of acceptable methods of screening for this type of cancer. Each test has its own benefits and drawbacks. Discuss with your doctor what is most appropriate for you during your annual wellness visit. The different tests include: colonoscopy (considered the best screening method), a fecal occult blood test, a fecal DNA test, and sigmoidoscopy. -Breast cancer screenings are recommended every other year for women of normal risk, age 54-69.  -Cervical cancer screenings for women over age 72 are only recommended with certain risk factors.   -All adults born between [facility] should be screened once for Hepatitis C.      Here is a list of your current Health Maintenance items (your personalized list of preventive services) with a due date:  Health Maintenance Due   Topic Date Due    DTaP/Tdap/Td  (1 - Tdap) 04/26/1988    Pneumococcal Vaccine (2 of 3 - PCV13) 02/01/2014    Shingles Vaccine (1 of 2) 04/26/2017    Eye Exam  11/22/2018    Annual Well Visit  12/13/2018

## 2019-03-07 NOTE — PROGRESS NOTES
This is the Subsequent Medicare Annual Wellness Exam, performed 12 months or more after the Initial AWV or the last Subsequent AWV    I have reviewed the patient's medical history in detail and updated the computerized patient record. History     Past Medical History:   Diagnosis Date    Anemia     Arthritis     Asthma 4/8/2014    Rx for same    Beta blocker therapy     Chronic kidney disease     ESRD - dialysis MWF    Diabetes ([facility] 26yo    Rx to control    Dialysis patient ([facility]     M-W-F    GERD (gastroesophageal reflux disease)     Headache(784.0)     Hepatitis B infection     Hypertension     Rx for same    Pre-kidney transplant, listed 4/30/2015      Past Surgical History:   Procedure Laterality Date    COLONOSCOPY N/A 11/1/2016    COLONOSCOPY performed by Merlene Conde MD at [address]    laparoscopic    HX COLONOSCOPY      HX ENDOSCOPY      HX MOHS PROCEDURES Left     HX OTHER SURGICAL  7/30/15    excision of left Axilla Hidradenitis    HX RENAL BIOPSY  2011    HX RENAL TRANSPLANT  04/05/2017    HX UROLOGICAL  2012    KIDNEY BX RIGHT - pt states she has only had one kidney bx as of 10/31/2016    HX UROLOGICAL  2017    transplant    HX VASCULAR ACCESS  1/2013    CHEST CATHETER FOR DIALYSIS    HX VASCULAR ACCESS Right     A-V fistula    HX WISDOM TEETH EXTRACTION       Current Outpatient Medications   Medication Sig Dispense Refill    acetaminophen-codeine (TYLENOL #3) 300-30 mg per tablet Take 1 Tab by mouth nightly as needed for Pain for up to 30 days. Max Daily Amount: 1 Tab. 30 Tab 0    Blood-Glucose Meter, Drum-type (ACCU-CHEK COMPACT PLUS CARE) kit Test blood sugar 4 times daily 1 Kit 0    Blood Sugar Diagnostic, Drum (ACCU-CHEK COMPACT PLUS TEST) strp Test blood sugar 4 times daily 100 Strip 3    PARoxetine mesylate,menop.sym, (BRISDELLE) 7.5 mg cap Take 7.5 Caps by mouth daily.  30 Cap 0    lancets (ONE TOUCH DELICA) 33 gauge misc 4 times daily. Diagnosis code: E11.8 125 Lancet 11    metFORMIN ER (GLUCOPHAGE XR) 500 mg tablet Take 1 Tab by mouth two (2) times daily (after meals). 60 Tab 10    ELIQUIS 5 mg tablet TAKE 1 TABLET BY MOUTH TWICE DAILY  11    Liraglutide (VICTOZA) 0.6 mg/0.1 mL (18 mg/3 mL) pnij 1.2 mg by SubCUTAneous route daily. 2 Pen 10    albuterol (PROVENTIL HFA, VENTOLIN HFA, PROAIR HFA) 90 mcg/actuation inhaler Take 2 Puffs by inhalation every four (4) hours as needed for Wheezing. 1 Inhaler 3    amLODIPine (NORVASC) 5 mg tablet Take 1 Tab by mouth daily. 90 Tab 3    fenofibrate nanocrystallized (TRICOR) 145 mg tablet Take 1 Tab by mouth daily. 30 Tab 11    labetalol (NORMODYNE) 200 mg tablet Take 2 Tabs by mouth two (2) times a day. (Patient taking differently: Take 200 mg by mouth two (2) times a day.) 360 Tab 1    NOVOLOG FLEXPEN U-100 INSULIN 100 unit/mL inpn 0-10 Units by SubCUTAneous route three (3) times daily as needed. 15 mL 10    predniSONE (DELTASONE) 5 mg tablet Take 1 Tab by mouth daily. 11    atorvastatin (LIPITOR) 40 mg tablet Take 1 Tab by mouth daily. Indications: atherosclerotic cardiovascular disease 90 Tab 3    ONETOUCH ULTRA BLUE TEST STRIP strip TEST FOUR TIMES DAILY 125 Strip 10    SUZY PEN NEEDLE 32 gauge x 5/32\" ndle USE TO INJECT INSULIN 5 TIMES DAILY 200 Pen Needle 3    butalbital-acetaminophen-caffeine (FIORICET, ESGIC) -40 mg per tablet Take 1 Tab by mouth three (3) times daily as needed for Pain. Indications: TENSION-TYPE HEADACHE 60 Tab 1    traZODone (DESYREL) 50 mg tablet Take 1 Tab by mouth nightly. 30 Tab 3    cinacalcet (SENSIPAR) 30 mg tablet Take 30 mg by mouth daily.  aspirin delayed-release 81 mg tablet Take  by mouth daily.  Insulin Syringe-Needle U-100 0.5 mL 31 gauge x 5/16 syrg 1 Each by SubCUTAneous route daily. 100 Syringe 3    tacrolimus (PROGRAF) 1 mg capsule Take 2 mg by mouth two (2) times a day.  Indications: taking 1 mg in am and 2 mg at QHS      pantoprazole (PROTONIX) 40 mg tablet TAKE ONE TABLET BY MOUTH ONCE DAILY 30 Tab 5    insulin detemir U-100 (LEVEMIR FLEXTOUCH) 100 unit/mL (3 mL) inpn Use up to 100 units daily as directed (Patient taking differently: 40 Units by SubCUTAneous route Before breakfast and dinner. Use up to 100 units daily as directed) 30 mL 10    mycophenolate mofetil (CELLCEPT) 250 mg capsule Take 750 mg by mouth two (2) times a day.        Allergies   Allergen Reactions    Latex Shortness of Breath    Tramadol Itching     Family History   Problem Relation Age of Onset    Hypertension Mother     Hypertension Sister     Diabetes Maternal Grandfather     Cancer Father         UNKNOWN    Hypertension Maternal Grandmother     Diabetes Son     Asthma Son     Cancer Maternal Aunt         UNKNOWN    Anesth Problems Neg Hx      Social History     Tobacco Use    Smoking status: Never Smoker    Smokeless tobacco: Never Used   Substance Use Topics    Alcohol use: No     Alcohol/week: 0.0 oz     Patient Active Problem List   Diagnosis Code    Hypertension I10    Kidney disease N28.9    Migraines G43.909    Diabetes mellitus ([facility] E11.9    Anemia D64.9    Hyperkalemia E87.5    DM (diabetes mellitus) ([facility] E11.9    Depression F32.9    Asthma J45.909    Dyslipidemia E78.5    Mitral regurgitation I34.0    Acute deep vein thrombosis (DVT) of popliteal vein of right lower extremity ([facility]) I82.431    Renal transplant recipient Z94.0    Type 2 diabetes mellitus without complication, with long-term current use of insulin (HCC) E11.9, Z79.4       Depression Risk Factor Screening:     3 most recent PHQ Screens 1/7/2019   PHQ Not Done -   Little interest or pleasure in doing things Not at all   Feeling down, depressed, irritable, or hopeless Not at all   Total Score PHQ 2 0     Alcohol Risk Factor Screening:       Functional Ability and Level of Safety:   Hearing Loss  Hearing is good.    Activities of Daily Living  The home contains: no safety equipment. Patient does total self care    Fall Risk  No flowsheet data found. Abuse Screen  Patient is not abused    Cognitive Screening   Evaluation of Cognitive Function:  Has your family/caregiver stated any concerns about your memory: no  Normal  Some word finding issues  Patient Care Team   Patient Care Team:  Chaitanya Castillo MD as PCP - General (Internal Medicine)  Mansoor Marcelino MD as Physician (Cardiology)  Rianna Fam, BEREKET as Nurse Navigator (Cardiology)  Omar England MD as Surgeon (General Surgery)  Jaclyn Gómez MD as Consulting Provider (Endocrinology)  Caitlyn Lujan NP (Nurse Practitioner)  Krystyna Andrade MD (Obstetrics & Gynecology)    Assessment/Plan   Education and counseling provided:  Are appropriate based on today's review and evaluation  End-of-Life planning (with patient's consent)    Diagnoses and all orders for this visit:    1. Sciatica of left side  -     acetaminophen-codeine (TYLENOL #3) 300-30 mg per tablet; Take 1 Tab by mouth nightly as needed for Pain for up to 30 days. Max Daily Amount: 1 Tab. 2. Medicare annual wellness visit, subsequent    3.  Screening for depression  -     [place] Maintenance Due   Topic Date Due    DTaP/Tdap/Td series (1 - Tdap) 04/26/1988    Pneumococcal 19-64 Highest Risk (2 of 3 - PCV13) 02/01/2014    Shingrix Vaccine Age 50> (1 of 2) 04/26/2017    EYE EXAM RETINAL OR DILATED  11/22/2018    MEDICARE YEARLY EXAM  12/13/2018     Seen vincent   With left sciatics some better with pain at night and early morning  Doing PT 2 times still per week, home exercises 2 times a day and happy that she is improved    Vitals:    03/07/19 1327   BP: 116/52   Pulse: 78   Resp: 18   Temp: 97 °F (36.1 °C)   TempSrc: Oral   SpO2: 99%   Weight: 174 lb 9.6 oz (79.2 kg)   Height: 5' 3\" (1.6 m)     Tender  sacroliliac area  Good gait    Pain bad at night 3/10 daytime  Diagnoses and all orders for this visit:    1. Sciatica of left side  -     acetaminophen-codeine (TYLENOL #3) 300-30 mg per tablet; Take 1 Tab by mouth nightly as needed for Pain for up to 30 days. Max Daily Amount: 1 Tab. 2. Medicare annual wellness visit, subsequent    3. Screening for depression  -     DEPRESSION SCREEN ANNUAL      One refill tylenol codeine    [place] monitoring program reviewed and no discrepancies are noted, this is reviewed with patient      Vitals:    03/07/19 1327   BP: 116/52   Pulse: 78   Resp: 18   Temp: 97 °F (36.1 °C)   TempSrc: Oral   SpO2: 99%   Weight: 174 lb 9.6 oz (79.2 kg)   Height: 5' 3\" (1.6 m)     S1 and S2 normal, no murmurs, clicks, gallops or rubs. Regular rate and rhythm. Chest is clear; no wheezes or rales. No edema or JVD. Left buttock tender  slrt on left mildly positive      Diagnoses and all orders for this visit:    1. Sciatica of left side  -     acetaminophen-codeine (TYLENOL #3) 300-30 mg per tablet; Take 1 Tab by mouth nightly as needed for Pain for up to 30 days. Max Daily Amount: 1 Tab. 2. Medicare annual wellness visit, subsequent    3.  Screening for depression  -     DEPRESSION SCREEN ANNUAL      Use codeine at hs for  A few weeks    [place] prescription monitoring program reviewed and no discrepancies are noted, this is reviewed with patient    Only short term meds

## 2019-03-07 NOTE — PROGRESS NOTES
1. Have you been to the ER, urgent care clinic since your last visit? Hospitalized since your last visit? No    2. Have you seen or consulted any other health care providers outside of the [address] [place] [address] since your last visit? Include any pap smears or colon screening.  Yes, wali

## 2019-03-12 ENCOUNTER — APPOINTMENT (OUTPATIENT)
Dept: NUTRITION | Age: 54
End: 2019-03-12

## 2019-03-15 ENCOUNTER — PATIENT OUTREACH (OUTPATIENT)
Dept: ENDOCRINOLOGY | Age: 54
End: 2019-03-15

## 2019-03-15 NOTE — PROGRESS NOTES
3/15/19  Nurse navigator (NN) spoke briefly to patient today, verified patient's name, address and . Patient states she has been dealing with her son who has been sick for the past few weeks and she has not been answering or returning NN telephone call. Patient states she is not in a place for working on her diabetes management at this time and would like NN to call her in about 2 weeks to see where she is at. Patient states she did receive the diabetes education materials in the mail. Goals Addressed                 This Visit's Progress     Follows diet recommendations and achieves/maintains goal weight   No change     19  Patient instructed to limit carbohydrate intake to 30 grams per meal for diabetes and weight control. Patient will review education materials on carbohydrate counting with NN at next contact. Patient will learn what is a carbohydrate, its worth in grams and plan her meals accordingly in the next month.   John Felix

## 2019-03-21 ENCOUNTER — OFFICE VISIT (OUTPATIENT)
Dept: OBGYN CLINIC | Age: 54
End: 2019-03-21

## 2019-03-21 ENCOUNTER — OFFICE VISIT (OUTPATIENT)
Dept: INTERNAL MEDICINE CLINIC | Age: 54
End: 2019-03-21

## 2019-03-21 VITALS
BODY MASS INDEX: 31.18 KG/M2 | HEART RATE: 86 BPM | WEIGHT: 176 LBS | SYSTOLIC BLOOD PRESSURE: 141 MMHG | TEMPERATURE: 97.6 F | RESPIRATION RATE: 20 BRPM | OXYGEN SATURATION: 97 % | HEIGHT: 63 IN | DIASTOLIC BLOOD PRESSURE: 70 MMHG

## 2019-03-21 VITALS
SYSTOLIC BLOOD PRESSURE: 144 MMHG | BODY MASS INDEX: 31.08 KG/M2 | DIASTOLIC BLOOD PRESSURE: 82 MMHG | HEIGHT: 63 IN | WEIGHT: 175.4 LBS

## 2019-03-21 DIAGNOSIS — Z01.419 ENCOUNTER FOR GYNECOLOGICAL EXAMINATION WITHOUT ABNORMAL FINDING: Primary | ICD-10-CM

## 2019-03-21 DIAGNOSIS — H10.9 CONJUNCTIVITIS OF BOTH EYES, UNSPECIFIED CONJUNCTIVITIS TYPE: Primary | ICD-10-CM

## 2019-03-21 DIAGNOSIS — N95.1 VASOMOTOR SYMPTOMS DUE TO MENOPAUSE: ICD-10-CM

## 2019-03-21 DIAGNOSIS — J01.00 ACUTE MAXILLARY SINUSITIS, RECURRENCE NOT SPECIFIED: ICD-10-CM

## 2019-03-21 DIAGNOSIS — B00.9 HSV-2 INFECTION: ICD-10-CM

## 2019-03-21 RX ORDER — AMOXICILLIN AND CLAVULANATE POTASSIUM 875; 125 MG/1; MG/1
1 TABLET, FILM COATED ORAL EVERY 12 HOURS
Qty: 20 TAB | Refills: 0 | Status: SHIPPED | OUTPATIENT
Start: 2019-03-21 | End: 2019-04-22

## 2019-03-21 RX ORDER — PAROXETINE 7.5 MG/1
7.5 CAPSULE ORAL DAILY
Qty: 90 CAP | Refills: 4 | Status: SHIPPED | OUTPATIENT
Start: 2019-03-21 | End: 2020-04-27

## 2019-03-21 RX ORDER — CIPROFLOXACIN HYDROCHLORIDE 3.5 MG/ML
SOLUTION/ DROPS TOPICAL
Qty: 5 ML | Refills: 0 | Status: SHIPPED | OUTPATIENT
Start: 2019-03-21 | End: 2019-04-22

## 2019-03-21 RX ORDER — VALACYCLOVIR HYDROCHLORIDE 500 MG/1
500 TABLET, FILM COATED ORAL 2 TIMES DAILY
Qty: 10 TAB | Refills: 4 | Status: SHIPPED | OUTPATIENT
Start: 2019-03-21 | End: 2019-03-26

## 2019-03-21 NOTE — PROGRESS NOTES
Annual exam    Major Baugh is a 48 y.o.  A0 postmenopausal female s/p renal transplant in 2017, now presenting for annual exam. Requests refill of Brisdelle (for VMS) and Valtrex for HSV (rare outbreaks). Pt reports new onset sciatica pain for which she is going to physical therapy, otherwise doing well. Ob/Gyn Hx:    Menopause- age ~38 (), no PMB  ? VMS- yes, improved on brisdelle  ? Vag dryness-denies  ? HRT-denies  STI- denies  ? SA-denies     Health maintenance:  Pap- NILM HPV neg --> repeat   Mammo-18 B1  Colonoscopy- , normal   Dexa-denies      Past Medical History:   Diagnosis Date    Anemia     Arthritis     Asthma 2014    Rx for same    Beta blocker therapy     Chronic kidney disease     ESRD - dialysis MWF    Diabetes ([facility] 26yo    Rx to control    Dialysis patient ([facility]     M-W-F    GERD (gastroesophageal reflux disease)     Headache(784.0)     Hepatitis B infection     Hypertension     Rx for same    Pre-kidney transplant, listed 2015       Past Surgical History:   Procedure Laterality Date    COLONOSCOPY N/A 2016    COLONOSCOPY performed by Juan Carlos Boateng MD at [address] [place]  [address]    laparoscopic    HX COLONOSCOPY      HX ENDOSCOPY      HX MOHS PROCEDURES Left     HX OTHER SURGICAL  7/30/15    excision of left Axilla Hidradenitis    HX RENAL BIOPSY      HX RENAL TRANSPLANT  2017    HX UROLOGICAL      KIDNEY BX RIGHT - pt states she has only had one kidney bx as of 10/31/2016    HX UROLOGICAL  2017    transplant    HX VASCULAR ACCESS  2013    CHEST CATHETER FOR DIALYSIS    HX VASCULAR ACCESS Right     A-V fistula    HX WISDOM TEETH EXTRACTION         Family History   Problem Relation Age of Onset    Hypertension Mother     Hypertension Sister     Diabetes Maternal Grandfather     Cancer Father         UNKNOWN    Hypertension Maternal Grandmother     Diabetes Son     Asthma Son     Cancer Maternal Aunt         UNKNOWN    Anesth Problems Neg Hx        Social History     Socioeconomic History    Marital status: SINGLE     Spouse name: Not on file    Number of children: Not on file    Years of education: Not on file    Highest education level: Not on file   Occupational History    Not on file   Social Needs    Financial resource strain: Not on file    Food insecurity:     Worry: Not on file     Inability: Not on file    Transportation needs:     Medical: Not on file     Non-medical: Not on file   Tobacco Use    Smoking status: Never Smoker    Smokeless tobacco: Never Used   Substance and Sexual Activity    Alcohol use: No     Alcohol/week: 0.0 oz    Drug use: No    Sexual activity: Not Currently     Birth control/protection: None   Lifestyle    Physical activity:     Days per week: Not on file     Minutes per session: Not on file    Stress: Not on file   Relationships    Social connections:     Talks on phone: Not on file     Gets together: Not on file     Attends [employer] service: Not on file     Active member of club or organization: Not on file     Attends meetings of clubs or organizations: Not on file     Relationship status: Not on file    Intimate partner violence:     Fear of current or ex partner: Not on file     Emotionally abused: Not on file     Physically abused: Not on file     Forced sexual activity: Not on file   Other Topics Concern    Not on file   Social History Narrative    Not on file       Current Outpatient Medications   Medication Sig Dispense Refill    acetaminophen-codeine (TYLENOL #3) 300-30 mg per tablet Take 1 Tab by mouth nightly as needed for Pain for up to 30 days. Max Daily Amount: 1 Tab.  30 Tab 0    Blood-Glucose Meter, Drum-type (ACCU-CHEK COMPACT PLUS CARE) kit Test blood sugar 4 times daily 1 Kit 0    Blood Sugar Diagnostic, Drum (ACCU-CHEK COMPACT PLUS TEST) strp Test blood sugar 4 times daily 100 Strip 3    insulin detemir U-100 (LEVEMIR FLEXTOUCH) 100 unit/mL (3 mL) inpn Use up to 100 units daily as directed (Patient taking differently: 40 Units by SubCUTAneous route Before breakfast and dinner. Use up to 100 units daily as directed) 30 mL 10    lancets (ONE TOUCH DELICA) 33 gauge misc 4 times daily. Diagnosis code: E11.8 125 Lancet 11    metFORMIN ER (GLUCOPHAGE XR) 500 mg tablet Take 1 Tab by mouth two (2) times daily (after meals). 60 Tab 10    ELIQUIS 5 mg tablet TAKE 1 TABLET BY MOUTH TWICE DAILY  11    Liraglutide (VICTOZA) 0.6 mg/0.1 mL (18 mg/3 mL) pnij 1.2 mg by SubCUTAneous route daily. 2 Pen 10    albuterol (PROVENTIL HFA, VENTOLIN HFA, PROAIR HFA) 90 mcg/actuation inhaler Take 2 Puffs by inhalation every four (4) hours as needed for Wheezing. 1 Inhaler 3    amLODIPine (NORVASC) 5 mg tablet Take 1 Tab by mouth daily. 90 Tab 3    fenofibrate nanocrystallized (TRICOR) 145 mg tablet Take 1 Tab by mouth daily. 30 Tab 11    labetalol (NORMODYNE) 200 mg tablet Take 2 Tabs by mouth two (2) times a day. (Patient taking differently: Take 200 mg by mouth two (2) times a day.) 360 Tab 1    NOVOLOG FLEXPEN U-100 INSULIN 100 unit/mL inpn 0-10 Units by SubCUTAneous route three (3) times daily as needed. 15 mL 10    predniSONE (DELTASONE) 5 mg tablet Take 1 Tab by mouth daily. 11    atorvastatin (LIPITOR) 40 mg tablet Take 1 Tab by mouth daily. Indications: atherosclerotic cardiovascular disease 90 Tab 3    ONETOUCH ULTRA BLUE TEST STRIP strip TEST FOUR TIMES DAILY 125 Strip 10    SUZY PEN NEEDLE 32 gauge x 5/32\" ndle USE TO INJECT INSULIN 5 TIMES DAILY 200 Pen Needle 3    butalbital-acetaminophen-caffeine (FIORICET, ESGIC) -40 mg per tablet Take 1 Tab by mouth three (3) times daily as needed for Pain. Indications: TENSION-TYPE HEADACHE 60 Tab 1    traZODone (DESYREL) 50 mg tablet Take 1 Tab by mouth nightly. 30 Tab 3    cinacalcet (SENSIPAR) 30 mg tablet Take 30 mg by mouth daily.       aspirin delayed-release 81 mg tablet Take  by mouth daily.  mycophenolate mofetil (CELLCEPT) 250 mg capsule Take 750 mg by mouth two (2) times a day.  Insulin Syringe-Needle U-100 0.5 mL 31 gauge x 5/16 syrg 1 Each by SubCUTAneous route daily. 100 Syringe 3    tacrolimus (PROGRAF) 1 mg capsule Take 2 mg by mouth two (2) times a day. Indications: taking 1 mg in am and 2 mg at QHS      pantoprazole (PROTONIX) 40 mg tablet TAKE ONE TABLET BY MOUTH ONCE DAILY 30 Tab 5    PARoxetine mesylate,menop.sym, (BRISDELLE) 7.5 mg cap Take 7.5 Caps by mouth daily.  30 Cap 0       Allergies   Allergen Reactions    Latex Shortness of Breath    Tramadol Itching       Review of Systems - History obtained from the patient  Constitutional: negative for weight loss, fever, night sweats  HEENT: negative for hearing loss, earache, congestion, snoring, sorethroat  CV: negative for chest pain, palpitations, edema  Resp: negative for cough, shortness of breath, wheezing  GI: negative for change in bowel habits, abdominal pain, black or bloody stools  : negative for frequency, dysuria, hematuria, vaginal discharge  MSK: negative for joint pain, muscle pain, +sciatica/back pain  Breast: negative for breast lumps, nipple discharge, galactorrhea  Skin :negative for itching, rash, hives  Neuro: negative for dizziness, headache, confusion, weakness  Psych: negative for anxiety, depression, change in mood  Heme/lymph: negative for bleeding, bruising, pallor    Physical Exam    Visit Vitals  /82 (BP 1 Location: Left arm, BP Patient Position: Sitting)   Ht 5' 3\" (1.6 m)   Wt 175 lb 6.4 oz (79.6 kg)   BMI 31.07 kg/m²       Constitutional  · Appearance: well-nourished, well developed, alert, in no acute distress    HENT  · Head and Face: appears normal    Neck  · Inspection/Palpation: normal appearance, no masses or tenderness  · Lymph Nodes: no lymphadenopathy present  · Thyroid: gland size normal, nontender, no nodules or masses present on palpation    Chest  · Respiratory Effort: non-labored breathing  · Auscultation: CTAB, normal breath sounds    Cardiovascular  · Heart:  · Auscultation: regular rate and rhythm without murmur  · Extremities: no peripheral edema    Breasts  · Inspection of Breasts: breasts symmetrical, no skin changes, no discharge present, nipple appearance normal, no skin retraction present  · Palpation of Breasts and Axillae: no masses present on palpation, no breast tenderness  · Axillary Lymph Nodes: no lymphadenopathy present    Gastrointestinal  · Abdominal Examination: abdomen non-tender to palpation, normal bowel sounds, no masses present  · Liver and spleen: no hepatomegaly present, spleen not palpable  · Hernias: no hernias identified    Genitourinary  · External Genitalia: normal appearance for age, no discharge present, no tenderness present, no inflammatory lesions present, no masses present, +atrophy of UG mucosa  · Vagina: normal vaginal vault without central or paravaginal defects, no discharge present, no inflammatory lesions present, no masses present  · Bladder: non-tender to palpation  · Urethra: appears normal  · Cervix: normal   · Uterus: normal size, shape and consistency, small, mobile  · Adnexa: no adnexal tenderness present, no adnexal masses present  · Perineum: perineum within normal limits, no evidence of trauma, no rashes or skin lesions present    Skin  · General Inspection: no rash, no lesions identified    Neurologic/Psychiatric  · Mental Status:  · Orientation: grossly oriented to person, place and time  · Mood and Affect: mood normal, affect appropriate      Assessment/Plan:  48 y.o. postmenopausal female presenting for annual exam. Overall doing well.      Health Maintenance:  -counseled re: diet, exercise, healthy lifestyle  -pap/HPV next due 2022  -declines STI testing  -refer for mammo  -colonoscopy utd  -dexa per PCP - consideration of early dexa due to renal transplant  -refill Rx for Brisdelle for VMS  -refill Rx for Valtrex for HSV2    RTC: 1 year for AE or sooner prn    Franc Rodriguez MD  3/21/2019  8:50 AM

## 2019-03-21 NOTE — PATIENT INSTRUCTIONS

## 2019-03-21 NOTE — PROGRESS NOTES
Symptoms: headache, head congestion, nasal/yellow, both eyes red, achy, itchy, burning, cough/non productive, Robitussin. Patient is c/o sciatic pain, but in therapy and left arm achy and stiff.

## 2019-03-21 NOTE — PROGRESS NOTES
Subjective:   Jessica Hale is a 48 y.o. female who complains of headache, left sinus pain, itching in eyes and chills for 3 days, gradually worsening since that time. She denies a history of fevers, shortness of breath and wheezing. She states she started with cold symptoms about 3 days ago. Last night she started with drainage of her eyes and redness. She reports this am they were stuck together and worse. She has not used anything different such as make up. She does not recall being around anyone with pink eye but she has had sinus symptoms. Evaluation to date: none. Treatment to date: OTC products. Patient does not smoke cigarettes. Relevant PMH: No pertinent additional PMH. Patient Active Problem List    Diagnosis Date Noted    Type 2 diabetes mellitus without complication, with long-term current use of insulin ([facility] 01/21/2019    Renal transplant recipient 12/12/2017    Acute deep vein thrombosis (DVT) of popliteal vein of right lower extremity ([facility] 08/04/2017    Dyslipidemia 02/19/2015    Mitral regurgitation 02/19/2015    Asthma 04/08/2014    Depression 08/26/2013    Anemia 11/19/2012    Hyperkalemia 11/19/2012    DM (diabetes mellitus) ([facility] 11/19/2012    Diabetes mellitus ([facility] 07/27/2012    Hypertension 07/17/2012    Kidney disease 07/17/2012    Migraines 07/17/2012     Allergies   Allergen Reactions    Latex Shortness of Breath    Tramadol Itching        Review of Systems  Pertinent items are noted in HPI. Objective:     Visit Vitals  /70   Pulse 86   Temp 97.6 °F (36.4 °C) (Oral)   Resp 20   Ht 5' 3\" (1.6 m)   Wt 176 lb (79.8 kg)   SpO2 97%   BMI 31.18 kg/m²     General:  alert, cooperative, no distress   Eyes: positive findings: conjunctivae: 1+ for erythema with some drainage noted   Ears: normal TM's and external ear canals AU   Sinuses: tenderness over left maxillary   Mouth:  abnormal findings: mild oropharyngeal erythema   Neck: no adenopathy.    Heart: normal rate and regular rhythm, no murmurs noted. Lungs: clear to auscultation bilaterally   Abdomen: Not examined        Assessment/Plan:   sinusitis  Antibiotics per orders. RTC prn. Diagnoses and all orders for this visit:    1. Conjunctivitis of both eyes, unspecified conjunctivitis type  -     ciprofloxacin HCl (CILOXAN) 0.3 % ophthalmic solution; 1-2 drops to each eye every 2 hours for 2 days, then every 4 hours for 5 days    2. Acute maxillary sinusitis, recurrence not specified  -     amoxicillin-clavulanate (AUGMENTIN) 875-125 mg per tablet; Take 1 Tab by mouth every twelve (12) hours. .patient to take the medication as prescribed. If her eyes are not feeling better by tomorrow morning, she has been instructed to call the office in the am so I can try to get her an appointment with the eye doctor. She may use warm compress to help sooth the eyes. All this was discussed with the patient and she understands and agrees.

## 2019-03-29 ENCOUNTER — HOSPITAL ENCOUNTER (OUTPATIENT)
Dept: NUTRITION | Age: 54
Discharge: HOME | End: 2019-03-29
Payer: MEDICARE

## 2019-03-29 PROCEDURE — 97802 MEDICAL NUTRITION INDIV IN: CPT | Performed by: DIETITIAN, REGISTERED

## 2019-03-29 NOTE — PROGRESS NOTES
[address], [facility], [address] [place] [address]  Phone: [phone] Fax: [phone]     Nutrition Assessment - Medical Nutrition Therapy   Outpatient Initial Evaluation         Patient Name: Leno Lemus : 1965   Treatment Diagnosis: Diabetes   Referral Source: Becca Roldan MD Start of Care [facility]): 3/29/2019     Gender: female Age: 48 y.o. Ht: 63 in Wt:  173.8 lb  kg   BMI: 30.8 RMR   Male  RMR Female 1342   Anthropometrics Assessment: Per BMI, pt is considered obese. Moderate abdominal adiposity is evident based on visual observation. Past Medical History includes: High Cholesterol, HTN, Kidney Transplant      Pertinent Medications:     Current Outpatient Medications:     PARoxetine mesylate,menop.sym, (BRISDELLE) 7.5 mg cap, Take 7.5 Caps by mouth daily. , Disp: 90 Cap, Rfl: 4    amoxicillin-clavulanate (AUGMENTIN) 875-125 mg per tablet, Take 1 Tab by mouth every twelve (12) hours. , Disp: 20 Tab, Rfl: 0    ciprofloxacin HCl (CILOXAN) 0.3 % ophthalmic solution, 1-2 drops to each eye every 2 hours for 2 days, then every 4 hours for 5 days, Disp: 5 mL, Rfl: 0    acetaminophen-codeine (TYLENOL #3) 300-30 mg per tablet, Take 1 Tab by mouth nightly as needed for Pain for up to 30 days. Max Daily Amount: 1 Tab., Disp: 30 Tab, Rfl: 0    Blood-Glucose Meter, Drum-type (ACCU-CHEK COMPACT PLUS CARE) kit, Test blood sugar 4 times daily, Disp: 1 Kit, Rfl: 0    Blood Sugar Diagnostic, Drum (ACCU-CHEK COMPACT PLUS TEST) strp, Test blood sugar 4 times daily, Disp: 100 Strip, Rfl: 3    insulin detemir U-100 (LEVEMIR FLEXTOUCH) 100 unit/mL (3 mL) inpn, Use up to 100 units daily as directed (Patient taking differently: 40 Units by SubCUTAneous route Before breakfast and dinner.  Use up to 100 units daily as directed), Disp: 30 mL, Rfl: 10    lancets (Wendy Tian 42) 33 gauge misc, 4 times daily. Diagnosis code: E11.8, Disp: 125 Lancet, Rfl: 11    metFORMIN ER (GLUCOPHAGE XR) 500 mg tablet, Take 1 Tab by mouth two (2) times daily (after meals). , Disp: 60 Tab, Rfl: 10    ELIQUIS 5 mg tablet, TAKE 1 TABLET BY MOUTH TWICE DAILY, Disp: , Rfl: 11    Liraglutide (VICTOZA) 0.6 mg/0.1 mL (18 mg/3 mL) pnij, 1.2 mg by SubCUTAneous route daily. , Disp: 2 Pen, Rfl: 10    albuterol (PROVENTIL HFA, VENTOLIN HFA, PROAIR HFA) 90 mcg/actuation inhaler, Take 2 Puffs by inhalation every four (4) hours as needed for Wheezing., Disp: 1 Inhaler, Rfl: 3    amLODIPine (NORVASC) 5 mg tablet, Take 1 Tab by mouth daily. , Disp: 90 Tab, Rfl: 3    fenofibrate nanocrystallized (TRICOR) 145 mg tablet, Take 1 Tab by mouth daily. , Disp: 30 Tab, Rfl: 11    labetalol (NORMODYNE) 200 mg tablet, Take 2 Tabs by mouth two (2) times a day. (Patient taking differently: Take 200 mg by mouth two (2) times a day.), Disp: 360 Tab, Rfl: 1    NOVOLOG FLEXPEN U-100 INSULIN 100 unit/mL inpn, 0-10 Units by SubCUTAneous route three (3) times daily as needed. , Disp: 15 mL, Rfl: 10    predniSONE (DELTASONE) 5 mg tablet, Take 1 Tab by mouth daily. , Disp: , Rfl: 11    atorvastatin (LIPITOR) 40 mg tablet, Take 1 Tab by mouth daily. Indications: atherosclerotic cardiovascular disease, Disp: 90 Tab, Rfl: 3    ONETOUCH ULTRA BLUE TEST STRIP strip, TEST FOUR TIMES DAILY, Disp: 125 Strip, Rfl: 10    SUZY PEN NEEDLE 32 gauge x 5/32\" ndle, USE TO INJECT INSULIN 5 TIMES DAILY, Disp: 200 Pen Needle, Rfl: 3    butalbital-acetaminophen-caffeine (FIORICET, ESGIC) -40 mg per tablet, Take 1 Tab by mouth three (3) times daily as needed for Pain. Indications: TENSION-TYPE HEADACHE, Disp: 60 Tab, Rfl: 1    traZODone (DESYREL) 50 mg tablet, Take 1 Tab by mouth nightly., Disp: 30 Tab, Rfl: 3    cinacalcet (SENSIPAR) 30 mg tablet, Take 30 mg by mouth daily. , Disp: , Rfl:     aspirin delayed-release 81 mg tablet, Take  by mouth daily. , Disp: , Rfl:     mycophenolate mofetil (CELLCEPT) 250 mg capsule, Take 750 mg by mouth two (2) times a day., Disp: , Rfl:     Insulin Syringe-Needle U-100 0.5 mL 31 gauge x 5/16 syrg, 1 Each by SubCUTAneous route daily. , Disp: 100 Syringe, Rfl: 3    tacrolimus (PROGRAF) 1 mg capsule, Take 2 mg by mouth two (2) times a day. Indications: taking 1 mg in am and 2 mg at QHS, Disp: , Rfl:     pantoprazole (PROTONIX) 40 mg tablet, TAKE ONE TABLET BY MOUTH ONCE DAILY, Disp: 30 Tab, Rfl: 5     Biochemical Data:   Lab Results   Component Value Date/Time    Hemoglobin A1c 6.1 12/28/2015 08:01 PM    Hemoglobin A1c, External 10.4 10/24/2018     Lab Results   Component Value Date/Time    Cholesterol, total 177 12/06/2013 11:05 AM    HDL Cholesterol 47 12/06/2013 11:05 AM    LDL, calculated 107 (H) 12/06/2013 11:05 AM    VLDL, calculated 23 12/06/2013 11:05 AM    Triglyceride 117 12/06/2013 11:05 AM          Nutrition Diagnosis Inappropriate carbohydrate intake R/T nutrition and food related knowledge deficit  Related to diabetes AEB A1C of >10% and pt account     Subjective/Assessment: Pt is a 56yo female here today for help with controlling her blood sugars. She has had diabetes since pregnancy in 2012. She had a kidney transplant in 2017. She states she had education around carbohydrates and foods that affect her blood sugars from the Dietitian at her Dialysis clinic but was not able to process or utilize the information at that time. She has made changes on her own to improve eating habits overall including baking foods instead of fryin them, increasing vegetable intake, eating smaller portions, using measuring cups, and reducing intake of red meat. She knows she should start an exercise routine and likes to walk when it is nice outside. She would like to loose weight as well for improved health. Set weight goal today at 10% over 6 months. Recently she has graduated from Physical Therapy.  She has been instructed to do these exercises daily. Set additional exercise walking goal today. Positive for food insecurity. Current Eating Patterns: B- hardy's ham egg cheese on [place] toast with diet coke. Does not always eat breakfast  Diet soda 2 times per week  S- 2 packs nabbs crackers. L/D (4pm) - grilled chicken sandwich from [facility] with ranch dressing   OR if cooks at home: 2 hot dogs, 2 slices white bread, mustard, broccoli/green beans (vegetable at meal 5 times per week)  OR anibal pasta 2 cups  S- ice cream 1 cup (packed)  OR pack of pb crackers or small bag chips  2x/wk eats out. [employer] sandwich with fries OR steak, potatoes 1 cup, source ream, butter, salt pepper    Eats past full : 1 night per week (typically meal at home)  Eats when stressed: son as main source. Trying now to go to her room instead. Estimate Needs   Calories: 1400 Protein: 70 Carbs: 175 Fat: 47   Kcal/day  g/day  g/day  g/day        percent: 20  50                 Education & Recommendations provided: Educated pt on the pathophysiology of Type II Diabetes, insulin resistance and the rationale for dietary modifications and increased activity. Educated pt on carbohydrate food sources, counting carbohydrates, label reading, meal timing, and appropriate serving sizes. Developed a rough meal plan with patient with meal and snack ideas. Provided resources for food insecurity including SNAP and [employer]!.    Handouts Provided: [x]  Carbohydrates  [x]  Protein  []  Fiber  []  Serving Sizes  [x]  Meal and Snack Ideas  []  Food Journals [x]  Diabetes  []  Cholesterol  []  Sodium  [x]  Gen Nutr Guidelines  []  SBGM Guidelines  [x]  Others: Always Cheap Grocery List   Information Reviewed with: pt   Readiness to Change Stage: []  Pre-contemplative    []  Contemplative  [x]  Preparation               []  Action                  []  Maintenance   Potential Barriers to Learning: []  Decline in memory    []  Language barrier   [x]  Other: food insecurity  [x]  Emotional                  []  Limited mobility  []  Lack of motivation     [] Vision, hearing or cognitive impairment   Expected Compliance: Good /      Nutritional Goal - To promote lifestyle changes to result in:    [x]  Weight loss  [x]  Improved diabetic control  []  Decreased cholesterol levels  []  Decreased blood pressure  []  Weight maintenance []  Preventing any interactions associated with food allergies  []  Adequate weight gain toward goal weight  []  Other:        Patient Goals:  SMART goals - Improve consistency of CHO intake w/goal to plan meals with 30-45 gm CHO/meal and 1-2 optional snack of 15-20 gm. - Improve physical activity w/goal to walk for 30 minutes 3 times per week. -continue PT exercises daily in the mornings as directed.    Total Treatment Time: 60min   Dietitian Signature: Garfield Rain MS RD Date: 3/29/2019   Follow-up: 4-6 weeks Time: 10:37 AM

## 2019-04-08 ENCOUNTER — PATIENT OUTREACH (OUTPATIENT)
Dept: ENDOCRINOLOGY | Age: 54
End: 2019-04-08

## 2019-04-08 NOTE — PROGRESS NOTES
Goals Addressed                 This Visit's Progress     Follows diet recommendations and achieves/maintains goal weight        2/12/19  Patient instructed to limit carbohydrate intake to 30 grams per meal for diabetes and weight control. Patient will review education materials on carbohydrate counting with NN at next contact. Patient will learn what is a carbohydrate, its worth in grams and plan her meals accordingly in the next month. YHW    4/8/19  Patient visited with a registered dietitian on 3/29/19. Patient was started on a 1400 calorie/day nutritional plan with an increase in protein and decrease in carbohydrates per meal.  YHW           Patient verbalizes understanding of self -management goals of living with Diabetes. 4/8/19  Patient is currently taking the following medications for diabetes management: Levemir 40 units, Metformin 500 mg 2 x daily, Victoza 1.2 mg daily and Novolog per scale. Patient will test her blood sugar 1-2 times daily and review with nurse navigator in 2 weeks.   Bob Ayala

## 2019-04-22 ENCOUNTER — OFFICE VISIT (OUTPATIENT)
Dept: ENDOCRINOLOGY | Age: 54
End: 2019-04-22

## 2019-04-22 VITALS
HEIGHT: 63 IN | SYSTOLIC BLOOD PRESSURE: 145 MMHG | HEART RATE: 79 BPM | BODY MASS INDEX: 30.79 KG/M2 | DIASTOLIC BLOOD PRESSURE: 74 MMHG | WEIGHT: 173.8 LBS

## 2019-04-22 DIAGNOSIS — I10 ESSENTIAL HYPERTENSION: ICD-10-CM

## 2019-04-22 DIAGNOSIS — Z94.0 S/P KIDNEY TRANSPLANT: ICD-10-CM

## 2019-04-22 DIAGNOSIS — E11.29 TYPE 2 DIABETES MELLITUS WITH OTHER DIABETIC KIDNEY COMPLICATION, WITH LONG-TERM CURRENT USE OF INSULIN (HCC): Primary | ICD-10-CM

## 2019-04-22 DIAGNOSIS — Z79.4 TYPE 2 DIABETES MELLITUS WITH OTHER DIABETIC KIDNEY COMPLICATION, WITH LONG-TERM CURRENT USE OF INSULIN (HCC): Primary | ICD-10-CM

## 2019-04-22 RX ORDER — INSULIN ASPART INJECTION 100 [IU]/ML
INJECTION, SOLUTION SUBCUTANEOUS
Qty: 15 ML | Refills: 10 | Status: SHIPPED | OUTPATIENT
Start: 2019-04-22 | End: 2020-04-28

## 2019-04-22 NOTE — PROGRESS NOTES
History of Present Illness: Pascual Esparza is a 48 y.o. female presents for follow-up of diabetes. She has a history of ESRD and is now s/p renal transplant on 4/11/2017  Diabetes control worsened after transplant due to increased clearance of insulin, anti-rejection medications, and increase in appetite. Weight increased by almost 30 lbs and has been stable for last 6 months or so. A1c 11.5 and has been consistently high over much of the last year. There is some concerns that persistently elevated glucose values are having a detrimental effect on renal function. Current diabetes regimen:  Levemir 40 units twice daily. Victoza 1.2 mg daily. Tolerating better  Metformin  mg twice daily. Novolog sliding scale - taking for elevated values. Glucoses: forgot meter  Checking twice daily. Fasting: now high 110s. Was 190s-220s. Before dinner: 250. Was in 300s several weeks ago. Lowest was 70 on day fasting. Diet:  Breakfast : sinha, eggs, hashbrowns. Lunch:  Fruit or sandwich/burger  Dinner - last night - baked chicken, macaroni and cheese, green beans and small roll. Beverages - still having diet soda. Seen katie Guido once  And will see again. Cut back on mashed potatoes, pork, red meat. Increased vegetables. Exercise - walking. HTN  - managed by transplant team. Labetalol and amlodipine. Social:  Has son with diabetes who is 32years of age who is following with me  Has had some stress.      Past Medical History:   Diagnosis Date    Anemia     Arthritis     Asthma 4/8/2014    Rx for same    Beta blocker therapy     Chronic kidney disease     ESRD - dialysis MWF    Diabetes ([facility] [address] 26yo    Rx to control    Dialysis patient [facility])     M-W-F    GERD (gastroesophageal reflux disease)     Headache(784.0)     Hepatitis B infection     Hypertension     Rx for same    Pre-kidney transplant, listed 4/30/2015     Current Outpatient Medications   Medication Sig  PARoxetine mesylate,menop.sym, (BRISDELLE) 7.5 mg cap Take 7.5 Caps by mouth daily.  Blood-Glucose Meter, Drum-type (ACCU-CHEK COMPACT PLUS CARE) kit Test blood sugar 4 times daily    Blood Sugar Diagnostic, Drum (ACCU-CHEK COMPACT PLUS TEST) strp Test blood sugar 4 times daily    insulin detemir U-100 (LEVEMIR FLEXTOUCH) 100 unit/mL (3 mL) inpn Use up to 100 units daily as directed (Patient taking differently: 40 Units by SubCUTAneous route Before breakfast and dinner. Use up to 100 units daily as directed)    lancets (ONE TOUCH DELICA) 33 gauge misc 4 times daily. Diagnosis code: E11.8    metFORMIN ER (GLUCOPHAGE XR) 500 mg tablet Take 1 Tab by mouth two (2) times daily (after meals).  ELIQUIS 5 mg tablet TAKE 1 TABLET BY MOUTH TWICE DAILY    Liraglutide (VICTOZA) 0.6 mg/0.1 mL (18 mg/3 mL) pnij 1.2 mg by SubCUTAneous route daily.  albuterol (PROVENTIL HFA, VENTOLIN HFA, PROAIR HFA) 90 mcg/actuation inhaler Take 2 Puffs by inhalation every four (4) hours as needed for Wheezing.  amLODIPine (NORVASC) 5 mg tablet Take 1 Tab by mouth daily.  fenofibrate nanocrystallized (TRICOR) 145 mg tablet Take 1 Tab by mouth daily.  labetalol (NORMODYNE) 200 mg tablet Take 2 Tabs by mouth two (2) times a day. (Patient taking differently: Take 200 mg by mouth two (2) times a day.)    NOVOLOG FLEXPEN U-100 INSULIN 100 unit/mL inpn 0-10 Units by SubCUTAneous route three (3) times daily as needed.  predniSONE (DELTASONE) 5 mg tablet Take 1 Tab by mouth daily.  atorvastatin (LIPITOR) 40 mg tablet Take 1 Tab by mouth daily. Indications: atherosclerotic cardiovascular disease    ONETOUCH ULTRA BLUE TEST STRIP strip TEST FOUR TIMES DAILY    SUZY PEN NEEDLE 32 gauge x 5/32\" ndle USE TO INJECT INSULIN 5 TIMES DAILY    butalbital-acetaminophen-caffeine (FIORICET, ESGIC) -40 mg per tablet Take 1 Tab by mouth three (3) times daily as needed for Pain.  Indications: TENSION-TYPE HEADACHE    traZODone (DESYREL) 50 mg tablet Take 1 Tab by mouth nightly.  cinacalcet (SENSIPAR) 30 mg tablet Take 30 mg by mouth daily.  aspirin delayed-release 81 mg tablet Take  by mouth daily.  mycophenolate mofetil (CELLCEPT) 250 mg capsule Take 750 mg by mouth two (2) times a day.  Insulin Syringe-Needle U-100 0.5 mL 31 gauge x 5/16 syrg 1 Each by SubCUTAneous route daily.  tacrolimus (PROGRAF) 1 mg capsule Take 2 mg by mouth two (2) times a day. Indications: taking 1 mg in am and 2 mg at QHS    pantoprazole (PROTONIX) 40 mg tablet TAKE ONE TABLET BY MOUTH ONCE DAILY     No current facility-administered medications for this visit. Allergies   Allergen Reactions    Latex Shortness of Breath    Tramadol Itching     Physical Examination:  Visit Vitals  /74 (BP 1 Location: Left arm, BP Patient Position: Sitting)   Pulse 79   Ht 5' 3\" (1.6 m)   Wt 173 lb 12.8 oz (78.8 kg)   BMI 30.79 kg/m²   -   - General: pleasant, no distress, normal gait   HEENT: hearing intact, EOMI, clear sclera without icterus  - Cardiovascular: regular, normal rate   - Respiratory: normal effort  - Integumentary: no edema  - Psychiatric: normal mood and affect    Data Reviewed:   Lab Results   Component Value Date/Time    Hemoglobin A1c, External 10.4 10/24/2018      Lab Results   Component Value Date/Time    Creatinine, External 1.53 12/12/2018        Lab Results   Component Value Date/Time    LDL-C, External 45 10/24/2018      No results found for: TSH, FT4, TRALT, TMCLT, TSHEXT     Assessment/Plan:   1. Type 2 diabetes mellitus with other diabetic kidney complication, with long-term current use of insulin (HCC)   - not controlled.    However, per her report, her glucoses are improved over last few weeks, although still not at goal  Suspect Levemir dose is excessive as fasting values are by far lowest and she had one value of 90  Decrease Levemir to 30 units twice daily  Add Fiasp or Novolog - rapid acting insulin - for meals and for high glucoses  - 1 unit for every 5 grams of carbohydrate with meals  -  3 units for every 50 mg/dl glucose is > 150 for high values       2. S/P kidney transplant    3. Essential hypertension - defer to transplant team + nephrology   Greater than 50% of 25 minute visit was spent counseling the patient about above. Patient Instructions   Diabetes; Watch carbohydrate intake with meals and try to keep this less than 30 grams. Continue efforts with weight loss. Victoza 1.2 mg. Continue metformin  Levemir 30 units twice daily  - follow glucose trend from bedtime to fasting to evaluate Levemir dose. Novolog/Fiasp for carbohydrate intake:  - start with 1 unit for every 5 g carb   - divide number of carbohydrates by 5 to determine dose. I would take if having > 30 g carbohydrates. 15 g carbohydrate - 3 units  30 g carbohydrate - 6 units  45 g carbohydrate - 9 units  60 g carbohydrate - 12 units  75 g carbohydrate - 15 units  90 g carbohydrate - 18 units    Novolog/Fiasp for high glucoses:  150-200: 3 units  201-250: 6 units  251-300: 9 units  301-350: 12 units  351 +     : 15 units    Goals for blood glucose:  Fasting  (less than 150)  Lunch, Dinner, Bedtime -  (less than 180). Follow-up and Dispositions    · Return in about 3 months (around 7/22/2019).

## 2019-04-22 NOTE — PATIENT INSTRUCTIONS
Diabetes; Watch carbohydrate intake with meals and try to keep this less than 30 grams. Continue efforts with weight loss. Victoza 1.2 mg. Continue metformin  Levemir 30 units twice daily  - follow glucose trend from bedtime to fasting to evaluate Levemir dose. Novolog/Fiasp for carbohydrate intake:  - start with 1 unit for every 5 g carb   - divide number of carbohydrates by 5 to determine dose. I would take if having > 30 g carbohydrates. 15 g carbohydrate - 3 units  30 g carbohydrate - 6 units  45 g carbohydrate - 9 units  60 g carbohydrate - 12 units  75 g carbohydrate - 15 units  90 g carbohydrate - 18 units    Novolog/Fiasp for high glucoses:  150-200: 3 units  201-250: 6 units  251-300: 9 units  301-350: 12 units  351 +     : 15 units    Goals for blood glucose:  Fasting  (less than 150)  Lunch, Dinner, Bedtime -  (less than 180).

## 2019-05-23 DIAGNOSIS — Z79.4 TYPE 2 DIABETES MELLITUS WITH OTHER DIABETIC KIDNEY COMPLICATION, WITH LONG-TERM CURRENT USE OF INSULIN (HCC): Primary | ICD-10-CM

## 2019-05-23 DIAGNOSIS — E11.29 TYPE 2 DIABETES MELLITUS WITH OTHER DIABETIC KIDNEY COMPLICATION, WITH LONG-TERM CURRENT USE OF INSULIN (HCC): Primary | ICD-10-CM

## 2019-05-30 ENCOUNTER — TELEPHONE (OUTPATIENT)
Dept: CARDIOLOGY CLINIC | Age: 54
End: 2019-05-30

## 2019-05-30 NOTE — TELEPHONE ENCOUNTER
Pt called asking if she can have her appointment for today rescheduled sooner than October to see Dr. Elaina Mancuso.   Phone #[phone]  Thanks

## 2019-05-30 NOTE — TELEPHONE ENCOUNTER
Returned patient's call, 2 pt identifiers used    Rescheduled patient's appt.      Future Appointments   Date Time Provider [place]   6/6/2019  8:30 AM Isael Garcia [address]   6/24/2019  2:10 PM Ileana Osorio MD RDE [address]   7/24/2019  8:00 AM DANYELLETWO, [id number] [facility]   7/24/2019  8:40 AM Nataly Harris  [address]

## 2019-06-04 RX ORDER — PEN NEEDLE, DIABETIC 31 GX3/16"
NEEDLE, DISPOSABLE MISCELLANEOUS
Qty: 200 PEN NEEDLE | Refills: 3 | Status: SHIPPED | OUTPATIENT
Start: 2019-06-04 | End: 2020-05-09

## 2019-06-13 RX ORDER — FENOFIBRATE 145 MG/1
145 TABLET, COATED ORAL DAILY
Qty: 30 TAB | Refills: 1 | Status: SHIPPED | OUTPATIENT
Start: 2019-06-13 | End: 2019-08-26 | Stop reason: SDUPTHER

## 2019-06-13 NOTE — TELEPHONE ENCOUNTER
Requested Prescriptions     Signed Prescriptions Disp Refills    fenofibrate nanocrystallized (TRICOR) 145 mg tablet 30 Tab 1     Sig: Take 1 Tab by mouth daily.      Authorizing Provider: Kee Johnson     Ordering User: Monica Higginbotham     Per verbal orders

## 2019-06-25 ENCOUNTER — OFFICE VISIT (OUTPATIENT)
Dept: INTERNAL MEDICINE CLINIC | Age: 54
End: 2019-06-25

## 2019-06-25 VITALS
RESPIRATION RATE: 20 BRPM | TEMPERATURE: 98 F | BODY MASS INDEX: 30.12 KG/M2 | HEIGHT: 63 IN | SYSTOLIC BLOOD PRESSURE: 145 MMHG | DIASTOLIC BLOOD PRESSURE: 69 MMHG | OXYGEN SATURATION: 99 % | WEIGHT: 170 LBS | HEART RATE: 77 BPM

## 2019-06-25 DIAGNOSIS — G89.29 CHRONIC NONINTRACTABLE HEADACHE, UNSPECIFIED HEADACHE TYPE: ICD-10-CM

## 2019-06-25 DIAGNOSIS — E78.5 DYSLIPIDEMIA: Primary | ICD-10-CM

## 2019-06-25 DIAGNOSIS — G47.00 INSOMNIA, UNSPECIFIED TYPE: ICD-10-CM

## 2019-06-25 DIAGNOSIS — R51.9 CHRONIC NONINTRACTABLE HEADACHE, UNSPECIFIED HEADACHE TYPE: ICD-10-CM

## 2019-06-25 DIAGNOSIS — N28.9 KIDNEY DISEASE: ICD-10-CM

## 2019-06-25 RX ORDER — ATORVASTATIN CALCIUM 40 MG/1
40 TABLET, FILM COATED ORAL DAILY
Qty: 90 TAB | Refills: 3 | Status: SHIPPED | OUTPATIENT
Start: 2019-06-25 | End: 2020-06-28

## 2019-06-25 RX ORDER — TRAZODONE HYDROCHLORIDE 50 MG/1
50 TABLET ORAL
Qty: 90 TAB | Refills: 1 | Status: SHIPPED | OUTPATIENT
Start: 2019-06-25 | End: 2019-08-21

## 2019-06-25 RX ORDER — PANTOPRAZOLE SODIUM 40 MG/1
TABLET, DELAYED RELEASE ORAL
Qty: 90 TAB | Refills: 1 | Status: SHIPPED | OUTPATIENT
Start: 2019-06-25

## 2019-06-25 RX ORDER — BUTALBITAL, ACETAMINOPHEN AND CAFFEINE 50; 325; 40 MG/1; MG/1; MG/1
1 TABLET ORAL
Qty: 60 TAB | Refills: 1 | Status: SHIPPED | OUTPATIENT
Start: 2019-06-25 | End: 2019-11-18 | Stop reason: SDUPTHER

## 2019-06-25 RX ORDER — LABETALOL 200 MG/1
200 TABLET, FILM COATED ORAL 2 TIMES DAILY
Qty: 360 TAB | Refills: 1 | Status: SHIPPED | OUTPATIENT
Start: 2019-06-25 | End: 2020-05-13

## 2019-06-25 NOTE — PROGRESS NOTES
HISTORY OF PRESENT ILLNESS  Nabeel Randolph is a 48 y.o. female. HPI  Patient presents to the office to get her refills. She has not had labs in a little while. She has a prescription today for keflex but she can not remember why she was prescribed this medication. She states she has been having moments that she can't remember exactly. ( she now remember's that she was prescribed at the kidney doctor's office. She states it was prescribed to prevent infection of her fistular) She states they are suppose to be removing it soon. Allergies   Allergen Reactions    Latex Shortness of Breath    Keflex [Cephalexin] Nausea and Vomiting     Stomach cramping    Tramadol Itching     Past Medical History:   Diagnosis Date    Anemia     Arthritis     Asthma 4/8/2014    Rx for same    Beta blocker therapy     Chronic kidney disease     ESRD - dialysis MWF    Diabetes ([facility] [address] 26yo    Rx to control    Dialysis patient ([facility] [address]     M-W-F    GERD (gastroesophageal reflux disease)     Headache(784.0)     Hepatitis B infection     Hypertension     Rx for same    Pre-kidney transplant, listed 4/30/2015       Review of Systems   Constitutional: Negative for chills and fever. Respiratory: Negative for cough, shortness of breath and wheezing. Cardiovascular: Negative for chest pain. Amin Schuster Amin Schuster Amin Schuster Blood pressure 145/69, pulse 77, temperature 98 °F (36.7 °C), temperature source Oral, resp. rate 20, height 5' 3\" (1.6 m), weight 170 lb (77.1 kg), SpO2 99 %. Physical Exam   Constitutional: No distress. Cardiovascular: Normal rate and regular rhythm. Murmur heard. Pulmonary/Chest: Effort normal and breath sounds normal. She has no wheezes. Musculoskeletal: She exhibits no edema. Skin: No rash noted. No erythema. Psychiatric: She has a normal mood and affect. Her behavior is normal. Judgment and thought content normal.       ASSESSMENT and PLAN  Diagnoses and all orders for this visit:    1.  Dyslipidemia  - atorvastatin (LIPITOR) 40 mg tablet; Take 1 Tab by mouth daily. Indications: hardening of the arteries due to plaque buildup    2. Chronic nonintractable headache, unspecified headache type    3. Insomnia, unspecified type    4. Kidney disease    Other orders  -     butalbital-acetaminophen-caffeine (FIORICET, ESGIC) -40 mg per tablet; Take 1 Tab by mouth three (3) times daily as needed for Pain. Indications: Tension Headache  -     labetalol (NORMODYNE) 200 mg tablet; Take 1 Tab by mouth two (2) times a day. -     pantoprazole (PROTONIX) 40 mg tablet; TAKE ONE TABLET BY MOUTH ONCE DAILY  -     traZODone (DESYREL) 50 mg tablet; Take 1 Tab by mouth nightly as needed (as needed). All of the patient's were refilled today. She will be returning to see Dr. Abena Ang for her physical. She should follow up earlier if needed. All this was discussed with the patient and she understands and agrees.

## 2019-07-16 ENCOUNTER — APPOINTMENT (OUTPATIENT)
Dept: NUTRITION | Age: 54
End: 2019-07-16

## 2019-08-02 ENCOUNTER — APPOINTMENT (OUTPATIENT)
Dept: NUTRITION | Age: 54
End: 2019-08-02

## 2019-08-07 LAB
CREATININE, EXTERNAL: 1.58
HBA1C MFR BLD HPLC: 10.2 %

## 2019-08-17 LAB — MICROALBUMIN UR TEST STR-MCNC: 4.6 MG/DL

## 2019-08-21 ENCOUNTER — OFFICE VISIT (OUTPATIENT)
Dept: INTERNAL MEDICINE CLINIC | Age: 54
End: 2019-08-21

## 2019-08-21 VITALS
DIASTOLIC BLOOD PRESSURE: 72 MMHG | WEIGHT: 170 LBS | SYSTOLIC BLOOD PRESSURE: 147 MMHG | RESPIRATION RATE: 16 BRPM | BODY MASS INDEX: 30.12 KG/M2 | HEIGHT: 63 IN | HEART RATE: 78 BPM | OXYGEN SATURATION: 99 % | TEMPERATURE: 98.2 F

## 2019-08-21 DIAGNOSIS — L72.3 INFECTED SEBACEOUS CYST: Primary | ICD-10-CM

## 2019-08-21 DIAGNOSIS — L08.9 INFECTED SEBACEOUS CYST: Primary | ICD-10-CM

## 2019-08-21 RX ORDER — TRAZODONE HYDROCHLORIDE 100 MG/1
100 TABLET ORAL
Qty: 30 TAB | Refills: 3 | Status: SHIPPED | OUTPATIENT
Start: 2019-08-21 | End: 2022-08-02

## 2019-08-21 NOTE — PROGRESS NOTES
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 [address]    Renal transplant recipient    Acute deep vein thrombosis (DVT) of popliteal vein of right lower extremity (HCC)    Dyslipidemia    Mitral regurgitation    Asthma    Depression    Anemia    Hyperkalemia    DM (diabetes mellitus) ([facility] [address]    Diabetes mellitus ([facility] [address]    Hypertension    Kidney disease    Migraines     Vitals:    08/21/19 1136   BP: 147/72   Pulse: 78   Resp: 16   Temp: 98.2 °F (36.8 °C)   TempSrc: Oral   SpO2: 99%   Weight: 170 lb (77.1 kg)   Height: 5' 3\" (1.6 m)     Skin exam - LESIONS NOTED: sebaceous cyst on the right shoulder, skin abscess on the right shoulder    Needs top be drained      Diagnoses and all orders for this visit:    1. Infected sebaceous cyst  -     REFERRAL TO GENERAL SURGERY    Other orders  -     traZODone (DESYREL) 100 mg tablet; Take 1 Tab by mouth nightly. Elean Morejon

## 2019-08-21 NOTE — PROGRESS NOTES
1. Have you been to the ER, urgent care clinic since your last visit? Hospitalized since your last visit? No    2. Have you seen or consulted any other health care providers outside of the [address] [place] [address] since your last visit? Include any pap smears or colon screening.  ortho

## 2019-08-26 RX ORDER — FENOFIBRATE 145 MG/1
145 TABLET, COATED ORAL DAILY
Qty: 30 TAB | Refills: 0 | Status: SHIPPED | OUTPATIENT
Start: 2019-08-26 | End: 2019-10-08 | Stop reason: SDUPTHER

## 2019-08-26 NOTE — TELEPHONE ENCOUNTER
Requested Prescriptions     Signed Prescriptions Disp Refills    fenofibrate nanocrystallized (TRICOR) 145 mg tablet 30 Tab 0     Sig: Take 1 Tab by mouth daily.      Authorizing Provider: Lyubov David     Ordering User: Pako Noble     Per verbal orders      Future Appointments   Date Time Provider Trisha Culver   8/27/2019  8:00 AM Boris Mckee [facility]   8/27/2019  3:00 PM Naomi Bergman MD [facility]   9/25/2019  8:00 AM Karishma GUAMAN   9/25/2019  8:40 AM Adama Jimenez  [address]   10/29/2019 11:30 AM Elizabeth Napier MD RDE [address]   12/30/2019  9:00 AM [id number] [facility] [place] [facility] REG

## 2019-08-27 ENCOUNTER — OFFICE VISIT (OUTPATIENT)
Dept: SURGERY | Age: 54
End: 2019-08-27

## 2019-08-27 VITALS
OXYGEN SATURATION: 98 % | TEMPERATURE: 97.7 F | RESPIRATION RATE: 16 BRPM | HEIGHT: 63 IN | SYSTOLIC BLOOD PRESSURE: 123 MMHG | DIASTOLIC BLOOD PRESSURE: 77 MMHG | BODY MASS INDEX: 31.01 KG/M2 | WEIGHT: 175 LBS | HEART RATE: 83 BPM

## 2019-08-27 DIAGNOSIS — L72.3 SEBACEOUS CYST: Primary | ICD-10-CM

## 2019-08-27 NOTE — PROGRESS NOTES
1. Have you been to the ER, urgent care clinic since your last visit? Hospitalized since your last visit? No    2. Have you seen or consulted any other health care providers outside of the [address] [place] [address] since your last visit? Include any pap smears or colon screening.  No

## 2019-08-27 NOTE — PROGRESS NOTES
HISTORY OF PRESENT ILLNESS  Gucci Valero is a 48 y.o. female who is referred by Dr. Amna Badillo for further evaluation of a sebaceous cyst on her right shoulder. Ms. Bre Medina tells me that she noted a subcutaneous mass on her right shoulder approximately one week ago. The mass has become progressively larger and more bothersome to her. No associated drainage or bleeding. Found to have a sebaceous cyst.   She has otherwise been in her usual state of health. Past Medical History:  No date: Anemia  No date: Arthritis  4/8/2014: Asthma      Comment:  Rx for same  No date: Beta blocker therapy  No date: Chronic kidney disease      Comment:  ESRD - dialysis MWF  28yo: Diabetes ([facility] [address]      Comment:  Rx to control  No date: Dialysis patient ([facility] [address]      Comment:  M-W-F  No date: GERD (gastroesophageal reflux disease)  No date: Headache(784.0)  No date: Hepatitis B infection  No date: Hypertension      Comment:  Rx for same  4/30/2015: Pre-kidney transplant, listed  8/27/2019: Sebaceous cyst    Past Surgical History:  11/1/2016: COLONOSCOPY; N/A      Comment:  COLONOSCOPY performed by Prachi Hair MD at [address] [place] [address] Street: [address] [place] [address] Drive: HX CHOLECYSTECTOMY      Comment:  laparoscopic  No date: HX COLONOSCOPY  No date: HX ENDOSCOPY  No date: HX MOHS PROCEDURES;  Left  7/30/15: HX OTHER SURGICAL      Comment:  excision of left Axilla Hidradenitis  2011: HX RENAL BIOPSY  04/05/2017: HX RENAL TRANSPLANT  2012: HX UROLOGICAL      Comment:  KIDNEY BX RIGHT - pt states she has only had one kidney                bx as of 10/31/2016  2017: HX UROLOGICAL      Comment:  transplant  1/2013: HX VASCULAR ACCESS      Comment:  CHEST CATHETER FOR DIALYSIS  No date: HX VASCULAR ACCESS; Right      Comment:  A-V fistula  No date: HX WISDOM TEETH EXTRACTION    Review of patient's family history indicates:  Problem: Hypertension      Relation: Mother          Age of Onset: (Not Specified)  Problem: Hypertension Relation: Sister          Age of Onset: (Not Specified)  Problem: Diabetes      Relation: Maternal Grandfather          Age of Onset: (Not Specified)  Problem: Cancer      Relation: Father          Age of Onset: (Not Specified)          Comment: UNKNOWN  Problem: Hypertension      Relation: Maternal Grandmother          Age of Onset: (Not Specified)  Problem: Diabetes      Relation: Son          Age of Onset: (Not Specified)  Problem: Asthma      Relation: Son          Age of Onset: (Not Specified)  Problem: Cancer      Relation: Maternal Aunt          Age of Onset: (Not Specified)          Comment: UNKNOWN  Problem: Anesth Problems      Relation: Neg Hx          Age of Onset: (Not Specified)    Social History: Employment - None. Tobacco - Denies. EtOH - Denies. Review of systems negative except as noted. Review of Systems   Constitutional: Negative for chills and fever. Eyes: Positive for blurred vision. Gastrointestinal: Negative for nausea and vomiting. Musculoskeletal: Positive for joint pain and myalgias. Stiff joints. Neurological: Positive for dizziness. Psychiatric/Behavioral: The patient is nervous/anxious and has insomnia. Physical Exam   Constitutional: She appears well-developed and well-nourished. No distress. HENT:   Head: Normocephalic and atraumatic. Eyes: No scleral icterus. Neck: Neck supple. Cardiovascular: Normal rate and regular rhythm. Pulmonary/Chest: Effort normal and breath sounds normal.   Abdominal: Soft. She exhibits no distension. There is no tenderness. Musculoskeletal: Normal range of motion. Lymphadenopathy:     She has no cervical adenopathy. Neurological: She is alert. Skin:        Approx. 2cm x 2cm, well circumscribed, freely movable, subcutaneous mass. No active infection. Clinically, this is c/w a sebaceous cyst.   Vitals reviewed.       ASSESSMENT and PLAN  Familia Burk is a 48 y.o. female with a sebaceous cyst on her right shoulder. In view of the findings on history and physical exam she should benefit from excision. Discussed procedure with her including risks of bleeding, infection, recurrence. She understands and wishes to proceed. Consent on chart. [facility] [address]  OFFICE PROCEDURE PROGRESS NOTE        Chart reviewed for the following:   Ashleigh Carballo MD, have reviewed the History, Physical and updated the Allergic reactions for [facility] performed immediately prior to start of procedure:   Ashleigh Carballo MD, have performed the following reviews on [address] [place] [address] prior to the start of the procedure:            * Patient was identified by name and date of birth   * Agreement on procedure being performed was verified  * Risks and Benefits explained to the patient  * Procedure site verified and marked as necessary  * Patient was positioned for comfort  * Consent was signed and verified     Time: 4:20 PM      Date of procedure: 8/27/2019    Procedure performed by:  Obed Cortés MD    Provider assisted by: Charline Warren LPN    How tolerated by patient: tolerated the procedure well with no complications    Post Procedural Pain Scale: 0 - No Hurt    Comments: None. Procedure: Following betadine prep and drape, local anesthetic was infiltrated and an incision over the sebaceous cyst was opened sharply. The sebaceous cyst was dissected free circumferentially and excised. Remaining sebaceous cyst wall was debrided. Hemostasis with pressure. Incision closed with interrupted 3-0 Nylon vertical mattress sutures. Abx ointment and dry dressing applied. Told Ms. Crowley that she can remove dressing tomorrow and get incision wet. Asked her to keep a dry dressing over the incision. Tylenol or Motrin for pain. Will see in one more week or earlier if need be. Follow up with Dr. Antonina Littlejohn as scheduled.      CC: Farzad Lu MD

## 2019-09-05 ENCOUNTER — OFFICE VISIT (OUTPATIENT)
Dept: SURGERY | Age: 54
End: 2019-09-05

## 2019-09-05 VITALS
HEART RATE: 94 BPM | TEMPERATURE: 98.5 F | RESPIRATION RATE: 18 BRPM | OXYGEN SATURATION: 98 % | SYSTOLIC BLOOD PRESSURE: 118 MMHG | BODY MASS INDEX: 30.48 KG/M2 | DIASTOLIC BLOOD PRESSURE: 75 MMHG | HEIGHT: 63 IN | WEIGHT: 172 LBS

## 2019-09-05 DIAGNOSIS — Z09 FOLLOW-UP EXAM: Primary | ICD-10-CM

## 2019-09-05 NOTE — PROGRESS NOTES
Chief Complaint   Patient presents with    Surgical Follow-up     9 days s/p excision od sebaceous cyst right sholulder by Dr. Tab Cee. Here for suture removal  No pain at surgical site   No fevers or chills   Some drainage \"little bloody and thin\"  Has been covering with BandAide     Visit Vitals  /75   Pulse 94   Temp 98.5 °F (36.9 °C) (Oral)   Resp 18   Ht 5' 3\" (1.6 m)   Wt 172 lb (78 kg)   SpO2 98%   BMI 30.47 kg/m²     RIGHT Shoulder sutures intact no erythema or induration; scant serous drainage     9 days s/p excision sebaceous cyst   Sutures not quite ready to come out   Will leave another 5 days and she will return next week for suture removal   Continue to keep clean and dry and ok to wash gently in the [address] [place] [address] verbalized understanding and questions were answered to the best of my knowledge and ability. Skin care  educational materials were provided.

## 2019-09-05 NOTE — PROGRESS NOTES
1. Have you been to the ER, urgent care clinic since your last visit? Hospitalized since your last visit? No    2. Have you seen or consulted any other health care providers outside of the [address] [place] [address] since your last visit? Include any pap smears or colon screening.  no

## 2019-09-10 ENCOUNTER — OFFICE VISIT (OUTPATIENT)
Dept: SURGERY | Age: 54
End: 2019-09-10

## 2019-09-10 VITALS
BODY MASS INDEX: 30.48 KG/M2 | OXYGEN SATURATION: 98 % | TEMPERATURE: 98.3 F | WEIGHT: 172 LBS | SYSTOLIC BLOOD PRESSURE: 100 MMHG | HEIGHT: 63 IN | RESPIRATION RATE: 18 BRPM | DIASTOLIC BLOOD PRESSURE: 66 MMHG | HEART RATE: 86 BPM

## 2019-09-10 DIAGNOSIS — Z48.02 VISIT FOR SUTURE REMOVAL: Primary | ICD-10-CM

## 2019-09-10 NOTE — PROGRESS NOTES
Chief Complaint   Patient presents with    Surgical Follow-up     2 weeks s/p excision sebaceous cyst right shoulder. Here for suture removal.  No drainage, no pain, no fevers   Has been washing area in the shower and applying antibiotic ointment     Visit Vitals  /66 (BP 1 Location: Left arm, BP Patient Position: Sitting)   Pulse 86   Temp 98.3 °F (36.8 °C) (Oral)   Resp 18   Ht 5' 3\" (1.6 m)   Wt 172 lb (78 kg)   SpO2 98%   BMI 30.47 kg/m²     Looks well   Right shoulder sutures intact and incision is well approximated   No erythema or induration       ICD-10-CM ICD-9-CM    1. Visit for suture removal Z48.02 V58.32      Sutures removed   Protect from sun exposure  Keep clean and dry and ok to moisturize   Discharged from surgical care with prn follow up   Júnior Ross verbalized understanding and questions were answered to the best of my knowledge and ability. Skin care  educational materials were provided.

## 2019-09-13 ENCOUNTER — HOSPITAL ENCOUNTER (OUTPATIENT)
Dept: NUTRITION | Age: 54
Discharge: HOME | End: 2019-09-13
Payer: MEDICARE

## 2019-09-13 PROCEDURE — 97803 MED NUTRITION INDIV SUBSEQ: CPT | Performed by: DIETITIAN, REGISTERED

## 2019-09-13 NOTE — PROGRESS NOTES
NUTRITION - FOLLOW-UP TREATMENT NOTE  Patient Name: Ruddy Zhang         Date: 2019  : 1965    YES Patient  Verified  Diagnosis: Diabetes, Kidney Transplant, HTN   In time:   8:00am           Out time:   8:30am   Total Treatment Time (min):   30     SUBJECTIVE/ASSESSMENT    Changes in medication or medical history? Any new allergies, surgeries or procedures?    /NO    If yes, update Summary List   Pt has returned for follow up. She has been following the sliding scale provided by her Endocrinologist. She notes she reduced the levemir to 20u and has seen her morning blood sugars 180-220s. She plans to increase back to 30u from Endo instructions. Reviewed carbohydrate counting today and goal of 30g max total carbohydrate per meal.   B- skipped yesterday  S- none  L- caprese salad (tomaotoe and motzerella) with employer out to lunch  S- none  D- steak with whole ear of corn and 1/2 potaoto (45g cho)  S- none  Pt notes being very stressed recently with son and employer. She does not get hungry when she is stressed. She notes issues with headaches describing pounding when she is stressed and light sensitivity. She also notes memory issues for past few months mostly with short term things. She ment to mention this to PCP at last visit. Worked with pt to identify better drink options. She is trying to limit sodas. Educated on reading labels. She will take pictures to keep a food log as remembering what she ate and how to count carbohydrates is difficulty. She expressed comprehension, high motivation, and compliance is expected. Current Wt: 174.8 Previous Wt: 169.2 Wt Change: +5.6     Achievement of Goals: Improved. Inconsistent. Continued. -30g total carbohydrate per meal (2 selections from sheet provided). If eating more carbohydrate at a meal, increase insulin based on Endocrinology direction. Not met.  Will try using employer's treadmill at work.-walk 3 days per week         Patient Education:  [x]  Review current plan with patient   []  Other:    Handouts/  Information Provided: [x]  Carbohydrates  []  Protein  []  Fiber  []  Serving Sizes  []  Fluids  []  General guidelines []  Diabetes  []  Cholesterol  []  Sodium  []  SBGM  []  Food Journals  [x]  Others: inflammation      New Patient Goals: -instead of soda, try zevia or [employer] brand Clear Water today  -read labels at home.  Make own list of common carbohydrate sources to use.   -keep food journal with pictures on phone for accountability and awareness  -exercise: use treadmill at work as able     PLAN    [x]  Continue on current plan []  Follow-up PRN   []  Discharge due to :    [x]  Next appt: 6 weeks     Dietitian: Beth Doherty MS RD    Date: 9/13/2019 Time: 9:58 AM

## 2019-09-25 ENCOUNTER — OFFICE VISIT (OUTPATIENT)
Dept: CARDIOLOGY CLINIC | Age: 54
End: 2019-09-25

## 2019-09-25 VITALS
RESPIRATION RATE: 16 BRPM | SYSTOLIC BLOOD PRESSURE: 120 MMHG | WEIGHT: 174 LBS | BODY MASS INDEX: 30.83 KG/M2 | HEART RATE: 84 BPM | OXYGEN SATURATION: 99 % | HEIGHT: 63 IN | DIASTOLIC BLOOD PRESSURE: 70 MMHG

## 2019-09-25 DIAGNOSIS — I34.0 MITRAL VALVE INSUFFICIENCY, UNSPECIFIED ETIOLOGY: Primary | ICD-10-CM

## 2019-09-25 DIAGNOSIS — I50.30 HEART FAILURE WITH PRESERVED LEFT VENTRICULAR FUNCTION (HFPEF) (HCC): ICD-10-CM

## 2019-09-25 DIAGNOSIS — Z99.2 TYPE 1 DIABETES MELLITUS WITH CHRONIC KIDNEY DISEASE ON CHRONIC DIALYSIS (HCC): ICD-10-CM

## 2019-09-25 DIAGNOSIS — Z94.0 RENAL TRANSPLANT RECIPIENT: ICD-10-CM

## 2019-09-25 DIAGNOSIS — E78.5 DYSLIPIDEMIA: ICD-10-CM

## 2019-09-25 DIAGNOSIS — N18.6 TYPE 1 DIABETES MELLITUS WITH CHRONIC KIDNEY DISEASE ON CHRONIC DIALYSIS (HCC): ICD-10-CM

## 2019-09-25 DIAGNOSIS — E10.22 TYPE 1 DIABETES MELLITUS WITH CHRONIC KIDNEY DISEASE ON CHRONIC DIALYSIS (HCC): ICD-10-CM

## 2019-09-25 DIAGNOSIS — I36.1 NON-RHEUMATIC TRICUSPID VALVE INSUFFICIENCY: ICD-10-CM

## 2019-09-25 DIAGNOSIS — Z79.4 TYPE 2 DIABETES MELLITUS WITHOUT COMPLICATION, WITH LONG-TERM CURRENT USE OF INSULIN (HCC): ICD-10-CM

## 2019-09-25 DIAGNOSIS — E11.9 TYPE 2 DIABETES MELLITUS WITHOUT COMPLICATION, WITH LONG-TERM CURRENT USE OF INSULIN (HCC): ICD-10-CM

## 2019-09-25 DIAGNOSIS — I34.0 NON-RHEUMATIC MITRAL REGURGITATION: ICD-10-CM

## 2019-09-25 DIAGNOSIS — I10 ESSENTIAL HYPERTENSION: ICD-10-CM

## 2019-09-25 RX ORDER — LANOLIN ALCOHOL/MO/W.PET/CERES
400 CREAM (GRAM) TOPICAL DAILY
Qty: 90 TAB | Refills: 3 | Status: SHIPPED | OUTPATIENT
Start: 2019-09-25

## 2019-09-25 NOTE — PROGRESS NOTES
Chief Complaint   Patient presents with    Hypertension    Dizziness    Follow-up     1. Have you been to the ER, urgent care clinic since your last visit? Hospitalized since your last visit? No    2. Have you seen or consulted any other health care providers outside of the [address] [place] [address] since your last visit? Include any pap smears or colon screening. No    3) Do you have an Advance Directive on file?  no

## 2019-09-25 NOTE — PROGRESS NOTES
[facility] [place]:   [phone]    This note will not be viewable in MyChart. HPI: Tolu Frey, a 48y.o. year-old who presents for follow up regarding her HTN and mitral regurgitation. Having bad days related to sciatica and that is an issues. Reviewed her echo results with her today, looks ok. Going for shots for the back- one coming up next week, last one in May. A little pain up in the right chest but that goes away. TTE today showed normal LVEF, mild LVH, mild TR, mild MR    Has dyspnea with exertion, even with just walking a block   Denies PND, sleeps on 2 pillows but not as high as it used to be   Overall she is feeling better  No recent chest pain   Has rare palpitations at night for a few minutes, no associated symptoms with episodes, no change. Occasional near syncope but she has muscle spasms that don't agree with her and take her breath away. Has rare LE edema   Walks 2 days/week for about an hour   Weight is hanging out about 174  adjustments to her insulin, prednisone   On Coumadin, recent duplex still showed DVT so she remains on Coumadin, denies any blood in her stool or urine  Only has dizziness with position changes, no syncope  Had sleep study which did not show LOUIS  BP at home well controlled - -127mmHg  Still has outstanding bill with [facility] so cannot get labs there    **Labs received from transplant center after her last visit - scanned into [facility], repeat next month  Mg 1.7 5/16/18 on supplements  A1C 9.5% 4/19/18  , , , HDL 44 4/19/18    Assessment/Plan:  1. Overweight Body mass index is 30.82 kg/m². encouraged regular exercise  - 45 minutes/day 5 days/week   2. HTN- malignant, better controlled post renal transplant on labetalol 400mg BID and amlodipine 5mg daily  3. Dyslipidemia - now on atorvastatin 40mg daily, will get lipid results from Dr. Jamel Reid    4.  Diabetes Mellitus - Hgb A1C  on insulin and oral agents, managed by PCP  5. Mitral Regurgitation - mild by TTE today     6. Stage V CKD - s/p right renal transplant 4/5/17, on cellcept, prograf, prednisone, followed by Dr. Teddy Paul, Dr. Burke Bernheim and Dr. Ceci Javed  7. CAD prevention- on ASA 81mg daily and atorvastatin, no angina symptoms, will continue to assess MSUTAFA   8. Vitamin D deficiency -on supplementation   9. Right leg DVT - on coumadin, followed by Dr. Ceci Javed at transplant center  10. Hx of gastritis/esophageal stricture  - on protonix, last EGD and colonoscopy showed diverticulosis, gastritis without hemorrhage and her esophagus was dilated, followed by Dr. Phyllis King    5/18 TTE - normal LVEF, mild MR   5/17 TTE - normal LVEF, mod MR   2/16 TTE - LVEF 55 % to 60 %, no WMA, grade 1 dd, mod MR  3/14 TTE LVEF 55 % to 60 %, no WMA, mild cLVH, mild to mod MR  1/13 TTE LVEF 55%, no WMA, moderate increased wall thickness, grade 1 diastolic dysfunction, moderate MR    Soc no tob, no etoh  Fhx no early CAD    She  has a past medical history of Anemia, Arthritis, Asthma (4/8/2014), Beta blocker therapy, Chronic kidney disease, Diabetes ([address] (28yo), Dialysis patient ([address] GERD (gastroesophageal reflux disease), Headache(784.0), Hepatitis B infection, Hypertension, Pre-kidney transplant, listed (4/30/2015), and Sebaceous cyst (8/27/2019). She also has no past medical history of Adverse effect of anesthesia, Glomerulosclerosis due to secondary diabetes ([address] or Sleep apnea. Cardiovascular ROS: no chest pain   Respiratory ROS: denies cough, wheezing  Neurological ROS: no TIA or stroke symptoms  All other systems negative except as above. PE  Vitals:    09/25/19 0840   BP: 146/74   Pulse: 84   Resp: 16   SpO2: 99%   Weight: 174 lb (78.9 kg)   Height: 5' 3\" (1.6 m)    Body mass index is 30.82 kg/m².   General appearance - alert, well appearing, and in no distress  Mental status - affect appropriate to mood  Eyes - sclera anicteric, moist mucous membranes  Neck - supple  Lymphatics - not assessed   Chest - clear to auscultation, no wheezes, rales or rhonchi  Heart - normal rate, regular rhythm, normal S1, S2, 2/6 TIANNA   Abdomen - soft, nontender, nondistended  Back exam - full range of motion, no tenderness  Neurological - cranial nerves II through XII grossly intact, no focal deficit  Musculoskeletal - no muscular tenderness noted, normal strength  Extremities - peripheral pulses normal, no LE edema    Skin - normal coloration  no rashes    Recent Labs:  Lab Results   Component Value Date/Time    Cholesterol, total 177 12/06/2013 11:05 AM     No results found for: ZANE  Lab Results   Component Value Date/Time    BUN 25 (H) 12/05/2016 08:29 PM     Lab Results   Component Value Date/Time    Potassium 4.1 12/05/2016 08:29 PM     Lab Results   Component Value Date/Time    Hemoglobin A1c 6.1 12/28/2015 08:01 PM    Hemoglobin A1c, External 10.2 08/07/2019     No components found for: HGBI,  IHGB,  HGB,  HGBP,  WBHGB  Lab Results   Component Value Date/Time    PLATELET 981 38/90/4616 08:29 PM       Reviewed:  Past Medical History:   Diagnosis Date    Anemia     Arthritis     Asthma 4/8/2014    Rx for same    Beta blocker therapy     Chronic kidney disease     ESRD - dialysis MWF    Diabetes ([facility] [address] 26yo    Rx to control    Dialysis patient ([facility] [address]     M-W-F    GERD (gastroesophageal reflux disease)     Headache(784.0)     Hepatitis B infection     Hypertension     Rx for same    Pre-kidney transplant, listed 4/30/2015    Sebaceous cyst 8/27/2019     Social History     Tobacco Use   Smoking Status Never Smoker   Smokeless Tobacco Never Used     Social History     Substance and Sexual Activity   Alcohol Use No    Alcohol/week: 0.0 standard drinks     Allergies   Allergen Reactions    Latex Shortness of Breath    Keflex [Cephalexin] Nausea and Vomiting     Stomach cramping    Tramadol Itching       Current Outpatient Medications   Medication Sig    fenofibrate nanocrystallized (TRICOR) 145 mg tablet Take 1 Tab by mouth daily.  atorvastatin (LIPITOR) 40 mg tablet Take 1 Tab by mouth daily. Indications: hardening of the arteries due to plaque buildup    butalbital-acetaminophen-caffeine (FIORICET, ESGIC) -40 mg per tablet Take 1 Tab by mouth three (3) times daily as needed for Pain. Indications: Tension Headache    labetalol (NORMODYNE) 200 mg tablet Take 1 Tab by mouth two (2) times a day.  pantoprazole (PROTONIX) 40 mg tablet TAKE ONE TABLET BY MOUTH ONCE DAILY    Insulin Needles, Disposable, (SUZY PEN NEEDLE) 32 gauge x 5/32\" ndle USE TO INJECT INSULIN 5 TIMES DAILY    glucose blood VI test strips (ONETOUCH ULTRA BLUE TEST STRIP) strip TEST FOUR TIMES DAILY    FIASP FLEXTOUCH U-100 INSULIN 100 unit/mL (3 mL) inpn As directed for high glucoses and for carbohydrate intake - up to 50 units/day    PARoxetine mesylate,menop.sym, (BRISDELLE) 7.5 mg cap Take 7.5 Caps by mouth daily.  Blood-Glucose Meter, Drum-type (ACCU-CHEK COMPACT PLUS CARE) kit Test blood sugar 4 times daily    Blood Sugar Diagnostic, Drum (ACCU-CHEK COMPACT PLUS TEST) strp Test blood sugar 4 times daily    insulin detemir U-100 (LEVEMIR FLEXTOUCH) 100 unit/mL (3 mL) inpn Use up to 100 units daily as directed (Patient taking differently: 40 Units by SubCUTAneous route Before breakfast and dinner. Use up to 100 units daily as directed)    lancets (ONE TOUCH DELICA) 33 gauge misc 4 times daily. Diagnosis code: E11.8    metFORMIN ER (GLUCOPHAGE XR) 500 mg tablet Take 1 Tab by mouth two (2) times daily (after meals).  ELIQUIS 5 mg tablet TAKE 1 TABLET BY MOUTH TWICE DAILY    Liraglutide (VICTOZA) 0.6 mg/0.1 mL (18 mg/3 mL) pnij 1.2 mg by SubCUTAneous route daily.  albuterol (PROVENTIL HFA, VENTOLIN HFA, PROAIR HFA) 90 mcg/actuation inhaler Take 2 Puffs by inhalation every four (4) hours as needed for Wheezing.  amLODIPine (NORVASC) 5 mg tablet Take 1 Tab by mouth daily.     predniSONE (DELTASONE) 5 mg tablet Take 1 Tab by mouth daily.  cinacalcet (SENSIPAR) 30 mg tablet Take 30 mg by mouth daily.  aspirin delayed-release 81 mg tablet Take  by mouth daily.  mycophenolate mofetil (CELLCEPT) 250 mg capsule Take 750 mg by mouth two (2) times a day.  Insulin Syringe-Needle U-100 0.5 mL 31 gauge x 5/16 syrg 1 Each by SubCUTAneous route daily.  traZODone (DESYREL) 100 mg tablet Take 1 Tab by mouth nightly.  tacrolimus (PROGRAF) 1 mg capsule Take 2 mg by mouth two (2) times a day. Indications: taking 1 mg in am and 2 mg at QHS     No current facility-administered medications for this visit.         Awilda Nelson MD  [facility] and V[facility] [place]  [address] [facility] [address]  [address]

## 2019-09-26 ENCOUNTER — TELEPHONE (OUTPATIENT)
Dept: CARDIOLOGY CLINIC | Age: 54
End: 2019-09-26

## 2019-09-26 NOTE — TELEPHONE ENCOUNTER
----- Message from Cici Bowden MD sent at 9/26/2019  1:55 PM EDT -----  Echo:  LVH, EF 65%, mild MR    Echo results discussed with patient, 2 pt identifiers used

## 2019-10-08 ENCOUNTER — PATIENT OUTREACH (OUTPATIENT)
Dept: INTERNAL MEDICINE CLINIC | Age: 54
End: 2019-10-08

## 2019-10-08 RX ORDER — FENOFIBRATE 145 MG/1
145 TABLET, COATED ORAL DAILY
Qty: 30 TAB | Refills: 11 | Status: SHIPPED | OUTPATIENT
Start: 2019-10-08 | End: 2020-10-23 | Stop reason: SDUPTHER

## 2019-10-08 NOTE — TELEPHONE ENCOUNTER
Requested Prescriptions     Signed Prescriptions Disp Refills    fenofibrate nanocrystallized (TRICOR) 145 mg tablet 30 Tab 11     Sig: Take 1 Tab by mouth daily.      Authorizing Provider: Pat Garcia     Ordering User: David Cr     Per verbal orders

## 2019-10-10 RX ORDER — METFORMIN HYDROCHLORIDE 500 MG/1
500 TABLET, EXTENDED RELEASE ORAL
Qty: 60 TAB | Refills: 10 | Status: SHIPPED | OUTPATIENT
Start: 2019-10-10 | End: 2020-10-06 | Stop reason: SDUPTHER

## 2019-10-16 LAB
CREATININE, EXTERNAL: 1.31
HBA1C MFR BLD HPLC: 11.6 %
LDL-C, EXTERNAL: 69

## 2019-10-23 NOTE — TELEPHONE ENCOUNTER
PCP: Joey Vivas MD    Last appt: 2019  Future Appointments   Date Time Provider Trisha Culver   10/25/2019  8:00 AM [place]   10/29/2019 11:30 AM Darrian Kearney MD RDE [address]   2019  9:00 AM [address] [facility]   2019  8:00 AM Horace Pierce,  [facility]   2020  8:00 AM [place], [address] [facility]   2020  9:00 AM Murray Homans,  [address]       Requested Prescriptions     Pending Prescriptions Disp Refills    liraglutide (VICTOZA) 0.6 mg/0.1 mL (18 mg/3 mL) pnij 2 Pen 10     Si.2 mg by SubCUTAneous route daily.

## 2019-10-29 ENCOUNTER — OFFICE VISIT (OUTPATIENT)
Dept: ENDOCRINOLOGY | Age: 54
End: 2019-10-29

## 2019-10-29 VITALS
HEIGHT: 63 IN | HEART RATE: 87 BPM | SYSTOLIC BLOOD PRESSURE: 103 MMHG | DIASTOLIC BLOOD PRESSURE: 66 MMHG | BODY MASS INDEX: 31.61 KG/M2 | WEIGHT: 178.4 LBS

## 2019-10-29 DIAGNOSIS — Z94.0 S/P KIDNEY TRANSPLANT: ICD-10-CM

## 2019-10-29 DIAGNOSIS — Z79.4 TYPE 2 DIABETES MELLITUS WITH OTHER DIABETIC KIDNEY COMPLICATION, WITH LONG-TERM CURRENT USE OF INSULIN (HCC): Primary | ICD-10-CM

## 2019-10-29 DIAGNOSIS — I10 ESSENTIAL HYPERTENSION: ICD-10-CM

## 2019-10-29 DIAGNOSIS — E11.29 TYPE 2 DIABETES MELLITUS WITH OTHER DIABETIC KIDNEY COMPLICATION, WITH LONG-TERM CURRENT USE OF INSULIN (HCC): Primary | ICD-10-CM

## 2019-10-29 LAB — HBA1C MFR BLD HPLC: 11.6 %

## 2019-10-29 NOTE — PROGRESS NOTES
History of Present Illness: Carol Ann Nunez is a 48 y.o. female presents for follow-up of diabetes. She has a history of ESRD and is now s/p renal transplant on 4/11/2017  Diabetes control worsened after transplant due to increased clearance of insulin, anti-rejection medications, and increase in appetite. A1c 11.5 and has been consistently high over much of the last year. There is some concerns that persistently elevated glucose values are having a detrimental effect on renal function. Current diabetes regimen:  Levemir 30 units twice daily. Will take less if lower at bedtime. Took 20 units last night. Victoza 1.2 mg daily. Tolerating better  Metformin  mg twice daily. Fiasp 50 units twice daily. Supposed to adjust based on glucoses. However, after some discussion, it appears she is often only taking this once daily. Glucoses: forgot meter  Mostly 180-220 range. Does feel low around bedtime. 220 this AM.  3 hours and 30 minutes later in clinic, glucose was 75 after taking 50 units of Fiasp not having any breakfast.    Diet:  Breakfast :skipped yesterday. No breakfast this AM either. Lunch:  4 pm - small steak, small baked potato and vegetables. Dinner - last night 8 pm- slice of pizza - no Fiasp as she was at work. Did not check at bedtime. She has seen a nutritionist in the past.  Exercise - walking. HTN  - managed by transplant team. Labetalol and amlodipine.      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      Past Medical History:   Diagnosis Date    Anemia     Arthritis     Asthma 4/8/2014    Rx for same    Beta blocker therapy     Chronic kidney disease     ESRD - dialysis MWF    Diabetes ([facility] [address] 26yo    Rx to control    Dialysis patient ([facility] [address]     M-W-F    GERD (gastroesophageal reflux disease)     Headache(784.0)     Hepatitis B infection     Hypertension     Rx for same  Pre-kidney transplant, listed 4/30/2015    Sebaceous cyst 8/27/2019     Current Outpatient Medications   Medication Sig    liraglutide (VICTOZA) 0.6 mg/0.1 mL (18 mg/3 mL) pnij 1.2 mg by SubCUTAneous route daily.  metFORMIN ER (GLUCOPHAGE XR) 500 mg tablet Take 1 Tab by mouth two (2) times daily (after meals).  fenofibrate nanocrystallized (TRICOR) 145 mg tablet Take 1 Tab by mouth daily.  magnesium oxide (MAG-OX) 400 mg tablet Take 1 Tab by mouth daily.  traZODone (DESYREL) 100 mg tablet Take 1 Tab by mouth nightly.  atorvastatin (LIPITOR) 40 mg tablet Take 1 Tab by mouth daily. Indications: hardening of the arteries due to plaque buildup    labetalol (NORMODYNE) 200 mg tablet Take 1 Tab by mouth two (2) times a day.  pantoprazole (PROTONIX) 40 mg tablet TAKE ONE TABLET BY MOUTH ONCE DAILY    Insulin Needles, Disposable, (SUZY PEN NEEDLE) 32 gauge x 5/32\" ndle USE TO INJECT INSULIN 5 TIMES DAILY    FIASP FLEXTOUCH U-100 INSULIN 100 unit/mL (3 mL) inpn As directed for high glucoses and for carbohydrate intake - up to 50 units/day    PARoxetine mesylate,menop.sym, (BRISDELLE) 7.5 mg cap Take 7.5 Caps by mouth daily.  Blood-Glucose Meter, Drum-type (ACCU-CHEK COMPACT PLUS CARE) kit Test blood sugar 4 times daily    Blood Sugar Diagnostic, Drum (ACCU-CHEK COMPACT PLUS TEST) strp Test blood sugar 4 times daily    insulin detemir U-100 (LEVEMIR FLEXTOUCH) 100 unit/mL (3 mL) inpn Use up to 100 units daily as directed (Patient taking differently: 40 Units by SubCUTAneous route Before breakfast and dinner. Use up to 100 units daily as directed)    lancets (ONE TOUCH DELICA) 33 gauge misc 4 times daily. Diagnosis code: E11.8    ELIQUIS 5 mg tablet TAKE 1 TABLET BY MOUTH TWICE DAILY    amLODIPine (NORVASC) 5 mg tablet Take 1 Tab by mouth daily.  predniSONE (DELTASONE) 5 mg tablet Take 1 Tab by mouth daily.  cinacalcet (SENSIPAR) 30 mg tablet Take 30 mg by mouth daily.     aspirin delayed-release 81 mg tablet Take  by mouth daily.  mycophenolate mofetil (CELLCEPT) 250 mg capsule Take 750 mg by mouth two (2) times a day.  Insulin Syringe-Needle U-100 0.5 mL 31 gauge x 5/16 syrg 1 Each by SubCUTAneous route daily.  tacrolimus (PROGRAF) 1 mg capsule Take 2 mg by mouth two (2) times a day. Indications: taking 1 mg in am and 2 mg at QHS    butalbital-acetaminophen-caffeine (FIORICET, ESGIC) -40 mg per tablet Take 1 Tab by mouth three (3) times daily as needed for Pain. Indications: Tension Headache    glucose blood VI test strips (ONETOUCH ULTRA BLUE TEST STRIP) strip TEST FOUR TIMES DAILY    albuterol (PROVENTIL HFA, VENTOLIN HFA, PROAIR HFA) 90 mcg/actuation inhaler Take 2 Puffs by inhalation every four (4) hours as needed for Wheezing. No current facility-administered medications for this visit.       Allergies   Allergen Reactions    Latex Shortness of Breath    Keflex [Cephalexin] Nausea and Vomiting     Stomach cramping    Tramadol Itching       Review of Systems:  - Eyes: no blurry vision or double vision  - Cardiovascular: no chest pain  - Respiratory: no shortness of breath  - Musculoskeletal: no myalgias  - Neurological: no numbness/tingling in extremities    Physical Examination:  Visit Vitals  /66 (BP 1 Location: Left arm, BP Patient Position: Sitting)   Pulse 87   Ht 5' 3\" (1.6 m)   Wt 178 lb 6.4 oz (80.9 kg)   BMI 31.60 kg/m²   -   - General: pleasant, no distress, normal gait   HEENT: hearing intact, EOMI, clear sclera without icterus  - Cardiovascular: regular, normal rate   - Respiratory: normal effort  - Integumentary: no edema  - Psychiatric: normal mood and affect    Data Reviewed:   Lab Results   Component Value Date/Time    Hemoglobin A1c, External 10.2 08/07/2019      Lab Results   Component Value Date/Time    Creatinine, External 1.58 08/07/2019        Lab Results   Component Value Date/Time    LDL-C, External 45 10/24/2018      No results found for: TSH, FT4, TRALT, TMCLT, TSHEXT     Assessment/Plan:   1. 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 She had questions about the V-Go insulin device. Reviewed how this works and contrasted this with current regimen. She also had questions about CGM. I think she would benefit from CGM monitoring. Reviewed how she needs to monitor 4 times daily before insurance will approve  Continue Levemir 30 units twice daily-advised her to follow glucoses when not eating  Recommended Fiasp 1 unit for every 5 g of carbohydrates. Reviewed and explained this with her. Advised her to take this before meals  Fiasp 3: 50 for values greater than 150.   2. S/P kidney transplant   Posttransplant medications may increase insulin resistance. She is aware of importance of controlling glucoses to keep her current kidney healthy   3. BMI 31.0-31.9,adult   Encouraged dietary and exercise efforts   4. Essential hypertension   Blood pressures under good control. Continue current medications   Greater than 50% of 40 minute visit was spent counseling the patient about above. Patient Instructions   Diabetes; Watch carbohydrate intake with meals and try to keep this less than 30 grams. Continue efforts with weight loss. Victoza 1.2 mg. Continue metformin    Levemir 30 units twice daily  - follow glucose trend from bedtime to fasting to evaluate Levemir dose. Novolog/Fiasp for carbohydrate intake:  - start with 1 unit for every 5 g carb   - divide number of carbohydrates by 5 to determine dose.     15 g carbohydrate - 3 units  30 g carbohydrate - 6 units  45 g carbohydrate - 9 units  60 g carbohydrate - 12 units  75 g carbohydrate - 15 units  90 g carbohydrate - 18 units    Novolog/Fiasp for high glucoses:  150-200: 3 units  201-250: 6 units  251-300: 9 units  301-350: 12 units  351 +     : 15 units    Check glucoses when you awaken, midday, before dinner and at bedtime  Goals for blood glucose:  Fasting  (less than 150)  Lunch, Dinner, Bedtime -  (less than 180).         Follow-up and Dispositions    · Return for 11/15 at 3:30 pm .

## 2019-10-29 NOTE — PATIENT INSTRUCTIONS
Diabetes; Watch carbohydrate intake with meals and try to keep this less than 30 grams. Continue efforts with weight loss. Victoza 1.2 mg. Continue metformin    Levemir 30 units twice daily  - follow glucose trend from bedtime to fasting to evaluate Levemir dose. Novolog/Fiasp for carbohydrate intake:  - start with 1 unit for every 5 g carb   - divide number of carbohydrates by 5 to determine dose. 15 g carbohydrate - 3 units  30 g carbohydrate - 6 units  45 g carbohydrate - 9 units  60 g carbohydrate - 12 units  75 g carbohydrate - 15 units  90 g carbohydrate - 18 units    Novolog/Fiasp for high glucoses:  150-200: 3 units  201-250: 6 units  251-300: 9 units  301-350: 12 units  351 +     : 15 units    Check glucoses when you awaken, midday, before dinner and at bedtime  Goals for blood glucose:  Fasting  (less than 150)  Lunch, Dinner, Bedtime -  (less than 180).

## 2019-11-05 ENCOUNTER — HOSPITAL ENCOUNTER (OUTPATIENT)
Dept: NUTRITION | Age: 54
Discharge: HOME | End: 2019-11-05
Payer: MEDICARE

## 2019-11-05 PROCEDURE — 97803 MED NUTRITION INDIV SUBSEQ: CPT | Performed by: DIETITIAN, REGISTERED
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
## 2019-11-18 ENCOUNTER — OFFICE VISIT (OUTPATIENT)
Dept: INTERNAL MEDICINE CLINIC | Age: 54
End: 2019-11-18

## 2019-11-18 VITALS
RESPIRATION RATE: 18 BRPM | HEART RATE: 84 BPM | TEMPERATURE: 98 F | OXYGEN SATURATION: 98 % | BODY MASS INDEX: 31.36 KG/M2 | DIASTOLIC BLOOD PRESSURE: 73 MMHG | SYSTOLIC BLOOD PRESSURE: 141 MMHG | WEIGHT: 177 LBS | HEIGHT: 63 IN

## 2019-11-18 DIAGNOSIS — Z23 ENCOUNTER FOR IMMUNIZATION: Primary | ICD-10-CM

## 2019-11-18 DIAGNOSIS — M54.32 SCIATICA OF LEFT SIDE: ICD-10-CM

## 2019-11-18 DIAGNOSIS — F41.8 ANXIETY ABOUT HEALTH: ICD-10-CM

## 2019-11-18 RX ORDER — ACETAMINOPHEN AND CODEINE PHOSPHATE 300; 30 MG/1; MG/1
1 TABLET ORAL
Qty: 30 TAB | Refills: 0 | Status: SHIPPED | OUTPATIENT
Start: 2019-11-18 | End: 2019-12-18

## 2019-11-18 RX ORDER — BUTALBITAL, ACETAMINOPHEN AND CAFFEINE 50; 325; 40 MG/1; MG/1; MG/1
1 TABLET ORAL
Qty: 60 TAB | Refills: 1 | Status: SHIPPED | OUTPATIENT
Start: 2019-11-18 | End: 2021-09-16 | Stop reason: SDUPTHER

## 2019-11-18 NOTE — PATIENT INSTRUCTIONS
Vaccine Information Statement    Influenza (Flu) Vaccine (Inactivated or Recombinant): What You Need to Know    Many Vaccine Information Statements are available in Welsh and other languages. See www.immunize.org/vis  Hojas de información sobre vacunas están disponibles en español y en muchos otros idiomas. Visite www.immunize.org/vis    1. Why get vaccinated? Influenza vaccine can prevent influenza (flu). Flu is a contagious disease that spreads around the [facility] every year, usually between October and May. Anyone can get the flu, but it is more dangerous for some people. Infants and young children, people 72years of age and older, pregnant women, and people with certain health conditions or a weakened immune system are at greatest risk of flu complications. Pneumonia, bronchitis, sinus infections and ear infections are examples of flu-related complications. If you have a medical condition, such as heart disease, cancer or diabetes, flu can make it worse. Flu can cause fever and chills, sore throat, muscle aches, fatigue, cough, headache, and runny or stuffy nose. Some people may have vomiting and diarrhea, though this is more common in children than adults. Each year thousands of people in the [facility] die from flu, and many more are hospitalized. Flu vaccine prevents millions of illnesses and flu-related visits to the doctor each year. 2. Influenza vaccines     CDC recommends everyone 10months of age and older get vaccinated every flu season. Children 6 months through 6years of age may need 2 doses during a single flu season. Everyone else needs only 1 dose each flu season. It takes about 2 weeks for protection to develop after vaccination. There are many flu viruses, and they are always changing. Each year a new flu vaccine is made to protect against three or four viruses that are likely to cause disease in the upcoming flu season.  Even when the vaccine doesnt exactly match these viruses, it may still provide some protection. Influenza vaccine does not cause flu. Influenza vaccine may be given at the same time as other vaccines. 3. Talk with your health care provider    Tell your vaccine provider if the person getting the vaccine:   Has had an allergic reaction after a previous dose of influenza vaccine, or has any severe, life-threatening allergies.  Has ever had Guillain-Barré Syndrome (also called GBS). In some cases, your health care provider may decide to postpone influenza vaccination to a future visit. People with minor illnesses, such as a cold, may be vaccinated. People who are moderately or severely ill should usually wait until they recover before getting influenza vaccine. Your health care provider can give you more information. 4. Risks of a reaction     Soreness, redness, and swelling where shot is given, fever, muscle aches, and headache can happen after influenza vaccine.  There may be a very small increased risk of Guillain-Barré Syndrome (GBS) after inactivated influenza vaccine (the flu shot). Adeola Jenkins children who get the flu shot along with pneumococcal vaccine (PCV13), and/or DTaP vaccine at the same time might be slightly more likely to have a seizure caused by fever. Tell your health care provider if a child who is getting flu vaccine has ever had a seizure. People sometimes faint after medical procedures, including vaccination. Tell your provider if you feel dizzy or have vision changes or ringing in the ears. As with any medicine, there is a very remote chance of a vaccine causing a severe allergic reaction, other serious injury, or death. 5. What if there is a serious problem? An allergic reaction could occur after the vaccinated person leaves the clinic.  If you see signs of a severe allergic reaction (hives, swelling of the face and throat, difficulty breathing, a fast heartbeat, dizziness, or weakness), call 9-1-1 and get the person to the nearest hospital.    For other signs that concern you, call your health care provider. Adverse reactions should be reported to the Vaccine Adverse Event Reporting System (VAERS). Your health care provider will usually file this report, or you can do it yourself. Visit the VAERS website at www.vaers. [facility].gov or call 5-601.295.6543. VAERS is only for reporting reactions, and VAERS staff do not give medical advice. 6. The National Vaccine Injury Compensation Program    The [facility] Vaccine Injury Compensation Program (VICP) is a federal program that was created to compensate people who may have been injured by certain vaccines. Visit the VICP website at www.hrsa.gov/vaccinecompensation or call 5-552.744.2764 to learn about the program and about filing a claim. There is a time limit to file a claim for compensation. 7. How can I learn more?  Ask your health care provider.  Call your local or state health department.  Contact the Centers for Disease Control and Prevention (CDC):  - Call 4-239.636.6053 (1-800-CDC-INFO) or  - Visit CDCs influenza website at www.cdc.gov/flu    Vaccine Information Statement (Interim)  Inactivated Influenza Vaccine   8/15/2019  42 TRUPTI Mitchell [id number]-[id number]   Department of Health and Human Services  Centers for Disease Control and Prevention    Office Use Only

## 2019-11-18 NOTE — PROGRESS NOTES
1. Have you been to the ER, urgent care clinic since your last visit? Hospitalized since your last visit? No    2. Have you seen or consulted any other health care providers outside of the [address] [place] [address] since your last visit? Include any pap smears or colon screening.  Yes [facility]

## 2019-11-18 NOTE — PROGRESS NOTES
Chief Complaint   Patient presents with    Headache     migraine    Immunization/Injection     influenza vaccination     Diabetic transplant patient with recurrent sciatica, has had a couple of injections, still has pain. Asks me for a refill, one refill of Tylenol 3, I gave her a prescription for 30 back in April. She said she used them at times and it would give her some relief. She has used a variety of meds, a variety of things. She has been in PT. She has tried a couple of injections. She still has sciatic pain. She gets intermittent headaches that are controlled by Fioricet. Diabetes control has not been great. She has seen endocrinology and working hard and some adjustments have been made and she is hopefully going to see some improvement. Her BP has been normal.  She says her kidney function has been stable. She needs a flu vaccine. Physical Examination:  BP was about 140/80. S1, S2 regular. Lungs clear. A little bit of lower back tenderness. Gait was slow, but able to function. Neurologically intact. Impression:  Recurrent sciatica. Plan: Will give her one refill on Tylenol with codeine. I will continue to follow her. She will continue to get more injections and more PT and follow with orthopedics. Back surgery is probably not the best plan with poor control of diabetes and renal transplant. 1. Encounter for immunization    - INFLUENZA VIRUS VAC QUAD,SPLIT,PRESV FREE SYRINGE IM    2. Sciatica of left side  refill  - acetaminophen-codeine (TYLENOL #3) 300-30 mg per tablet; Take 1 Tab by mouth nightly as needed for Pain for up to 30 days. Max Daily Amount: 1 Tab. Dispense: 30 Tab; Refill: 0    3.  Anxiety about health  See counselor  - REFERRAL TO [address] [place] [address] prescription monitoring program reviewed and no discrepancies are noted, this is reviewed with patient  She is requesting and getting small amounts from several people this is not chronic

## 2019-12-10 LAB
CREATININE, EXTERNAL: 1.38
HBA1C MFR BLD HPLC: 8.2 %

## 2019-12-17 ENCOUNTER — OFFICE VISIT (OUTPATIENT)
Dept: ENDOCRINOLOGY | Age: 54
End: 2019-12-17

## 2019-12-17 VITALS
DIASTOLIC BLOOD PRESSURE: 52 MMHG | SYSTOLIC BLOOD PRESSURE: 107 MMHG | BODY MASS INDEX: 31.11 KG/M2 | HEART RATE: 92 BPM | HEIGHT: 63 IN | WEIGHT: 175.6 LBS

## 2019-12-17 DIAGNOSIS — Z79.4 TYPE 2 DIABETES MELLITUS WITH COMPLICATION, WITH LONG-TERM CURRENT USE OF INSULIN (HCC): Primary | ICD-10-CM

## 2019-12-17 DIAGNOSIS — E11.8 TYPE 2 DIABETES MELLITUS WITH COMPLICATION, WITH LONG-TERM CURRENT USE OF INSULIN (HCC): Primary | ICD-10-CM

## 2019-12-17 DIAGNOSIS — I10 ESSENTIAL HYPERTENSION: ICD-10-CM

## 2019-12-17 DIAGNOSIS — Z94.0 S/P KIDNEY TRANSPLANT: ICD-10-CM

## 2019-12-17 RX ORDER — DICLOFENAC SODIUM 75 MG/1
75 TABLET, DELAYED RELEASE ORAL
COMMUNITY
Start: 2019-12-09 | End: 2020-05-11 | Stop reason: ALTCHOICE

## 2019-12-17 NOTE — PATIENT INSTRUCTIONS
Diabetes; Watch carbohydrate intake with meals and try to keep this less than 30 grams. Continue efforts with weight loss. Victoza 1.2 mg. Continue metformin    Increase Levemir to 35 units twice daily  - follow glucose trend from bedtime to fasting to evaluate Levemir dose. Question whether you need Fiasp for carbohydrate intake. Follow trends around meals to assess    Novolog/Fiasp for high glucoses:  150-200: 3 units  201-250: 6 units  251-300: 9 units  301-350: 12 units  351 +     : 15 units    Check glucoses when you awaken, midday, before dinner and at bedtime  Goals for blood glucose:  Fasting  (less than 150)  Lunch, Dinner, Bedtime -  (less than 180). Endocrinology options in [place] area: [facility]:   Tom Camilo and [facility] End[facility] --   Address: [address] [place] [address], [address], [facility] [address]   Phone: [phone]     [facility] End[facility] - Tom Vargas and [facility]. Address: [place], [place]. [address]   Phone: [phone]     Non-[facility] Endocrinology groups:     [facility]   Two locations: [address] [place] [address]   Phone: [phone]   [email or web address]     [address]   [place], [address] [facility]     AND    [address] [facility], [address] [facility]   Two locations: [address] [place] [address]   Phone: [phone]   [email or web address]     [address] [facility]     AND     [address] [facility]   [address]
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
## 2019-12-20 ENCOUNTER — APPOINTMENT (OUTPATIENT)
Dept: NUTRITION | Age: 54
End: 2019-12-20

## 2020-01-20 LAB
CREATININE, EXTERNAL: 1.51
INR, EXTERNAL: 1.6
PT, EXTERNAL: 18.5

## 2020-03-09 ENCOUNTER — OFFICE VISIT (OUTPATIENT)
Dept: INTERNAL MEDICINE CLINIC | Age: 55
End: 2020-03-09

## 2020-03-09 VITALS
OXYGEN SATURATION: 100 % | SYSTOLIC BLOOD PRESSURE: 112 MMHG | DIASTOLIC BLOOD PRESSURE: 62 MMHG | HEART RATE: 90 BPM | BODY MASS INDEX: 29.41 KG/M2 | WEIGHT: 166 LBS | TEMPERATURE: 98.3 F | HEIGHT: 63 IN

## 2020-03-09 DIAGNOSIS — E78.5 DYSLIPIDEMIA: ICD-10-CM

## 2020-03-09 DIAGNOSIS — Z00.00 MEDICARE ANNUAL WELLNESS VISIT, SUBSEQUENT: ICD-10-CM

## 2020-03-09 DIAGNOSIS — Z79.4 TYPE 2 DIABETES MELLITUS WITH COMPLICATION, WITH LONG-TERM CURRENT USE OF INSULIN (HCC): ICD-10-CM

## 2020-03-09 DIAGNOSIS — Z86.718 HISTORY OF DVT (DEEP VEIN THROMBOSIS): Primary | ICD-10-CM

## 2020-03-09 DIAGNOSIS — I10 ESSENTIAL HYPERTENSION: ICD-10-CM

## 2020-03-09 DIAGNOSIS — E11.8 TYPE 2 DIABETES MELLITUS WITH COMPLICATION, WITH LONG-TERM CURRENT USE OF INSULIN (HCC): ICD-10-CM

## 2020-03-09 DIAGNOSIS — Z23 ENCOUNTER FOR IMMUNIZATION: ICD-10-CM

## 2020-03-09 DIAGNOSIS — I50.30 HEART FAILURE WITH PRESERVED LEFT VENTRICULAR FUNCTION (HFPEF) (HCC): ICD-10-CM

## 2020-03-09 DIAGNOSIS — N30.20: ICD-10-CM

## 2020-03-09 PROBLEM — I82.431 ACUTE DEEP VEIN THROMBOSIS (DVT) OF POPLITEAL VEIN OF RIGHT LOWER EXTREMITY (HCC): Status: RESOLVED | Noted: 2017-08-04 | Resolved: 2020-03-09

## 2020-03-09 NOTE — PATIENT INSTRUCTIONS
Medicare Wellness Visit, Female     The best way to live healthy is to have a lifestyle where you eat a well-balanced diet, exercise regularly, limit alcohol use, and quit all forms of tobacco/nicotine, if applicable. Regular preventive services are another way to keep healthy. Preventive services (vaccines, screening tests, monitoring & exams) can help personalize your care plan, which helps you manage your own care. Screening tests can find health problems at the earliest stages, when they are easiest to treat. [facility] follows the current, evidence-based guidelines published by the [facility] Alvaro Plunkett ([facility]) when recommending preventive services for our patients. Because we follow these guidelines, sometimes recommendations change over time as research supports it. (For example, mammograms used to be recommended annually. Even though Medicare will still pay for an annual mammogram, the newer guidelines recommend a mammogram every two years for women of average risk). Of course, you and your doctor may decide to screen more often for some diseases, based on your risk and your co-morbidities (chronic disease you are already diagnosed with). Preventive services for you include:  - Medicare offers their members a free annual wellness visit, which is time for you and your primary care provider to discuss and plan for your preventive service needs. Take advantage of this benefit every year!  -All adults over the age of 72 should receive the recommended pneumonia vaccines. Current USPSTF guidelines recommend a series of two vaccines for the best pneumonia protection.   -All adults should have a flu vaccine yearly and a tetanus vaccine every 10 years.   -All adults age 48 and older should receive the shingles vaccines (series of two vaccines).       -All adults age 38-68 who are overweight should have a diabetes screening test once every three years.   -All adults born between [birth year] and 1965 should be screened once for Hepatitis C.  -Other screening tests and preventive services for persons with diabetes include: an eye exam to screen for diabetic retinopathy, a kidney function test, a foot exam, and stricter control over your cholesterol.   -Cardiovascular screening for adults with routine risk involves an electrocardiogram (ECG) at intervals determined by your doctor.   -Colorectal cancer screenings should be done for adults age 54-65 with no increased risk factors for colorectal cancer. There are a number of acceptable methods of screening for this type of cancer. Each test has its own benefits and drawbacks. Discuss with your doctor what is most appropriate for you during your annual wellness visit. The different tests include: colonoscopy (considered the best screening method), a fecal occult blood test, a fecal DNA test, and sigmoidoscopy.    -A bone mass density test is recommended when a woman turns 65 to screen for osteoporosis. This test is only recommended one time, as a screening. Some providers will use this same test as a disease monitoring tool if you already have osteoporosis. -Breast cancer screenings are recommended every other year for women of normal risk, age 54-69.  -Cervical cancer screenings for women over age 72 are only recommended with certain risk factors.      Here is a list of your current Health Maintenance items (your personalized list of preventive services) with a due date:  Health Maintenance Due   Topic Date Due    DTaP/Tdap/Td  (1 - Tdap) 04/26/1978    Pneumococcal Vaccine (2 of 3 - PCV13) 02/01/2014    Shingles Vaccine (1 of 2) 04/26/2017    Diabetic Foot Care  09/10/2019    Pap Test  01/31/2020    Annual Well Visit  03/07/2020

## 2020-03-09 NOTE — PROGRESS NOTES
This is the Subsequent Medicare Annual Wellness Exam, performed 12 months or more after the Initial AWV or the last Subsequent AWV    I have reviewed the patient's medical history in detail and updated the computerized patient record.      History     Patient Active Problem List   Diagnosis Code    Hypertension I10    Kidney disease N28.9    Migraines G43.909    Diabetes mellitus ([facility] [address] E11.9    Anemia D64.9    Hyperkalemia E87.5    DM (diabetes mellitus) ([facility] [address] E11.9    Depression F32.9    Asthma J45.909    Dyslipidemia E78.5    Mitral regurgitation I34.0    Acute deep vein thrombosis (DVT) of popliteal vein of right lower extremity (HCC) I82.431    Renal transplant recipient Z94.0    Type 2 diabetes mellitus without complication, with long-term current use of insulin (HCC) E11.9, Z79.4    Sebaceous cyst L72.3    Cystitis bacillary, chronic N30.20     Past Medical History:   Diagnosis Date    Anemia     Arthritis     Asthma 4/8/2014    Rx for same    Beta blocker therapy     Chronic kidney disease     ESRD - dialysis MWF    Diabetes ([facility] [address] 28yo    Rx to control    Dialysis patient ([facility] [address]     M-W-F    GERD (gastroesophageal reflux disease)     Headache(784.0)     Hepatitis B infection     Hypertension     Rx for same    Pre-kidney transplant, listed 4/30/2015    Sebaceous cyst 8/27/2019      Past Surgical History:   Procedure Laterality Date    COLONOSCOPY N/A 11/1/2016    COLONOSCOPY performed by Kavita Vaughan MD at [address] [place]  [address]    laparoscopic    HX COLONOSCOPY      HX ENDOSCOPY      HX MOHS PROCEDURES Left     HX OTHER SURGICAL  7/30/15    excision of left Axilla Hidradenitis    HX RENAL BIOPSY  2011    HX RENAL TRANSPLANT  04/05/2017    HX UROLOGICAL  2012    KIDNEY BX RIGHT - pt states she has only had one kidney bx as of 10/31/2016    HX UROLOGICAL  2017    transplant    HX VASCULAR ACCESS  1/2013    CHEST CATHETER FOR DIALYSIS    HX VASCULAR ACCESS Right     A-V fistula    HX WISDOM TEETH EXTRACTION       Current Outpatient Medications   Medication Sig Dispense Refill    diclofenac EC (VOLTAREN) 75 mg EC tablet       butalbital-acetaminophen-caffeine (FIORICET, ESGIC) -40 mg per tablet Take 1 Tab by mouth three (3) times daily as needed for Pain. Indications: Tension Headache 60 Tab 1    liraglutide (VICTOZA) 0.6 mg/0.1 mL (18 mg/3 mL) pnij 1.2 mg by SubCUTAneous route daily. 2 Pen 10    metFORMIN ER (GLUCOPHAGE XR) 500 mg tablet Take 1 Tab by mouth two (2) times daily (after meals). 60 Tab 10    fenofibrate nanocrystallized (TRICOR) 145 mg tablet Take 1 Tab by mouth daily. 30 Tab 11    magnesium oxide (MAG-OX) 400 mg tablet Take 1 Tab by mouth daily. 90 Tab 3    traZODone (DESYREL) 100 mg tablet Take 1 Tab by mouth nightly. 30 Tab 3    atorvastatin (LIPITOR) 40 mg tablet Take 1 Tab by mouth daily. Indications: hardening of the arteries due to plaque buildup 90 Tab 3    labetalol (NORMODYNE) 200 mg tablet Take 1 Tab by mouth two (2) times a day. 360 Tab 1    pantoprazole (PROTONIX) 40 mg tablet TAKE ONE TABLET BY MOUTH ONCE DAILY 90 Tab 1    Insulin Needles, Disposable, (SUZY PEN NEEDLE) 32 gauge x 5/32\" ndle USE TO INJECT INSULIN 5 TIMES DAILY 200 Pen Needle 3    FIASP FLEXTOUCH U-100 INSULIN 100 unit/mL (3 mL) inpn As directed for high glucoses and for carbohydrate intake - up to 50 units/day 15 mL 10    PARoxetine mesylate,menop.sym, (BRISDELLE) 7.5 mg cap Take 7.5 Caps by mouth daily. 90 Cap 4    Blood-Glucose Meter, Drum-type (ACCU-CHEK COMPACT PLUS CARE) kit Test blood sugar 4 times daily 1 Kit 0    Blood Sugar Diagnostic, Drum (ACCU-CHEK COMPACT PLUS TEST) strp Test blood sugar 4 times daily 100 Strip 3    insulin detemir U-100 (LEVEMIR FLEXTOUCH) 100 unit/mL (3 mL) inpn Use up to 100 units daily as directed (Patient taking differently: 40 Units by SubCUTAneous route Before breakfast and dinner. Use up to 100 units daily as directed) 30 mL 10    lancets (ONE TOUCH DELICA) 33 gauge misc 4 times daily. Diagnosis code: E11.8 125 Lancet 11    ELIQUIS 5 mg tablet TAKE 1 TABLET BY MOUTH TWICE DAILY  11    albuterol (PROVENTIL HFA, VENTOLIN HFA, PROAIR HFA) 90 mcg/actuation inhaler Take 2 Puffs by inhalation every four (4) hours as needed for Wheezing. 1 Inhaler 3    predniSONE (DELTASONE) 5 mg tablet Take 1 Tab by mouth daily. 11    cinacalcet (SENSIPAR) 30 mg tablet Take 30 mg by mouth daily.  aspirin delayed-release 81 mg tablet Take  by mouth daily.  mycophenolate mofetil (CELLCEPT) 250 mg capsule Take 750 mg by mouth two (2) times a day.  Insulin Syringe-Needle U-100 0.5 mL 31 gauge x 5/16 syrg 1 Each by SubCUTAneous route daily. 100 Syringe 3    tacrolimus (PROGRAF) 1 mg capsule Take 2 mg by mouth two (2) times a day.  Indications: taking 1 mg in am and 2 mg at QHS       Allergies   Allergen Reactions    Latex Shortness of Breath    Keflex [Cephalexin] Nausea and Vomiting     Stomach cramping    Tramadol Itching       Family History   Problem Relation Age of Onset    Hypertension Mother     Hypertension Sister     Diabetes Maternal Grandfather     Cancer Father         UNKNOWN    Hypertension Maternal Grandmother     Diabetes Son     Asthma Son     Cancer Maternal Aunt         UNKNOWN    Anesth Problems Neg Hx      Social History     Tobacco Use    Smoking status: Never Smoker    Smokeless tobacco: Never Used   Substance Use Topics    Alcohol use: No     Alcohol/week: 0.0 standard drinks       Depression Risk Factor Screening:     3 most recent PHQ Screens 8/21/2019   PHQ Not Done -   Little interest or pleasure in doing things Several days   Feeling down, depressed, irritable, or hopeless Several days   Total Score PHQ 2 2       Alcohol Risk Factor Screening:   Do you average 1 drink per night or more than 7 drinks a week:  No    On any one occasion in the past three months have you have had more than 3 drinks containing alcohol:  No      Functional Ability and Level of Safety:   Hearing: Hearing is good. Activities of Daily Living: The home contains: no safety equipment. Patient does total self care    Ambulation: with no difficulty    Fall Risk:  No flowsheet data found.     Abuse Screen:  Patient is not abused    Cognitive Screening   Has your family/caregiver stated any concerns about your memory: no  Cognitive Screening: Normal - Verbal Fluency Test    Patient Care Team   Patient Care Team:  Edmar Choe MD as PCP - General (Internal Medicine)  Edmar Choe MD as PCP - [facility]  Jerolyn Hamman, MD as Physician (Cardiology)  Galen Perez, RN as Nurse Navigator (Cardiology)  Roger Jones MD as Surgeon (General Surgery)  Irma Pillai MD as Consulting Provider (Endocrinology)  Emily Bean NP (Nurse Practitioner)  Elina Olivera MD (Obstetrics & Gynecology)    Assessment/Plan   Education and counseling provided:  Are appropriate based on today's review and evaluation        Health Maintenance Due   Topic Date Due    DTaP/Tdap/Td series (1 - Tdap) 04/26/1978    Pneumococcal 0-64 years (2 of 3 - PCV13) 02/01/2014    Shingrix Vaccine Age 50> (1 of 2) 04/26/2017    Foot Exam Q1  09/10/2019    PAP AKA CERVICAL CYTOLOGY  01/31/2020    Medicare Yearly Exam  03/07/2020   she was hospitalized in [place] with near syncope and fever  Had pelvic abcess  Recent US at transplant a week ago all was well  Off amlodipine since low BP  Diabetes fair to poor  A1c 8 then 10 using victoza and insulin trying with diet to improve  mood ok    Missing prevnar  Due mammogram  Not light headed since off amlodipine  Transplant team is following to see tomorrow    Vitals:    03/09/20 0851   BP: 112/62   Pulse: 90   Temp: 98.3 °F (36.8 °C)   TempSrc: Oral   SpO2: 100%   Weight: 166 lb (75.3 kg)   Height: 5' 3\" (1.6 m)     S1 and S2 normal, no murmurs, clicks, gallops or rubs. Regular rate and rhythm. Chest is clear; no wheezes or rales. No edema or JVD. no abd tenderness  Neuro intact  Mood ok a little withdrawn    1. Medicare annual wellness visit, subsequent  Due vaccines  Due mammogram    2. Encounter for immunization  She turned down    3. Heart failure with preserved left ventricular function (HFpEF) ([facility])  stable    4. Type 2 diabetes mellitus with complication, with long-term current use of insulin ([address]  Fair to poor    Diagnoses and all orders for this visit:    1. History of DVT (deep vein thrombosis)    2. Medicare annual wellness visit, subsequent    3. Encounter for immunization    4. Heart failure with preserved left ventricular function (HFpEF) ([address]    5. Type 2 diabetes mellitus with complication, with long-term current use of insulin ([address]    6. Essential hypertension    7. Dyslipidemia    8.  Cystitis bacillary, chronic      BP controlled  Recurrent infection followed transplant team  Long term anticoagulant with unprovoKed DVT    Requested Prescriptions      No prescriptions requested or ordered in this encounter

## 2020-03-17 RX ORDER — SODIUM CHLORIDE 9 MG/ML
25 INJECTION, SOLUTION INTRAVENOUS CONTINUOUS
Status: DISCONTINUED | OUTPATIENT
Start: 2020-03-25 | End: 2020-03-26 | Stop reason: HOSPADM

## 2020-03-25 ENCOUNTER — HOSPITAL ENCOUNTER (OUTPATIENT)
Dept: INFUSION THERAPY | Age: 55
Discharge: HOME | End: 2020-03-25
Payer: MEDICARE

## 2020-03-25 VITALS
BODY MASS INDEX: 30.11 KG/M2 | RESPIRATION RATE: 16 BRPM | OXYGEN SATURATION: 100 % | HEART RATE: 95 BPM | DIASTOLIC BLOOD PRESSURE: 55 MMHG | TEMPERATURE: 98.9 F | SYSTOLIC BLOOD PRESSURE: 123 MMHG | WEIGHT: 169.97 LBS

## 2020-03-25 PROCEDURE — 96366 THER/PROPH/DIAG IV INF ADDON: CPT

## 2020-03-25 PROCEDURE — 96365 THER/PROPH/DIAG IV INF INIT: CPT

## 2020-03-25 PROCEDURE — 74011250636 HC RX REV CODE- 250/636

## 2020-03-25 RX ORDER — SODIUM CHLORIDE 0.9 % (FLUSH) 0.9 %
5-10 SYRINGE (ML) INJECTION AS NEEDED
Status: DISCONTINUED | OUTPATIENT
Start: 2020-03-25 | End: 2020-03-26 | Stop reason: HOSPADM

## 2020-03-25 RX ADMIN — SODIUM CHLORIDE 300 MG: 900 INJECTION, SOLUTION INTRAVENOUS at 14:09

## 2020-03-25 NOTE — PROGRESS NOTES
[address] [facility] @ [facility] VISIT NOTE    7301 Patient arrives for Venofer Day 1 without acute problems. Please see connect care for complete assessment and education provided. Vital signs stable throughout and prior to discharge, Pt. Tolerated treatment well and discharged without incident. Patient/parent is aware of next [facility] appt on 4/8/2020 and appointment card provided. Medications Verified by Helena Simmons RN via [employer]:  1.  Venofer 300 mg    *Patient monitored for 30 minutes post infusion per [facility]   Patient Vitals for the past 12 hrs:   Temp Pulse Resp BP SpO2   03/25/20 1616 98.9 °F (37.2 °C) 95 16 123/55 --   03/25/20 1543 99 °F (37.2 °C) 94 16 133/75 100 %   03/25/20 1316 98.7 °F (37.1 °C) 94 16 126/76 100 %

## 2020-04-02 ENCOUNTER — TELEPHONE (OUTPATIENT)
Dept: CARDIOLOGY CLINIC | Age: 55
End: 2020-04-02

## 2020-04-02 NOTE — TELEPHONE ENCOUNTER
Spoke to patient and scheduled her a telephone visit with Barbara HEAD 4/2/20 @ 1:30, Follow up but pushed up appointment due to patient having some issues. Patient id verified [phone].

## 2020-04-02 NOTE — TELEPHONE ENCOUNTER
Attempted to reach patient to reschedule 4/24 appointment no answer no voice mail, patient needs to be switched to virtual visit or phone call visit if she calls back and the first available with .

## 2020-04-03 ENCOUNTER — VIRTUAL VISIT (OUTPATIENT)
Dept: CARDIOLOGY CLINIC | Age: 55
End: 2020-04-03

## 2020-04-03 ENCOUNTER — DOCUMENTATION ONLY (OUTPATIENT)
Dept: CARDIOLOGY CLINIC | Age: 55
End: 2020-04-03

## 2020-04-03 VITALS
BODY MASS INDEX: 30.3 KG/M2 | WEIGHT: 171 LBS | RESPIRATION RATE: 16 BRPM | SYSTOLIC BLOOD PRESSURE: 131 MMHG | DIASTOLIC BLOOD PRESSURE: 88 MMHG | HEIGHT: 63 IN

## 2020-04-03 DIAGNOSIS — R07.9 CHEST PAIN, UNSPECIFIED TYPE: Primary | ICD-10-CM

## 2020-04-03 DIAGNOSIS — Z94.0 RENAL TRANSPLANT RECIPIENT: ICD-10-CM

## 2020-04-03 DIAGNOSIS — R06.09 DYSPNEA ON EXERTION: ICD-10-CM

## 2020-04-03 DIAGNOSIS — D50.9 IRON DEFICIENCY ANEMIA, UNSPECIFIED IRON DEFICIENCY ANEMIA TYPE: ICD-10-CM

## 2020-04-03 DIAGNOSIS — I10 ESSENTIAL HYPERTENSION: ICD-10-CM

## 2020-04-03 DIAGNOSIS — E78.5 DYSLIPIDEMIA: ICD-10-CM

## 2020-04-03 RX ORDER — SODIUM CHLORIDE 9 MG/ML
25 INJECTION, SOLUTION INTRAVENOUS CONTINUOUS
Status: DISCONTINUED | OUTPATIENT
Start: 2020-04-08 | End: 2020-04-09 | Stop reason: HOSPADM

## 2020-04-03 NOTE — PROGRESS NOTES
4/3/2020 at 2:27 appointment scheduled with patient for phone visit only Raven HEAD NP on 5/7/2020 at 3:00

## 2020-04-03 NOTE — PROGRESS NOTES
TELEPHONE VISIT DOCUMENTATION     Pursuant to the emergency declaration under the [address], 1135 waiver authority and the [employer] Act, this Telephone Visit was conducted, with patient's consent, to reduce the patient's risk of exposure to COVID-19 and provide continuity of care for an established patient. She and/or health care decision maker is aware that that she may receive a bill for this telephone service, depending on her insurance coverage, and has provided verbal consent to proceed. This is a Patient Initiated Episode with an Established Patient who has not had a related appointment within my department in the past 7 days or scheduled within the next 24 hours. ASSESSMENT & PLAN:      1. Overweight - Body mass index is 30.29 kg/m². encouraged regular exercise    2. HTN- malignant, well controlled post renal transplant on labetalol 200mg BID   3. Dyslipidemia - on atorvastatin 40mg daily, will request lipid results from Dr. Raven Johnsw office  4. Diabetes Mellitus - on insulin and oral agents per PCP   5. Mitral Regurgitation - mild by TTE 9/19    6. Stage V CKD s/p right renal transplant 4/5/17 - on cellcept, prograf, prednisone, followed by Dr. Juliano Reza, Dr. Kaylen Martinez and Dr. Giancarlo Guevara  7. CAD prevention- on ASA 81mg daily and atorvastatin, currently having chest pain but not exertional, possibly related to anemia, will reassess by phone visit in 1 month    8. Vitamin D deficiency - not on supplementation   9. Right leg DVT - off coumadin now  10. Hx of gastritis/esophageal stricture  - on protonix, last EGD and colonoscopy showed diverticulosis, gastritis without hemorrhage and her esophagus was dilated, followed by Dr. John Edwards  11.   Anemia - sounds like iron deficiency anemia per patient report, getting iron infusions from nephrology, will request Dr. Zacarias Harrison last office note and her last CBC results for review, discussed how significant anemia can cause chest pain and dyspnea, will reassess by telephone visit in 1 month     **3/2/20 - creatinine 1.3, baseline 1.3 - 1.5  norvasc stopped by transplant team for hypotension     9/19 TTE - EF 65%, no WMA, mild MR  5/18 TTE - normal LVEF, mild MR   5/17 TTE - normal LVEF, mod MR   2/16 TTE - LVEF 55 % to 60 %, no WMA, grade 1 dd, mod MR  3/14 TTE LVEF 55 % to 60 %, no WMA, mild cLVH, mild to mod MR  1/13 TTE LVEF 55%, no WMA, moderate increased wall thickness, grade 1 diastolic dysfunction, moderate MR     Soc no tob, no etoh  Fhx no early CAD    We discussed the expected course, resolution and complications of the diagnosis(es) in detail. Medication risks, benefits, costs, interactions, and alternatives were discussed as indicated. I advised her to contact the office if her condition worsens, changes or fails to improve as anticipated. She expressed understanding with the diagnosis(es) and plan    Total Time: minutes: 11-20 minutes    HISTORY OF PRESENTING ILLNESS      Maggie Kelsey is a 48 y.o. female evaluated via telephone on 4/3/2020 due to COVID 19 restrictions. Patient unable to participate in Virtual Visit with synchronous audio/visual technology.       She has been having some chest pain when pulling on something or with reclining  Chest pain sometimes improves with sitting up  She can also get chest pain with activity, usually pain is midsternal   She has been having some dyspnea with exertion recently too, says BP has been well controlled  She reports some mild palpitations with stairs but episodes are brief, no high risk features   No fevers or chills  She reports that she is now getting iron infusions from nephrology   Has occasional lightheadedness with standing up but no syncope   Says her blood sugars stay around 200  Says Dr. Emir Sheets checks her lipids    **Labs dated 10/29/19 received after patient's visit  , LDL 69, HDL 68      PAST MEDICAL HISTORY     Past Medical History:   Diagnosis Date    Anemia     Arthritis     Asthma 4/8/2014    Rx for same    Beta blocker therapy     Chronic kidney disease     ESRD - dialysis MWF    Diabetes ([facility] [address] 28yo    Rx to control    Dialysis patient ([facility] [address]     M-W-F    GERD (gastroesophageal reflux disease)     Headache(784.0)     Hepatitis B infection     Hypertension     Rx for same    Pre-kidney transplant, listed 4/30/2015    Sebaceous cyst 8/27/2019           PAST SURGICAL HISTORY     Past Surgical History:   Procedure Laterality Date    COLONOSCOPY N/A 11/1/2016    COLONOSCOPY performed by Eliel Caballero MD at [address] [place] [address] [facility]    laparoscopic    HX COLONOSCOPY      HX ENDOSCOPY      HX MOHS PROCEDURES Left     HX OTHER SURGICAL  7/30/15    excision of left Axilla Hidradenitis    HX RENAL BIOPSY  2011    HX RENAL TRANSPLANT  04/05/2017    HX UROLOGICAL  2012    KIDNEY BX RIGHT - pt states she has only had one kidney bx as of 10/31/2016    HX UROLOGICAL  2017    transplant    HX VASCULAR ACCESS  1/2013    CHEST CATHETER FOR DIALYSIS    HX VASCULAR ACCESS Right     A-V fistula    HX WISDOM TEETH EXTRACTION            ALLERGIES     Allergies   Allergen Reactions    Latex Shortness of Breath    Keflex [Cephalexin] Nausea and Vomiting     Stomach cramping    Tramadol Itching          FAMILY HISTORY     Family History   Problem Relation Age of Onset    Hypertension Mother     Hypertension Sister     Diabetes Maternal Grandfather     Cancer Father         UNKNOWN    Hypertension Maternal Grandmother     Diabetes Son     Asthma Son     Cancer Maternal Aunt         UNKNOWN    Anesth Problems Neg Hx            SOCIAL HISTORY     Social History     Socioeconomic History    Marital status: SINGLE     Spouse name: Not on file    Number of children: Not on file    Years of education: Not on file    Highest education level: Not on file   Tobacco Use    Smoking status: Never Smoker    Smokeless tobacco: Never Used   Substance and Sexual Activity    Alcohol use: No     Alcohol/week: 0.0 standard drinks    Drug use: No    Sexual activity: Not Currently     Birth control/protection: None         MEDICATIONS     Current Outpatient Medications   Medication Sig    diclofenac EC (VOLTAREN) 75 mg EC tablet     butalbital-acetaminophen-caffeine (FIORICET, ESGIC) -40 mg per tablet Take 1 Tab by mouth three (3) times daily as needed for Pain. Indications: Tension Headache    liraglutide (VICTOZA) 0.6 mg/0.1 mL (18 mg/3 mL) pnij 1.2 mg by SubCUTAneous route daily.  metFORMIN ER (GLUCOPHAGE XR) 500 mg tablet Take 1 Tab by mouth two (2) times daily (after meals).  fenofibrate nanocrystallized (TRICOR) 145 mg tablet Take 1 Tab by mouth daily.  magnesium oxide (MAG-OX) 400 mg tablet Take 1 Tab by mouth daily.  traZODone (DESYREL) 100 mg tablet Take 1 Tab by mouth nightly.  atorvastatin (LIPITOR) 40 mg tablet Take 1 Tab by mouth daily. Indications: hardening of the arteries due to plaque buildup    labetalol (NORMODYNE) 200 mg tablet Take 1 Tab by mouth two (2) times a day.  pantoprazole (PROTONIX) 40 mg tablet TAKE ONE TABLET BY MOUTH ONCE DAILY    Insulin Needles, Disposable, (SUZY PEN NEEDLE) 32 gauge x 5/32\" ndle USE TO INJECT INSULIN 5 TIMES DAILY    FIASP FLEXTOUCH U-100 INSULIN 100 unit/mL (3 mL) inpn As directed for high glucoses and for carbohydrate intake - up to 50 units/day    PARoxetine mesylate,menop.sym, (BRISDELLE) 7.5 mg cap Take 7.5 Caps by mouth daily.     Blood-Glucose Meter, Drum-type (ACCU-CHEK COMPACT PLUS CARE) kit Test blood sugar 4 times daily    Blood Sugar Diagnostic, Drum (ACCU-CHEK COMPACT PLUS TEST) strp Test blood sugar 4 times daily    insulin detemir U-100 (LEVEMIR FLEXTOUCH) 100 unit/mL (3 mL) inpn Use up to 100 units daily as directed (Patient taking differently: 40 Units by SubCUTAneous route Before breakfast and dinner. Use up to 100 units daily as directed)    lancets (ONE TOUCH DELICA) 33 gauge misc 4 times daily. Diagnosis code: E11.8    albuterol (PROVENTIL HFA, VENTOLIN HFA, PROAIR HFA) 90 mcg/actuation inhaler Take 2 Puffs by inhalation every four (4) hours as needed for Wheezing.  predniSONE (DELTASONE) 5 mg tablet Take 1 Tab by mouth daily.  cinacalcet (SENSIPAR) 30 mg tablet Take 30 mg by mouth daily.  aspirin delayed-release 81 mg tablet Take  by mouth daily.  mycophenolate mofetil (CELLCEPT) 250 mg capsule Take 750 mg by mouth two (2) times a day.  Insulin Syringe-Needle U-100 0.5 mL 31 gauge x 5/16 syrg 1 Each by SubCUTAneous route daily.  tacrolimus (PROGRAF) 1 mg capsule Take 2 mg by mouth two (2) times a day. Indications: taking 1 mg in am and 2 mg at QHS     No current facility-administered medications for this visit. Facility-Administered Medications Ordered in Other Visits   Medication Dose Route Frequency    [START ON 4/8/2020] iron sucrose (VENOFER) 300 mg in 0.9% sodium chloride 250 mL IVPB, 25 ml overfill  300 mg IntraVENous ONCE    [START ON 4/8/2020] 0.9% sodium chloride infusion  25 mL/hr IntraVENous CONTINUOUS       I have reviewed the vitals, medical history, surgical history, allergies, family history, social history and medications. REVIEW OF SYMPTOMS     Constitutional: Negative for fever, chills, malaise/fatigue and diaphoresis. Respiratory: Negative for cough, sputum production, wheezing. Cardiovascular: Negative for palpitations, orthopnea, claudication, leg swelling and PND. Gastrointestinal: Negative for heartburn, nausea, vomiting, blood in stool   Musculoskeletal: Negative for joint pain and back pain. Skin: Negative for rash. Neurological: Negative for focal weakness, loss of consciousness, weakness and headaches. Endo/Heme/Allergies: Negative for abnormal bleeding.     Psychiatric/Behavioral: Negative for memory loss. DIAGNOSTIC DATA      No specialty comments available. LABORATORY DATA      Lab Results   Component Value Date/Time    WBC 5.1 12/05/2016 08:29 PM    Hemoglobin (POC) 8.8 (L) 04/09/2013 08:16 AM    HGB 8.9 (L) 12/05/2016 08:29 PM    Hematocrit (POC) 26 (L) 04/09/2013 08:16 AM    HCT 27.2 (L) 12/05/2016 08:29 PM    PLATELET 178 96/17/2110 08:29 PM    MCV 89.2 12/05/2016 08:29 PM      Lab Results   Component Value Date/Time    Sodium 142 12/05/2016 08:29 PM    Potassium 4.1 12/05/2016 08:29 PM    Chloride 101 12/05/2016 08:29 PM    CO2 32 12/05/2016 08:29 PM    Anion gap 9 12/05/2016 08:29 PM    Glucose 101 (H) 12/05/2016 08:29 PM    BUN 25 (H) 12/05/2016 08:29 PM    Creatinine 5.76 (H) 12/05/2016 08:29 PM    BUN/Creatinine ratio 4 (L) 12/05/2016 08:29 PM    GFR est AA 9 (L) 12/05/2016 08:29 PM    GFR est non-AA 8 (L) 12/05/2016 08:29 PM    Calcium 9.5 12/05/2016 08:29 PM    Bilirubin, total 0.4 12/05/2016 08:29 PM    AST (SGOT) 34 12/05/2016 08:29 PM    Alk.  phosphatase 101 12/05/2016 08:29 PM    Protein, total 7.7 12/05/2016 08:29 PM    Albumin 3.7 12/05/2016 08:29 PM    Globulin 4.0 12/05/2016 08:29 PM    A-G Ratio 0.9 (L) 12/05/2016 08:29 PM    ALT (SGPT) 20 12/05/2016 08:29 PM        Briana Pugh NP    [address]  [address] [facility], [address] [place] [address] [address]  Sparkle Lucas  [phone] / [phone]

## 2020-04-03 NOTE — PROGRESS NOTES
Qamar Davila - please call to set her up for a phone call only visit with me in 1 month (afternoon please)    Misha Simmons - please call Dr. Mei Russell office and get her last CBC and his last office note, sounds like she is getting iron infusions for anemia  Please call her PCP Dr. Ayaz Bo to get her last A1c and lipid results too    Thanks

## 2020-04-08 ENCOUNTER — TELEPHONE (OUTPATIENT)
Dept: CARDIOLOGY CLINIC | Age: 55
End: 2020-04-08

## 2020-04-08 ENCOUNTER — HOSPITAL ENCOUNTER (OUTPATIENT)
Dept: INFUSION THERAPY | Age: 55
Discharge: HOME | End: 2020-04-08
Payer: MEDICARE

## 2020-04-08 VITALS
HEART RATE: 89 BPM | OXYGEN SATURATION: 100 % | DIASTOLIC BLOOD PRESSURE: 71 MMHG | SYSTOLIC BLOOD PRESSURE: 133 MMHG | RESPIRATION RATE: 16 BRPM | TEMPERATURE: 98.3 F

## 2020-04-08 PROCEDURE — 74011250636 HC RX REV CODE- 250/636: Performed by: INTERNAL MEDICINE

## 2020-04-08 PROCEDURE — 96365 THER/PROPH/DIAG IV INF INIT: CPT

## 2020-04-08 RX ORDER — SODIUM CHLORIDE 0.9 % (FLUSH) 0.9 %
5-10 SYRINGE (ML) INJECTION AS NEEDED
Status: DISCONTINUED | OUTPATIENT
Start: 2020-04-08 | End: 2020-04-09 | Stop reason: HOSPADM

## 2020-04-08 RX ADMIN — SODIUM CHLORIDE 25 ML/HR: 900 INJECTION, SOLUTION INTRAVENOUS at 08:13

## 2020-04-08 RX ADMIN — IRON SUCROSE 300 MG: 20 INJECTION, SOLUTION INTRAVENOUS at 08:49

## 2020-04-08 RX ADMIN — Medication 10 ML: at 08:13

## 2020-04-08 NOTE — TELEPHONE ENCOUNTER
Endo was Dr. Myra Gipson. She has an upcoming appt with Dr. Yisel Winters. Last office note in [address] [place] [address] 12/19    Future Appointments   Date Time Provider Trisha Pinedai   4/22/2020  8:00 AM A3 PEDS [facility]   5/7/2020  3:00 PM Breanna Hughes  [address]   5/13/2020  2:50 PM Alexa Sequeira MD RDE [address]   9/29/2020  8:00 AM [facility], [address]   9/29/2020  9:00 AM Joseline Stack  [address]     Lipids and HgbA1c are in CC under Media.

## 2020-04-17 RX ORDER — SODIUM CHLORIDE 9 MG/ML
25 INJECTION, SOLUTION INTRAVENOUS CONTINUOUS
Status: DISPENSED | OUTPATIENT
Start: 2020-04-22 | End: 2020-04-22

## 2020-04-22 ENCOUNTER — HOSPITAL ENCOUNTER (OUTPATIENT)
Dept: INFUSION THERAPY | Age: 55
Discharge: HOME | End: 2020-04-22
Payer: MEDICARE

## 2020-04-22 VITALS
HEART RATE: 92 BPM | DIASTOLIC BLOOD PRESSURE: 78 MMHG | RESPIRATION RATE: 18 BRPM | OXYGEN SATURATION: 99 % | SYSTOLIC BLOOD PRESSURE: 144 MMHG | TEMPERATURE: 98.3 F

## 2020-04-22 PROCEDURE — 74011250636 HC RX REV CODE- 250/636: Performed by: INTERNAL MEDICINE

## 2020-04-22 PROCEDURE — 96365 THER/PROPH/DIAG IV INF INIT: CPT

## 2020-04-22 PROCEDURE — 96366 THER/PROPH/DIAG IV INF ADDON: CPT

## 2020-04-22 RX ORDER — SODIUM CHLORIDE 0.9 % (FLUSH) 0.9 %
10 SYRINGE (ML) INJECTION AS NEEDED
Status: DISCONTINUED | OUTPATIENT
Start: 2020-04-22 | End: 2020-04-23 | Stop reason: HOSPADM

## 2020-04-22 RX ADMIN — Medication 10 ML: at 08:20

## 2020-04-22 RX ADMIN — SODIUM CHLORIDE 400 MG: 900 INJECTION, SOLUTION INTRAVENOUS at 08:44

## 2020-04-22 RX ADMIN — Medication 10 ML: at 08:25

## 2020-04-22 RX ADMIN — SODIUM CHLORIDE 25 ML/HR: 900 INJECTION, SOLUTION INTRAVENOUS at 08:26

## 2020-04-22 NOTE — PROGRESS NOTES
[address] @ [facility] VISIT NOTE     9960 Patient arrives for Venofer Infusion Day 28 without acute problems. Please see connect care for complete assessment and education provided. Vital signs stable throughout and prior to discharge, Pt. Tolerated treatment well and discharged without incident. Patient is aware of no further [facility] appointments @ this time & to follow up with Healthcare provider for next appointment. Patient refused to wait 30 minutes post infusion for observation as recommended & denies any adverse reactions @ this time. Medications Verified by Erin Snyder RN via [employer]:  1. Venofer 400 mg IV (over 150 mins)  2.  NS Sri 49  Patient Vitals for the past 12 hrs:   Temp Pulse Resp BP SpO2   04/22/20 1123 98.3 °F (36.8 °C) 92 18 144/78 99 %   04/22/20 0810 98.5 °F (36.9 °C) 87 16 111/55 100 %

## 2020-04-28 RX ORDER — INSULIN ASPART INJECTION 100 [IU]/ML
INJECTION, SOLUTION SUBCUTANEOUS
Qty: 15 ML | Refills: 6 | Status: SHIPPED | OUTPATIENT
Start: 2020-04-28 | End: 2020-11-05

## 2020-05-04 ENCOUNTER — DOCUMENTATION ONLY (OUTPATIENT)
Dept: CARDIOLOGY CLINIC | Age: 55
End: 2020-05-04

## 2020-05-07 ENCOUNTER — TELEPHONE (OUTPATIENT)
Dept: CARDIOLOGY CLINIC | Age: 55
End: 2020-05-07

## 2020-05-07 ENCOUNTER — VIRTUAL VISIT (OUTPATIENT)
Dept: CARDIOLOGY CLINIC | Age: 55
End: 2020-05-07

## 2020-05-07 VITALS
SYSTOLIC BLOOD PRESSURE: 110 MMHG | HEART RATE: 97 BPM | DIASTOLIC BLOOD PRESSURE: 72 MMHG | WEIGHT: 178.8 LBS | BODY MASS INDEX: 31.68 KG/M2 | HEIGHT: 63 IN

## 2020-05-07 DIAGNOSIS — I34.0 MITRAL VALVE INSUFFICIENCY, UNSPECIFIED ETIOLOGY: ICD-10-CM

## 2020-05-07 DIAGNOSIS — I10 ESSENTIAL HYPERTENSION: ICD-10-CM

## 2020-05-07 DIAGNOSIS — R07.9 CHEST PAIN, UNSPECIFIED TYPE: Primary | ICD-10-CM

## 2020-05-07 DIAGNOSIS — E78.5 DYSLIPIDEMIA: ICD-10-CM

## 2020-05-07 DIAGNOSIS — R06.09 DYSPNEA ON EXERTION: ICD-10-CM

## 2020-05-07 NOTE — PROGRESS NOTES
TELEPHONE VISIT DOCUMENTATION     Pursuant to the emergency declaration under the [address], 1135 waiver authority and the [employer] and [employer] Act, this Telephone Visit was conducted, with patient's consent, to reduce the patient's risk of exposure to COVID-19 and provide continuity of care for an established patient. She and/or health care decision maker is aware that that she may receive a bill for this telephone service, depending on her insurance coverage, and has provided verbal consent to proceed. This is a Patient Initiated Episode with an Established Patient who has not had a related appointment within my department in the past 7 days or scheduled within the next 24 hours. ASSESSMENT & PLAN:      1. Overweight - Body mass index is 31.67 kg/m². encouraged regular exercise    2. HTN- malignant, well controlled post renal transplant on labetalol 200mg BID   3. Dyslipidemia - on atorvastatin 40mg daily per PCP, LDL 69 in 10/19  4. Diabetes Mellitus - on insulin and oral agents per PCP   5. Mitral Regurgitation - mild by TTE 9/19, has repeat TTE scheduled for 9/20    6. Stage V CKD s/p right renal transplant 4/5/17 - on cellcept, prograf, prednisone, followed by Dr. Hoa Chan, Dr. Mirlande Higgins and Dr. Terrell Short  -baseline creatinine now 1.3 - 1.5  7. CAD prevention- on ASA 81mg daily and atorvastatin, no longer having chest pain after treatment for iron deficiency anemia   8. Vitamin D deficiency - not on supplementation   9. Right leg DVT - off coumadin now  10. Hx of gastritis/esophageal stricture  - on protonix, last EGD and colonoscopy showed diverticulosis, gastritis without hemorrhage and her esophagus was dilated, followed by Dr. Julien Benjamin  11. Iron deficiency anemia - done with iron transfusions currently and feeling better, followed by nephrology   12.   Shortness of breath, cough, wheezing, sinus congestion - advised her to call Dr. Prachi Cano office to discuss, no fevers or chills       9/19 TTE - EF 65%, no WMA, mild MR  5/18 TTE - normal LVEF, mild MR   5/17 TTE - normal LVEF, mod MR   2/16 TTE - LVEF 55 % to 60 %, no WMA, grade 1 dd, mod MR  3/14 TTE LVEF 55 % to 60 %, no WMA, mild cLVH, mild to mod MR  1/13 TTE LVEF 55%, no WMA, moderate increased wall thickness, grade 1 diastolic dysfunction, moderate MR     Soc no tob, no etoh  Fhx no early CAD    We discussed the expected course, resolution and complications of the diagnosis(es) in detail. Medication risks, benefits, costs, interactions, and alternatives were discussed as indicated. I advised her to contact the office if her condition worsens, changes or fails to improve as anticipated. She expressed understanding with the diagnosis(es) and plan    Total Time: minutes: 5-10 minutes    HISTORY OF PRESENTING ILLNESS      Mine Alexander is a 50 y.o. female evaluated via telephone on 5/7/2020 due to COVID 19 restrictions. Patient unable to participate in Virtual Visit with synchronous audio/visual technology.       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 Jennifer Tanner     Past Medical History:   Diagnosis Date    Anemia     Arthritis     Asthma 4/8/2014    Rx for same    Beta blocker therapy     Chronic kidney disease     ESRD - dialysis [facility]    Diabetes ([facility] [address] 28yo    Rx to control    Dialysis patient [facility])     M-W-F    GERD (gastroesophageal reflux disease)     Headache(784.0)     Hepatitis B infection     Hypertension     Rx for same    Pre-kidney transplant, listed 4/30/2015    Sebaceous cyst 8/27/2019           PAST SURGICAL HISTORY     Past Surgical History:   Procedure Laterality Date    COLONOSCOPY N/A 11/1/2016    COLONOSCOPY performed by Arash Forde MD at [address]    laparoscopic    HX COLONOSCOPY      HX ENDOSCOPY      HX MOHS PROCEDURES Left     HX OTHER SURGICAL  7/30/15    excision of left Axilla Hidradenitis    HX RENAL BIOPSY  2011    HX RENAL TRANSPLANT  04/05/2017    HX UROLOGICAL  2012    KIDNEY BX RIGHT - pt states she has only had one kidney bx as of 10/31/2016    HX UROLOGICAL  2017    transplant    HX VASCULAR ACCESS  1/2013    CHEST CATHETER FOR DIALYSIS    HX VASCULAR ACCESS Right     A-V fistula    HX WISDOM TEETH EXTRACTION            ALLERGIES     Allergies   Allergen Reactions    Latex Shortness of Breath    Keflex [Cephalexin] Nausea and Vomiting     Stomach cramping    Tramadol Itching          FAMILY HISTORY     Family History   Problem Relation Age of Onset    Hypertension Mother     Hypertension Sister     Diabetes Maternal Grandfather     Cancer Father         UNKNOWN    Hypertension Maternal Grandmother     Diabetes Son     Asthma Son     Cancer Maternal Aunt         UNKNOWN    Anesth Problems Neg Hx            SOCIAL HISTORY     Social History     Socioeconomic History    Marital status: SINGLE     Spouse name: Not on file    Number of children: Not on file    Years of education: Not on file    Highest education level: Not on file   Tobacco Use    Smoking status: Never Smoker    Smokeless tobacco: Never Used   Substance and Sexual Activity    Alcohol use: No     Alcohol/week: 0.0 standard drinks    Drug use: No    Sexual activity: Not Currently     Birth control/protection: None         MEDICATIONS     Current Outpatient Medications   Medication Sig    insulin aspart, niacinamide, (Fiasp FlexTouch U-100 Insulin) 100 unit/mL (3 mL) inpn AS DIRECTED FOR high glucoses AND FOR carbohydrate intake UP TO 50 units/day    PARoxetine mesylate,menop.sym, 7.5 mg cap Take 7.5 mg by mouth daily.  diclofenac EC (VOLTAREN) 75 mg EC tablet     butalbital-acetaminophen-caffeine (FIORICET, ESGIC) -40 mg per tablet Take 1 Tab by mouth three (3) times daily as needed for Pain. Indications: Tension Headache    liraglutide (VICTOZA) 0.6 mg/0.1 mL (18 mg/3 mL) pnij 1.2 mg by SubCUTAneous route daily.  metFORMIN ER (GLUCOPHAGE XR) 500 mg tablet Take 1 Tab by mouth two (2) times daily (after meals).  fenofibrate nanocrystallized (TRICOR) 145 mg tablet Take 1 Tab by mouth daily.  magnesium oxide (MAG-OX) 400 mg tablet Take 1 Tab by mouth daily. (Patient taking differently: Take 800 mg by mouth two (2) times a day.)    traZODone (DESYREL) 100 mg tablet Take 1 Tab by mouth nightly.  atorvastatin (LIPITOR) 40 mg tablet Take 1 Tab by mouth daily. Indications: hardening of the arteries due to plaque buildup    labetalol (NORMODYNE) 200 mg tablet Take 1 Tab by mouth two (2) times a day.  pantoprazole (PROTONIX) 40 mg tablet TAKE ONE TABLET BY MOUTH ONCE DAILY    Insulin Needles, Disposable, (SUZY PEN NEEDLE) 32 gauge x 5/32\" ndle USE TO INJECT INSULIN 5 TIMES DAILY    Blood-Glucose Meter, Drum-type (ACCU-CHEK COMPACT PLUS CARE) kit Test blood sugar 4 times daily    Blood Sugar Diagnostic, Drum (ACCU-CHEK COMPACT PLUS TEST) strp Test blood sugar 4 times daily    insulin detemir U-100 (LEVEMIR FLEXTOUCH) 100 unit/mL (3 mL) inpn Use up to 100 units daily as directed (Patient taking differently: 40 Units by SubCUTAneous route Before breakfast and dinner. Use up to 100 units daily as directed)    lancets (ONE TOUCH DELICA) 33 gauge misc 4 times daily.  Diagnosis code: E11.8    albuterol (PROVENTIL HFA, VENTOLIN HFA, PROAIR HFA) 90 mcg/actuation inhaler Take 2 Puffs by inhalation every four (4) hours as needed for Wheezing.  predniSONE (DELTASONE) 5 mg tablet Take 1 Tab by mouth daily.  cinacalcet (SENSIPAR) 30 mg tablet Take 30 mg by mouth daily.  aspirin delayed-release 81 mg tablet Take  by mouth daily.  mycophenolate mofetil (CELLCEPT) 250 mg capsule Take 750 mg by mouth two (2) times a day.  Insulin Syringe-Needle U-100 0.5 mL 31 gauge x 5/16 syrg 1 Each by SubCUTAneous route daily.  tacrolimus (PROGRAF) 1 mg capsule Take 2 mg by mouth. Indications: taking 1 mg in am and 2 mg at QHS     No current facility-administered medications for this visit. I have reviewed the vitals, medical history, surgical history, allergies, family history, social history and medications. REVIEW OF SYMPTOMS     Constitutional: Negative for fever, chills, malaise/fatigue and diaphoresis. Respiratory: Negative for sputum production  Cardiovascular: Negative for chest pain, orthopnea, claudication, leg swelling and PND. Gastrointestinal: Negative for heartburn, nausea, vomiting, blood in stool   Musculoskeletal: Negative for joint pain and back pain. Skin: Negative for rash. Neurological: Negative for focal weakness, loss of consciousness, weakness and headaches. Endo/Heme/Allergies: Negative for abnormal bleeding. Psychiatric/Behavioral: Negative for memory loss.         DIAGNOSTIC DATA      See above      LABORATORY DATA      Lab Results   Component Value Date/Time    WBC 5.1 12/05/2016 08:29 PM    Hemoglobin (POC) 8.8 (L) 04/09/2013 08:16 AM    HGB 8.9 (L) 12/05/2016 08:29 PM    Hematocrit (POC) 26 (L) 04/09/2013 08:16 AM    HCT 27.2 (L) 12/05/2016 08:29 PM    PLATELET 714 90/46/6451 08:29 PM    MCV 89.2 12/05/2016 08:29 PM      Lab Results   Component Value Date/Time    Sodium 142 12/05/2016 08:29 PM    Potassium 4.1 12/05/2016 08:29 PM    Chloride 101 12/05/2016 08:29 PM    CO2 32 12/05/2016 08:29 PM    Anion gap 9 12/05/2016 08:29 PM    Glucose 101 (H) 12/05/2016 08:29 PM    BUN 25 (H) 12/05/2016 08:29 PM    Creatinine 5.76 (H) 12/05/2016 08:29 PM    BUN/Creatinine ratio 4 (L) 12/05/2016 08:29 PM    GFR est AA 9 (L) 12/05/2016 08:29 PM    GFR est non-AA 8 (L) 12/05/2016 08:29 PM    Calcium 9.5 12/05/2016 08:29 PM    Bilirubin, total 0.4 12/05/2016 08:29 PM    AST (SGOT) 34 12/05/2016 08:29 PM    Alk.  phosphatase 101 12/05/2016 08:29 PM    Protein, total 7.7 12/05/2016 08:29 PM    Albumin 3.7 12/05/2016 08:29 PM    Globulin 4.0 12/05/2016 08:29 PM    A-G Ratio 0.9 (L) 12/05/2016 08:29 PM    ALT (SGPT) 20 12/05/2016 08:29 PM        Judy Garcia NP    [address]  [address] [facility], [address] [facility] [place] [address]  Sparkle Lucas  [phone] / [phone]

## 2020-05-09 RX ORDER — PEN NEEDLE, DIABETIC 31 GX3/16"
NEEDLE, DISPOSABLE MISCELLANEOUS
Qty: 500 PEN NEEDLE | Refills: 3 | Status: SHIPPED | OUTPATIENT
Start: 2020-05-09 | End: 2021-05-10

## 2020-05-11 ENCOUNTER — VIRTUAL VISIT (OUTPATIENT)
Dept: INTERNAL MEDICINE CLINIC | Age: 55
End: 2020-05-11

## 2020-05-11 VITALS
DIASTOLIC BLOOD PRESSURE: 75 MMHG | HEIGHT: 63 IN | TEMPERATURE: 98.6 F | WEIGHT: 173 LBS | SYSTOLIC BLOOD PRESSURE: 116 MMHG | BODY MASS INDEX: 30.65 KG/M2

## 2020-05-11 DIAGNOSIS — J30.1 SEASONAL ALLERGIC RHINITIS DUE TO POLLEN: ICD-10-CM

## 2020-05-11 DIAGNOSIS — Z99.2 TYPE 1 DIABETES MELLITUS WITH CHRONIC KIDNEY DISEASE ON CHRONIC DIALYSIS (HCC): ICD-10-CM

## 2020-05-11 DIAGNOSIS — N18.6 TYPE 1 DIABETES MELLITUS WITH CHRONIC KIDNEY DISEASE ON CHRONIC DIALYSIS (HCC): ICD-10-CM

## 2020-05-11 DIAGNOSIS — Z71.89 ADVICE GIVEN ABOUT COVID-19 VIRUS BY TELEPHONE: ICD-10-CM

## 2020-05-11 DIAGNOSIS — F45.41 STRESS HEADACHE: Primary | ICD-10-CM

## 2020-05-11 DIAGNOSIS — E10.22 TYPE 1 DIABETES MELLITUS WITH CHRONIC KIDNEY DISEASE ON CHRONIC DIALYSIS (HCC): ICD-10-CM

## 2020-05-11 RX ORDER — SODIUM PHOSPHATE, DIBASIC, ANHYDROUS, POTASSIUM PHOSPHATE, MONOBASIC, AND SODIUM PHOSPHATE, MONOBASIC, MONOHYDRATE 852; 155; 130 MG/1; MG/1; MG/1
TABLET, COATED ORAL
COMMUNITY
Start: 2020-04-24

## 2020-05-11 RX ORDER — TERBINAFINE HYDROCHLORIDE 250 MG/1
TABLET ORAL
COMMUNITY
Start: 2020-04-10 | End: 2021-04-22

## 2020-05-11 NOTE — PROGRESS NOTES
This was a virtual visit today. This is a transplant patient with hypertension, diabetes, and kidney transplant. She says she actually had a visit with the transplant team this morning. Last week she had a cough. The cough is a little bit less. She has had no fever or shortness of breath. She has had a little bit of runny nose and sneezing. She has had a headache, however, too, daily that has been going on for a week or 2. She has had a lot of stress related to her son who is a diabetic on dialysis with peripheral vascular disease. She has got other issues going on in her life too. She does not sleep that well. She takes Fioricet, but she takes maybe 2 a week. She takes some Tylenol during the day for other headaches. She has been feeling a little bit better. The transplant team told her to try Claritin for the cough as they thought it was probably allergies. She had no fever and her lungs were clear. She was happy her blood pressure at transplant was normal and she was afebrile. She looked well. She did not look ill at all, and she was alert and oriented sitting in her car in the parking lot. I advised to not overdue headache medications. I talked with her in some depth about rebound headaches. Also suggested that some of her anxiety and stress was related to DKTKH-12 when I put in licensed clinical  consult. I told her I agreed with taking Claritin 10 mg daily for allergy symptoms to see if that reduces her cough and her drainage and also see if it helps cut back a little bit on headaches. THIS IS A VIRTUAL VISIT USING DOXY. ME SOFTWARE    Patient Active Problem List    Diagnosis    Cystitis bacillary, chronic    History of DVT (deep vein thrombosis)    Sebaceous cyst    Type 2 diabetes mellitus without complication, with long-term current use of insulin ([facility] [address]    Renal transplant recipient    Dyslipidemia    Mitral regurgitation    Asthma    Depression    Anemia    Hyperkalemia    [facility] mellitus) [facility]    Diabetes mellitus ([facility]    Hypertension    Kidney disease    Migraines     1. Stress headache  Less tylenol and fioricet  counseling  - REFERRAL TO SOCIAL WORK    2. Seasonal allergic rhinitis due to pollen  claritin trial    3. Advice given about COVID-19 virus by telephone    - REFERRAL TO SOCIAL WORK    4.  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

## 2020-05-11 NOTE — PROGRESS NOTES
Patient c/o headache for about 1 week on and off. Migraine medication helps. Patient states headaches more constant, questioning sinus. Patient saw today kidney doctor, recommended Claritin or nasal spray.

## 2020-05-13 ENCOUNTER — VIRTUAL VISIT (OUTPATIENT)
Dept: ENDOCRINOLOGY | Age: 55
End: 2020-05-13

## 2020-05-13 DIAGNOSIS — Z79.4 TYPE 2 DIABETES MELLITUS WITH COMPLICATION, WITH LONG-TERM CURRENT USE OF INSULIN (HCC): Primary | ICD-10-CM

## 2020-05-13 DIAGNOSIS — E78.5 DYSLIPIDEMIA: ICD-10-CM

## 2020-05-13 DIAGNOSIS — I10 ESSENTIAL HYPERTENSION: ICD-10-CM

## 2020-05-13 DIAGNOSIS — E11.8 TYPE 2 DIABETES MELLITUS WITH COMPLICATION, WITH LONG-TERM CURRENT USE OF INSULIN (HCC): Primary | ICD-10-CM

## 2020-05-13 RX ORDER — FLASH GLUCOSE SCANNING READER
EACH MISCELLANEOUS
Qty: 1 EACH | Refills: 0 | Status: SHIPPED | OUTPATIENT
Start: 2020-05-13 | End: 2021-04-22 | Stop reason: ALTCHOICE

## 2020-05-13 RX ORDER — LABETALOL 200 MG/1
200 TABLET, FILM COATED ORAL
Qty: 360 TAB | Refills: 1 | COMMUNITY
Start: 2020-05-13

## 2020-05-13 RX ORDER — FLASH GLUCOSE SENSOR
KIT MISCELLANEOUS
Qty: 2 KIT | Refills: 11 | Status: SHIPPED | OUTPATIENT
Start: 2020-05-13 | End: 2021-04-22 | Stop reason: ALTCHOICE

## 2020-05-13 NOTE — PROGRESS NOTES
Chief Complaint   Patient presents with    Diabetes     pcp and pharmacy confirmed       **THIS IS A VIRTUAL VISIT VIA A VIDEO SYNCHRONOUS DISCUSSION. PATIENT AGREED TO HAVE THEIR CARE DELIVERED OVER A DOXY. ME VIDEO VISIT IN PLACE OF THEIR REGULARLY SCHEDULED OFFICE VISIT**    History of Present Illness: Mine Alexander is a 50 y.o. female here for follow up of diabetes. First visit with me. Last visit with Dr. Rodney Irving was in 12/19. She doesn't recall him telling her to increase the levemir to 35 units bid and has remained on 30 units bid. She is taking the victoza and metformin as directed. Takes the fiasp 3:50 > 150 for correction. Her sugars are consistently in the 200-250 range. She is s/p kidney transplant in 2017 and we need her sugars under better control to prevent worsening kidney function in the future. She was given a freestyle sekou by Dr. Rodney Irving in 12/19 and found this helpful and would like to get a prescription and I think this would be beneficial to improving her control. she has the following indications to begin treatment with Freestyle Sekou:  1) she has type 2 diabetes and is on an intensive insulin regimen with 5 injections per day  2) she tests her blood sugar 4 times per day and makes treatment decisions off her blood sugar readings and will do the same off freestyle sekou sensor readings  3) she will require adjustments to her insulin injection doses based on her freestyle sekou sensor readings  4) she will benefit from therapeutic continuous glucose monitoring and I recommend that she begin this  5) she is seen in my office every 6 months    Compliant with BP and lipid regimen. Had an A1c drawn at transplant clinic but doesn't recall what it was this spring.         Current Outpatient Medications   Medication Sig    K-Phos-NeutraL 250 mg tablet TAKE 1 TABLET BY MOUTH EVERY DAY    terbinafine HCL (LAMISIL) 250 mg tablet TAKE 1 TABLET BY MOUTH DAILY THEN get labs & fill remaining quantity FOR a total of 21 DAYS    Insulin Needles, Disposable, (Unifine Pentips) 32 gauge x 5/32\" ndle USE TO INJECT INSULIN 5 TIMES DAILY    insulin aspart, niacinamide, (Fiasp FlexTouch U-100 Insulin) 100 unit/mL (3 mL) inpn AS DIRECTED FOR high glucoses AND FOR carbohydrate intake UP TO 50 units/day    PARoxetine mesylate,menop.sym, 7.5 mg cap Take 7.5 mg by mouth daily.  butalbital-acetaminophen-caffeine (FIORICET, ESGIC) -40 mg per tablet Take 1 Tab by mouth three (3) times daily as needed for Pain. Indications: Tension Headache    liraglutide (VICTOZA) 0.6 mg/0.1 mL (18 mg/3 mL) pnij 1.2 mg by SubCUTAneous route daily.  metFORMIN ER (GLUCOPHAGE XR) 500 mg tablet Take 1 Tab by mouth two (2) times daily (after meals).  fenofibrate nanocrystallized (TRICOR) 145 mg tablet Take 1 Tab by mouth daily.  magnesium oxide (MAG-OX) 400 mg tablet Take 1 Tab by mouth daily. (Patient taking differently: Take 800 mg by mouth two (2) times a day.)    traZODone (DESYREL) 100 mg tablet Take 1 Tab by mouth nightly. (Patient taking differently: Take 100 mg by mouth nightly as needed.)    atorvastatin (LIPITOR) 40 mg tablet Take 1 Tab by mouth daily. Indications: hardening of the arteries due to plaque buildup    labetalol (NORMODYNE) 200 mg tablet Take 1 Tab by mouth at bedtime    pantoprazole (PROTONIX) 40 mg tablet TAKE ONE TABLET BY MOUTH ONCE DAILY    Blood-Glucose Meter, Drum-type (ACCU-CHEK COMPACT PLUS CARE) kit Test blood sugar 4 times daily    Blood Sugar Diagnostic, Drum (ACCU-CHEK COMPACT PLUS TEST) strp Test blood sugar 4 times daily    insulin detemir U-100 (LEVEMIR FLEXTOUCH) 100 unit/mL (3 mL) inpn Use up to 100 units daily as directed (Patient taking differently: 30 Units by SubCUTAneous route Before breakfast and dinner. Use up to 100 units daily as directed)    lancets (ONE TOUCH DELICA) 33 gauge misc 4 times daily.  Diagnosis code: E11.8    albuterol (PROVENTIL HFA, VENTOLIN HFA, PROAIR HFA) 90 mcg/actuation inhaler Take 2 Puffs by inhalation every four (4) hours as needed for Wheezing.  predniSONE (DELTASONE) 5 mg tablet Take 1 Tab by mouth daily.  cinacalcet (SENSIPAR) 30 mg tablet Take 30 mg by mouth daily.  aspirin delayed-release 81 mg tablet Take  by mouth daily.  mycophenolate mofetil (CELLCEPT) 250 mg capsule Take 750 mg by mouth two (2) times a day.  Insulin Syringe-Needle U-100 0.5 mL 31 gauge x 5/16 syrg 1 Each by SubCUTAneous route daily.  tacrolimus (PROGRAF) 1 mg capsule Take 2 mg by mouth. Indications: taking 1 mg in am and 2 mg at QHS     No current facility-administered medications for this visit. Allergies   Allergen Reactions    Latex Shortness of Breath    Keflex [Cephalexin] Nausea and Vomiting     Stomach cramping    Tramadol Itching     Review of Systems: PER HPI    Physical Examination:  - GENERAL: NCAT, Appears well nourished   - EYES: EOMI, non-icteric, no proptosis   - Ear/Nose/Throat: NCAT, no visible inflammation or masses   - CARDIOVASCULAR: no cyanosis, no visible JVD   - RESPIRATORY: respiratory effort normal without any distress or labored breathing   - MUSCULOSKELETAL: Normal ROM of neck and upper extremities observed   - SKIN: No rash on face  - NEUROLOGIC:  No facial asymmetry (Cranial nerve 7 motor function), No gaze palsy   - PSYCHIATRIC: Normal affect, Normal insight and judgement       Data Reviewed:   - none new for review    Assessment/Plan:   1. Type 2 diabetes mellitus with complication, with long-term current use of insulin ([facility]): her most recent Hgb A1c that I have for review was 8.2% in 12/19 but current control seems worse.   Needs more basal insulin to get her fasting sugars to goal.  Hopefully CGM with Adam Morrison will help her identify trends to improve her control too.  - increase levemir to 35 units bid  - cont metformin  mg 1 tab bid  - cont victoza 1.2 mg daily  - cont fiasp 3:50 > 150 for correction  - check bs 4 times per day due to fluctuating sugars  - foot exam due at next visit  - microalbumin needed in the future  - optho UTD 2/20      2. Essential hypertension: BP has been controlled per her report  -  cont current regimen for now      3. Dyslipidemia: LDL 69 in 10/19 on atorva 40  - cont atorva 40 mg daily        Patient Instructions   1) I learned that with Medicare patients we may need to go through a durable medical equipment (DME) supplier to get the Garcia sensors. You can contact [address] at [phone] or Stanford Mccauley [phone] ext [phone] to have them fax me a form to get the sensors if they aren't covered at your local [address]. Once you get set up with this, let me know and I can send you a link so I can look at your data. 2) Increase levemir to 35 units twice daily. Wait 5 days. If your morning sugar before breakfast is still over 130, then try 40 units twice daily. If you are still over 130 in the morning on 40 twice daily, then let me know. 3) Keep taking the fiasp on the scale that you have. 4) Please come for a follow up visit on 9/9/20 at 12:10pm in our [facility] office.       Follow-up and Dispositions    · Return for 9/9/20 at 12:10pm.               Copy sent to:  Grace Rinne, MD as PCP - General (Internal Medicine)  Marisol Koehler MD as Physician (Cardiology)  Denzel Wilkerson MD (Nephrology)

## 2020-05-13 NOTE — PATIENT INSTRUCTIONS
1) I learned that with Medicare patients we may need to go through a durable medical equipment (DME) supplier to get the Garcia sensors. You can contact [address] at [phone] or Rebecca Davalos [phone] to have them fax me a form to get the sensors if they aren't covered at your local [address]. Once you get set up with this, let me know and I can send you a link so I can look at your data. 2) Increase levemir to 35 units twice daily. Wait 5 days. If your morning sugar before breakfast is still over 130, then try 40 units twice daily. If you are still over 130 in the morning on 40 twice daily, then let me know. 3) Keep taking the fiasp on the scale that you have. 4) Please come for a follow up visit on 9/9/20 at 12:10pm in our [facility] office.

## 2020-05-13 NOTE — LETTER
5/28/2020 8:09 AM    Ms. Emile Berrios [phone]      Since you haven't read the message I sent you on 5/13/20 through [address], I wanted to send you a letter with the message:    1) I learned that with Medicare patients we may need to go through a durable medical equipment (DME) supplier to get the Garcia sensors.  You can contact [address] at [phone] or Dieter Yates [phone] ext [phone] to have them fax me a form to get the sensors if they aren't covered at your local [address] you get set up with this, let me know and I can send you a link so I can look at your data.       2) Increase levemir to 35 units twice daily.  Wait 5 days. Carlos Eduardo Handler your morning sugar before breakfast is still over 130, then try 40 units twice daily.  If you are still over 130 in the morning on 40 twice daily, then let me know.       3) Keep taking the fiasp on the scale that you have. 4) Please come for a follow up visit on 9/9/20 at 12:10pm in our [facility] office.              Sincerely,      Anamika Rolle MD

## 2020-06-09 ENCOUNTER — TELEPHONE (OUTPATIENT)
Dept: ENDOCRINOLOGY | Age: 55
End: 2020-06-09

## 2020-06-09 NOTE — TELEPHONE ENCOUNTER
Please call pt to let her know she has an unread message in [address] from 6/5. I'm confused.  True metrix is a glucose meter and freestyle Laban Stair is a continuous glucose monitor. Kary Vaughn are 2 different things.  Do you NOT want the Laban Stair and just need a new meter? Let me know when you have a chance. Previous Messages      ----- Message -----        From:Dee Crowley        Sent:6/5/2020  3:36 PM EDT          To:Kadeem Coleman MD     Subject:RE:instructions 5/13     Good evening Mr. Rosita Nicole I have gotten in contact with my pharmacy. [address] on \[facility]". Medicare will pay for it. They just need you to fax the order in. It's call True Metrix . Machine,strips, Lancets,

## 2020-06-09 NOTE — TELEPHONE ENCOUNTER
Pt notified of message per Dr. Patel Ibrahim and voiced understanding of what was read to her. She stated that she sent an e-mail to Dr. Patel Ibrahim today that she contacted [address] and they will be faxing the forms to our office.

## 2020-06-09 NOTE — TELEPHONE ENCOUNTER
I had written to her about this but it appears she didn't fully read my message:    I learned that with Medicare patients we may need to go through a durable medical equipment (DME) supplier to get the Vj Mc sensors.   You can contact [address] at [phone] or Reilly Bueno [phone] ext [phone] to have them fax me a form to get the sensors if they aren't covered at your local [id number] [place] [address]

## 2020-06-26 DIAGNOSIS — E78.5 DYSLIPIDEMIA: ICD-10-CM

## 2020-06-28 RX ORDER — ATORVASTATIN CALCIUM 40 MG/1
TABLET, FILM COATED ORAL
Qty: 90 TAB | Refills: 1 | Status: SHIPPED | OUTPATIENT
Start: 2020-06-28 | End: 2020-11-05 | Stop reason: SDUPTHER

## 2020-08-21 ENCOUNTER — OFFICE VISIT (OUTPATIENT)
Dept: OBGYN CLINIC | Age: 55
End: 2020-08-21
Payer: MEDICARE

## 2020-08-21 VITALS
HEIGHT: 63 IN | SYSTOLIC BLOOD PRESSURE: 146 MMHG | WEIGHT: 176 LBS | BODY MASS INDEX: 31.18 KG/M2 | DIASTOLIC BLOOD PRESSURE: 88 MMHG

## 2020-08-21 DIAGNOSIS — Z12.39 SCREENING FOR BREAST CANCER: Primary | ICD-10-CM

## 2020-08-21 DIAGNOSIS — Z01.419 ENCOUNTER FOR GYNECOLOGICAL EXAMINATION WITHOUT ABNORMAL FINDING: ICD-10-CM

## 2020-08-21 PROCEDURE — G9231 DOC ESRD DIA TRANS PREG: HCPCS | Performed by: OBSTETRICS & GYNECOLOGY

## 2020-08-21 PROCEDURE — G9717 DOC PT DX DEP/BP F/U NT REQ: HCPCS | Performed by: OBSTETRICS & GYNECOLOGY

## 2020-08-21 PROCEDURE — G8417 CALC BMI ABV UP PARAM F/U: HCPCS | Performed by: OBSTETRICS & GYNECOLOGY

## 2020-08-21 PROCEDURE — G9899 SCRN MAM PERF RSLTS DOC: HCPCS | Performed by: OBSTETRICS & GYNECOLOGY

## 2020-08-21 PROCEDURE — G0101 CA SCREEN;PELVIC/BREAST EXAM: HCPCS | Performed by: OBSTETRICS & GYNECOLOGY

## 2020-08-21 PROCEDURE — 3017F COLORECTAL CA SCREEN DOC REV: CPT | Performed by: OBSTETRICS & GYNECOLOGY

## 2020-08-21 RX ORDER — VALACYCLOVIR HYDROCHLORIDE 500 MG/1
500 TABLET, FILM COATED ORAL 2 TIMES DAILY
Qty: 10 TAB | Refills: 5 | Status: SHIPPED | OUTPATIENT
Start: 2020-08-21 | End: 2020-08-26

## 2020-08-21 RX ORDER — PAROXETINE 7.5 MG/1
7.5 CAPSULE ORAL DAILY
Qty: 90 CAP | Refills: 4 | Status: SHIPPED | OUTPATIENT
Start: 2020-08-21 | End: 2020-11-19

## 2020-08-21 NOTE — PROGRESS NOTES
Annual exam    Brice Monge is a 50 y.o.  A0 postmenopausal female s/p renal transplant in 2017, now presenting for annual exam.     Requests refill of Brisdelle (for VMS) and Valtrex for HSV (rare outbreaks). Does c/o low libido, no sex in 6 years. Just no desire. Partner of 30 years supportive, but pt tearful when discussing. Her 27 yo son has been very sick with complications of diabetes (leg amputations, blindness requiring eye surgeries, etc). Ob/Gyn Hx:    Menopause- age ~38 (), no PMB  ? VMS- yes, improved on brisdelle  ? Vag dryness-denies  ? HRT-denies  STI- denies  ? SA-denies     Health maintenance:  Pap- NILM HPV neg --> repeat   Mammo-18 B1  Colonoscopy- , normal   Dexa-denies      Past Medical History:   Diagnosis Date    Anemia     Arthritis     Asthma 2014    Rx for same    Diabetes ([facility] [address] [facility] 28yo    Rx to control    GERD (gastroesophageal reflux disease)     Headache(784.0)     Hepatitis B infection     Hypertension     Rx for same    Kidney transplanted 2017    Sebaceous cyst 2019       Past Surgical History:   Procedure Laterality Date    COLONOSCOPY N/A 2016    COLONOSCOPY performed by Joesph Harper MD at [address] [place]  [address]    laparoscopic    HX COLONOSCOPY      HX ENDOSCOPY      HX MOHS PROCEDURES Left     HX OTHER SURGICAL  7/30/15    excision of left Axilla Hidradenitis    HX RENAL BIOPSY      HX RENAL TRANSPLANT  2017    HX UROLOGICAL      KIDNEY BX RIGHT - pt states she has only had one kidney bx as of 10/31/2016    HX UROLOGICAL  2017    transplant    HX VASCULAR ACCESS  2013    CHEST CATHETER FOR DIALYSIS    HX VASCULAR ACCESS Right     A-V fistula    HX WISDOM TEETH EXTRACTION         Family History   Problem Relation Age of Onset    Hypertension Mother     Hypertension Sister     Diabetes Maternal Grandfather     Cancer Father UNKNOWN    Hypertension Maternal Grandmother     Diabetes Son     Asthma Son     Cancer Maternal Aunt         UNKNOWN    Anesth Problems Neg Hx        Social History     Socioeconomic History    Marital status: SINGLE     Spouse name: Not on file    Number of children: Not on file    Years of education: Not on file    Highest education level: Not on file   Occupational History    Not on file   Social Needs    Financial resource strain: Not on file    Food insecurity     Worry: Not on file     Inability: Not on file    Transportation needs     Medical: Not on file     Non-medical: Not on file   Tobacco Use    Smoking status: Never Smoker    Smokeless tobacco: Never Used   Substance and Sexual Activity    Alcohol use: No     Alcohol/week: 0.0 standard drinks    Drug use: No    Sexual activity: Not Currently     Birth control/protection: None   Lifestyle    Physical activity     Days per week: Not on file     Minutes per session: Not on file    Stress: Not on file   Relationships    Social connections     Talks on phone: Not on file     Gets together: Not on file     Attends Taoist service: Not on file     Active member of club or organization: Not on file     Attends meetings of clubs or organizations: Not on file     Relationship status: Not on file    Intimate partner violence     Fear of current or ex partner: Not on file     Emotionally abused: Not on file     Physically abused: Not on file     Forced sexual activity: Not on file   Other Topics Concern    Not on file   Social History Narrative    Lives in [facility] with moni and 1 son and 1 daughter. Also has another son. Works as a CNA. No hobbies.        Current Outpatient Medications   Medication Sig Dispense Refill    insulin detemir U-100 (LEVEMIR FLEXTOUCH) 100 unit/mL (3 mL) inpn Inject 35 units bid--please deliver to her house 30 mL 11    atorvastatin (LIPITOR) 40 mg tablet TAKE 1 TABLET BY MOUTH EVERY DAY 90 Tab 1    OneTouch Ultra Blue Test Strip strip Test 4 times daily. DX E11.9 400 Strip 3    labetaloL (NORMODYNE) 200 mg tablet Take 1 Tab by mouth nightly. 360 Tab 1    FreeStyle Sekou 14 Day Sensor kit Use as directed every 14 days 2 Kit 11    FreeStyle Sekou 14 Day [place] misc Use as directed 1 Each 0    K-Phos-NeutraL 250 mg tablet TAKE 1 TABLET BY MOUTH EVERY DAY      terbinafine HCL (LAMISIL) 250 mg tablet TAKE 1 TABLET BY MOUTH DAILY THEN get labs & fill remaining quantity FOR a total of 21 DAYS      Insulin Needles, Disposable, (Unifine Pentips) 32 gauge x 5/32\" ndle USE TO INJECT INSULIN 5 TIMES DAILY 500 Pen Needle 3    insulin aspart, niacinamide, (Fiasp FlexTouch U-100 Insulin) 100 unit/mL (3 mL) inpn AS DIRECTED FOR high glucoses AND FOR carbohydrate intake UP TO 50 units/day 15 mL 6    PARoxetine mesylate,menop.sym, 7.5 mg cap Take 7.5 mg by mouth daily. 90 Cap 1    butalbital-acetaminophen-caffeine (FIORICET, ESGIC) -40 mg per tablet Take 1 Tab by mouth three (3) times daily as needed for Pain. Indications: Tension Headache 60 Tab 1    liraglutide (VICTOZA) 0.6 mg/0.1 mL (18 mg/3 mL) pnij 1.2 mg by SubCUTAneous route daily. 2 Pen 10    metFORMIN ER (GLUCOPHAGE XR) 500 mg tablet Take 1 Tab by mouth two (2) times daily (after meals). 60 Tab 10    fenofibrate nanocrystallized (TRICOR) 145 mg tablet Take 1 Tab by mouth daily. 30 Tab 11    magnesium oxide (MAG-OX) 400 mg tablet Take 1 Tab by mouth daily. (Patient taking differently: Take 800 mg by mouth two (2) times a day.) 90 Tab 3    traZODone (DESYREL) 100 mg tablet Take 1 Tab by mouth nightly.  (Patient taking differently: Take 100 mg by mouth nightly as needed.) 30 Tab 3    pantoprazole (PROTONIX) 40 mg tablet TAKE ONE TABLET BY MOUTH ONCE DAILY 90 Tab 1    Blood-Glucose Meter, Drum-type (ACCU-CHEK COMPACT PLUS CARE) kit Test blood sugar 4 times daily 1 Kit 0    Blood Sugar Diagnostic, Drum (ACCU-CHEK COMPACT PLUS TEST) strp Test blood sugar 4 times daily 100 Strip 3    lancets (ONE TOUCH DELICA) 33 gauge misc 4 times daily. Diagnosis code: E11.8 125 Lancet 11    albuterol (PROVENTIL HFA, VENTOLIN HFA, PROAIR HFA) 90 mcg/actuation inhaler Take 2 Puffs by inhalation every four (4) hours as needed for Wheezing. 1 Inhaler 3    predniSONE (DELTASONE) 5 mg tablet Take 1 Tab by mouth daily. 11    cinacalcet (SENSIPAR) 30 mg tablet Take 30 mg by mouth daily.  aspirin delayed-release 81 mg tablet Take  by mouth daily.  mycophenolate mofetil (CELLCEPT) 250 mg capsule Take 750 mg by mouth two (2) times a day.  Insulin Syringe-Needle U-100 0.5 mL 31 gauge x 5/16 syrg 1 Each by SubCUTAneous route daily. 100 Syringe 3    tacrolimus (PROGRAF) 1 mg capsule Take 2 mg by mouth.  Indications: taking 1 mg in am and 2 mg at QHS         Allergies   Allergen Reactions    Latex Shortness of Breath    Keflex [Cephalexin] Nausea and Vomiting     Stomach cramping    Tramadol Itching       Review of Systems - History obtained from the patient  Constitutional: negative for weight loss, fever, night sweats  HEENT: negative for hearing loss, earache, congestion, snoring, sorethroat  CV: negative for chest pain, palpitations, edema  Resp: negative for cough, shortness of breath, wheezing  GI: negative for change in bowel habits, abdominal pain, black or bloody stools  : negative for frequency, dysuria, hematuria, vaginal discharge  MSK: negative for joint pain, muscle pain, +sciatica/back pain  Breast: negative for breast lumps, nipple discharge, galactorrhea  Skin :negative for itching, rash, hives  Neuro: negative for dizziness, headache, confusion, weakness  Psych: negative for anxiety, depression, change in mood  Heme/lymph: negative for bleeding, bruising, pallor    Physical Exam  Visit Vitals  /88 (BP 1 Location: Left arm, BP Patient Position: Sitting)   Ht 5' 3\" (1.6 m)   Wt 176 lb (79.8 kg)   BMI 31.18 kg/m² Constitutional  · Appearance: well-nourished, well developed, alert, in no acute distress    HENT  · Head and Face: appears normal    Neck  · Inspection/Palpation: normal appearance, no masses or tenderness  · Lymph Nodes: no lymphadenopathy present  · Thyroid: gland size normal, nontender, no nodules or masses present on palpation    Chest  · Respiratory Effort: non-labored breathing  · Auscultation: CTAB, normal breath sounds    Cardiovascular  · Heart:  · Auscultation: regular rate and rhythm without murmur  · Extremities: no peripheral edema    Breasts  · Inspection of Breasts: breasts symmetrical, no skin changes, no discharge present, nipple appearance normal, no skin retraction present  · Palpation of Breasts and Axillae: no masses present on palpation, no breast tenderness  · Axillary Lymph Nodes: no lymphadenopathy present    Gastrointestinal  · Abdominal Examination: abdomen non-tender to palpation, normal bowel sounds, no masses present  · Liver and spleen: no hepatomegaly present, spleen not palpable  · Hernias: no hernias identified    Genitourinary  · External Genitalia: normal appearance for age, no discharge present, no tenderness present, no inflammatory lesions present, no masses present, +atrophy of UG mucosa  · Vagina: normal vaginal vault without central or paravaginal defects, no discharge present, no inflammatory lesions present, no masses present  · Bladder: non-tender to palpation  · Urethra: appears normal  · Cervix: normal   · Uterus: normal size, shape and consistency, small, mobile  · Adnexa: no adnexal tenderness present, no adnexal masses present  · Perineum: perineum within normal limits, no evidence of trauma, no rashes or skin lesions present    Skin  · General Inspection: no rash, no lesions identified    Neurologic/Psychiatric  · Mental Status:  · Orientation: grossly oriented to person, place and time  · Mood and Affect: mood normal, affect appropriate      Assessment/Plan:  50 y.o. postmenopausal female presenting for annual exam. Overall doing well.      Health Maintenance:  -counseled re: diet, exercise, healthy lifestyle  -pap/HPV next due 2022  -declines STI testing  -refer for mammo  -colonoscopy utd  -dexa per PCP - consideration of early dexa due to renal transplant  -refill Rx for Brisdelle for VMS  -refill Rx for Valtrex for HSV2  -discussed HSDD, declines referral to sex therapist, discussed medical problems and some medications may be contributing    RTC: 1 year for AE or sooner prn    Zackary Geller MD  8/21/2020  10:31 AM

## 2020-08-21 NOTE — PATIENT INSTRUCTIONS
Pelvic Exam: Care Instructions  Your Care Instructions     When your doctor examines all of your pelvic organs, it's called a pelvic exam. Two good reasons to have this kind of exam are to check for sexually transmitted infections (STIs) and to get a Pap test. A Pap test is also called a Pap smear. It checks for early changes that can lead to cancer of the cervix. Sometimes a pelvic exam is part of a regular checkup. Your doctor may ask you to avoid vaginal sex, tampons, vaginal medicines, vaginal sprays or powders, and douching for 1 to 2 days before the test.  Other times, women have this kind of exam at any time of the month. This is because they have pelvic pain, bleeding, or discharge. Or they may have another pelvic problem. Before your exam, it's important to share some information with your doctor. For example, if you are a survivor of rape or sexual abuse, you can talk about any concerns you may have. Your doctor will also want to know if you are pregnant or use birth control. And he or she will want to hear about any problems, surgeries, or procedures you have had in your pelvic area. You will also need to tell your doctor when your last period was. Follow-up care is a key part of your treatment and safety. Be sure to make and go to all appointments, and call your doctor if you are having problems. It's also a good idea to know your test results and keep a list of the medicines you take. How is a pelvic exam done? · During a pelvic exam, you will:  ? Take off your clothes below the waist. You will get a paper or cloth cover to put over the lower half of your body. If this is regular checkup, you may undress completely and put on a gown. ? Lie on your back on an exam table. Your feet will be raised above you. Stirrups will support your feet. · The doctor will:  ? Ask you to relax your knees. Your knees need to lean out, toward the walls. ?  Check the opening of your vagina for sores or swelling. ? Gently put a tool called a speculum into your vagina. It opens the vagina a little bit. You will feel some pressure. But if you are relaxed, it will not hurt. It lets your doctor see inside the vagina. ? Use a small brush, spatula, or swab to get a sample of cells, if you are having a Pap test or culture. The doctor then removes the speculum. ? Put on gloves and put one or two fingers of one hand into your vagina. The other hand goes on your lower belly. This lets your doctor feel your pelvic organs. You will probably feel some pressure. Try to stay relaxed. ? Put one gloved finger into your rectum and one into your vagina, if needed. This can also help check your pelvic organs. This exam takes about 10 minutes. At the end, you will get a washcloth or tissue to clean your vaginal area. You can then get dressed. Why is a pelvic exam done? A pelvic exam may be done:  · As part of a woman's regular physical checkup. The exam may include a Pap test.  · To check for vaginal infection. · To check for sexually transmitted infections, such as chlamydia or herpes. · To help find the cause of abnormal uterine bleeding. · To look for problems like uterine fibroids, ovarian cysts, or uterine prolapse. · To find the cause of pelvic or belly pain. · Before inserting an intrauterine device (IUD) for birth control. · To collect evidence if you've been sexually assaulted. What are the risks of a pelvic exam?  There is a small chance that the doctor will find something on a pelvic exam that would not have caused a problem. This is called overdiagnosis. It could lead to tests or treatment you don't need. When should you call for help? Watch closely for changes in your health, and be sure to contact your doctor if you have any problems. Where can you learn more? Go to http://bruce-halima.info/  Enter M421 in the search box to learn more about \"Pelvic Exam: Care Instructions. \"  Current as of: November 8, 2019               Content Version: 12.5  © 6616-2456 [employer]. Care instructions adapted under license by [employer] (which disclaims liability or warranty for this information). If you have questions about a medical condition or this instruction, always ask your healthcare professional. [address] any warranty or liability for your use of this information.

## 2020-09-03 NOTE — PROGRESS NOTES
Follow up exam    Yunier Huang is a 50 y.o.  A0 postmenopausal female s/p renal transplant in , now presenting for ultrasound follow up due to c/o pelvic/abdominal bloating (unchanged from recent AE). Ultrasound today showing 7cm complex right adnexal cystic structure. Upon review of prior CT A/P report from 2020 (done at [facility]), pt was noted to have similar pelvic cystic structure noted at that time (appears stable in size). Unclear etiology. Of note, pt also with RLQ pelvic transplanted kidney in addition to her 2 native kidneys. On CT report they noted cystic structure appeared separate from both right ovary and right pelvic transplanted kidney. TV AND TA ULTRASOUND 20  THE UTERUS IS RETROVERTED, NORMAL IN SIZE AND HETEROGENEOUS IN ECHOGENICITY. THERE APPEARS TO BE MULTIPLE CALCIFICATION THROUGHOUT THE UTERUS. THE ENDOMETRIUM MEASURES 1MM IN THICKNESS. NO MASSES OR ABNORMALITIES ARE SEEN. THERE APPEARS TO BE A COMPLEX CYSTIC STRUCTURE SEEN WITHIN THE RIGHT ADNEXA MEASURING 71 X 60 X 70MM, POSSIBLE RIGHT ADNEXAL MASS VS. RIGHT OVARIAN COMPLEX CYST. LEFT OVARY IS NOT VISUALIZED DUE TO BOWEL GAS. LEFT ADNEXA APPERS WNL. NO FREE FLUID IS SEEN IN THE CDS. Ob/Gyn Hx:    Menopause- age ~38 (), no PMB  ? VMS- yes, improved on brisdelle  ? Vag dryness-denies  ? HRT-denies  STI- denies  ? SA-denies     Health maintenance:  Pap- NILM HPV neg --> repeat   Mammo-18 B1  Colonoscopy- , normal   Dexa-denies      Past Medical History:   Diagnosis Date    Anemia     Arthritis     Asthma 2014    Rx for same    Diabetes ([facility] [address] 28yo    Rx to control    GERD (gastroesophageal reflux disease)     Headache(784.0)     Hepatitis B infection     Hypertension     Rx for same    Kidney transplanted 2017    Sebaceous cyst 2019       Past Surgical History:   Procedure Laterality Date    COLONOSCOPY N/A 2016    COL[facility] performed [facility] ENDOSCOPY    HX  SECTION      HX CHOLECYSTECTOMY      laparoscopic    HX COLONOSCOPY      HX ENDOSCOPY      HX MOHS PROCEDURES Left     HX OTHER SURGICAL  7/30/15    excision of left Axilla Hidradenitis    HX RENAL BIOPSY      HX RENAL TRANSPLANT  2017    HX UROLOGICAL  2012    KIDNEY BX RIGHT - pt states she has only had one kidney bx as of 10/31/2016    HX UROLOGICAL  2017    transplant    HX VASCULAR ACCESS  2013    CHEST CATHETER FOR DIALYSIS    HX VASCULAR ACCESS Right     A-V fistula    HX WISDOM TEETH EXTRACTION         Family History   Problem Relation Age of Onset    Hypertension Mother     Hypertension Sister     Diabetes Maternal Grandfather     Cancer Father         UNKNOWN    Hypertension Maternal Grandmother     Diabetes Son     Asthma Son     Cancer Maternal Aunt         UNKNOWN    Anesth Problems Neg Hx        Social History     Socioeconomic History    Marital status: SINGLE     Spouse name: Not on file    Number of children: Not on file    Years of education: Not on file    Highest education level: Not on file   Occupational History    Not on file   Social Needs    Financial resource strain: Not on file    Food insecurity     Worry: Not on file     Inability: Not on file    Transportation needs     Medical: Not on file     Non-medical: Not on file   Tobacco Use    Smoking status: Never Smoker    Smokeless tobacco: Never Used   Substance and Sexual Activity    Alcohol use: No     Alcohol/week: 0.0 standard drinks    Drug use: No    Sexual activity: Not Currently     Birth control/protection: None   Lifestyle    Physical activity     Days per week: Not on file     Minutes per session: Not on file    Stress: Not on file   Relationships    Social connections     Talks on phone: Not on file     Gets together: Not on file     Attends [employer] service: Not on file     Active member of club or organization: Not on file     Attends meetings of clubs or organizations: Not on file     Relationship status: Not on file    Intimate partner violence     Fear of current or ex partner: Not on file     Emotionally abused: Not on file     Physically abused: Not on file     Forced sexual activity: Not on file   Other Topics Concern    Not on file   Social History Narrative    Lives in [facility] with moni and 1 son and 1 daughter. Also has another son. Works as a CNA. No hobbies. Current Outpatient Medications   Medication Sig Dispense Refill    PARoxetine mesylate,menop.sym, 7.5 mg cap Take 7.5 mg by mouth daily for 90 days. 90 Cap 4    insulin detemir U-100 (LEVEMIR FLEXTOUCH) 100 unit/mL (3 mL) inpn Inject 35 units bid--please deliver to her house 30 mL 11    atorvastatin (LIPITOR) 40 mg tablet TAKE 1 TABLET BY MOUTH EVERY DAY 90 Tab 1    OneTouch Ultra Blue Test Strip strip Test 4 times daily. DX E11.9 400 Strip 3    labetaloL (NORMODYNE) 200 mg tablet Take 1 Tab by mouth nightly. 360 Tab 1    FreeStyle Sekou 14 Day Sensor kit Use as directed every 14 days 2 Kit 11    FreeStyle Sekou 14 Day [employer] misc Use as directed 1 Each 0    K-Phos-NeutraL 250 mg tablet TAKE 1 TABLET BY MOUTH EVERY DAY      terbinafine HCL (LAMISIL) 250 mg tablet TAKE 1 TABLET BY MOUTH DAILY THEN get labs & fill remaining quantity FOR a total of 21 DAYS      Insulin Needles, Disposable, (Unifine Pentips) 32 gauge x 5/32\" ndle USE TO INJECT INSULIN 5 TIMES DAILY 500 Pen Needle 3    insulin aspart, niacinamide, (Fiasp FlexTouch U-100 Insulin) 100 unit/mL (3 mL) inpn AS DIRECTED FOR high glucoses AND FOR carbohydrate intake UP TO 50 units/day 15 mL 6    butalbital-acetaminophen-caffeine (FIORICET, ESGIC) -40 mg per tablet Take 1 Tab by mouth three (3) times daily as needed for Pain. Indications: Tension Headache 60 Tab 1    liraglutide (VICTOZA) 0.6 mg/0.1 mL (18 mg/3 mL) pnij 1.2 mg by SubCUTAneous route daily.  2 Pen 10    metFORMIN ER (GLUCOPHAGE XR) 500 mg tablet Take 1 Tab by mouth two (2) times daily (after meals). 60 Tab 10    fenofibrate nanocrystallized (TRICOR) 145 mg tablet Take 1 Tab by mouth daily. 30 Tab 11    magnesium oxide (MAG-OX) 400 mg tablet Take 1 Tab by mouth daily. (Patient taking differently: Take 800 mg by mouth two (2) times a day.) 90 Tab 3    traZODone (DESYREL) 100 mg tablet Take 1 Tab by mouth nightly. (Patient taking differently: Take 100 mg by mouth nightly as needed.) 30 Tab 3    pantoprazole (PROTONIX) 40 mg tablet TAKE ONE TABLET BY MOUTH ONCE DAILY 90 Tab 1    Blood-Glucose Meter, Drum-type (ACCU-CHEK COMPACT PLUS CARE) kit Test blood sugar 4 times daily 1 Kit 0    Blood Sugar Diagnostic, Drum (ACCU-CHEK COMPACT PLUS TEST) strp Test blood sugar 4 times daily 100 Strip 3    lancets (ONE TOUCH DELICA) 33 gauge misc 4 times daily. Diagnosis code: E11.8 125 Lancet 11    albuterol (PROVENTIL HFA, VENTOLIN HFA, PROAIR HFA) 90 mcg/actuation inhaler Take 2 Puffs by inhalation every four (4) hours as needed for Wheezing. 1 Inhaler 3    predniSONE (DELTASONE) 5 mg tablet Take 1 Tab by mouth daily. 11    cinacalcet (SENSIPAR) 30 mg tablet Take 30 mg by mouth daily.  aspirin delayed-release 81 mg tablet Take  by mouth daily.  mycophenolate mofetil (CELLCEPT) 250 mg capsule Take 750 mg by mouth two (2) times a day.  Insulin Syringe-Needle U-100 0.5 mL 31 gauge x 5/16 syrg 1 Each by SubCUTAneous route daily. 100 Syringe 3    tacrolimus (PROGRAF) 1 mg capsule Take 2 mg by mouth.  Indications: taking 1 mg in am and 2 mg at QHS         Allergies   Allergen Reactions    Latex Shortness of Breath    Keflex [Cephalexin] Nausea and Vomiting     Stomach cramping    Tramadol Itching       Review of Systems - History obtained from the patient  Constitutional: negative for weight loss, fever, night sweats  HEENT: negative for hearing loss, earache, congestion, snoring, sorethroat  CV: negative for chest pain, palpitations, edema  Resp: negative for cough, shortness of breath, wheezing  GI: negative for change in bowel habits, abdominal pain, black or bloody stools  : negative for frequency, dysuria, hematuria, vaginal discharge, +abdominal/pelvic bloating  MSK: negative for joint pain, muscle pain, +sciatica/back pain  Breast: negative for breast lumps, nipple discharge, galactorrhea  Skin :negative for itching, rash, hives  Neuro: negative for dizziness, headache, confusion, weakness  Psych: negative for anxiety, depression, change in mood  Heme/lymph: negative for bleeding, bruising, pallor    Physical Exam  Visit Vitals  /78 (BP 1 Location: Left arm, BP Patient Position: Sitting)   Wt 175 lb 4.8 oz (79.5 kg)   BMI 31.05 kg/m²   Constitutional  · Appearance: well-nourished, well developed, alert, in no acute distress    HENT  · Head and Face: appears normal    Neck  · Inspection/Palpation: normal appearance, no masses or tenderness  · Lymph Nodes: no lymphadenopathy present  · Thyroid: gland size normal, nontender, no nodules or masses present on palpation    Chest  · Respiratory Effort: non-labored breathing  · Auscultation: CTAB, normal breath sounds    Cardiovascular  · Heart:  · Auscultation: regular rate and rhythm without murmur  · Extremities: no peripheral edema    Breasts  · Inspection of Breasts: breasts symmetrical, no skin changes, no discharge present, nipple appearance normal, no skin retraction present  · Palpation of Breasts and Axillae: no masses present on palpation, no breast tenderness  · Axillary Lymph Nodes: no lymphadenopathy present    Gastrointestinal  · Abdominal Examination: abdomen non-tender to palpation, normal bowel sounds, no masses present, +mildly distended  · Liver and spleen: no hepatomegaly present, spleen not palpable  · Hernias: no hernias identified    Genitourinary - deferred today as examined at recent AE    Skin  · General Inspection: no rash, no lesions identified    Neurologic/Psychiatric  · Mental Status:  · Orientation: grossly oriented to person, place and time  · Mood and Affect: mood normal, affect appropriate      Assessment/Plan:  50 y.o. postmenopausal female presenting for follow up of abdominal/pelvic bloating. US and prior CT A/P in Jan 2020 demonstrating 7cm complex right adnexal cystic lesion (appears stable in size). Of note, pt also with RLQ pelvic transplanted kidney.     -reviewed TVUS results with pt today  -reviewed OSH imaging report  -tumor markers  -referral to gyn onc for consultation    Kyra Deng MD  9/4/2020  9:45 AM

## 2020-09-04 ENCOUNTER — OFFICE VISIT (OUTPATIENT)
Dept: OBGYN CLINIC | Age: 55
End: 2020-09-04
Payer: MEDICARE

## 2020-09-04 VITALS — BODY MASS INDEX: 31.05 KG/M2 | SYSTOLIC BLOOD PRESSURE: 122 MMHG | WEIGHT: 175.3 LBS | DIASTOLIC BLOOD PRESSURE: 78 MMHG

## 2020-09-04 DIAGNOSIS — C56.1 MALIGNANT NEOPLASM OF RIGHT OVARY (HCC): ICD-10-CM

## 2020-09-04 DIAGNOSIS — R19.00 PELVIC MASS: Primary | ICD-10-CM

## 2020-09-04 PROCEDURE — G9231 DOC ESRD DIA TRANS PREG: HCPCS | Performed by: OBSTETRICS & GYNECOLOGY

## 2020-09-04 PROCEDURE — G9717 DOC PT DX DEP/BP F/U NT REQ: HCPCS | Performed by: OBSTETRICS & GYNECOLOGY

## 2020-09-04 PROCEDURE — G9899 SCRN MAM PERF RSLTS DOC: HCPCS | Performed by: OBSTETRICS & GYNECOLOGY

## 2020-09-04 PROCEDURE — G8417 CALC BMI ABV UP PARAM F/U: HCPCS | Performed by: OBSTETRICS & GYNECOLOGY

## 2020-09-04 PROCEDURE — G8427 DOCREV CUR MEDS BY ELIG CLIN: HCPCS | Performed by: OBSTETRICS & GYNECOLOGY

## 2020-09-04 PROCEDURE — 99213 OFFICE O/P EST LOW 20 MIN: CPT | Performed by: OBSTETRICS & GYNECOLOGY

## 2020-09-04 PROCEDURE — 3017F COLORECTAL CA SCREEN DOC REV: CPT | Performed by: OBSTETRICS & GYNECOLOGY

## 2020-09-04 NOTE — PATIENT INSTRUCTIONS
Uterine Fibroids: Care Instructions  Your Care Instructions     Uterine fibroids are growths in the uterus. Fibroids aren't cancer. Doctors don't know what causes fibroids. Fibroids are very common in women during their childbearing years. Fibroids can grow on the inside of the uterus, in the muscle wall of the uterus, or near the outside wall of the uterus. In some women, fibroids cause painful cramps and heavy periods. In these cases, taking anti-inflammatory medicines, birth control pills, or using an intrauterine device (IUD) often helps decrease symptoms. Sometimes surgery is needed to treat fibroids. But if you are near menopause, you may want to wait and see if your symptoms get better. Most fibroids shrink and go away after menopause, when your menstrual periods stop completely. Follow-up care is a key part of your treatment and safety. Be sure to make and go to all appointments, and call your doctor if you are having problems. It's also a good idea to know your test results and keep a list of the medicines you take. How can you care for yourself at home? · If your doctor gave you medicine, take it as exactly as prescribed. Be safe with medicines. Call your doctor if you think you are having a problem with your medicine. · Take anti-inflammatory medicines for pain. These include ibuprofen (Advil, Motrin) and naproxen (Aleve). Read and follow all instructions on the label. · Use heat, such as a hot water bottle or a heating pad set on low, or a warm bath to relax tense muscles and relieve cramping. Put a thin cloth between the heating pad and your skin. Never go to sleep with a heating pad on. · Lie down and put a pillow under your knees. Or, lie on your side and bring your knees up to your chest. These positions may help relieve belly pain or pressure. · Keep track of how many sanitary pads or tampons you use each day. · Get at least 30 minutes of exercise on most days of the week.  Walking is a good choice. You also may want to do other activities, such as running, swimming, cycling, or playing tennis or team sports. · If you bleed longer than usual or have heavy bleeding, take a daily multivitamin with iron. When should you call for help? Call your doctor now or seek immediate medical care if:    · You have severe vaginal bleeding.     · You have new or worse belly or pelvic pain. Watch closely for changes in your health, and be sure to contact your doctor if:    · You have unusual vaginal bleeding.     · You do not get better as expected. Where can you learn more? Go to http://www.gray.com/  Enter B121 in the search box to learn more about \"Uterine Fibroids: Care Instructions. \"  Current as of: November 8, 2019               Content Version: 12.6  © 4072-4427 [employer]. Care instructions adapted under license by [employer] (which disclaims liability or warranty for this information). If you have questions about a medical condition or this instruction, always ask your healthcare professional. [address] any warranty or liability for your use of this information.

## 2020-09-09 ENCOUNTER — VIRTUAL VISIT (OUTPATIENT)
Dept: ENDOCRINOLOGY | Age: 55
End: 2020-09-09
Payer: MEDICARE

## 2020-09-09 DIAGNOSIS — E78.5 DYSLIPIDEMIA: ICD-10-CM

## 2020-09-09 DIAGNOSIS — Z79.4 TYPE 2 DIABETES MELLITUS WITH COMPLICATION, WITH LONG-TERM CURRENT USE OF INSULIN (HCC): Primary | ICD-10-CM

## 2020-09-09 DIAGNOSIS — I10 ESSENTIAL HYPERTENSION: ICD-10-CM

## 2020-09-09 DIAGNOSIS — E11.8 TYPE 2 DIABETES MELLITUS WITH COMPLICATION, WITH LONG-TERM CURRENT USE OF INSULIN (HCC): Primary | ICD-10-CM

## 2020-09-09 PROCEDURE — 99214 OFFICE O/P EST MOD 30 MIN: CPT | Performed by: INTERNAL MEDICINE

## 2020-09-09 NOTE — PATIENT INSTRUCTIONS
1) Increase your victoza to 1.8 mg daily. I sent a new prescription for 3 pens to [facility] pharmacy. 2) If your fasting sugars are still over 130 after 1 week, then increase your levemir to 40 units twice daily and if still over 130 after 1 more week, go to 45 units twice daily. 3) Your A1c was 9.2% in 8/20 and we need to get this back under 7% to help prevent worsening damage to your kidney transplant. 4) I will send my note from today to [address] to try and get your freestyle jaycee sensors sent to you. 5) Please come for a follow up visit on 1/6/21 at 10:50am in our [facility] office. 6) I will mail you a lab slip. Please put this in the glove compartment or other safe spot where you keep your medical papers and I will send you a reminder to have your labs drawn prior to next visit.

## 2020-09-09 NOTE — PROGRESS NOTES
Chief Complaint   Patient presents with    Diabetes     pcp and pharmacy confirmed     Other     [phone] (M)   doxy       **THIS IS A VIRTUAL VISIT VIA A VIDEO SYNCHRONOUS DISCUSSION. PATIENT AGREED TO HAVE THEIR CARE DELIVERED OVER A DOXY. ME VIDEO VISIT IN PLACE OF THEIR REGULARLY SCHEDULED OFFICE VISIT**    History of Present Illness: Brook Kwong is a 50 y.o. female here for follow up of diabetes. So far has not been able to get her freestyle sekou sensors from [facility] so will send this note to them now. Has been taking levemir 35 units bid and the lowest she has seen is the 160s but most are in the low 200s but not going over 300 any longer. Forgot that I had recommended increasing her dose to 40 units bid if her fasting sugars were still over 130 in one week. At lunch time can be in the 250 range and at dinner and bedtime are still in the 220s. Compliant with metformin 1 tab bid and victoza 1.2 mg daily. Has not been on a higher dose of metformin recently due to her GFR of 44. Has not been on 1.8 mg of victoza and willing to try increasing her dose. Still taking the fiasp as directed 3-4 times daily for high sugars. States her BP has been controlled at recent office visits.     she has the following indications to begin treatment with Freestyle Sekou:  1) she has type 2 diabetes and is on an intensive insulin regimen with 5 injections per day  2) she tests her blood sugar 4 times per day and makes treatment decisions off her blood sugar readings and will do the same off freestyle sekou sensor readings  3) she will require adjustments to her insulin injection doses based on her freestyle sekou sensor readings  4) she will benefit from therapeutic continuous glucose monitoring and I recommend that she begin this  5) she is seen in my office every 4-6 months        Current Outpatient Medications   Medication Sig    PARoxetine mesylate,menop.sym, 7.5 mg cap Take 7.5 mg by mouth daily for 90 days.    insulin detemir U-100 (LEVEMIR FLEXTOUCH) 100 unit/mL (3 mL) inpn Inject 35 units bid--please deliver to her house    atorvastatin (LIPITOR) 40 mg tablet TAKE 1 TABLET BY MOUTH EVERY DAY    labetaloL (NORMODYNE) 200 mg tablet Take 1 Tab by mouth nightly.  K-Phos-NeutraL 250 mg tablet TAKE 1 TABLET BY MOUTH EVERY DAY    terbinafine HCL (LAMISIL) 250 mg tablet TAKE 1 TABLET BY MOUTH DAILY THEN get labs & fill remaining quantity FOR a total of 21 DAYS    Insulin Needles, Disposable, (Unifine Pentips) 32 gauge x 5/32\" ndle USE TO INJECT INSULIN 5 TIMES DAILY    insulin aspart, niacinamide, (Fiasp FlexTouch U-100 Insulin) 100 unit/mL (3 mL) inpn AS DIRECTED FOR high glucoses AND FOR carbohydrate intake UP TO 50 units/day    butalbital-acetaminophen-caffeine (FIORICET, ESGIC) -40 mg per tablet Take 1 Tab by mouth three (3) times daily as needed for Pain. Indications: Tension Headache    liraglutide (VICTOZA) 0.6 mg/0.1 mL (18 mg/3 mL) pnij 1.2 mg by SubCUTAneous route daily.  metFORMIN ER (GLUCOPHAGE XR) 500 mg tablet Take 1 Tab by mouth two (2) times daily (after meals).  fenofibrate nanocrystallized (TRICOR) 145 mg tablet Take 1 Tab by mouth daily.  magnesium oxide (MAG-OX) 400 mg tablet Take 1 Tab by mouth daily. (Patient taking differently: Take 800 mg by mouth two (2) times a day.)    traZODone (DESYREL) 100 mg tablet Take 1 Tab by mouth nightly. (Patient taking differently: Take 100 mg by mouth nightly as needed.)    pantoprazole (PROTONIX) 40 mg tablet TAKE ONE TABLET BY MOUTH ONCE DAILY    Blood-Glucose Meter, Drum-type (ACCU-CHEK COMPACT PLUS CARE) kit Test blood sugar 4 times daily    Blood Sugar Diagnostic, Drum (ACCU-CHEK COMPACT PLUS TEST) strp Test blood sugar 4 times daily    lancets (ONE TOUCH DELICA) 33 gauge misc 4 times daily.  Diagnosis code: E11.8    albuterol (PROVENTIL HFA, VENTOLIN HFA, PROAIR HFA) 90 mcg/actuation inhaler Take 2 Puffs by inhalation every four (4) hours as needed for Wheezing.  predniSONE (DELTASONE) 5 mg tablet Take 1 Tab by mouth daily.  cinacalcet (SENSIPAR) 30 mg tablet Take 30 mg by mouth daily.  aspirin delayed-release 81 mg tablet Take  by mouth daily.  mycophenolate mofetil (CELLCEPT) 250 mg capsule Take 250 mg by mouth. 2 tabs in the am and 4 tabs in the pm    Insulin Syringe-Needle U-100 0.5 mL 31 gauge x 5/16 syrg 1 Each by SubCUTAneous route daily.  tacrolimus (PROGRAF) 1 mg capsule Take 2 mg by mouth. Indications: taking 1 mg in am and 2 mg at QHS    OneTouch Ultra Blue Test Strip strip Test 4 times daily. DX E11.9    FreeStyle Sekou 14 Day Sensor kit Use as directed every 14 days   [employer] 14 Day [place] misc Use as directed     No current facility-administered medications for this visit. Allergies   Allergen Reactions    Latex Shortness of Breath    Keflex [Cephalexin] Nausea and Vomiting     Stomach cramping    Tramadol Itching     Review of Systems: PER HPI    Physical Examination:  - GENERAL: NCAT, Appears well nourished   - EYES: EOMI, non-icteric, no proptosis   - Ear/Nose/Throat: NCAT, no visible inflammation or masses   - CARDIOVASCULAR: no cyanosis, no visible JVD   - RESPIRATORY: respiratory effort normal without any distress or labored breathing   - MUSCULOSKELETAL: Normal ROM of neck and upper extremities observed   - SKIN: No rash on face  - NEUROLOGIC:  No facial asymmetry (Cranial nerve 7 motor function), No gaze palsy   - PSYCHIATRIC: Normal affect, Normal insight and judgement       Data Reviewed:   - A1c 9.2%  - BUN/Cr 30/1.49    Assessment/Plan:     1. Type 2 diabetes mellitus with complication, with long-term current use of insulin ([facility]): her most recent Hgb A1c was 9.2% in 8/20 up from 8.2% in 12/19.   Will try maxing out victoza and if needed, will then increase her levemir to get her fasting sugars to goal.  Hopefully CGM with Jose will help her identify trends to improve her control too. - cont levemir 40 units bid  - cont metformin  mg 1 tab bid  - increase victoza to 1.8 mg daily  - cont fiasp 3:50 > 150 for correction  - check bs 4 times per day due to fluctuating sugars  - foot exam due at next visit  - microalbumin as needed in the future  - optho UTD 2/20  - check Hgb A1c, cmp, and microalbumin prior to next visit        2. Essential hypertension: BP has been controlled per her report  -  cont current regimen for now      3. Dyslipidemia: LDL 69 in 10/19 on atorva 40  - cont atorva 40 mg daily   - check lipids prior to next visit         Patient Instructions   1) Increase your victoza to 1.8 mg daily. I sent a new prescription for 3 pens to [facility] pharmacy. 2) If your fasting sugars are still over 130 after 1 week, then increase your levemir to 40 units twice daily and if still over 130 after 1 more week, go to 45 units twice daily. 3) Your A1c was 9.2% in 8/20 and we need to get this back under 7% to help prevent worsening damage to your kidney transplant. 4) I will send my note from today to [facility] [address] to try and get your freestyle jaycee sensors sent to you. 5) Please come for a follow up visit on 1/6/21 at 10:50am in our [facility] office. 6) I will mail you a lab slip. Please put this in the glove compartment or other safe spot where you keep your medical papers and I will send you a reminder to have your labs drawn prior to next visit.             Follow-up and Dispositions    · Return 1/6/21 at 10:50am.               Copy sent to:  Omar Powers MD as PCP - General (Internal Medicine)  Venora Phalen, MD as Physician (Cardiology)  Lakesha Moreno MD (Nephrology)    Lab follow up: 11/14/20  Received labs from transplant center from 11/11/20:  - Hgb A1c 9.1%  - lipids: total 125 ,  HDL 69, TG not checked, LDL 41  - ALT 17, AST 13  - BUN/Cr 26/1.65

## 2020-09-10 LAB
AFP-TM SERPL-MCNC: 3.3 NG/ML (ref 0–8.3)
CANCER AG125 SERPL-ACNC: 9.4 U/ML (ref 0–38.1)
ESTRADIOL SERPL-MCNC: <5 PG/ML
HCG INTACT+B SERPL-ACNC: 2 MIU/ML
HE4 SERPL-SCNC: 121 PMOL/L (ref 0–105.2)
INHIBIN A SERPL-MCNC: 0.7 PG/ML
INHIBIN B SERPL-MCNC: <7 PG/ML (ref 0–16.9)
LDH SERPL-CCNC: 142 IU/L (ref 119–226)

## 2020-09-15 NOTE — PROGRESS NOTES
New Patient, Referred by Dr. Alessandro Patton for pelvic mass, pt report right lower abdominal pain that is a 3/10 on pain scale    1. Have you been to the ER, urgent care clinic since your last visit? Hospitalized since your last visit?  no    2. Have you seen or consulted any other health care providers outside of the [address] [place] [address] since your last visit? Include any pap smears or colon screening.    no

## 2020-09-16 ENCOUNTER — OFFICE VISIT (OUTPATIENT)
Dept: GYNECOLOGY | Age: 55
End: 2020-09-16
Payer: MEDICARE

## 2020-09-16 VITALS
DIASTOLIC BLOOD PRESSURE: 79 MMHG | HEIGHT: 63 IN | WEIGHT: 175.4 LBS | HEART RATE: 83 BPM | BODY MASS INDEX: 31.08 KG/M2 | SYSTOLIC BLOOD PRESSURE: 123 MMHG

## 2020-09-16 DIAGNOSIS — Z94.0 RENAL TRANSPLANT RECIPIENT: ICD-10-CM

## 2020-09-16 DIAGNOSIS — N28.9 KIDNEY DISEASE: ICD-10-CM

## 2020-09-16 DIAGNOSIS — R19.00 PELVIC MASS: Primary | ICD-10-CM

## 2020-09-16 DIAGNOSIS — Z94.0 TRANSPLANTED KIDNEY: ICD-10-CM

## 2020-09-16 DIAGNOSIS — Z86.718 HISTORY OF DVT (DEEP VEIN THROMBOSIS): ICD-10-CM

## 2020-09-16 DIAGNOSIS — E11.9 TYPE 2 DIABETES MELLITUS WITHOUT COMPLICATION, WITH LONG-TERM CURRENT USE OF INSULIN (HCC): ICD-10-CM

## 2020-09-16 DIAGNOSIS — E78.5 DYSLIPIDEMIA: ICD-10-CM

## 2020-09-16 DIAGNOSIS — Z79.4 TYPE 2 DIABETES MELLITUS WITHOUT COMPLICATION, WITH LONG-TERM CURRENT USE OF INSULIN (HCC): ICD-10-CM

## 2020-09-16 DIAGNOSIS — J45.909 UNCOMPLICATED ASTHMA, UNSPECIFIED ASTHMA SEVERITY, UNSPECIFIED WHETHER PERSISTENT: ICD-10-CM

## 2020-09-16 PROCEDURE — G0463 HOSPITAL OUTPT CLINIC VISIT: HCPCS | Performed by: OBSTETRICS & GYNECOLOGY

## 2020-09-16 PROCEDURE — 99205 OFFICE O/P NEW HI 60 MIN: CPT | Performed by: OBSTETRICS & GYNECOLOGY

## 2020-09-16 NOTE — LETTER
9/22/20    Patient: Brice Monge   YOB: 1965   Date of Visit: 9/16/2020     Zackary Geller MD  [facility]  [address] [facility]    Dear Zackary Geller MD,      Thank you for referring Ms. Brett [facility]chavarria [facility]. My notes for this consultation are attached. If you have questions, please do not hesitate to call me. I look forward to following your patient along with you.       Sincerely,    Sophy Contreras MD

## 2020-09-16 NOTE — PROGRESS NOTES
[address] [facility] [address] [facility]  P [phone]  F [phone]    Office Note  Patient ID:  Name:  Timmy Turner  MRN:  [id number]  :  1965/55 y.o. Date:  2020      HISTORY OF PRESENT ILLNESS:  Ms. Timmy Turner is a 50 y.o.  perimenopausal female who presents in consultation from Dr. Haylee Forte for a complex pelvic mass. The patient has a history of renal transplant in 2017. She remains on her chronic anti-rejection medications. She has a history of pelvic and abdominal bloating and discomfort. The patient reports she was seen at [address] [place] [address] in 2020 for an unrelated issue, but a CT A/P at that time demonstrated a pelvic cystic structure. We have the report under media, but we do not have the actual images. The patient continues to report a dull pain and bloating in the pelvis. She reports this comes and goes, and is not always present. Denies vagina bleeding or discharge. Denies constipation, diarrhea, nausea, vomiting, change in appetite or bowel habits. Denies CP, SOB, fevers or chills. Pertinent PMH/PSH: kidney transplant, DM, h/o DVT,       Active, no restrictions. Imaging Review:   Pelvic ultrasound 2020  THE UTERUS IS RETROVERTED, NORMAL IN SIZE AND HETEROGENEOUS IN ECHOGENICITY. THERE APPEARS TO BE MULTIPLE CALCIFICATION THROUGHOUT THE UTERUS. THE ENDOMETRIUM MEASURES 1MM IN THICKNESS. NO MASSES OR ABNORMALITIES ARE SEEN. THERE APPEARS TO BE A COMPLEX CYSTIC STRUCTURE SEEN WITHIN THE RIGHT ADNEXA MEASURING 71 X 60 X 70MM, POSSIBLE RIGHT ADNEXAL MASS VS. RIGHT OVARIAN COMPLEX CYST. LEFT OVARY IS NOT VISUALIZED DUE TO BOWEL GAS. LEFT ADNEXA APPERS WNL. NO FREE FLUID IS SEEN IN THE CDS. CT A/P 2020: Impression: 1. Status post right lower quadrant kidney transplantation. No hydronephrosis. 2. Two walled off hypodensities in the pelvis.  Differential considerations included abscess formation or postoperative lymphocele and seroma. Clinical correlation recommended. 3. Right native kidney hydronephrosis and ureteral dilatation possible due to compression of the ureter by the above-described hypodensities in the pelvis. 4. Colonic diverticulosis without evidence of acute diverticulitis. 5. Other findings as above. ROS:  A comprehensive review of systems was negative except for that written in the History of Present Illness. , 10 point ROS    OB/GYN ROS:  Patient denies significant menstrual problems.     ECOG ndGndrndanddndend:nd nd2nd Problem List:  Patient Active Problem List    Diagnosis Date Noted    Pelvic mass 09/22/2020    Cystitis bacillary, chronic 03/09/2020    History of DVT (deep vein thrombosis) 03/09/2020    Sebaceous cyst 08/27/2019    Type 2 diabetes mellitus without complication, with long-term current use of insulin ([address] 01/21/2019    Renal transplant recipient 12/12/2017    Dyslipidemia 02/19/2015    Mitral regurgitation 02/19/2015    Asthma 04/08/2014    Depression 08/26/2013    Anemia 11/19/2012    Hyperkalemia 11/19/2012    DM (diabetes mellitus) ([address] 11/19/2012    Diabetes mellitus ([address] 07/27/2012    Hypertension 07/17/2012    Kidney disease 07/17/2012    Migraines 07/17/2012     PMH:  Past Medical History:   Diagnosis Date    Anemia     Arthritis     Asthma 4/8/2014    Rx for same    Diabetes ([address] 28yo    Rx to control    GERD (gastroesophageal reflux disease)     Headache(784.0)     Hepatitis B infection     Hypertension     Rx for same    Kidney transplanted 04/2017    Sebaceous cyst 8/27/2019      PSH:  Past Surgical History:   Procedure Laterality Date    COLONOSCOPY N/A 11/1/2016    COLONOSCOPY performed by Joesph Harper MD at [address] [place]  [address]    laparoscopic    HX COLONOSCOPY      HX ENDOSCOPY      HX MOHS PROCEDURES Left     HX OTHER SURGICAL  7/30/15    excision of left Axilla Hidradenitis    HX RENAL BIOPSY  2011    HX RENAL TRANSPLANT  04/05/2017    HX UROLOGICAL  2012    KIDNEY BX RIGHT - pt states she has only had one kidney bx as of 10/31/2016    HX UROLOGICAL  2017    transplant    HX VASCULAR ACCESS  1/2013    CHEST CATHETER FOR DIALYSIS    HX VASCULAR ACCESS Right     A-V fistula    HX WISDOM TEETH EXTRACTION        Social History:  Social History     Tobacco Use    Smoking status: Never Smoker    Smokeless tobacco: Never Used   Substance Use Topics    Alcohol use: No     Alcohol/week: 0.0 standard drinks      Family History:  Family History   Problem Relation Age of Onset    Hypertension Mother     Hypertension Sister     Diabetes Maternal Grandfather     Cancer Father         UNKNOWN    Hypertension Maternal Grandmother     Diabetes Son     Asthma Son     Cancer Maternal Aunt         UNKNOWN    Anesth Problems Neg Hx       Medications: (reviewed)  Current Outpatient Medications   Medication Sig    liraglutide (VICTOZA) 0.6 mg/0.1 mL (18 mg/3 mL) pnij 1.8 mg by SubCUTAneous route daily.  PARoxetine mesylate,menop.sym, 7.5 mg cap Take 7.5 mg by mouth daily for 90 days.  insulin detemir U-100 (LEVEMIR FLEXTOUCH) 100 unit/mL (3 mL) inpn Inject 35 units bid--please deliver to her house    atorvastatin (LIPITOR) 40 mg tablet TAKE 1 TABLET BY MOUTH EVERY DAY    labetaloL (NORMODYNE) 200 mg tablet Take 1 Tab by mouth nightly.  K-Phos-NeutraL 250 mg tablet TAKE 1 TABLET BY MOUTH EVERY DAY    terbinafine HCL (LAMISIL) 250 mg tablet TAKE 1 TABLET BY MOUTH DAILY THEN get labs & fill remaining quantity FOR a total of 21 DAYS    insulin aspart, niacinamide, (Fiasp FlexTouch U-100 Insulin) 100 unit/mL (3 mL) inpn AS DIRECTED FOR high glucoses AND FOR carbohydrate intake UP TO 50 units/day    metFORMIN ER (GLUCOPHAGE XR) 500 mg tablet Take 1 Tab by mouth two (2) times daily (after meals).     fenofibrate nanocrystallized (TRICOR) 145 mg tablet Take 1 Tab by mouth daily.  magnesium oxide (MAG-OX) 400 mg tablet Take 1 Tab by mouth daily. (Patient taking differently: Take 800 mg by mouth two (2) times a day.)    traZODone (DESYREL) 100 mg tablet Take 1 Tab by mouth nightly. (Patient taking differently: Take 100 mg by mouth nightly as needed.)    pantoprazole (PROTONIX) 40 mg tablet TAKE ONE TABLET BY MOUTH ONCE DAILY    predniSONE (DELTASONE) 5 mg tablet Take 1 Tab by mouth daily.  cinacalcet (SENSIPAR) 30 mg tablet Take 30 mg by mouth daily.  aspirin delayed-release 81 mg tablet Take  by mouth daily.  mycophenolate mofetil (CELLCEPT) 250 mg capsule Take 250 mg by mouth. 2 tabs in the am and 4 tabs in the pm    tacrolimus (PROGRAF) 1 mg capsule Take 2 mg by mouth. Indications: taking 1 mg in am and 2 mg at QHS    OneTouch Ultra Blue Test Strip strip Test 4 times daily. DX E11.9    FreeStyle Sekou 14 Day Sensor kit Use as directed every 14 days   [employer] 14 Day [place] misc Use as directed    Insulin Needles, Disposable, (Unifine Pentips) 32 gauge x 5/32\" ndle USE TO INJECT INSULIN 5 TIMES DAILY    butalbital-acetaminophen-caffeine (FIORICET, ESGIC) -40 mg per tablet Take 1 Tab by mouth three (3) times daily as needed for Pain. Indications: Tension Headache    Blood-Glucose Meter, Drum-type (ACCU-CHEK COMPACT PLUS CARE) kit Test blood sugar 4 times daily    Blood Sugar Diagnostic, Drum (ACCU-CHEK COMPACT PLUS TEST) strp Test blood sugar 4 times daily    lancets (ONE TOUCH DELICA) 33 gauge misc 4 times daily. Diagnosis code: E11.8    albuterol (PROVENTIL HFA, VENTOLIN HFA, PROAIR HFA) 90 mcg/actuation inhaler Take 2 Puffs by inhalation every four (4) hours as needed for Wheezing.  Insulin Syringe-Needle U-100 0.5 mL 31 gauge x 5/16 syrg 1 Each by SubCUTAneous route daily. No current facility-administered medications for this visit.       Allergies: (reviewed)  Allergies   Allergen Reactions    Latex Shortness of Breath    Keflex [Cephalexin] Nausea and Vomiting     Stomach cramping    Tramadol Itching        Gyn History:   Last pap: 2017, normal  History of abnormal pap: denies  Menses: menopausal      OBJECTIVE:    Physical Exam:  VITAL SIGNS: Vitals:    09/16/20 0902   BP: 123/79   Pulse: 83   Weight: 175 lb 6.4 oz (79.6 kg)   Height: 5' 3\" (1.6 m)     Body mass index is 31.07 kg/m². GENERAL SISI: Conversant, alert, oriented. No acute distress. HEENT: HEENT. No thyroid enlargement. No JVD. Neck: Supple without restrictions. RESPIRATORY: Clear to auscultation and percussion to the bases. No CVAT. CARDIOVASC: RRR without murmur/rub. GASTROINT: soft, non-tender, without masses or organomegaly   MUSCULOSKEL: no joint tenderness, deformity or swelling       EXTREMITIES: extremities normal, atraumatic, no cyanosis or edema   PELVIC: Exam chaperoned by nurse. Normal appearing external genitalia. On speculum exam, normal appearing vagina and cervix. On bimanual exam, the cervix and uterus are normal size and mobile. No evidence of adnexal masses or nodularity, although the exam is difficult given the patient's pelvic kidney transplant. RECTAL: deferred   IGNACIO SURVEY: No suspicious lymphadenopathy or edema noted. NEURO: Grossly intact. No acute deficit.        Lab Date as available:    Lab Results   Component Value Date/Time    WBC 5.1 12/05/2016 08:29 PM    HGB 8.9 (L) 12/05/2016 08:29 PM    HCT 27.2 (L) 12/05/2016 08:29 PM    PLATELET 958 35/73/1723 08:29 PM    MCV 89.2 12/05/2016 08:29 PM     Lab Results   Component Value Date/Time    Sodium 142 12/05/2016 08:29 PM    Potassium 4.1 12/05/2016 08:29 PM    Chloride 101 12/05/2016 08:29 PM    CO2 32 12/05/2016 08:29 PM    Anion gap 9 12/05/2016 08:29 PM    Glucose 101 (H) 12/05/2016 08:29 PM    BUN 25 (H) 12/05/2016 08:29 PM    Creatinine 5.76 (H) 12/05/2016 08:29 PM    BUN/Creatinine ratio 4 (L) 12/05/2016 08:29 PM    GFR est AA 9 (L) 12/05/2016 08:29 PM    GFR est non-AA 8 (L) 12/05/2016 08:29 PM    Calcium 9.5 12/05/2016 08:29 PM         IMPRESSION/PLAN:    Ms. Chelly Alvarenga is a 50 y.o. female with a working diagnosis of 7cm complex pelvic mass. H/o pelvic renal transplant in 2017. Problems:     Patient Active Problem List    Diagnosis Date Noted    Pelvic mass 09/22/2020    Cystitis bacillary, chronic 03/09/2020    History of DVT (deep vein thrombosis) 03/09/2020    Sebaceous cyst 08/27/2019    Type 2 diabetes mellitus without complication, with long-term current use of insulin ([facility] 01/21/2019    Renal transplant recipient 12/12/2017    Dyslipidemia 02/19/2015    Mitral regurgitation 02/19/2015    Asthma 04/08/2014    Depression 08/26/2013    Anemia 11/19/2012    Hyperkalemia 11/19/2012    DM (diabetes mellitus) ([facility] 11/19/2012    Diabetes [facility] 07/27/2012    Hypertension 07/17/2012    Kidney disease 07/17/2012    Migraines 07/17/2012       I reviewed Ms. Calvin Crowley's course to date, including her medical records, recent studies, physical exam, and review of symptoms. Counseled patient regarding benign, borderline, and malignant conditions related to ovarian masses. Reviewed her normal Ca-125 at 9.4. Her HE-4 is slightly elevated at 121, but unfortunately this is not very helpful in this case as HE-4 will be elevated in patients with renal disease. Normal Inhibin A/B, AFP, LDH, and tumor-bhcg. Given the patient's medical and surgical history, she is not an ideal surgical candidate. Part of her issues may simply be related to her pelvic renal transplant and adhesive disease. I have recommended that we obtain her outside imaging for review, along with obtaining a pelvic MRI for better characterization and delineation between structures. While the patient may ultimately need surgery, I think it is imperative that we know what we are dealing with instead of moving too quickly to surgery that may not be needed.  After we receive her outside records and we obtain her pelvic MRI, we will have the patient return either in person or via virtual visit for further discussion. We will make further plans at that time. All questions and concerns were addressed with the patient and she is comfortable with the plan.       Defined Sensitive Document    >50% of total time allocated to visit dedicated to counseling, 60 minutes total.    Signed By: Samira Cruz MD     9/22/2020/7:16 AM

## 2020-09-21 ENCOUNTER — HOSPITAL ENCOUNTER (OUTPATIENT)
Dept: MRI IMAGING | Age: 55
Discharge: HOME | End: 2020-09-21
Attending: OBSTETRICS & GYNECOLOGY
Payer: MEDICARE

## 2020-09-21 PROCEDURE — 72195 MRI PELVIS W/O DYE: CPT

## 2020-09-22 ENCOUNTER — VIRTUAL VISIT (OUTPATIENT)
Dept: GYNECOLOGY | Age: 55
End: 2020-09-22
Payer: MEDICARE

## 2020-09-22 DIAGNOSIS — Z94.0 RENAL TRANSPLANT RECIPIENT: ICD-10-CM

## 2020-09-22 DIAGNOSIS — Z86.718 HISTORY OF DVT (DEEP VEIN THROMBOSIS): ICD-10-CM

## 2020-09-22 DIAGNOSIS — R19.00 PELVIC MASS: Primary | ICD-10-CM

## 2020-09-22 PROCEDURE — 99214 OFFICE O/P EST MOD 30 MIN: CPT | Performed by: OBSTETRICS & GYNECOLOGY

## 2020-09-22 NOTE — PROGRESS NOTES
Crystal Black is a 50 y.o. female who was seen by synchronous (real-time) audio-video technology on 2020    [address] [facility],[address] [facility] [address]  P [phone]  F [phone]    Office Note  Patient ID:  Name:  Crystal Black  MRN:  [id number]  :  1965/55 y.o. Date:  2020      HISTORY OF PRESENT ILLNESS:  Ms. Crystal Black is a 50 y.o. female who initially presented with a complex 7cm pelvic mass. Presents back today for MRI results. The patient also has a renal transplant in her pelvis since 2017. Reports prior cyst in her pelvis in 2020 at [facility], and reports this has been drained once. She is without complaints today. Presents back for MRI results and further discussion of management. Initial History:  Ms. Crystal Black is a 50 y.o.  perimenopausal female who presents in consultation from Dr. Yovany Bhat for a complex pelvic mass. The patient has a history of renal transplant in 2017. She remains on her chronic anti-rejection medications. She has a history of pelvic and abdominal bloating and discomfort. The patient reports she was seen at [facility] in 2020 for an unrelated issue, but a CT A/P at that time demonstrated a pelvic cystic structure. We have the report under media, but we do not have the actual images. The patient continues to report a dull pain and bloating in the pelvis. She reports this comes and goes, and is not always present. Denies vagina bleeding or discharge. Denies constipation, diarrhea, nausea, vomiting, change in appetite or bowel habits. Denies CP, SOB, fevers or chills. Pertinent PMH/PSH: kidney transplant, DM, h/o DVT,       Active, no restrictions.      Imaging Review:   MRI pelvis 2020:  FINDINGS: There is a large T2 hyperintense lesion in the right lower pelvis  which measures approximately 6.5 AP by 6.6 cm transverse and extends  approximately 6.7 cm craniocaudal. Mild heterogeneity is noted along the right  lateral border which is otherwise homogeneously T2 hyperintense. Lesion  demonstrates mild increase T1 signal compared to simple fluid on the T1-weighted  images as well as T2 shading on T2-weighted images. Mass appears to arise from  the right ovary demonstrates a T1 and T2 hypointense ring possibly representing  hemosiderin. Other findings include normal-appearing uterus. The urinary bladder is  unremarkable. Visualized bowel is unremarkable. Visualized osseous structures  are unremarkable. No adenopathy is noted. IMPRESSION  IMPRESSION: Cystic mass in the right pelvis likely arising from the right ovary  which demonstrates predominantly homogeneous signal with some heterogeneity  noted along the right lateral margin. There is a possible hemosiderin ring with  increased T1 signal and T2 shading. This may represent a large hemorrhagic cyst  or likely endometrioma given the ultrasound findings. Pelvic ultrasound 9/4/2020  THE UTERUS IS RETROVERTED, NORMAL IN SIZE AND HETEROGENEOUS IN ECHOGENICITY. THERE APPEARS TO BE MULTIPLE CALCIFICATION THROUGHOUT THE UTERUS. THE ENDOMETRIUM MEASURES 1MM IN THICKNESS. NO MASSES OR ABNORMALITIES ARE SEEN. THERE APPEARS TO BE A COMPLEX CYSTIC STRUCTURE SEEN WITHIN THE RIGHT ADNEXA MEASURING 71 X 60 X 70MM, POSSIBLE RIGHT ADNEXAL MASS VS. RIGHT OVARIAN COMPLEX CYST. LEFT OVARY IS NOT VISUALIZED DUE TO BOWEL GAS. LEFT ADNEXA APPERS WNL. NO FREE FLUID IS SEEN IN THE CDS. CT A/P 1/22/2020: Impression: 1. Status post right lower quadrant kidney transplantation. No hydronephrosis. 2. Two walled off hypodensities in the pelvis. Differential considerations included abscess formation or postoperative lymphocele and seroma. Clinical correlation recommended. 3. Right native kidney hydronephrosis and ureteral dilatation possible due to compression of the ureter by the above-described hypodensities in the pelvis.    4. Colonic diverticulosis without evidence of acute diverticulitis. 5. Other findings as above. ROS:  A comprehensive review of systems was negative except for that written in the History of Present Illness. , 10 point ROS    OB/GYN ROS:  Patient denies significant menstrual problems.     ECOG ndGndrndanddndend:nd nd2nd Problem List:  Patient Active Problem List    Diagnosis Date Noted    Pelvic mass 09/22/2020    Cystitis bacillary, chronic 03/09/2020    History of DVT (deep vein thrombosis) 03/09/2020    Sebaceous cyst 08/27/2019    Type 2 diabetes mellitus without complication, with long-term current use of insulin ([address] 01/21/2019    Renal transplant recipient 12/12/2017    Dyslipidemia 02/19/2015    Mitral regurgitation 02/19/2015    Asthma 04/08/2014    Depression 08/26/2013    Anemia 11/19/2012    Hyperkalemia 11/19/2012    DM (diabetes mellitus) ([address] 11/19/2012    Diabetes mellitus ([address] 07/27/2012    Hypertension 07/17/2012    Kidney disease 07/17/2012    Migraines 07/17/2012     PMH:  Past Medical History:   Diagnosis Date    Anemia     Arthritis     Asthma 4/8/2014    Rx for same    Diabetes ([address] 28yo    Rx to control    GERD (gastroesophageal reflux disease)     Headache(784.0)     Hepatitis B infection     Hypertension     Rx for same    Kidney transplanted 04/2017    Sebaceous cyst 8/27/2019      PSH:  Past Surgical History:   Procedure Laterality Date    COLONOSCOPY N/A 11/1/2016    COLONOSCOPY performed by Luis Dunaway MD at [address] [facility]    laparoscopic    HX COLONOSCOPY      HX ENDOSCOPY      HX MOHS PROCEDURES Left     HX OTHER SURGICAL  7/30/15    excision of left Axilla Hidradenitis    HX RENAL BIOPSY  2011    HX RENAL TRANSPLANT  04/05/2017    HX UROLOGICAL  2012    KIDNEY BX RIGHT - pt states she has only had one kidney bx as of 10/31/2016    HX UROLOGICAL  2017    transplant    HX VASCULAR ACCESS  1/2013 CHEST CATHETER FOR DIALYSIS    HX VASCULAR ACCESS Right     A-V fistula    HX WISDOM TEETH EXTRACTION        Social History:  Social History     Tobacco Use    Smoking status: Never Smoker    Smokeless tobacco: Never Used   Substance Use Topics    Alcohol use: No     Alcohol/week: 0.0 standard drinks      Family History:  Family History   Problem Relation Age of Onset    Hypertension Mother     Hypertension Sister     Diabetes Maternal Grandfather     Cancer Father         UNKNOWN    Hypertension Maternal Grandmother     Diabetes Son     Asthma Son     Cancer Maternal Aunt         UNKNOWN    Anesth Problems Neg Hx       Medications: (reviewed)  Current Outpatient Medications   Medication Sig    liraglutide (VICTOZA) 0.6 mg/0.1 mL (18 mg/3 mL) pnij 1.8 mg by SubCUTAneous route daily.  PARoxetine mesylate,menop.sym, 7.5 mg cap Take 7.5 mg by mouth daily for 90 days.  insulin detemir U-100 (LEVEMIR FLEXTOUCH) 100 unit/mL (3 mL) inpn Inject 35 units bid--please deliver to her house    atorvastatin (LIPITOR) 40 mg tablet TAKE 1 TABLET BY MOUTH EVERY DAY    OneTouch Ultra Blue Test Strip strip Test 4 times daily. DX E11.9    labetaloL (NORMODYNE) 200 mg tablet Take 1 Tab by mouth nightly.     FreeStyle Sekou 14 Day Sensor kit Use as directed every 14 days   [employer] 14 Day [place] misc Use as directed    K-Phos-NeutraL 250 mg tablet TAKE 1 TABLET BY MOUTH EVERY DAY    terbinafine HCL (LAMISIL) 250 mg tablet TAKE 1 TABLET BY MOUTH DAILY THEN get labs & fill remaining quantity FOR a total of 21 DAYS    Insulin Needles, Disposable, (Unifine Pentips) 32 gauge x 5/32\" ndle USE TO INJECT INSULIN 5 TIMES DAILY    insulin aspart, niacinamide, (Fiasp FlexTouch U-100 Insulin) 100 unit/mL (3 mL) inpn AS DIRECTED FOR high glucoses AND FOR carbohydrate intake UP TO 50 units/day    butalbital-acetaminophen-caffeine (FIORICET, ESGIC) -40 mg per tablet Take 1 Tab by mouth three (3) times daily as needed for Pain. Indications: Tension Headache    metFORMIN ER (GLUCOPHAGE XR) 500 mg tablet Take 1 Tab by mouth two (2) times daily (after meals).  fenofibrate nanocrystallized (TRICOR) 145 mg tablet Take 1 Tab by mouth daily.  magnesium oxide (MAG-OX) 400 mg tablet Take 1 Tab by mouth daily. (Patient taking differently: Take 800 mg by mouth two (2) times a day.)    traZODone (DESYREL) 100 mg tablet Take 1 Tab by mouth nightly. (Patient taking differently: Take 100 mg by mouth nightly as needed.)    pantoprazole (PROTONIX) 40 mg tablet TAKE ONE TABLET BY MOUTH ONCE DAILY    Blood-Glucose Meter, Drum-type (ACCU-CHEK COMPACT PLUS CARE) kit Test blood sugar 4 times daily    Blood Sugar Diagnostic, Drum (ACCU-CHEK COMPACT PLUS TEST) strp Test blood sugar 4 times daily    lancets (ONE TOUCH DELICA) 33 gauge misc 4 times daily. Diagnosis code: E11.8    albuterol (PROVENTIL HFA, VENTOLIN HFA, PROAIR HFA) 90 mcg/actuation inhaler Take 2 Puffs by inhalation every four (4) hours as needed for Wheezing.  predniSONE (DELTASONE) 5 mg tablet Take 1 Tab by mouth daily.  cinacalcet (SENSIPAR) 30 mg tablet Take 30 mg by mouth daily.  aspirin delayed-release 81 mg tablet Take  by mouth daily.  mycophenolate mofetil (CELLCEPT) 250 mg capsule Take 250 mg by mouth. 2 tabs in the am and 4 tabs in the pm    Insulin Syringe-Needle U-100 0.5 mL 31 gauge x 5/16 syrg 1 Each by SubCUTAneous route daily.  tacrolimus (PROGRAF) 1 mg capsule Take 2 mg by mouth. Indications: taking 1 mg in am and 2 mg at QHS     No current facility-administered medications for this visit.       Allergies: (reviewed)  Allergies   Allergen Reactions    Latex Shortness of Breath    Keflex [Cephalexin] Nausea and Vomiting     Stomach cramping    Tramadol Itching        Gyn History:   Last pap: 2017, normal  History of abnormal pap: denies  Menses: menopausal      OBJECTIVE:  *deferred today given video-conference visit for ongoing COVID-19 pandemic*    Physical Exam:  VITAL SIGNS: There were no vitals filed for this visit. There is no height or weight on file to calculate BMI. GENERAL SISI: Conversant, alert, oriented. No acute distress. HEENT: HEENT. No thyroid enlargement. No JVD. Neck: Supple without restrictions. RESPIRATORY: Clear to auscultation and percussion to the bases. No CVAT. CARDIOVASC: RRR without murmur/rub. GASTROINT: soft, non-tender, without masses or organomegaly   MUSCULOSKEL: no joint tenderness, deformity or swelling       EXTREMITIES: extremities normal, atraumatic, no cyanosis or edema   PELVIC: Exam chaperoned by nurse. Normal appearing external genitalia. On speculum exam, normal appearing vagina and cervix. On bimanual exam, the cervix and uterus are normal size and mobile. No evidence of adnexal masses or nodularity, although the exam is difficult given the patient's pelvic kidney transplant. RECTAL: deferred   IGNACIO SURVEY: No suspicious lymphadenopathy or edema noted. NEURO: Grossly intact. No acute deficit. Lab Date as available:    Lab Results   Component Value Date/Time    WBC 5.1 12/05/2016 08:29 PM    HGB 8.9 (L) 12/05/2016 08:29 PM    HCT 27.2 (L) 12/05/2016 08:29 PM    PLATELET 493 25/74/4051 08:29 PM    MCV 89.2 12/05/2016 08:29 PM     Lab Results   Component Value Date/Time    Sodium 142 12/05/2016 08:29 PM    Potassium 4.1 12/05/2016 08:29 PM    Chloride 101 12/05/2016 08:29 PM    CO2 32 12/05/2016 08:29 PM    Anion gap 9 12/05/2016 08:29 PM    Glucose 101 (H) 12/05/2016 08:29 PM    BUN 25 (H) 12/05/2016 08:29 PM    Creatinine 5.76 (H) 12/05/2016 08:29 PM    BUN/Creatinine ratio 4 (L) 12/05/2016 08:29 PM    GFR est AA 9 (L) 12/05/2016 08:29 PM    GFR est non-AA 8 (L) 12/05/2016 08:29 PM    Calcium 9.5 12/05/2016 08:29 PM         IMPRESSION/PLAN:    Ms. Janet Torres is a 50 y.o. female with a working diagnosis of 7cm complex pelvic mass.  H/o pelvic renal transplant in 2017. MRI 9/21/2020 suggests endometrioma versus hemorrhagic cyst.     Problems:     Patient Active Problem List    Diagnosis Date Noted    Pelvic mass 09/22/2020    Cystitis bacillary, chronic 03/09/2020    History of DVT (deep vein thrombosis) 03/09/2020    Sebaceous cyst 08/27/2019    Type 2 diabetes mellitus without complication, with long-term current use of insulin ([facility] 01/21/2019    Renal transplant recipient 12/12/2017    Dyslipidemia 02/19/2015    Mitral regurgitation 02/19/2015    Asthma 04/08/2014    Depression 08/26/2013    Anemia 11/19/2012    Hyperkalemia 11/19/2012    DM (diabetes mellitus) ([facility] 11/19/2012    Diabetes mellitus ([facility] 07/27/2012    Hypertension 07/17/2012    Kidney disease 07/17/2012    Migraines 07/17/2012     Reviewed patient's course to date, including her recent pelvic MRI. Again I counseled patient regarding benign, borderline, and malignant conditions related to ovarian masses. Reviewed her normal Ca-125 at 9.4. Her HE-4 is slightly elevated at 121, but unfortunately this is not very helpful in this case as HE-4 will be elevated in patients with renal disease. Normal Inhibin A/B, AFP, LDH, and tumor-bhcg. Given the patient's medical and surgical history, she is not an ideal surgical candidate. Part of her issues may simply be related to her pelvic renal transplant and adhesive disease. Her MRI suggests likely hemorrhagic cyst versus endometrioma. No other abnormalities seen. I discussed surgical resection for definitive diagnosis versus continued observation. Risk of malignancy is low, <5%. At this time the patient would still like to avoid surgery. Given she is relatively asymptomatic and her imaging suggests a benign process, and normal Ca-125 is normal, it is reasonable and appropriate for continued observation. Plan for repeat ultrasound and Ca-125 in 3 months. She will return sooner if she experiences worsening pain.  RTC in 3 months with Ca-125 and repeat pelvic ultrasound. Reviewed precautionary symptoms to return sooner. = All questions and concerns were addressed with the patient and she is comfortable with the plan. Spencer Lara MD        Pursuant to the emergency declaration unde the [facility], [facility] waiver authority and the [employer] and [employer] Act, this Virtual Visit was conducted, with patient's consent, to reduce the patient's risk of exposure to COVID-19 and provide continuity of care for an established patient. Services were provided through a video synchronous discussion virtually to substitute for in-person clinic visit.      I spent at least 25 minutes with this established patient, and >50% of the time was spent counseling and/or coordinating care regarding pelvic mass    Spencer Lara MD

## 2020-09-22 NOTE — PROGRESS NOTES
MRI results, virtual phone call visit    1. Have you been to the ER, urgent care clinic since your last visit? Hospitalized since your last visit?  no    2. Have you seen or consulted any other health care providers outside of the [address] [place] [address] since your last visit? Include any pap smears or colon screening.      no

## 2020-09-23 DIAGNOSIS — R19.00 PELVIC MASS: ICD-10-CM

## 2020-09-23 DIAGNOSIS — Z94.0 TRANSPLANTED KIDNEY: ICD-10-CM

## 2020-09-28 ENCOUNTER — TRANSCRIBE ORDER (OUTPATIENT)
Dept: REGISTRATION | Age: 55
End: 2020-09-28

## 2020-09-28 ENCOUNTER — HOSPITAL ENCOUNTER (OUTPATIENT)
Dept: MAMMOGRAPHY | Age: 55
Discharge: HOME | End: 2020-09-28
Attending: INTERNAL MEDICINE
Payer: MEDICARE

## 2020-09-28 DIAGNOSIS — Z12.31 VISIT FOR SCREENING MAMMOGRAM: Primary | ICD-10-CM

## 2020-09-28 DIAGNOSIS — Z12.31 VISIT FOR SCREENING MAMMOGRAM: ICD-10-CM

## 2020-09-28 PROCEDURE — 77067 SCR MAMMO BI INCL CAD: CPT

## 2020-09-29 ENCOUNTER — OFFICE VISIT (OUTPATIENT)
Dept: CARDIOLOGY CLINIC | Age: 55
End: 2020-09-29
Payer: MEDICARE

## 2020-09-29 ENCOUNTER — ANCILLARY PROCEDURE (OUTPATIENT)
Dept: CARDIOLOGY CLINIC | Age: 55
End: 2020-09-29
Payer: MEDICARE

## 2020-09-29 VITALS — WEIGHT: 175 LBS | HEIGHT: 63 IN | BODY MASS INDEX: 31.01 KG/M2

## 2020-09-29 VITALS
BODY MASS INDEX: 31.01 KG/M2 | RESPIRATION RATE: 15 BRPM | HEIGHT: 63 IN | OXYGEN SATURATION: 98 % | SYSTOLIC BLOOD PRESSURE: 130 MMHG | HEART RATE: 80 BPM | WEIGHT: 175 LBS | DIASTOLIC BLOOD PRESSURE: 76 MMHG

## 2020-09-29 DIAGNOSIS — I10 ESSENTIAL HYPERTENSION: Primary | ICD-10-CM

## 2020-09-29 DIAGNOSIS — I10 HYPERTENSION, UNSPECIFIED TYPE: ICD-10-CM

## 2020-09-29 DIAGNOSIS — Z94.0 RENAL TRANSPLANT RECIPIENT: ICD-10-CM

## 2020-09-29 DIAGNOSIS — I49.9 IRREGULAR HEART BEAT: ICD-10-CM

## 2020-09-29 DIAGNOSIS — I34.0 MITRAL VALVE INSUFFICIENCY, UNSPECIFIED ETIOLOGY: ICD-10-CM

## 2020-09-29 DIAGNOSIS — R07.9 CHEST PAIN, UNSPECIFIED TYPE: ICD-10-CM

## 2020-09-29 DIAGNOSIS — E78.5 DYSLIPIDEMIA: ICD-10-CM

## 2020-09-29 DIAGNOSIS — R25.2 LEG CRAMPS: ICD-10-CM

## 2020-09-29 DIAGNOSIS — D50.9 IRON DEFICIENCY ANEMIA, UNSPECIFIED IRON DEFICIENCY ANEMIA TYPE: ICD-10-CM

## 2020-09-29 DIAGNOSIS — R06.09 DYSPNEA ON EXERTION: ICD-10-CM

## 2020-09-29 DIAGNOSIS — I36.1 NON-RHEUMATIC TRICUSPID VALVE INSUFFICIENCY: ICD-10-CM

## 2020-09-29 DIAGNOSIS — R42 DIZZINESS: ICD-10-CM

## 2020-09-29 DIAGNOSIS — I34.0 NONRHEUMATIC MITRAL VALVE REGURGITATION: ICD-10-CM

## 2020-09-29 LAB
ECHO AO ASC DIAM: 2.79 CM
ECHO AO ROOT DIAM: 2.93 CM
ECHO AV AREA PEAK VELOCITY: 2.87 CM2
ECHO AV AREA VTI: 3.11 CM2
ECHO AV AREA/BSA PEAK VELOCITY: 1.6 CM2/M2
ECHO AV AREA/BSA VTI: 1.7 CM2/M2
ECHO AV MEAN GRADIENT: 3.09 MMHG
ECHO AV PEAK GRADIENT: 4.69 MMHG
ECHO AV PEAK VELOCITY: 108.24 CM/S
ECHO AV VTI: 21.69 CM
ECHO LA AREA 4C: 15.25 CM2
ECHO LA MAJOR AXIS: 3.84 CM
ECHO LA MINOR AXIS: 2.1 CM
ECHO LA VOL 2C: 34.08 ML (ref 22–52)
ECHO LA VOL 4C: 37.62 ML (ref 22–52)
ECHO LA VOL BP: 41 ML (ref 22–52)
ECHO LA VOL/BSA BIPLANE: 22.44 ML/M2 (ref 16–28)
ECHO LA VOLUME INDEX A2C: 18.65 ML/M2 (ref 16–28)
ECHO LA VOLUME INDEX A4C: 20.59 ML/M2 (ref 16–28)
ECHO LV E' LATERAL VELOCITY: 5.47 CM/S
ECHO LV E' SEPTAL VELOCITY: 4.77 CM/S
ECHO LV EDV A2C: 48.58 ML
ECHO LV EDV A4C: 82.52 ML
ECHO LV EDV BP: 69.04 ML (ref 56–104)
ECHO LV EDV INDEX A4C: 45.2 ML/M2
ECHO LV EDV INDEX BP: 37.8 ML/M2
ECHO LV EDV NDEX A2C: 26.6 ML/M2
ECHO LV EJECTION FRACTION A2C: 53 PERCENT
ECHO LV EJECTION FRACTION A4C: 65 PERCENT
ECHO LV EJECTION FRACTION BIPLANE: 62.7 PERCENT (ref 55–100)
ECHO LV ESV A2C: 22.71 ML
ECHO LV ESV A4C: 28.7 ML
ECHO LV ESV BP: 25.73 ML (ref 19–49)
ECHO LV ESV INDEX A2C: 12.4 ML/M2
ECHO LV ESV INDEX A4C: 15.7 ML/M2
ECHO LV ESV INDEX BP: 14.1 ML/M2
ECHO LV INTERNAL DIMENSION DIASTOLIC: 4.23 CM (ref 3.9–5.3)
ECHO LV INTERNAL DIMENSION SYSTOLIC: 2.82 CM
ECHO LV IVSD: 1.35 CM (ref 0.6–0.9)
ECHO LV MASS 2D: 223 G (ref 67–162)
ECHO LV MASS INDEX 2D: 122.1 G/M2 (ref 43–95)
ECHO LV POSTERIOR WALL DIASTOLIC: 1.42 CM (ref 0.6–0.9)
ECHO LVOT DIAM: 2.17 CM
ECHO LVOT PEAK GRADIENT: 2.83 MMHG
ECHO LVOT PEAK VELOCITY: 84.14 CM/S
ECHO LVOT SV: 67.4 ML
ECHO LVOT VTI: 18.24 CM
ECHO MV A VELOCITY: 83.72 CM/S
ECHO MV E DECELERATION TIME (DT): 0.22 S
ECHO MV E VELOCITY: 68.35 CM/S
ECHO MV E/A RATIO: 0.82
ECHO MV E/E' LATERAL: 12.5
ECHO MV E/E' RATIO (AVERAGED): 13.41
ECHO MV E/E' SEPTAL: 14.33
ECHO PV MAX VELOCITY: 88.06 CM/S
ECHO PV PEAK INSTANTANEOUS GRADIENT SYSTOLIC: 3.1 MMHG
ECHO RV TAPSE: 1.63 CM (ref 1.5–2)
LA VOL DISK BP: 37.99 ML (ref 22–52)

## 2020-09-29 PROCEDURE — G0463 HOSPITAL OUTPT CLINIC VISIT: HCPCS | Performed by: INTERNAL MEDICINE

## 2020-09-29 PROCEDURE — G9717 DOC PT DX DEP/BP F/U NT REQ: HCPCS | Performed by: INTERNAL MEDICINE

## 2020-09-29 PROCEDURE — 93005 ELECTROCARDIOGRAM TRACING: CPT | Performed by: INTERNAL MEDICINE

## 2020-09-29 PROCEDURE — 99214 OFFICE O/P EST MOD 30 MIN: CPT | Performed by: INTERNAL MEDICINE

## 2020-09-29 PROCEDURE — G8417 CALC BMI ABV UP PARAM F/U: HCPCS | Performed by: INTERNAL MEDICINE

## 2020-09-29 PROCEDURE — G9231 DOC ESRD DIA TRANS PREG: HCPCS | Performed by: INTERNAL MEDICINE

## 2020-09-29 PROCEDURE — 3017F COLORECTAL CA SCREEN DOC REV: CPT | Performed by: INTERNAL MEDICINE

## 2020-09-29 PROCEDURE — 93306 TTE W/DOPPLER COMPLETE: CPT | Performed by: INTERNAL MEDICINE

## 2020-09-29 PROCEDURE — G9899 SCRN MAM PERF RSLTS DOC: HCPCS | Performed by: INTERNAL MEDICINE

## 2020-09-29 PROCEDURE — 93010 ELECTROCARDIOGRAM REPORT: CPT | Performed by: INTERNAL MEDICINE

## 2020-09-29 PROCEDURE — G8427 DOCREV CUR MEDS BY ELIG CLIN: HCPCS | Performed by: INTERNAL MEDICINE

## 2020-09-29 NOTE — PROGRESS NOTES
[facility] [place]:   [phone]    HPI: Dorian Bumpers, a 50y.o. year-old who presents for follow up regarding her HTN and mitral regurgitation. Feels ok, maybe like she needs to lose weight with her breathing. She is walking and using the steps and doing ok. No chest pain or pressure. Occasional sharp pains tht go away quickly. Will have a lot of leg cramps at night and told her about quinine  BP running well at home. She has had an eventful year with concern about an abdominal/pelvic mass but now appears to be a benign finding on her most recent MRI. Overall she is doing pretty well from a cardiac standpoint her blood pressure staying under control she does need to work on her weight a little bit and plans to do that over the next few months with increasing her walking etc.  She is no longer having chest pain she was previously describing her back is doing better    TTE today showed normal LVEF, mild LVH, mild TR, mild MR    Has dyspnea with exertion, even with just walking a block   Denies PND, sleeps on 2 pillows but not as high as it used to be   Overall she is feeling better  No recent chest pain   No real palpitations recently    Has rare LE edema   Walks 2 days/week for about an hour   Weight is hanging out about 174  adjustments to her insulin, prednisone   On Coumadin, recent duplex still showed DVT so she remains on Coumadin, denies any blood in her stool or urine  Only has dizziness with position changes, no syncope  Had sleep study which did not show LOUIS  BP at home well controlled - -127mmHg  Still has outstanding bill with [facility] so cannot get labs there    Recent transplant labs reviewed    Assessment/Plan:  1. Overweight Body mass index is 31 kg/m². encouraged regular exercise  - 45 minutes/day 5 days/week   2. HTN- malignant, better controlled post renal transplant on labetalol, previously on amlodipine  3. Dyslipidemia -continue atorvastatin  4.  Diabetes Mellitus - Hgb A1C  on insulin and oral agents   5. Mitral Regurgitation - mild by TTE today   stable  6. Stage V CKD - s/p right renal transplant 4/5/17, on cellcept, prograf, prednisone, followed by Dr. Nida Ross, Dr. Coty Davenport and Dr. Tobias Shea  7. CAD prevention- on ASA 81mg daily and atorvastatin, no angina symptoms, stable now  8. Vitamin D deficiency -on supplementation   9. Right leg DVT - on coumadin, followed by Dr. Tobias Shea at transplant center  10. Hx of gastritis/esophageal stricture  - on protonix, last EGD and colonoscopy showed diverticulosis, gastritis without hemorrhage and her esophagus was dilated, followed by Dr. Thien Saeed    Echo 9/20EF 65% gr1 dd mild cLVH mild MR  5/18 TTE - normal LVEF, mild MR   5/17 TTE - normal LVEF, mod MR   2/16 TTE - LVEF 55 % to 60 %, no WMA, grade 1 dd, mod MR  3/14 TTE LVEF 55 % to 60 %, no WMA, mild cLVH, mild to mod MR  1/13 TTE LVEF 55%, no WMA, moderate increased wall thickness, grade 1 diastolic dysfunction, moderate MR    Soc no tob, no etoh  Fhx no early CAD    She  has a past medical history of Anemia, Arthritis, Asthma (4/8/2014), Diabetes ([address] (28yo), GERD (gastroesophageal reflux disease), Headache(784.0), Hepatitis B infection, Hypertension, Kidney transplanted (04/2017), and Sebaceous cyst (8/27/2019). She also has no past medical history of Adverse effect of anesthesia, Glomerulosclerosis due to secondary diabetes ([address] or Sleep apnea. Cardiovascular ROS: no chest pain   Respiratory ROS: denies cough, wheezing  Neurological ROS: no TIA or stroke symptoms  All other systems negative except as above. PE  Vitals:    09/29/20 0848   BP: 130/76   Pulse: 80   Resp: 15   SpO2: 98%   Weight: 175 lb (79.4 kg)   Height: 5' 3\" (1.6 m)    Body mass index is 31 kg/m².   General appearance - alert, well appearing, and in no distress  Mental status - affect appropriate to mood  Eyes - sclera anicteric, moist mucous membranes  Neck - supple  Lymphatics - not assessed   Chest - clear to auscultation, no wheezes, rales or rhonchi  Heart - normal rate, regular rhythm, normal S1, S2, 2/6 TIANNA   Abdomen - soft, nontender, nondistended  Back exam - full range of motion, no tenderness  Neurological - cranial nerves II through XII grossly intact, no focal deficit  Musculoskeletal - no muscular tenderness noted, normal strength  Extremities - peripheral pulses normal, no LE edema    Skin - normal coloration  no rashes    Recent Labs:  Lab Results   Component Value Date/Time    Cholesterol, total 177 12/06/2013 11:05 AM     No results found for: ZANE  Lab Results   Component Value Date/Time    BUN 25 (H) 12/05/2016 08:29 PM     Lab Results   Component Value Date/Time    Potassium 4.1 12/05/2016 08:29 PM     Lab Results   Component Value Date/Time    Hemoglobin A1c 6.1 12/28/2015 08:01 PM    Hemoglobin A1c, External 8.2 12/10/2019     No components found for: HGBI,  IHGB,  HGB,  HGBP,  WBHGB  Lab Results   Component Value Date/Time    PLATELET 042 75/68/5911 08:29 PM       Reviewed:  Past Medical History:   Diagnosis Date    Anemia     Arthritis     Asthma 4/8/2014    Rx for same    Diabetes ([address] 26yo    Rx to control    GERD (gastroesophageal reflux disease)     Headache(784.0)     Hepatitis B infection     Hypertension     Rx for same    Kidney transplanted 04/2017    Sebaceous cyst 8/27/2019     Social History     Tobacco Use   Smoking Status Never Smoker   Smokeless Tobacco Never Used     Social History     Substance and Sexual Activity   Alcohol Use No    Alcohol/week: 0.0 standard drinks     Allergies   Allergen Reactions    Latex Shortness of Breath    Keflex [Cephalexin] Nausea and Vomiting     Stomach cramping    Tramadol Itching       Current Outpatient Medications   Medication Sig    liraglutide (VICTOZA) 0.6 mg/0.1 mL (18 mg/3 mL) pnij 1.8 mg by SubCUTAneous route daily.  PARoxetine mesylate,menop.sym, 7.5 mg cap Take 7.5 mg by mouth daily for 90 days.     insulin detemir U-100 (LEVEMIR FLEXTOUCH) 100 unit/mL (3 mL) inpn Inject 35 units bid--please deliver to her house    atorvastatin (LIPITOR) 40 mg tablet TAKE 1 TABLET BY MOUTH EVERY DAY    OneTouch Ultra Blue Test Strip strip Test 4 times daily. DX E11.9    labetaloL (NORMODYNE) 200 mg tablet Take 1 Tab by mouth nightly.  FreeStyle Sekou 14 Day Sensor kit Use as directed every 14 days   [employer] 14 Day [place] misc Use as directed    K-Phos-NeutraL 250 mg tablet TAKE 1 TABLET BY MOUTH EVERY DAY    terbinafine HCL (LAMISIL) 250 mg tablet TAKE 1 TABLET BY MOUTH DAILY THEN get labs & fill remaining quantity FOR a total of 21 DAYS    Insulin Needles, Disposable, (Unifine Pentips) 32 gauge x 5/32\" ndle USE TO INJECT INSULIN 5 TIMES DAILY    insulin aspart, niacinamide, (Fiasp FlexTouch U-100 Insulin) 100 unit/mL (3 mL) inpn AS DIRECTED FOR high glucoses AND FOR carbohydrate intake UP TO 50 units/day    butalbital-acetaminophen-caffeine (FIORICET, ESGIC) -40 mg per tablet Take 1 Tab by mouth three (3) times daily as needed for Pain. Indications: Tension Headache    metFORMIN ER (GLUCOPHAGE XR) 500 mg tablet Take 1 Tab by mouth two (2) times daily (after meals).  fenofibrate nanocrystallized (TRICOR) 145 mg tablet Take 1 Tab by mouth daily.  magnesium oxide (MAG-OX) 400 mg tablet Take 1 Tab by mouth daily. (Patient taking differently: Take 800 mg by mouth two (2) times a day.)    traZODone (DESYREL) 100 mg tablet Take 1 Tab by mouth nightly. (Patient taking differently: Take 100 mg by mouth nightly as needed.)    pantoprazole (PROTONIX) 40 mg tablet TAKE ONE TABLET BY MOUTH ONCE DAILY    Blood-Glucose Meter, Drum-type (ACCU-CHEK COMPACT PLUS CARE) kit Test blood sugar 4 times daily    Blood Sugar Diagnostic, Drum (ACCU-CHEK COMPACT PLUS TEST) strp Test blood sugar 4 times daily    lancets (ONE TOUCH DELICA) 33 gauge misc 4 times daily.  Diagnosis code: E11.8    albuterol (PROVENTIL HFA, VENTOLIN HFA, PROAIR HFA) 90 mcg/actuation inhaler Take 2 Puffs by inhalation every four (4) hours as needed for Wheezing.  predniSONE (DELTASONE) 5 mg tablet Take 1 Tab by mouth daily.  cinacalcet (SENSIPAR) 30 mg tablet Take 30 mg by mouth daily.  aspirin delayed-release 81 mg tablet Take  by mouth daily.  mycophenolate mofetil (CELLCEPT) 250 mg capsule Take 250 mg by mouth. 2 tabs in the am and 4 tabs in the pm    Insulin Syringe-Needle U-100 0.5 mL 31 gauge x 5/16 syrg 1 Each by SubCUTAneous route daily.  tacrolimus (PROGRAF) 1 mg capsule Take 2 mg by mouth. Indications: taking 1 mg in am and 2 mg at QHS     No current facility-administered medications for this visit.         Cricket Tadeo MD  [facility] [place]  [address] [facility]  [address]

## 2020-09-29 NOTE — PATIENT INSTRUCTIONS
Try taking some tonic water at bedtime- a small juice glass should do it- make sure the tonic water contains quinine.

## 2020-10-06 RX ORDER — METFORMIN HYDROCHLORIDE 500 MG/1
500 TABLET, EXTENDED RELEASE ORAL
Qty: 60 TAB | Refills: 3 | Status: SHIPPED | OUTPATIENT
Start: 2020-10-06 | End: 2021-02-08 | Stop reason: SDUPTHER

## 2020-10-23 RX ORDER — FENOFIBRATE 145 MG/1
145 TABLET, COATED ORAL DAILY
Qty: 30 TAB | Refills: 11 | Status: SHIPPED | OUTPATIENT
Start: 2020-10-23 | End: 2021-10-08 | Stop reason: SDUPTHER

## 2020-11-05 DIAGNOSIS — E78.5 DYSLIPIDEMIA: ICD-10-CM

## 2020-11-05 RX ORDER — INSULIN ASPART INJECTION 100 [IU]/ML
INJECTION, SOLUTION SUBCUTANEOUS
Qty: 15 ML | Refills: 11 | Status: SHIPPED | OUTPATIENT
Start: 2020-11-05 | End: 2021-11-06

## 2020-11-06 RX ORDER — ATORVASTATIN CALCIUM 40 MG/1
TABLET, FILM COATED ORAL
Qty: 90 TAB | Refills: 1 | Status: SHIPPED | OUTPATIENT
Start: 2020-11-06 | End: 2021-05-20 | Stop reason: SDUPTHER

## 2020-11-10 ENCOUNTER — TRANSCRIBE ORDER (OUTPATIENT)
Dept: SCHEDULING | Age: 55
End: 2020-11-10

## 2020-11-10 DIAGNOSIS — R10.9 ABDOMINAL PAIN: Primary | ICD-10-CM

## 2020-11-11 LAB
CREATININE, EXTERNAL: 1.65
HBA1C MFR BLD HPLC: 9.1 %
LDL-C, EXTERNAL: 41

## 2020-11-20 ENCOUNTER — PATIENT MESSAGE (OUTPATIENT)
Dept: SURGERY | Age: 55
End: 2020-11-20

## 2020-11-20 NOTE — TELEPHONE ENCOUNTER
Good morning, I am not sure if you meant to send this to the surgical office but you do not need to call the ED in advance if you are having problems. Make sure you are wearing a mask.  You will be screened at the door as well prior to entering Hospital.

## 2020-12-20 DIAGNOSIS — Z79.4 TYPE 2 DIABETES MELLITUS WITH COMPLICATION, WITH LONG-TERM CURRENT USE OF INSULIN (HCC): ICD-10-CM

## 2020-12-20 DIAGNOSIS — I10 ESSENTIAL HYPERTENSION: ICD-10-CM

## 2020-12-20 DIAGNOSIS — E11.8 TYPE 2 DIABETES MELLITUS WITH COMPLICATION, WITH LONG-TERM CURRENT USE OF INSULIN (HCC): ICD-10-CM

## 2020-12-20 DIAGNOSIS — E78.5 DYSLIPIDEMIA: ICD-10-CM

## 2021-02-08 RX ORDER — METFORMIN HYDROCHLORIDE 500 MG/1
500 TABLET, EXTENDED RELEASE ORAL
Qty: 60 TAB | Refills: 3 | Status: SHIPPED | OUTPATIENT
Start: 2021-02-08 | End: 2021-06-28 | Stop reason: SDUPTHER

## 2021-04-12 ENCOUNTER — HOSPITAL ENCOUNTER (EMERGENCY)
Age: 56
Discharge: ANOTHER HOSPITAL | End: 2021-04-13
Attending: FAMILY MEDICINE
Payer: MEDICARE

## 2021-04-12 DIAGNOSIS — S82.892A CLOSED FRACTURE DISLOCATION OF LEFT ANKLE, INITIAL ENCOUNTER: Primary | ICD-10-CM

## 2021-04-12 PROCEDURE — 96376 TX/PRO/DX INJ SAME DRUG ADON: CPT

## 2021-04-12 PROCEDURE — 99285 EMERGENCY DEPT VISIT HI MDM: CPT

## 2021-04-12 RX ORDER — APIXABAN 5 MG/1
1 TABLET, FILM COATED ORAL 2 TIMES DAILY
COMMUNITY
Start: 2021-03-23

## 2021-04-12 RX ORDER — ONDANSETRON 2 MG/ML
4 INJECTION INTRAMUSCULAR; INTRAVENOUS
Status: COMPLETED | OUTPATIENT
Start: 2021-04-12 | End: 2021-04-13

## 2021-04-12 RX ORDER — GABAPENTIN 300 MG/1
CAPSULE ORAL
COMMUNITY
Start: 2021-04-08 | End: 2021-04-27

## 2021-04-12 RX ORDER — FENTANYL CITRATE 50 UG/ML
50 INJECTION, SOLUTION INTRAMUSCULAR; INTRAVENOUS
Status: COMPLETED | OUTPATIENT
Start: 2021-04-12 | End: 2021-04-13

## 2021-04-12 RX ORDER — SODIUM CHLORIDE 0.9 % (FLUSH) 0.9 %
5-10 SYRINGE (ML) INJECTION ONCE
Status: COMPLETED | OUTPATIENT
Start: 2021-04-12 | End: 2021-04-13

## 2021-04-12 RX ORDER — MAG CARB/ALUMINUM HYDROX/ALGIN 237.5-254
SUSPENSION, ORAL (FINAL DOSE FORM) ORAL
COMMUNITY
End: 2021-08-10

## 2021-04-12 RX ORDER — INSULIN DETEMIR 100 [IU]/ML
50 INJECTION, SOLUTION SUBCUTANEOUS 2 TIMES DAILY
COMMUNITY
End: 2021-07-19

## 2021-04-13 ENCOUNTER — APPOINTMENT (OUTPATIENT)
Dept: CT IMAGING | Age: 56
DRG: 312 | End: 2021-04-13
Attending: EMERGENCY MEDICINE
Payer: MEDICARE

## 2021-04-13 ENCOUNTER — APPOINTMENT (OUTPATIENT)
Dept: NON INVASIVE DIAGNOSTICS | Age: 56
DRG: 312 | End: 2021-04-13
Attending: INTERNAL MEDICINE
Payer: MEDICARE

## 2021-04-13 ENCOUNTER — APPOINTMENT (OUTPATIENT)
Dept: GENERAL RADIOLOGY | Age: 56
DRG: 312 | End: 2021-04-13
Attending: PHYSICIAN ASSISTANT
Payer: MEDICARE

## 2021-04-13 ENCOUNTER — APPOINTMENT (OUTPATIENT)
Dept: VASCULAR SURGERY | Age: 56
DRG: 312 | End: 2021-04-13
Attending: INTERNAL MEDICINE
Payer: MEDICARE

## 2021-04-13 ENCOUNTER — APPOINTMENT (OUTPATIENT)
Dept: GENERAL RADIOLOGY | Age: 56
End: 2021-04-13
Attending: FAMILY MEDICINE
Payer: MEDICARE

## 2021-04-13 ENCOUNTER — VIRTUAL VISIT (OUTPATIENT)
Dept: ENDOCRINOLOGY | Age: 56
End: 2021-04-13

## 2021-04-13 ENCOUNTER — APPOINTMENT (OUTPATIENT)
Dept: GENERAL RADIOLOGY | Age: 56
DRG: 312 | End: 2021-04-13
Attending: EMERGENCY MEDICINE
Payer: MEDICARE

## 2021-04-13 ENCOUNTER — APPOINTMENT (OUTPATIENT)
Dept: MRI IMAGING | Age: 56
DRG: 312 | End: 2021-04-13
Attending: INTERNAL MEDICINE
Payer: MEDICARE

## 2021-04-13 ENCOUNTER — HOSPITAL ENCOUNTER (INPATIENT)
Age: 56
LOS: 2 days | Discharge: HOME HEALTH | DRG: 312 | End: 2021-04-16
Attending: EMERGENCY MEDICINE | Admitting: INTERNAL MEDICINE
Payer: MEDICARE

## 2021-04-13 VITALS
BODY MASS INDEX: 31.25 KG/M2 | OXYGEN SATURATION: 94 % | TEMPERATURE: 99.1 F | SYSTOLIC BLOOD PRESSURE: 124 MMHG | RESPIRATION RATE: 16 BRPM | WEIGHT: 176.37 LBS | HEART RATE: 84 BPM | DIASTOLIC BLOOD PRESSURE: 59 MMHG | HEIGHT: 63 IN

## 2021-04-13 DIAGNOSIS — S82.852A TRIMALLEOLAR FRACTURE OF ANKLE, CLOSED, LEFT, INITIAL ENCOUNTER: Primary | ICD-10-CM

## 2021-04-13 DIAGNOSIS — S93.05XA ANKLE DISLOCATION, LEFT, INITIAL ENCOUNTER: ICD-10-CM

## 2021-04-13 DIAGNOSIS — R42 DIZZINESS: ICD-10-CM

## 2021-04-13 DIAGNOSIS — S82.892A CLOSED LEFT ANKLE FRACTURE, INITIAL ENCOUNTER: ICD-10-CM

## 2021-04-13 DIAGNOSIS — Z94.0 RENAL TRANSPLANT RECIPIENT: ICD-10-CM

## 2021-04-13 LAB
ALBUMIN SERPL-MCNC: 3.9 G/DL (ref 3.5–5)
ALBUMIN/GLOB SERPL: 1.2 {RATIO} (ref 1.1–2.2)
ALP SERPL-CCNC: 65 U/L (ref 45–117)
ALT SERPL-CCNC: 30 U/L (ref 12–78)
ANION GAP SERPL CALC-SCNC: 11 MMOL/L (ref 5–15)
AST SERPL-CCNC: 23 U/L (ref 15–37)
BASOPHILS # BLD: 0 K/UL (ref 0–0.1)
BASOPHILS NFR BLD: 0 % (ref 0–1)
BILIRUB SERPL-MCNC: 0.4 MG/DL (ref 0.2–1)
BUN SERPL-MCNC: 25 MG/DL (ref 6–20)
BUN/CREAT SERPL: 17 (ref 12–20)
CALCIUM SERPL-MCNC: 10.7 MG/DL (ref 8.5–10.1)
CHLORIDE SERPL-SCNC: 99 MMOL/L (ref 97–108)
CO2 SERPL-SCNC: 28 MMOL/L (ref 21–32)
CREAT SERPL-MCNC: 1.5 MG/DL (ref 0.55–1.02)
DIFFERENTIAL METHOD BLD: ABNORMAL
ECHO AO ASC DIAM: 2.77 CM
ECHO AO ROOT DIAM: 2.78 CM
ECHO AV AREA PEAK VELOCITY: 2.35 CM2
ECHO AV AREA VTI: 3.05 CM2
ECHO AV AREA/BSA PEAK VELOCITY: 1.3 CM2/M2
ECHO AV AREA/BSA VTI: 1.6 CM2/M2
ECHO AV MEAN GRADIENT: 4 MMHG
ECHO AV PEAK GRADIENT: 8.58 MMHG
ECHO AV PEAK VELOCITY: 146.5 CM/S
ECHO AV VTI: 27.47 CM
ECHO LA MAJOR AXIS: 3.39 CM
ECHO LA MINOR AXIS: 1.82 CM
ECHO LV E' LATERAL VELOCITY: 6.91 CM/S
ECHO LV E' SEPTAL VELOCITY: 4.82 CM/S
ECHO LV INTERNAL DIMENSION DIASTOLIC: 4.23 CM (ref 3.9–5.3)
ECHO LV INTERNAL DIMENSION SYSTOLIC: 2.43 CM
ECHO LV IVSD: 1.47 CM (ref 0.6–0.9)
ECHO LV MASS 2D: 236.7 G (ref 67–162)
ECHO LV MASS INDEX 2D: 127.4 G/M2 (ref 43–95)
ECHO LV POSTERIOR WALL DIASTOLIC: 1.41 CM (ref 0.6–0.9)
ECHO LVOT CARDIAC OUTPUT: 6.28 LITER/MINUTE
ECHO LVOT DIAM: 2.05 CM
ECHO LVOT PEAK GRADIENT: 4.37 MMHG
ECHO LVOT PEAK VELOCITY: 104.54 CM/S
ECHO LVOT SV: 83.7 ML
ECHO LVOT VTI: 25.37 CM
ECHO MV A VELOCITY: 131.64 CM/S
ECHO MV AREA PHT: 4.12 CM2
ECHO MV AREA VTI: 3.18 CM2
ECHO MV E DECELERATION TIME (DT): 187.56 MS
ECHO MV E VELOCITY: 98.87 CM/S
ECHO MV E/A RATIO: 0.75
ECHO MV E/E' LATERAL: 14.31
ECHO MV E/E' RATIO (AVERAGED): 17.41
ECHO MV E/E' SEPTAL: 20.51
ECHO MV MAX VELOCITY: 123.78 CM/S
ECHO MV MEAN GRADIENT: 2.55 MMHG
ECHO MV PEAK GRADIENT: 6.13 MMHG
ECHO MV PRESSURE HALF TIME (PHT): 53.43 MS
ECHO MV VTI: 26.3 CM
ECHO PV MAX VELOCITY: 101.82 CM/S
ECHO PV PEAK INSTANTANEOUS GRADIENT SYSTOLIC: 4.15 MMHG
ECHO TV REGURGITANT MAX VELOCITY: 407.2 CM/S
EOSINOPHIL # BLD: 0.1 K/UL (ref 0–0.4)
EOSINOPHIL NFR BLD: 1 % (ref 0–7)
ERYTHROCYTE [DISTWIDTH] IN BLOOD BY AUTOMATED COUNT: 13.8 % (ref 11.5–14.5)
GLOBULIN SER CALC-MCNC: 3.2 G/DL (ref 2–4)
GLUCOSE BLD STRIP.AUTO-MCNC: 141 MG/DL (ref 65–100)
GLUCOSE BLD STRIP.AUTO-MCNC: 204 MG/DL (ref 65–100)
GLUCOSE BLD STRIP.AUTO-MCNC: 221 MG/DL (ref 65–100)
GLUCOSE BLD STRIP.AUTO-MCNC: 282 MG/DL (ref 65–100)
GLUCOSE BLD STRIP.AUTO-MCNC: 302 MG/DL (ref 65–100)
GLUCOSE SERPL-MCNC: 152 MG/DL (ref 65–100)
HCT VFR BLD AUTO: 32.7 % (ref 35–47)
HGB BLD-MCNC: 10.9 G/DL (ref 11.5–16)
IMM GRANULOCYTES # BLD AUTO: 0 K/UL (ref 0–0.04)
IMM GRANULOCYTES NFR BLD AUTO: 0 % (ref 0–0.5)
LVOT MG: 2.23 MMHG
LYMPHOCYTES # BLD: 0.9 K/UL (ref 0.8–3.5)
LYMPHOCYTES NFR BLD: 9 % (ref 12–49)
MCH RBC QN AUTO: 27.9 PG (ref 26–34)
MCHC RBC AUTO-ENTMCNC: 33.3 G/DL (ref 30–36.5)
MCV RBC AUTO: 83.6 FL (ref 80–99)
MONOCYTES # BLD: 0.8 K/UL (ref 0–1)
MONOCYTES NFR BLD: 8 % (ref 5–13)
NEUTS SEG # BLD: 8.2 K/UL (ref 1.8–8)
NEUTS SEG NFR BLD: 82 % (ref 32–75)
NRBC # BLD: 0 K/UL (ref 0–0.01)
NRBC BLD-RTO: 0 PER 100 WBC
PLATELET # BLD AUTO: 212 K/UL (ref 150–400)
PMV BLD AUTO: 11.2 FL (ref 8.9–12.9)
POTASSIUM SERPL-SCNC: 4.4 MMOL/L (ref 3.5–5.1)
PROT SERPL-MCNC: 7.1 G/DL (ref 6.4–8.2)
RBC # BLD AUTO: 3.91 M/UL (ref 3.8–5.2)
SERVICE CMNT-IMP: ABNORMAL
SODIUM SERPL-SCNC: 138 MMOL/L (ref 136–145)
TROPONIN I SERPL-MCNC: <0.05 NG/ML
WBC # BLD AUTO: 10.1 K/UL (ref 3.6–11)

## 2021-04-13 PROCEDURE — 93005 ELECTROCARDIOGRAM TRACING: CPT

## 2021-04-13 PROCEDURE — 70450 CT HEAD/BRAIN W/O DYE: CPT

## 2021-04-13 PROCEDURE — 74011636637 HC RX REV CODE- 636/637: Performed by: INTERNAL MEDICINE

## 2021-04-13 PROCEDURE — 74011250636 HC RX REV CODE- 250/636: Performed by: EMERGENCY MEDICINE

## 2021-04-13 PROCEDURE — 75810000053 HC SPLINT APPLICATION

## 2021-04-13 PROCEDURE — 96374 THER/PROPH/DIAG INJ IV PUSH: CPT

## 2021-04-13 PROCEDURE — 73562 X-RAY EXAM OF KNEE 3: CPT

## 2021-04-13 PROCEDURE — 74011250636 HC RX REV CODE- 250/636: Performed by: INTERNAL MEDICINE

## 2021-04-13 PROCEDURE — 93880 EXTRACRANIAL BILAT STUDY: CPT

## 2021-04-13 PROCEDURE — 99223 1ST HOSP IP/OBS HIGH 75: CPT | Performed by: PSYCHIATRY & NEUROLOGY

## 2021-04-13 PROCEDURE — 84484 ASSAY OF TROPONIN QUANT: CPT

## 2021-04-13 PROCEDURE — 96375 TX/PRO/DX INJ NEW DRUG ADDON: CPT

## 2021-04-13 PROCEDURE — 70551 MRI BRAIN STEM W/O DYE: CPT

## 2021-04-13 PROCEDURE — 74011250636 HC RX REV CODE- 250/636: Performed by: FAMILY MEDICINE

## 2021-04-13 PROCEDURE — 96376 TX/PRO/DX INJ SAME DRUG ADON: CPT

## 2021-04-13 PROCEDURE — 85025 COMPLETE CBC W/AUTO DIFF WBC: CPT

## 2021-04-13 PROCEDURE — 99152 MOD SED SAME PHYS/QHP 5/>YRS: CPT

## 2021-04-13 PROCEDURE — 36415 COLL VENOUS BLD VENIPUNCTURE: CPT

## 2021-04-13 PROCEDURE — 73630 X-RAY EXAM OF FOOT: CPT

## 2021-04-13 PROCEDURE — 75810000301 HC ER LEVEL 1 CLOSED TREATMNT FRACTURE/DISLOCATION

## 2021-04-13 PROCEDURE — 99218 HC RM OBSERVATION: CPT

## 2021-04-13 PROCEDURE — 82962 GLUCOSE BLOOD TEST: CPT

## 2021-04-13 PROCEDURE — 80053 COMPREHEN METABOLIC PANEL: CPT

## 2021-04-13 PROCEDURE — 99285 EMERGENCY DEPT VISIT HI MDM: CPT

## 2021-04-13 PROCEDURE — 73600 X-RAY EXAM OF ANKLE: CPT

## 2021-04-13 PROCEDURE — 73610 X-RAY EXAM OF ANKLE: CPT

## 2021-04-13 PROCEDURE — 74011250637 HC RX REV CODE- 250/637: Performed by: INTERNAL MEDICINE

## 2021-04-13 PROCEDURE — A9270 NON-COVERED ITEM OR SERVICE: HCPCS | Performed by: INTERNAL MEDICINE

## 2021-04-13 RX ORDER — PANTOPRAZOLE SODIUM 40 MG/1
40 TABLET, DELAYED RELEASE ORAL
Status: DISCONTINUED | OUTPATIENT
Start: 2021-04-14 | End: 2021-04-16 | Stop reason: HOSPADM

## 2021-04-13 RX ORDER — ASPIRIN 81 MG/1
81 TABLET ORAL DAILY
Status: DISCONTINUED | OUTPATIENT
Start: 2021-04-13 | End: 2021-04-16 | Stop reason: HOSPADM

## 2021-04-13 RX ORDER — FENOFIBRATE 145 MG/1
145 TABLET, COATED ORAL DAILY
Status: DISCONTINUED | OUTPATIENT
Start: 2021-04-13 | End: 2021-04-16 | Stop reason: HOSPADM

## 2021-04-13 RX ORDER — ONDANSETRON 2 MG/ML
4 INJECTION INTRAMUSCULAR; INTRAVENOUS
Status: COMPLETED | OUTPATIENT
Start: 2021-04-13 | End: 2021-04-13

## 2021-04-13 RX ORDER — DEXTROSE 50 % IN WATER (D50W) INTRAVENOUS SYRINGE
12.5-25 AS NEEDED
Status: DISCONTINUED | OUTPATIENT
Start: 2021-04-13 | End: 2021-04-16 | Stop reason: HOSPADM

## 2021-04-13 RX ORDER — INSULIN GLARGINE 100 [IU]/ML
35 INJECTION, SOLUTION SUBCUTANEOUS
Status: DISCONTINUED | OUTPATIENT
Start: 2021-04-13 | End: 2021-04-16 | Stop reason: HOSPADM

## 2021-04-13 RX ORDER — MAGNESIUM SULFATE 100 %
4 CRYSTALS MISCELLANEOUS AS NEEDED
Status: DISCONTINUED | OUTPATIENT
Start: 2021-04-13 | End: 2021-04-16 | Stop reason: HOSPADM

## 2021-04-13 RX ORDER — OXYCODONE HYDROCHLORIDE 5 MG/1
5 TABLET ORAL
Status: DISCONTINUED | OUTPATIENT
Start: 2021-04-13 | End: 2021-04-16 | Stop reason: HOSPADM

## 2021-04-13 RX ORDER — MORPHINE SULFATE 4 MG/ML
4 INJECTION INTRAVENOUS
Status: COMPLETED | OUTPATIENT
Start: 2021-04-13 | End: 2021-04-13

## 2021-04-13 RX ORDER — OXYCODONE HYDROCHLORIDE 5 MG/1
5 TABLET ORAL
Status: DISCONTINUED | OUTPATIENT
Start: 2021-04-13 | End: 2021-04-13

## 2021-04-13 RX ORDER — MORPHINE SULFATE 2 MG/ML
2 INJECTION, SOLUTION INTRAMUSCULAR; INTRAVENOUS
Status: COMPLETED | OUTPATIENT
Start: 2021-04-13 | End: 2021-04-13

## 2021-04-13 RX ORDER — FENTANYL CITRATE 50 UG/ML
25 INJECTION, SOLUTION INTRAMUSCULAR; INTRAVENOUS
Status: COMPLETED | OUTPATIENT
Start: 2021-04-13 | End: 2021-04-13

## 2021-04-13 RX ORDER — PROPOFOL 10 MG/ML
100 INJECTION, EMULSION INTRAVENOUS
Status: COMPLETED | OUTPATIENT
Start: 2021-04-13 | End: 2021-04-13

## 2021-04-13 RX ORDER — CINACALCET 30 MG/1
30 TABLET, FILM COATED ORAL DAILY
Status: DISCONTINUED | OUTPATIENT
Start: 2021-04-13 | End: 2021-04-16 | Stop reason: HOSPADM

## 2021-04-13 RX ORDER — LABETALOL 100 MG/1
200 TABLET, FILM COATED ORAL
Status: DISCONTINUED | OUTPATIENT
Start: 2021-04-13 | End: 2021-04-16 | Stop reason: HOSPADM

## 2021-04-13 RX ORDER — MYCOPHENOLATE MOFETIL 250 MG/1
500 CAPSULE ORAL EVERY MORNING
Status: DISCONTINUED | OUTPATIENT
Start: 2021-04-14 | End: 2021-04-16 | Stop reason: HOSPADM

## 2021-04-13 RX ORDER — TACROLIMUS 1 MG/1
1 CAPSULE ORAL EVERY MORNING
Status: DISCONTINUED | OUTPATIENT
Start: 2021-04-14 | End: 2021-04-16 | Stop reason: HOSPADM

## 2021-04-13 RX ORDER — ATORVASTATIN CALCIUM 40 MG/1
40 TABLET, FILM COATED ORAL
Status: DISCONTINUED | OUTPATIENT
Start: 2021-04-13 | End: 2021-04-16 | Stop reason: HOSPADM

## 2021-04-13 RX ORDER — TERBINAFINE HYDROCHLORIDE 250 MG/1
250 TABLET ORAL DAILY
Status: DISCONTINUED | OUTPATIENT
Start: 2021-04-13 | End: 2021-04-16 | Stop reason: HOSPADM

## 2021-04-13 RX ORDER — ONDANSETRON 2 MG/ML
4 INJECTION INTRAMUSCULAR; INTRAVENOUS
Status: DISCONTINUED | OUTPATIENT
Start: 2021-04-13 | End: 2021-04-16 | Stop reason: HOSPADM

## 2021-04-13 RX ORDER — LABETALOL HYDROCHLORIDE 5 MG/ML
5 INJECTION, SOLUTION INTRAVENOUS
Status: DISCONTINUED | OUTPATIENT
Start: 2021-04-13 | End: 2021-04-16 | Stop reason: HOSPADM

## 2021-04-13 RX ORDER — ACETAMINOPHEN 325 MG/1
650 TABLET ORAL
Status: DISCONTINUED | OUTPATIENT
Start: 2021-04-13 | End: 2021-04-16 | Stop reason: HOSPADM

## 2021-04-13 RX ORDER — MECLIZINE HCL 12.5 MG 12.5 MG/1
12.5 TABLET ORAL
Status: DISCONTINUED | OUTPATIENT
Start: 2021-04-13 | End: 2021-04-16 | Stop reason: HOSPADM

## 2021-04-13 RX ORDER — GABAPENTIN 300 MG/1
300 CAPSULE ORAL 3 TIMES DAILY
Status: DISCONTINUED | OUTPATIENT
Start: 2021-04-13 | End: 2021-04-13

## 2021-04-13 RX ORDER — MYCOPHENOLATE MOFETIL 250 MG/1
1000 CAPSULE ORAL EVERY EVENING
Status: DISCONTINUED | OUTPATIENT
Start: 2021-04-13 | End: 2021-04-16 | Stop reason: HOSPADM

## 2021-04-13 RX ORDER — ACETAMINOPHEN 650 MG/1
650 SUPPOSITORY RECTAL
Status: DISCONTINUED | OUTPATIENT
Start: 2021-04-13 | End: 2021-04-16 | Stop reason: HOSPADM

## 2021-04-13 RX ORDER — PREDNISONE 5 MG/1
5 TABLET ORAL DAILY
Status: DISCONTINUED | OUTPATIENT
Start: 2021-04-13 | End: 2021-04-16 | Stop reason: HOSPADM

## 2021-04-13 RX ORDER — OXYCODONE HYDROCHLORIDE 5 MG/1
10 TABLET ORAL
Status: DISCONTINUED | OUTPATIENT
Start: 2021-04-13 | End: 2021-04-16 | Stop reason: HOSPADM

## 2021-04-13 RX ORDER — TRAZODONE HYDROCHLORIDE 50 MG/1
100 TABLET ORAL
Status: DISCONTINUED | OUTPATIENT
Start: 2021-04-13 | End: 2021-04-16 | Stop reason: HOSPADM

## 2021-04-13 RX ORDER — TACROLIMUS 1 MG/1
2 CAPSULE ORAL EVERY EVENING
Status: DISCONTINUED | OUTPATIENT
Start: 2021-04-13 | End: 2021-04-16 | Stop reason: HOSPADM

## 2021-04-13 RX ORDER — SODIUM CHLORIDE 9 MG/ML
75 INJECTION, SOLUTION INTRAVENOUS CONTINUOUS
Status: DISCONTINUED | OUTPATIENT
Start: 2021-04-13 | End: 2021-04-15

## 2021-04-13 RX ORDER — LANOLIN ALCOHOL/MO/W.PET/CERES
400 CREAM (GRAM) TOPICAL DAILY
Status: DISCONTINUED | OUTPATIENT
Start: 2021-04-13 | End: 2021-04-16 | Stop reason: HOSPADM

## 2021-04-13 RX ORDER — INSULIN LISPRO 100 [IU]/ML
INJECTION, SOLUTION INTRAVENOUS; SUBCUTANEOUS
Status: DISCONTINUED | OUTPATIENT
Start: 2021-04-13 | End: 2021-04-16 | Stop reason: HOSPADM

## 2021-04-13 RX ADMIN — ONDANSETRON 4 MG: 2 INJECTION INTRAMUSCULAR; INTRAVENOUS at 05:10

## 2021-04-13 RX ADMIN — SODIUM CHLORIDE 1000 ML: 9 INJECTION, SOLUTION INTRAVENOUS at 08:52

## 2021-04-13 RX ADMIN — SODIUM CHLORIDE 75 ML/HR: 9 INJECTION, SOLUTION INTRAVENOUS at 11:59

## 2021-04-13 RX ADMIN — FENOFIBRATE 145 MG: 145 TABLET ORAL at 11:58

## 2021-04-13 RX ADMIN — TERBINAFINE 250 MG: 250 TABLET ORAL at 11:58

## 2021-04-13 RX ADMIN — INSULIN LISPRO 2 UNITS: 100 INJECTION, SOLUTION INTRAVENOUS; SUBCUTANEOUS at 22:04

## 2021-04-13 RX ADMIN — INSULIN LISPRO 2 UNITS: 100 INJECTION, SOLUTION INTRAVENOUS; SUBCUTANEOUS at 12:09

## 2021-04-13 RX ADMIN — MORPHINE SULFATE 4 MG: 4 INJECTION INTRAVENOUS at 02:18

## 2021-04-13 RX ADMIN — OXYCODONE 5 MG: 5 TABLET ORAL at 11:58

## 2021-04-13 RX ADMIN — TACROLIMUS 2 MG: 1 CAPSULE ORAL at 19:55

## 2021-04-13 RX ADMIN — TRAZODONE HYDROCHLORIDE 100 MG: 100 TABLET ORAL at 19:50

## 2021-04-13 RX ADMIN — Medication 10 ML: at 00:29

## 2021-04-13 RX ADMIN — PROPOFOL 60 MG: 10 INJECTION, EMULSION INTRAVENOUS at 07:05

## 2021-04-13 RX ADMIN — ONDANSETRON 4 MG: 2 INJECTION INTRAMUSCULAR; INTRAVENOUS at 00:27

## 2021-04-13 RX ADMIN — MAGNESIUM OXIDE 400 MG (241.3 MG MAGNESIUM) TABLET 400 MG: TABLET at 11:58

## 2021-04-13 RX ADMIN — FENTANYL CITRATE 25 MCG: 50 INJECTION, SOLUTION INTRAMUSCULAR; INTRAVENOUS at 06:40

## 2021-04-13 RX ADMIN — FENTANYL CITRATE 50 MCG: 50 INJECTION, SOLUTION INTRAMUSCULAR; INTRAVENOUS at 00:29

## 2021-04-13 RX ADMIN — PREDNISONE 5 MG: 10 TABLET ORAL at 11:58

## 2021-04-13 RX ADMIN — OXYCODONE 10 MG: 5 TABLET ORAL at 21:36

## 2021-04-13 RX ADMIN — OXYCODONE 5 MG: 5 TABLET ORAL at 16:14

## 2021-04-13 RX ADMIN — APIXABAN 5 MG: 5 TABLET, FILM COATED ORAL at 21:29

## 2021-04-13 RX ADMIN — MORPHINE SULFATE 4 MG: 4 INJECTION INTRAVENOUS at 01:30

## 2021-04-13 RX ADMIN — APIXABAN 5 MG: 5 TABLET, FILM COATED ORAL at 11:58

## 2021-04-13 RX ADMIN — INSULIN GLARGINE 35 UNITS: 100 INJECTION, SOLUTION SUBCUTANEOUS at 22:04

## 2021-04-13 RX ADMIN — ASPIRIN 81 MG: 81 TABLET, COATED ORAL at 11:58

## 2021-04-13 RX ADMIN — MYCOPHENOLATE MOFETIL 1000 MG: 250 CAPSULE ORAL at 19:50

## 2021-04-13 RX ADMIN — ONDANSETRON 4 MG: 2 INJECTION INTRAMUSCULAR; INTRAVENOUS at 01:30

## 2021-04-13 RX ADMIN — FENTANYL CITRATE 25 MCG: 50 INJECTION, SOLUTION INTRAMUSCULAR; INTRAVENOUS at 07:36

## 2021-04-13 RX ADMIN — LABETALOL HYDROCHLORIDE 200 MG: 100 TABLET, FILM COATED ORAL at 21:29

## 2021-04-13 RX ADMIN — CINACALCET HYDROCHLORIDE 30 MG: 30 TABLET, FILM COATED ORAL at 11:58

## 2021-04-13 RX ADMIN — ATORVASTATIN CALCIUM 40 MG: 40 TABLET, FILM COATED ORAL at 21:31

## 2021-04-13 RX ADMIN — MORPHINE SULFATE 2 MG: 2 INJECTION, SOLUTION INTRAMUSCULAR; INTRAVENOUS at 02:49

## 2021-04-13 RX ADMIN — INSULIN LISPRO 2 UNITS: 100 INJECTION, SOLUTION INTRAVENOUS; SUBCUTANEOUS at 17:15

## 2021-04-13 RX ADMIN — GABAPENTIN 300 MG: 300 CAPSULE ORAL at 16:14

## 2021-04-13 NOTE — CONSULTS
Orthopedic CONSULT NOTE    Subjective:     Date of Consultation:  April 13, 2021      Mac Agiurre is a 65-year-old diabetic renal transplant patient with history of DVT x2 on Eliquis who fell at home and has pain in her left ankle. She is unable to bear weight. She has no hip or knee pain. She states that she felt that her sugar was dropping and she was trying to get something to eat and felt lightheaded and tripped and fell. She states that her foot twisted and bent under her. She has no numbness or tingling. She has right shoulder pain from a Covid shot that she received earlier today. No syncope no chest pain heaviness pressure or shortness of breath or abdominal pain is noted. No hip pain is noted. No back pain is noted. Patient did not hit her head she has no headache or neck pain.     Patient Active Problem List    Diagnosis Date Noted    Closed left ankle fracture, initial encounter 04/13/2021    Ankle dislocation, left, initial encounter 04/13/2021    Pelvic mass 09/22/2020    Cystitis bacillary, chronic 03/09/2020    History of DVT (deep vein thrombosis) 03/09/2020    Sebaceous cyst 08/27/2019    Type 2 diabetes mellitus without complication, with long-term current use of insulin ([address] 01/21/2019    Renal transplant recipient 12/12/2017    Dyslipidemia 02/19/2015    Mitral regurgitation 02/19/2015    Asthma 04/08/2014    Depression 08/26/2013    Anemia 11/19/2012    Hyperkalemia 11/19/2012    DM (diabetes mellitus) ([address] 11/19/2012    Diabetes mellitus ([address] 07/27/2012    Hypertension 07/17/2012    Kidney disease 07/17/2012    Migraines 07/17/2012     Family History   Problem Relation Age of Onset    Hypertension Mother     Hypertension Sister     Diabetes Maternal Grandfather     Cancer Father         UNKNOWN    Hypertension Maternal Grandmother     Diabetes Son     Asthma Son     Cancer Maternal Aunt         UNKNOWN    Anesth Problems Neg Hx       Social History Tobacco Use    Smoking status: Never Smoker    Smokeless tobacco: Never Used   Substance Use Topics    Alcohol use: No     Alcohol/week: 0.0 standard drinks     Past Medical History:   Diagnosis Date    Anemia     Arthritis     Asthma 4/8/2014    Rx for same    Diabetes ([address] 26yo    Rx to control    GERD (gastroesophageal reflux disease)     Headache(784.0)     Hepatitis B infection     Hypertension     Rx for same    Kidney transplanted 04/2017    Sebaceous cyst 8/27/2019      Past Surgical History:   Procedure Laterality Date    COLONOSCOPY N/A 11/1/2016    COLONOSCOPY performed by Jose A Monterroso MD at [address]    laparoscopic    HX COLONOSCOPY      HX ENDOSCOPY      HX MOHS PROCEDURES Left     HX OTHER SURGICAL  7/30/15    excision of left Axilla Hidradenitis    HX RENAL BIOPSY  2011    HX RENAL TRANSPLANT  04/05/2017    HX UROLOGICAL  2012    KIDNEY BX RIGHT - pt states she has only had one kidney bx as of 10/31/2016    HX UROLOGICAL  2017    transplant    HX VASCULAR ACCESS  1/2013    CHEST CATHETER FOR DIALYSIS    HX VASCULAR ACCESS Right     A-V fistula    HX WISDOM TEETH EXTRACTION        Prior to Admission medications    Medication Sig Start Date End Date Taking? Authorizing Provider   insulin detemir U-100 (Levemir FlexTouch U-100 Insuln) 100 unit/mL (3 mL) inpn Levemir FlexTouch U-100 Insulin 100 unit/mL (3 mL) subcutaneous pen   INJECT 35 UNITS UNDER THE SKIN TWICE DAILY    Other, MD Jagdish   Eliquis 5 mg tablet Take 1 Tab by mouth two (2) times a day.  3/23/21   Jagdish Prabhakar MD   gabapentin (NEURONTIN) 300 mg capsule TAKE 1 CAPSULE BY MOUTH THREE TIMES DAILY 4/8/21   Vanna, MD Jagdish   Aluminum Hydrox-Magnesium Carb (Gaviscon Extra Strength) 254-237.5 mg/5 mL susp Gaviscon Extra Strength 254 mg-237.5 mg/5 mL oral suspension   SHAKE WELL AND TAKE 20ML BY MOUTH FOUR TIMES DAILY AS NEEDED FOR ABDOMINAL PAIN    Other, MD Jagdish   metFORMIN ER (GLUCOPHAGE XR) 500 mg tablet Take 1 Tab by mouth two (2) times daily (after meals). 2/8/21   Ebonie Bills MD   atorvastatin (LIPITOR) 40 mg tablet TAKE 1 TABLET BY MOUTH EVERY DAY 11/6/20   Ebonie Bills MD   Fiasp FlexTouch U-100 Insulin 100 unit/mL (3 mL) inpn AS DIRECTED FOR high glucoses AND FOR carbohydrate intake UP TO 50 units/day 11/5/20   Joe Huynh MD   fenofibrate nanocrystallized (Tricor) 145 mg tablet Take 1 Tab by mouth daily. 10/23/20   Jeannine Askew MD   liraglutide (VICTOZA) 0.6 mg/0.1 mL (18 mg/3 mL) pnij 1.8 mg by SubCUTAneous route daily. 9/9/20   Joe Huynh MD   OneTouch Ultra Blue Test Strip strip Test 4 times daily. DX E11.9 5/27/20   Joe Huynh MD   labetaloL (NORMODYNE) 200 mg tablet Take 1 Tab by mouth nightly. 5/13/20   MD Reginaldo CoxStyle Sekou 14 Day Sensor kit Use as directed every 14 days 5/13/20   MD Reginaldo CoxStyle Sekou 14 Day [place] misc Use as directed 5/13/20   Joe Huynh MD   K-Phos-NeutraL 250 mg tablet TAKE 1 TABLET BY MOUTH EVERY DAY 4/24/20   Provider, Historical   terbinafine HCL (LAMISIL) 250 mg tablet TAKE 1 TABLET BY MOUTH DAILY THEN get labs & fill remaining quantity FOR a total of 21 DAYS 4/10/20   Provider, Historical   Insulin Needles, Disposable, (Unifine Pentips) 32 gauge x 5/32\" ndle USE TO INJECT INSULIN 5 TIMES DAILY 5/9/20   Joe Huynh MD   butalbital-acetaminophen-caffeine (FIORICET, ESGIC) -40 mg per tablet Take 1 Tab by mouth three (3) times daily as needed for Pain. Indications: Tension Headache 11/18/19   Ebonie Bills MD   magnesium oxide (MAG-OX) 400 mg tablet Take 1 Tab by mouth daily. Patient taking differently: Take 800 mg by mouth two (2) times a day. 9/25/19   Jeannine Askew MD   traZODone (DESYREL) 100 mg tablet Take 1 Tab by mouth nightly. Patient taking differently: Take 100 mg by mouth nightly as needed. 8/21/19   Davi Perry MD   pantoprazole (PROTONIX) 40 mg tablet TAKE ONE TABLET BY MOUTH ONCE DAILY 6/25/19   Margarette Paez PA-C   Blood-Glucose Meter, Drum-type (ACCU-CHEK COMPACT PLUS CARE) kit Test blood sugar 4 times daily 2/12/19   Erik Henning MD   Blood Sugar Diagnostic, Drum (ACCU-CHEK COMPACT PLUS TEST) strp Test blood sugar 4 times daily 2/12/19   Erik Henning MD   lancets ([facility]) 33 gauge misc 4 times daily. Diagnosis code: E11.8 1/16/19   Erik Henning MD   albuterol (PROVENTIL HFA, VENTOLIN HFA, PROAIR HFA) 90 mcg/actuation inhaler Take 2 Puffs by inhalation every four (4) hours as needed for Wheezing. 8/30/18   Davi Perry MD   predniSONE (DELTASONE) 5 mg tablet Take 1 Tab by mouth daily. 4/20/18   Provider, Historical   cinacalcet (SENSIPAR) 30 mg tablet Take 30 mg by mouth daily. Provider, Historical   aspirin delayed-release 81 mg tablet Take  by mouth daily. Provider, Historical   mycophenolate mofetil (CELLCEPT) 250 mg capsule Take 250 mg by mouth. 2 tabs in the am and 4 tabs in the pm    Provider, Historical   tacrolimus (PROGRAF) 1 mg capsule Take 2 mg by mouth. Indications: taking 1 mg in am and 2 mg at [facility]    Provider, Historical     Current Facility-Administered Medications   Medication Dose Route Frequency    sodium chloride 0.9 % bolus infusion 1,000 mL  1,000 mL IntraVENous NOW     Current Outpatient Medications   Medication Sig    insulin detemir U-100 (Levemir FlexTouch U-100 Insuln) 100 unit/mL (3 mL) inpn Levemir FlexTouch U-100 Insulin 100 unit/mL (3 mL) subcutaneous pen   INJECT 35 UNITS UNDER THE SKIN TWICE DAILY    Eliquis 5 mg tablet Take 1 Tab by mouth two (2) times a day.     gabapentin (NEURONTIN) 300 mg capsule TAKE 1 CAPSULE BY MOUTH THREE TIMES DAILY    Aluminum Hydrox-Magnesium Carb (Gaviscon Extra Strength) 254-237.5 mg/5 mL susp Gaviscon Extra Strength 254 mg-237.5 mg/5 mL oral suspension   SHAKE WELL AND TAKE 20ML BY MOUTH FOUR TIMES DAILY AS NEEDED FOR ABDOMINAL PAIN    metFORMIN ER (GLUCOPHAGE XR) 500 mg tablet Take 1 Tab by mouth two (2) times daily (after meals).  atorvastatin (LIPITOR) 40 mg tablet TAKE 1 TABLET BY MOUTH EVERY DAY    Fiasp FlexTouch U-100 Insulin 100 unit/mL (3 mL) inpn AS DIRECTED FOR high glucoses AND FOR carbohydrate intake UP TO 50 units/day    fenofibrate nanocrystallized (Tricor) 145 mg tablet Take 1 Tab by mouth daily.  liraglutide (VICTOZA) 0.6 mg/0.1 mL (18 mg/3 mL) pnij 1.8 mg by SubCUTAneous route daily.  OneTouch Ultra Blue Test Strip strip Test 4 times daily. DX E11.9    labetaloL (NORMODYNE) 200 mg tablet Take 1 Tab by mouth nightly.  FreeStyle Sekou 14 Day Sensor kit Use as directed every 14 days   Wayin 14 Day [place] misc Use as directed    K-Phos-NeutraL 250 mg tablet TAKE 1 TABLET BY MOUTH EVERY DAY    terbinafine HCL (LAMISIL) 250 mg tablet TAKE 1 TABLET BY MOUTH DAILY THEN get labs & fill remaining quantity FOR a total of 21 DAYS    Insulin Needles, Disposable, (Unifine Pentips) 32 gauge x 5/32\" ndle USE TO INJECT INSULIN 5 TIMES DAILY    butalbital-acetaminophen-caffeine (FIORICET, ESGIC) -40 mg per tablet Take 1 Tab by mouth three (3) times daily as needed for Pain. Indications: Tension Headache    magnesium oxide (MAG-OX) 400 mg tablet Take 1 Tab by mouth daily. (Patient taking differently: Take 800 mg by mouth two (2) times a day.)    traZODone (DESYREL) 100 mg tablet Take 1 Tab by mouth nightly. (Patient taking differently: Take 100 mg by mouth nightly as needed.)    pantoprazole (PROTONIX) 40 mg tablet TAKE ONE TABLET BY MOUTH ONCE DAILY    Blood-Glucose Meter, Drum-type (ACCU-CHEK COMPACT PLUS CARE) kit Test blood sugar 4 times daily    Blood Sugar Diagnostic, Drum (ACCU-CHEK COMPACT PLUS TEST) strp Test blood sugar 4 times daily    lancets (ONE TOUCH DELICA) 33 gauge misc 4 times daily.  Diagnosis code: E11.8    albuterol (PROVENTIL HFA, VENTOLIN HFA, PROAIR HFA) 90 mcg/actuation inhaler Take 2 Puffs by inhalation every four (4) hours as needed for Wheezing.  predniSONE (DELTASONE) 5 mg tablet Take 1 Tab by mouth daily.  cinacalcet (SENSIPAR) 30 mg tablet Take 30 mg by mouth daily.  aspirin delayed-release 81 mg tablet Take  by mouth daily.  mycophenolate mofetil (CELLCEPT) 250 mg capsule Take 250 mg by mouth. 2 tabs in the am and 4 tabs in the pm    tacrolimus (PROGRAF) 1 mg capsule Take 2 mg by mouth. Indications: taking 1 mg in am and 2 mg at [facility]      Allergies   Allergen Reactions    Latex Shortness of Breath    Keflex [Cephalexin] Nausea and Vomiting     Stomach cramping    Tramadol Itching        Review of Systems:  A comprehensive review of systems was negative except for that written in the HPI. Mental Status: no dementia    Objective:     Patient Vitals for the past 8 hrs:   BP Temp Pulse Resp SpO2 Height Weight   21 0837 (!) 129/48  77 18 96 %     21 0835 134/62  79 18 97 %     21 0825 124/61  82 21 99 %     21 0735 130/63  83 20 100 %     21 0730 (!) 118/57  77 21 100 %     21 0725 (!) 118/49  78 22 100 %     21 0724 (!) 118/49  77 24 100 %     21 0723 124/66  78 20 100 %     21 0720 124/66  80 24 100 %     21 0715 (!) 131/55  81 18 100 %     21 0715 (!) 131/55  78 26 100 %     21 0709 (!) 130/52  79 18 100 %     21 0703 (!) 131/59  79 20 99 %     21 0700 (!) 81/60  81 22 99 %     21 0659 (!) 79/56  84 20 98 %     21 0655 137/66  82 20 100 %     21 0611 122/61 99 °F (37.2 °C) 79 20 97 %     21 0449 118/63 99 °F (37.2 °C) 76 20 95 % 5' 3\" (1.6 m) 82.6 kg (182 lb 1.6 oz)     Temp (24hrs), Av.3 °F (37.4 °C), Min:99 °F (37.2 °C), Max:99.9 °F (37.7 °C)      EXAM: alert, cooperative, no distress, oriented, normal, normal mood,   Pt.  Is TTP over left ankle with moderate swelling and deformity. No blisters or wounds. Foot is warm and well perfused  Pulses 2+ in upper/lower extremities. Imaging Review: XRStudy Result    EXAM: XR ANKLE LT AP/LAT     INDICATION: Trauma.     COMPARISON: Earlier in the day.     FINDINGS: Two views of the left ankle demonstrate persistent posterior  dislocation of the ankle. There is the trimalleolar fracture is again seen. There is improved alignment of the medial and lateral tibial and fibular  fractures. The overlying cast obscures fine osseous detail     IMPRESSION  Persistent dislocation. Labs:   Recent Results (from the past 24 hour(s))   GLUCOSE, POC    Collection Time: 04/13/21 12:18 AM   Result Value Ref Range    Glucose (POC) 141 (H) 65 - 100 mg/dL    Performed by Alfredo ABERNATHY)    EKG, 12 LEAD, INITIAL    Collection Time: 04/13/21 12:24 AM   Result Value Ref Range    Ventricular Rate 89 BPM    Atrial Rate 89 BPM    P-R Interval 150 ms    QRS Duration 76 ms    Q-T Interval 356 ms    QTC Calculation (Bezet) 433 ms    Calculated P Axis 45 degrees    Calculated R Axis 61 degrees    Calculated T Axis 11 degrees    Diagnosis       Normal sinus rhythm  Normal ECG  No previous ECGs available     CBC WITH AUTOMATED DIFF    Collection Time: 04/13/21 12:25 AM   Result Value Ref Range    WBC 10.1 3.6 - 11.0 K/uL    RBC 3.91 3.80 - 5.20 M/uL    HGB 10.9 (L) 11.5 - 16.0 g/dL    HCT 32.7 (L) 35.0 - 47.0 %    MCV 83.6 80.0 - 99.0 FL    MCH 27.9 26.0 - 34.0 PG    MCHC 33.3 30.0 - 36.5 g/dL    RDW 13.8 11.5 - 14.5 %    PLATELET 314 586 - 449 K/uL    MPV 11.2 8.9 - 12.9 FL    NRBC 0.0 0  WBC    ABSOLUTE NRBC 0.00 0.00 - 0.01 K/uL    NEUTROPHILS 82 (H) 32 - 75 %    LYMPHOCYTES 9 (L) 12 - 49 %    MONOCYTES 8 5 - 13 %    EOSINOPHILS 1 0 - 7 %    BASOPHILS 0 0 - 1 %    IMMATURE GRANULOCYTES 0 0.0 - 0.5 %    ABS. NEUTROPHILS 8.2 (H) 1.8 - 8.0 K/UL    ABS. LYMPHOCYTES 0.9 0.8 - 3.5 K/UL    ABS. MONOCYTES 0.8 0.0 - 1.0 K/UL    ABS. EOSINOPHILS 0.1 0.0 - 0.4 K/UL    ABS. BASOPHILS 0.0 0.0 - 0.1 K/UL    ABS. IMM. GRANS. 0.0 0.00 - 0.04 K/UL    DF AUTOMATED     METABOLIC PANEL, COMPREHENSIVE    Collection Time: 04/13/21 12:25 AM   Result Value Ref Range    Sodium 138 136 - 145 mmol/L    Potassium 4.4 3.5 - 5.1 mmol/L    Chloride 99 97 - 108 mmol/L    CO2 28 21 - 32 mmol/L    Anion gap 11 5 - 15 mmol/L    Glucose 152 (H) 65 - 100 mg/dL    BUN 25 (H) 6 - 20 MG/DL    Creatinine 1.50 (H) 0.55 - 1.02 MG/DL    BUN/Creatinine ratio 17 12 - 20      GFR est AA 44 (L) >60 ml/min/1.73m2    GFR est non-AA 36 (L) >60 ml/min/1.73m2    Calcium 10.7 (H) 8.5 - 10.1 MG/DL    Bilirubin, total 0.4 0.2 - 1.0 MG/DL    ALT (SGPT) 30 12 - 78 U/L    AST (SGOT) 23 15 - 37 U/L    Alk.  phosphatase 65 45 - 117 U/L    Protein, total 7.1 6.4 - 8.2 g/dL    Albumin 3.9 3.5 - 5.0 g/dL    Globulin 3.2 2.0 - 4.0 g/dL    A-G Ratio 1.2 1.1 - 2.2     TROPONIN I    Collection Time: 04/13/21 12:25 AM   Result Value Ref Range    Troponin-I, Qt. <0.05 <0.05 ng/mL   GLUCOSE, POC    Collection Time: 04/13/21  7:19 AM   Result Value Ref Range    Glucose (POC) 282 (H) 65 - 100 mg/dL    Performed by Trang Alvarado BSN          Impression:     Patient Active Problem List    Diagnosis Date Noted    Closed left ankle fracture, initial encounter 04/13/2021    Ankle dislocation, left, initial encounter 04/13/2021    Pelvic mass 09/22/2020    Cystitis bacillary, chronic 03/09/2020    History of DVT (deep vein thrombosis) 03/09/2020    Sebaceous cyst 08/27/2019    Type 2 diabetes mellitus without complication, with long-term current use of insulin ([facility] 01/21/2019    Renal transplant recipient 12/12/2017    Dyslipidemia 02/19/2015    Mitral regurgitation 02/19/2015    Asthma 04/08/2014    Depression 08/26/2013    Anemia 11/19/2012    Hyperkalemia 11/19/2012    DM (diabetes mellitus) ([facility] 11/19/2012    Diabetes mellitus [facility] 07/27/2012    Hypertension 07/17/2012    Kidney disease 07/17/2012    Migraines 07/17/2012     Principal Problem:    Ankle dislocation, left, initial encounter (4/13/2021)    Active Problems:    Closed left ankle fracture, initial encounter (4/13/2021)    PROCEDURE NOTE - FRACTURE REDUCTION: The patient was induced with propofol for procedrual sedation via the emergency department MD. After it was confirmed that appropriate sedation had been reached, a longitudinal traction in conjunction with re-creation of the injury maneuver was applied reducing the fracture. Subsequently, a triplane splint was applied. The patient was aroused from anesthesia and tolerated the procedure well. Post-reduction plain films reviewed with confirmation of a satisfactory reduction of the fracture. The extremity was neurovascularly intact post reduction and splint placement. Tommie Peck PA-C      Plan:   Pt. Stable  Non-Operative Management at this time. Pt. Immobilized with triplane splint after closed reduction. Non weight bear LLE with walker. Pain meds per primary care team.  Pt. To be discharged to home. F/u with Dr. Chantale Padilla within 1 week by calling for an appointment at [phone]. Dr. Chantale Padilla aware and agree with above plan.       Tommie Peck PA-C

## 2021-04-13 NOTE — ROUTINE PROCESS
TRANSFER - OUT REPORT:    Verbal report given to NATA Lynn RN  on [employer]  being transferred to [id number] [address] ER for urgent transfer       Report consisted of patients Situation, Background, Assessment and   Recommendations(SBAR). Information from the following report(s) SBAR, ED Summary, Procedure Summary, Intake/Output, MAR, Recent Results and Med Rec Status was reviewed with the receiving nurse. Lines:   Peripheral IV 04/13/21 Left Forearm (Active)   Site Assessment Clean, dry, & intact 04/13/21 0034   Phlebitis Assessment 0 04/13/21 0034   Infiltration Assessment 0 04/13/21 0034   Dressing Status Clean, dry, & intact 04/13/21 0034   Dressing Type Transparent 04/13/21 0034   Hub Color/Line Status Pink;Flushed;Patent 04/13/21 0034   Action Taken Blood drawn 04/13/21 0034        Opportunity for questions and clarification was provided.       Patient transported with:   [facility]

## 2021-04-13 NOTE — ED NOTES
Call to [phone] for BLS transport to ED [facility] ED.   ETA [phone]

## 2021-04-13 NOTE — ED NOTES
Call bell answered. Patient stated that she is in a lot of pain and is requesting pain medication.  Note left for MD.

## 2021-04-13 NOTE — ED PROVIDER NOTES
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 She is unable to bear weight. She has no hip or knee pain. She states that she felt that her sugar was dropping and she was trying to get something to eat and felt lightheaded and tripped and fell. She states that her foot twisted and bent under her. She has no numbness or tingling. She has right shoulder pain from a Covid shot that she received earlier today. No syncope no chest pain heaviness pressure or shortness of breath or abdominal pain is noted. No hip pain is noted. No back pain is noted. Patient did not hit her head she has no headache or neck pain.             Past Medical History:   Diagnosis Date    Anemia     Arthritis     Asthma 4/8/2014    Rx for same    Diabetes ([address] 26yo    Rx to control    GERD (gastroesophageal reflux disease)     Headache(784.0)     Hepatitis B infection     Hypertension     Rx for same    Kidney transplanted 04/2017    Sebaceous cyst 8/27/2019       Past Surgical History:   Procedure Laterality Date    COLONOSCOPY N/A 11/1/2016    COLONOSCOPY performed by Rasta Bojorquez MD at [address]    laparoscopic    HX COLONOSCOPY      HX ENDOSCOPY      HX MOHS PROCEDURES Left     HX OTHER SURGICAL  7/30/15    excision of left Axilla Hidradenitis    HX RENAL BIOPSY  2011    HX RENAL TRANSPLANT  04/05/2017    HX UROLOGICAL  2012    KIDNEY BX RIGHT - pt states she has only had one kidney bx as of 10/31/2016    HX UROLOGICAL  2017    transplant    HX VASCULAR ACCESS  1/2013    CHEST CATHETER FOR DIALYSIS    HX VASCULAR ACCESS Right     A-V fistula    HX WISDOM TEETH EXTRACTION           Family History:   Problem Relation Age of Onset    Hypertension Mother     Hypertension Sister     Diabetes Maternal Grandfather     Cancer Father         UNKNOWN    Hypertension Maternal Grandmother     Diabetes Son     Asthma Son     Cancer Maternal Aunt         UNKNOWN    Anesth Problems Neg Hx        Social History     Socioeconomic History    Marital status: SINGLE     Spouse name: Not on file    Number of children: Not on file    Years of education: Not on file    Highest education level: Not on file   Occupational History    Not on file   Social Needs    Financial resource strain: Not on file    Food insecurity     Worry: Not on file     Inability: Not on file    Transportation needs     Medical: Not on file     Non-medical: Not on file   Tobacco Use    Smoking status: Never Smoker    Smokeless tobacco: Never Used   Substance and Sexual Activity    Alcohol use: No     Alcohol/week: 0.0 standard drinks    Drug use: No    Sexual activity: Not Currently     Birth control/protection: None   Lifestyle    Physical activity     Days per week: Not on file     Minutes per session: Not on file    Stress: Not on file   Relationships    Social connections     Talks on phone: Not on file     Gets together: Not on file     Attends [employer] service: Not on file     Active member of club or organization: Not on file     Attends meetings of clubs or organizations: Not on file     Relationship status: Not on file    Intimate partner violence     Fear of current or ex partner: Not on file     Emotionally abused: Not on file     Physically abused: Not on file     Forced sexual activity: Not on file   Other Topics Concern    Not on file   Social History Narrative    Lives in [facility] with moni and 1 son and 1 daughter. Also has another son. Works as a CNA. No hobbies. ALLERGIES: Latex, Keflex [cephalexin], and Tramadol    Review of Systems   All other systems reviewed and are negative.       Vitals:    04/12/21 2321 04/13/21 0039 04/13/21 0158 04/13/21 0222   BP: (!) 145/65 (!) 147/69 (!) 167/68 (!) 124/59   Pulse: 93 87 83 84   Resp: 18 16 16 16 Temp: 99.9 °F (37.7 °C)   99.1 °F (37.3 °C)   SpO2: 99% 99% 94% 94%   Weight: 80 kg (176 lb 5.9 oz)      Height: 5' 3\" (1.6 m)               Physical Exam  Vitals signs and nursing note reviewed. Constitutional:       General: She is not in acute distress. Appearance: She is not ill-appearing or toxic-appearing. Comments: Patient appears to be in moderate discomfort   HENT:      Head: Normocephalic and atraumatic. Right Ear: External ear normal.      Left Ear: External ear normal.      Nose: Nose normal.      Mouth/Throat:      Mouth: Mucous membranes are moist.      Pharynx: No oropharyngeal exudate or posterior oropharyngeal erythema. Eyes:      Extraocular Movements: Extraocular movements intact. Conjunctiva/sclera: Conjunctivae normal.      Pupils: Pupils are equal, round, and reactive to light. Neck:      Musculoskeletal: Normal range of motion and neck supple. No muscular tenderness. Cardiovascular:      Rate and Rhythm: Normal rate and regular rhythm. Heart sounds: Normal heart sounds. No murmur. No friction rub. No gallop. Pulmonary:      Effort: Pulmonary effort is normal. No respiratory distress. Breath sounds: Normal breath sounds. No stridor. No wheezing or rales. Chest:      Chest wall: No tenderness. Abdominal:      General: There is no distension. Palpations: Abdomen is soft. Tenderness: There is no abdominal tenderness. There is no right CVA tenderness, left CVA tenderness, guarding or rebound. Musculoskeletal:         General: Swelling, tenderness, deformity and signs of injury present. Right lower leg: No edema. Left lower leg: No edema. Comments: Right shoulder is tender over the side of her Covid injection, able to move the shoulder without problem, no effusion or swelling noted. Left ankle was swollen, very tender to touch, rotated laterally, unable to move due to pain.   Foot was tender over the midfoot, she was able to move the toes with some discomfort, cap refill was less than 2 seconds in the toes distally and sensation was intact throughout the foot. 2+ DP pulse was noted. Knee was with mild pain to palpation medially no effusion was noted. Lymphadenopathy:      Cervical: No cervical adenopathy. Skin:     General: Skin is warm and dry. Capillary Refill: Capillary refill takes less than 2 seconds. Coloration: Skin is not jaundiced or pale. Findings: No rash. Neurological:      General: No focal deficit present. Mental Status: She is alert and oriented to person, place, and time. Cranial Nerves: No cranial nerve deficit. Sensory: No sensory deficit. Coordination: Coordination normal.   Psychiatric:         Mood and Affect: Mood normal.         Behavior: Behavior normal.         Thought Content:  Thought content normal.         Judgment: Judgment normal.        Labs -     Recent Results (from the past 12 hour(s))   GLUCOSE, POC    Collection Time: 04/13/21 12:18 AM   Result Value Ref Range    Glucose (POC) 141 (H) 65 - 100 mg/dL    Performed by Reji Pierre  (RN)    EKG, 12 LEAD, INITIAL    Collection Time: 04/13/21 12:24 AM   Result Value Ref Range    Ventricular Rate 89 BPM    Atrial Rate 89 BPM    P-R Interval 150 ms    QRS Duration 76 ms    Q-T Interval 356 ms    QTC Calculation (Bezet) 433 ms    Calculated P Axis 45 degrees    Calculated R Axis 61 degrees    Calculated T Axis 11 degrees    Diagnosis       Normal sinus rhythm  Normal ECG  No previous ECGs available     CBC WITH AUTOMATED DIFF    Collection Time: 04/13/21 12:25 AM   Result Value Ref Range    WBC 10.1 3.6 - 11.0 K/uL    RBC 3.91 3.80 - 5.20 M/uL    HGB 10.9 (L) 11.5 - 16.0 g/dL    HCT 32.7 (L) 35.0 - 47.0 %    MCV 83.6 80.0 - 99.0 FL    MCH 27.9 26.0 - 34.0 PG    MCHC 33.3 30.0 - 36.5 g/dL    RDW 13.8 11.5 - 14.5 %    PLATELET 680 468 - 602 K/uL    MPV 11.2 8.9 - 12.9 FL    NRBC 0.0 0  WBC    ABSOLUTE NRBC 0.00 0.00 - 0.01 K/uL    NEUTROPHILS 82 (H) 32 - 75 %    LYMPHOCYTES 9 (L) 12 - 49 %    MONOCYTES 8 5 - 13 %    EOSINOPHILS 1 0 - 7 %    BASOPHILS 0 0 - 1 %    IMMATURE GRANULOCYTES 0 0.0 - 0.5 %    ABS. NEUTROPHILS 8.2 (H) 1.8 - 8.0 K/UL    ABS. LYMPHOCYTES 0.9 0.8 - 3.5 K/UL    ABS. MONOCYTES 0.8 0.0 - 1.0 K/UL    ABS. EOSINOPHILS 0.1 0.0 - 0.4 K/UL    ABS. BASOPHILS 0.0 0.0 - 0.1 K/UL    ABS. IMM. GRANS. 0.0 0.00 - 0.04 K/UL    DF AUTOMATED     METABOLIC PANEL, COMPREHENSIVE    Collection Time: 04/13/21 12:25 AM   Result Value Ref Range    Sodium 138 136 - 145 mmol/L    Potassium 4.4 3.5 - 5.1 mmol/L    Chloride 99 97 - 108 mmol/L    CO2 28 21 - 32 mmol/L    Anion gap 11 5 - 15 mmol/L    Glucose 152 (H) 65 - 100 mg/dL    BUN 25 (H) 6 - 20 MG/DL    Creatinine 1.50 (H) 0.55 - 1.02 MG/DL    BUN/Creatinine ratio 17 12 - 20      GFR est AA 44 (L) >60 ml/min/1.73m2    GFR est non-AA 36 (L) >60 ml/min/1.73m2    Calcium 10.7 (H) 8.5 - 10.1 MG/DL    Bilirubin, total 0.4 0.2 - 1.0 MG/DL    ALT (SGPT) 30 12 - 78 U/L    AST (SGOT) 23 15 - 37 U/L    Alk. phosphatase 65 45 - 117 U/L    Protein, total 7.1 6.4 - 8.2 g/dL    Albumin 3.9 3.5 - 5.0 g/dL    Globulin 3.2 2.0 - 4.0 g/dL    A-G Ratio 1.2 1.1 - 2.2     TROPONIN I    Collection Time: 04/13/21 12:25 AM   Result Value Ref Range    Troponin-I, Qt. <0.05 <0.05 ng/mL       Radiologic Studies -   CT Results  (Last 48 hours)    None        CXR Results  (Last 48 hours)    None          US Results (most recent):    XR Results (most recent):  Results from Hospital Encounter encounter on 04/12/21   XR KNEE LT 3 V    Narrative EXAM: XR KNEE LT 3 V    INDICATION: fall, medial knee pain. COMPARISON: None. FINDINGS: Three views of the left knee demonstrate no fracture or other acute  osseous or articular abnormality. There is no effusion. There are vascular  calcifications and mild degenerative spurring. Impression No acute abnormality.      EXAM: XR ANKLE LT MIN 3 V     INDICATION: fall, pain.     COMPARISON: None.     FINDINGS: Three views of the left ankle demonstrate posterior dislocation of the  ankle joints. The talus is located posterior to the distal fibula and tibia. There are medial, lateral, and posterior malleolar fractures. There is diffuse  soft tissue swelling.     IMPRESSION  Trimalleolar fracture dislocation. EXAM: XR FOOT LT MIN 3 V     INDICATION: fall, pain.     COMPARISON: None.     FINDINGS: Three views of the left foot demonstrate posterior dislocation of the  ankle joint. There is a small bone fragment noted inferior to the distal tibia  and fibula. There are inferior and posterior calcaneal spurs. There are vascular  calcifications.     IMPRESSION  Ankle dislocation. Small chip fractures. MDM  Number of Diagnoses or Management Options  Closed fracture dislocation of left ankle, initial encounter: new and requires workup     Amount and/or Complexity of Data Reviewed  Clinical lab tests: reviewed and ordered  Tests in the radiology section of CPT®: reviewed and ordered  Tests in the medicine section of CPT®: reviewed and ordered  Review and summarize past medical records: yes  Discuss the patient with other providers: yes (Dr Rock Feliz)    Risk of Complications, Morbidity, and/or Mortality  Presenting problems: moderate  Diagnostic procedures: moderate  Management options: moderate  General comments: Differential includes near syncope, hypoglycemia, cardiac dysrhythmia, orthostatic, fracture, dislocation, sprain    Patient Progress  Patient progress: improved    ED Course as of Apr 13 0258   Tue Apr 13, 2021   0029 EKG reveals normal sinus rhythm with normal rate of 89, normal RI intervals 150, normal QRS of 76, normal QTC of 433, no evidence of ischemia or infarction, no change from last EKG done 29 September 2020. [PS]   0117 X-ray reveals posterior dislocation fracture of the ankle.   Pulses are intact cap refill less than 2 seconds sensation intact distally. We have no orthopedic coverage at this hospital [facility], which is where patient had her [facility] surgery, is not accepting transfers at this time. Care discussed with [facility] [facility], Dr. Alexander Braun, who accepts patient in transfer there for orthopedic evaluation not available here. Patient will be splinted and pain will be treated with morphine.    [PS]   0205 Troponin is negative, CBC is unremarkable except for slightly low hemoglobin 10.9 which is improved compared to last CBC in chart, sugars 152, BUN is 25 creatinine is 1.5 GFR is 44, calcium is slightly elevated to 10.7. She tolerated splinting well. Pains been treated with morphine, is down to 8 out of 10 after 4 of morphine. She will be given another 4 of morphine IV. Nausea is improved with Zofran.    [PS]   1342 Patient given 2 more milligrams of morphine for trip to [facility]. Patient is being transferred due to the need for specialized care not available at our facility. I feel that the patient is stable for transfer by ambulance. Ambulance transfer is justified by the need for continuous monitoring, IV fluids, need for patient to be on a stretcher, and potential need for intervention by ambulance personnel en route. [PS]      ED Course User Index  [PS] Cecelia Beaver MD       Other Procedure    Date/Time: 4/13/2021 2:13 AM  Performed by: Bernice Pena RN  Authorized by: Cecelia Beaver MD     Consent:     Consent obtained:  Verbal    Consent given by:  Patient    Risks discussed:  Pain  Indications:     Indications:  Trimalleolar fracture dislocation  Post-procedure details:     Patient tolerance of procedure: Tolerated well, no immediate complications  Comments:      Well-padded posterior and sugar tong splint applied to left ankle by nursing staff. Patient tolerated procedure well.   I examined post procedure and noted that the sensation was intact and cap refill less than 2 seconds in the toes. Leg elevated and ice pack applied. Orders Placed This Encounter    APPLY SPLINT: SPECIFY    XR FOOT LT MIN 3 V    XR ANKLE LT MIN 3 V    XR KNEE LT 3 V    CBC WITH AUTOMATED DIFF    CMP    TROPONIN I    DIET NPO    POC GLUCOSE    OTHER PROCEDURE (ASAP ONLY)    GLUCOSE, POC    EKG, 12 LEAD, INITIAL    SALINE LOCK IV ONE TIME STAT    insulin detemir U-100 (Levemir FlexTouch U-100 Insuln) 100 unit/mL (3 mL) inpn    Eliquis 5 mg tablet    gabapentin (NEURONTIN) 300 mg capsule    Aluminum Hydrox-Magnesium Carb (Gaviscon Extra Strength) 254-237.5 mg/5 mL susp    sodium chloride (NS) flush 5-10 mL    ondansetron (ZOFRAN) injection 4 mg    fentaNYL citrate (PF) injection 50 mcg    ondansetron (ZOFRAN) injection 4 mg    morphine injection 4 mg    morphine injection 4 mg    morphine injection 2 mg       Medications   sodium chloride (NS) flush 5-10 mL (10 mL IntraVENous Given 4/13/21 0029)   ondansetron (ZOFRAN) injection 4 mg (4 mg IntraVENous Given 4/13/21 0027)   fentaNYL citrate (PF) injection 50 mcg (50 mcg IntraVENous Given 4/13/21 0029)   ondansetron (ZOFRAN) injection 4 mg (4 mg IntraVENous Given 4/13/21 0130)   morphine injection 4 mg (4 mg IntraVENous Given 4/13/21 0130)   morphine injection 4 mg (4 mg IntraVENous Given 4/13/21 0218)   morphine injection 2 mg (2 mg IntraVENous Given 4/13/21 0249)       Patient Vitals for the past 8 hrs:   Temp Pulse Resp BP SpO2   04/13/21 0222 99.1 °F (37.3 °C) 84 16 (!) 124/59 94 %   04/13/21 0158 -- 83 16 (!) 167/68 94 %   04/13/21 0039 -- 87 16 (!) 147/69 99 %   04/12/21 2321 99.9 °F (37.7 °C) 93 18 (!) 145/65 99 %         DIAGNOSIS:      ICD-10-CM ICD-9-CM    1. Closed fracture dislocation of left ankle, initial encounter  S82.892A 824.8         I have reviewed all pertinent and currently available diagnostic test results for this visit including, but not limited to, labs, xrays, and EKGs.  I have reviewed all pertinent and currently available medical records, past medical history, social history and family history. My plan of care and further evaluation and/or disposition is based on these results, as well as the initial, and subsequent, history and physical exam, as well as any additional complaints during the visit. Laboratory tests, medications, x-rays, diagnosis, and need for admission or transfer have been reviewed and discussed with the patient and/or family. Pt and/or family have had the opportunity to ask questions about their care. Patient and/or family verbalized understanding and agreement with care plan. After review of patient's ED evaluation I feel patient will benefit from transfer due to need for orthopedic evaluation and treatment not available at our facility. Rachna Butterfield MD    Please note that this dictation was completed with [employer], the computer voice recognition software. Quite often unanticipated grammatical, syntax, homophones, and other interpretive errors are inadvertently transcribed by the computer software. Please disregard these errors. Please excuse any errors that have escaped final proofreading.

## 2021-04-13 NOTE — PROGRESS NOTES
Speech path   Patient has no deficits for which she needs a speech evaluation. She is experiencing dizziness and getting a stroke work up. She passed her STAND. Normal communcation per nsg.    Georgi Dale, SLP

## 2021-04-13 NOTE — PROGRESS NOTES

## 2021-04-13 NOTE — H&P
Hospitalist Admission Note    NAME: Evangelista Paiz   :  1965   MRN:  [id number]     Date/Time:  2021 10:34 AM    Patient PCP: Nusrat Arriaza MD  ______________________________________________________________________  Given the patient's current clinical presentation, I have a high level of concern for decompensation if discharged from the emergency department. Complex decision making was performed, which includes reviewing the patient's available past medical records, laboratory results, and x-ray films. My assessment of this patient's clinical condition and my plan of care is as follows. Assessment / Plan:  Dizziness, rule out stroke  Ground-level fall  Left trimalleolar fracture  We will admit under observation  Patient felt weak and dizzy at home fell and broke her left ankle, she is status post close reduction by Ortho in the ED. Patient received propofol, fentanyl prior to ankle reduction. ED nurses could not stand her up or  Ambulate her  because of dizziness  Patient reported weakness since her first covid shot   Check orthostatic vitals if able  CT of the head is within normal limit, will check MRI of the brain without contrast, 2D echo and Doppler of the carotids. Will order as needed meclizine  Neuro consulted. Dizziness most likely due to dehydration/hypovolemia patient creatinine was mildly elevated and had a drop in blood pressure  For the left ankle fracture:  Non-Operative Management at this time. Pt. Immobilized with triplane splint after closed reduction. Non weight bear LLE with walker.     Elevated creatinine  Creatinine 1.5, last creatinine on file was in  most likely before her renal transplant  Start gentle hydration    Hypertension  Continue with home BP meds  Diabetes mellitus with hyperglycemia  Resume home dose insulin regiment, Sliding scale insulin high sensitivity    History of end-stage renal disease on hemodialysis, now is status post renal transplant  Continue with tacrolimus, CellCept, home dose prednisone  History of DVT  Continue with Eliquis  Hyperlipidemia  Continue with statin and fenofibrate    Code Status: Full code  Surrogate Decision Maker: Son    DVT Prophylaxis: Eliquis  GI Prophylaxis: not indicated    Baseline: Ambulatory      Subjective:   CHIEF COMPLAINT: Ground-level fall    HISTORY OF PRESENT ILLNESS:     Latha Manuel is a 50 y.o.  female with past medical history of diabetes mellitus, hypertension, end-stage renal disease status post right renal transplant, diabetic neuropathy who presents after a ground-level fall. patient reported that she had her Covid shot 2 days ago, after her Covid shot, she was feeling weak and tired. Overnight she tried to get out of the bed, her left leg went under the dresser and she fell. Reported that she was feeling dizzy before the fall. She denies any fever, chest pain, palpitation,  In the ED, her initial blood pressure was within normal limit, subsequent blood pressure showed no readings with systolic blood pressure in the 70s and 80s. Labs are unremarkable except for elevated creatinine  X-ray of her ankle showedTrimalleolar fracture dislocation. Patient was seen by Ortho in the ED, underwent reduction of her fracture and was placed into a splint. X-ray post reduction showed alignment of the fracture  When the ED physician tried to stand up and ambulate the patient, she felt dizzy and unable to walk  Therefore patient is admitted for further work-up of her dizziness. We were asked to admit for work up and evaluation of the above problems.      Past Medical History:   Diagnosis Date    Anemia     Arthritis     Asthma 4/8/2014    Rx for same    Diabetes [facility]) 28yo    Rx to control    GERD (gastroesophageal reflux disease)     Headache(784.0)     Hepatitis B infection     Hypertension     Rx for same    Kidney transplanted 04/2017    Sebaceous cyst 8/27/2019 Past Surgical History:   Procedure Laterality Date    COLONOSCOPY N/A 11/1/2016    COLONOSCOPY performed by Ollie Barksdale MD at [id number]    laparoscopic    HX COLONOSCOPY      HX ENDOSCOPY      HX MOHS PROCEDURES Left     HX OTHER SURGICAL  7/30/15    excision of left Axilla Hidradenitis    HX RENAL BIOPSY  2011    HX RENAL TRANSPLANT  04/05/2017    HX UROLOGICAL  2012    KIDNEY BX RIGHT - pt states she has only had one kidney bx as of 10/31/2016    HX UROLOGICAL  2017    transplant    HX VASCULAR ACCESS  1/2013    CHEST CATHETER FOR DIALYSIS    HX VASCULAR ACCESS Right     A-V fistula    HX WISDOM TEETH EXTRACTION         Social History     Tobacco Use    Smoking status: Never Smoker    Smokeless tobacco: Never Used   Substance Use Topics    Alcohol use: No     Alcohol/week: 0.0 standard drinks        Family History   Problem Relation Age of Onset    Hypertension Mother     Hypertension Sister     Diabetes Maternal Grandfather     Cancer Father         UNKNOWN    Hypertension Maternal Grandmother     Diabetes Son     Asthma Son     Cancer Maternal Aunt         UNKNOWN    Anesth Problems Neg Hx      Allergies   Allergen Reactions    Latex Shortness of Breath    Keflex [Cephalexin] Nausea and Vomiting     Stomach cramping    Tramadol Itching        Prior to Admission medications    Medication Sig Start Date End Date Taking? Authorizing Provider   insulin detemir U-100 (Levemir FlexTouch U-100 Insuln) 100 unit/mL (3 mL) inpn Levemir FlexTouch U-100 Insulin 100 unit/mL (3 mL) subcutaneous pen   INJECT 35 UNITS UNDER THE SKIN TWICE DAILY    Other, MD Jagdish   Eliquis 5 mg tablet Take 1 Tab by mouth two (2) times a day.  3/23/21   OtherJagdish MD   gabapentin (NEURONTIN) 300 mg capsule TAKE 1 CAPSULE BY MOUTH THREE TIMES DAILY 4/8/21   Other, MD Jagdish   Aluminum Hydrox-Magnesium Carb (Gaviscon Extra Strength) 254-237.5 mg/5 mL susp Gaviscon Extra Strength 254 mg-237.5 mg/5 mL oral suspension   SHAKE WELL AND TAKE 20ML BY MOUTH FOUR TIMES DAILY AS NEEDED FOR ABDOMINAL PAIN    Vanna, MD Jagdish   metFORMIN ER (GLUCOPHAGE XR) 500 mg tablet Take 1 Tab by mouth two (2) times daily (after meals). 2/8/21   Yadira Ordoñez MD   atorvastatin (LIPITOR) 40 mg tablet TAKE 1 TABLET BY MOUTH EVERY DAY 11/6/20   Yadira Ordoñez MD   Fiasp FlexTouch U-100 Insulin 100 unit/mL (3 mL) inpn AS DIRECTED FOR high glucoses AND FOR carbohydrate intake UP TO 50 units/day 11/5/20   Jomar Zamudio MD   fenofibrate nanocrystallized (Tricor) 145 mg tablet Take 1 Tab by mouth daily. 10/23/20   Trinidad Whitman MD   liraglutide (VICTOZA) 0.6 mg/0.1 mL (18 mg/3 mL) pnij 1.8 mg by SubCUTAneous route daily. 9/9/20   Jomar Zamudio MD   OneTouch Ultra Blue Test Strip strip Test 4 times daily. DX E11.9 5/27/20   Jomar Zamudio MD   labetaloL (NORMODYNE) 200 mg tablet Take 1 Tab by mouth nightly. 5/13/20   MD Reginaldo VerdugoStyle Sekou 14 Day Sensor kit Use as directed every 14 days 5/13/20   MD Ines Verdugo Sekou 14 Day [place] misc Use as directed 5/13/20   Jomar Zamudio MD   K-Phos-NeutraL 250 mg tablet TAKE 1 TABLET BY MOUTH EVERY DAY 4/24/20   Provider, Historical   terbinafine HCL (LAMISIL) 250 mg tablet TAKE 1 TABLET BY MOUTH DAILY THEN get labs & fill remaining quantity FOR a total of 21 DAYS 4/10/20   Provider, Historical   Insulin Needles, Disposable, (Unifine Pentips) 32 gauge x 5/32\" ndle USE TO INJECT INSULIN 5 TIMES DAILY 5/9/20   Jomar Zamudio MD   butalbital-acetaminophen-caffeine (FIORICET, ESGIC) -40 mg per tablet Take 1 Tab by mouth three (3) times daily as needed for Pain. Indications: Tension Headache 11/18/19   Yadira Ordoñez MD   magnesium oxide (MAG-OX) 400 mg tablet Take 1 Tab by mouth daily. Patient taking differently: Take 800 mg by mouth two (2) times a day.  9/25/19 Shanthi Pablo MD   traZODone (DESYREL) 100 mg tablet Take 1 Tab by mouth nightly. Patient taking differently: Take 100 mg by mouth nightly as needed. 8/21/19   Maridee Peabody, MD   pantoprazole (PROTONIX) 40 mg tablet TAKE ONE TABLET BY MOUTH ONCE DAILY 6/25/19   Margarette Paez PA-C   Blood-Glucose Meter, Drum-type (ACCU-CHEK COMPACT PLUS CARE) kit Test blood sugar 4 times daily 2/12/19   Aaron Stallings MD   Blood Sugar Diagnostic, Drum (ACCU-CHEK COMPACT PLUS TEST) strp Test blood sugar 4 times daily 2/12/19   Aaron Stallings MD   lancets ([facility]) 33 gauge misc 4 times daily. Diagnosis code: E11.8 1/16/19   Aaron Stallings MD   albuterol (PROVENTIL HFA, VENTOLIN HFA, PROAIR HFA) 90 mcg/actuation inhaler Take 2 Puffs by inhalation every four (4) hours as needed for Wheezing. 8/30/18   Maridee Peabody, MD   predniSONE (DELTASONE) 5 mg tablet Take 1 Tab by mouth daily. 4/20/18   Provider, Historical   cinacalcet (SENSIPAR) 30 mg tablet Take 30 mg by mouth daily. Provider, Historical   aspirin delayed-release 81 mg tablet Take  by mouth daily. Provider, Historical   mycophenolate mofetil (CELLCEPT) 250 mg capsule Take 250 mg by mouth. 2 tabs in the am and 4 tabs in the pm    Provider, Historical   tacrolimus (PROGRAF) 1 mg capsule Take 2 mg by mouth. Indications: taking 1 mg in am and 2 mg at [facility]    Provider, Historical       REVIEW OF SYSTEMS:     I am not able to complete the review of systems because:    The patient is intubated and sedated    The patient has altered mental status due to his acute medical problems    The patient has baseline aphasia from prior stroke(s)    The patient has baseline dementia and is not reliable historian    The patient is in acute medical distress and unable to provide information           Total of 12 systems reviewed as follows:       POSITIVE= underlined text  Negative = text not underlined  General:  fever, chills, sweats, generalized weakness, weight loss/gain,      loss of appetite   Eyes:    blurred vision, eye pain, loss of vision, double vision  ENT:    rhinorrhea, pharyngitis   Respiratory:   cough, sputum production, SOB, MUSTAFA, wheezing, pleuritic pain   Cardiology:   chest pain, palpitations, orthopnea, PND, edema, syncope   Gastrointestinal:  abdominal pain , N/V, diarrhea, dysphagia, constipation, bleeding   Genitourinary:  frequency, urgency, dysuria, hematuria, incontinence   Muskuloskeletal :  arthralgia, myalgia, back pain  Hematology:  easy bruising, nose or gum bleeding, lymphadenopathy   Dermatological: rash, ulceration, pruritis, color change / jaundice  Endocrine:   hot flashes or polydipsia   Neurological:  headache, dizziness, confusion, focal weakness, paresthesia,     Speech difficulties, memory loss, gait difficulty  Psychological: Feelings of anxiety, depression, agitation    Objective:   VITALS:    Visit Vitals  /62   Pulse 78   Temp 99 °F (37.2 °C)   Resp 23   Ht 5' 3\" (1.6 m)   Wt 82.6 kg (182 lb 1.6 oz)   SpO2 99%   BMI 32.26 kg/m²       PHYSICAL EXAM:    General:    Alert, cooperative, no distress, appears stated age. HEENT: Atraumatic, anicteric sclerae, pink conjunctivae     No oral ulcers, mucosa moist, throat clear, dentition fair  Neck:  Supple, symmetrical,  thyroid: non tender  Lungs:   Clear to auscultation bilaterally. No Wheezing or Rhonchi. No rales. Chest wall:  No tenderness  No Accessory muscle use. Heart:   Regular  rhythm,  No  murmur   No edema  Abdomen:   Soft, non-tender. Not distended. Bowel sounds normal  Extremities: No cyanosis. No clubbing,      Skin turgor normal, Capillary refill normal, Radial dial pulse 2+  Skin:     Not pale. Not Jaundiced  No rashes   Psych:  Good insight. Not depressed. Not anxious or agitated. Neurologic: EOMs intact. No facial asymmetry. No aphasia or slurred speech. Symmetrical strength, Sensation grossly intact. Alert and oriented X 4.  Feels dizzy _______________________________________________________________________  Care Plan discussed with:    Comments   Patient     Family      RN     Care Manager                    Consultant:      _______________________________________________________________________  Expected  Disposition:   Home with Family    HH/PT/OT/RN    SNF/LTC    LUIS    ________________________________________________________________________  TOTAL TIME:  79 Minutes    Critical Care Provided     Minutes non procedure based      Comments    x Reviewed previous records   >50% of visit spent in counseling and coordination of care x Discussion with patient and/or family and questions answered       ________________________________________________________________________  Signed: Roxane Solo MD    Procedures: see electronic medical records for all procedures/Xrays and details which were not copied into this note but were reviewed prior to creation of Plan.     LAB DATA REVIEWED:    Recent Results (from the past 24 hour(s))   GLUCOSE, POC    Collection Time: 04/13/21 12:18 AM   Result Value Ref Range    Glucose (POC) 141 (H) 65 - 100 mg/dL    Performed by Elizabeth Pierre  (RN)    EKG, 12 LEAD, INITIAL    Collection Time: 04/13/21 12:24 AM   Result Value Ref Range    Ventricular Rate 89 BPM    Atrial Rate 89 BPM    P-R Interval 150 ms    QRS Duration 76 ms    Q-T Interval 356 ms    QTC Calculation (Bezet) 433 ms    Calculated P Axis 45 degrees    Calculated R Axis 61 degrees    Calculated T Axis 11 degrees    Diagnosis       Normal sinus rhythm  Normal ECG  No previous ECGs available     CBC WITH AUTOMATED DIFF    Collection Time: 04/13/21 12:25 AM   Result Value Ref Range    WBC 10.1 3.6 - 11.0 K/uL    RBC 3.91 3.80 - 5.20 M/uL    HGB 10.9 (L) 11.5 - 16.0 g/dL    HCT 32.7 (L) 35.0 - 47.0 %    MCV 83.6 80.0 - 99.0 FL    MCH 27.9 26.0 - 34.0 PG    MCHC 33.3 30.0 - 36.5 g/dL    RDW 13.8 11.5 - 14.5 %    PLATELET 898 101 - 019 K/uL    MPV 11.2 8.9 - 12.9 FL    NRBC 0.0 0  WBC    ABSOLUTE NRBC 0.00 0.00 - 0.01 K/uL    NEUTROPHILS 82 (H) 32 - 75 %    LYMPHOCYTES 9 (L) 12 - 49 %    MONOCYTES 8 5 - 13 %    EOSINOPHILS 1 0 - 7 %    BASOPHILS 0 0 - 1 %    IMMATURE GRANULOCYTES 0 0.0 - 0.5 %    ABS. NEUTROPHILS 8.2 (H) 1.8 - 8.0 K/UL    ABS. LYMPHOCYTES 0.9 0.8 - 3.5 K/UL    ABS. MONOCYTES 0.8 0.0 - 1.0 K/UL    ABS. EOSINOPHILS 0.1 0.0 - 0.4 K/UL    ABS. BASOPHILS 0.0 0.0 - 0.1 K/UL    ABS. IMM. GRANS. 0.0 0.00 - 0.04 K/UL    DF AUTOMATED     METABOLIC PANEL, COMPREHENSIVE    Collection Time: 04/13/21 12:25 AM   Result Value Ref Range    Sodium 138 136 - 145 mmol/L    Potassium 4.4 3.5 - 5.1 mmol/L    Chloride 99 97 - 108 mmol/L    CO2 28 21 - 32 mmol/L    Anion gap 11 5 - 15 mmol/L    Glucose 152 (H) 65 - 100 mg/dL    BUN 25 (H) 6 - 20 MG/DL    Creatinine 1.50 (H) 0.55 - 1.02 MG/DL    BUN/Creatinine ratio 17 12 - 20      GFR est AA 44 (L) >60 ml/min/1.73m2    GFR est non-AA 36 (L) >60 ml/min/1.73m2    Calcium 10.7 (H) 8.5 - 10.1 MG/DL    Bilirubin, total 0.4 0.2 - 1.0 MG/DL    ALT (SGPT) 30 12 - 78 U/L    AST (SGOT) 23 15 - 37 U/L    Alk.  phosphatase 65 45 - 117 U/L    Protein, total 7.1 6.4 - 8.2 g/dL    Albumin 3.9 3.5 - 5.0 g/dL    Globulin 3.2 2.0 - 4.0 g/dL    A-G Ratio 1.2 1.1 - 2.2     TROPONIN I    Collection Time: 04/13/21 12:25 AM   Result Value Ref Range    Troponin-I, Qt. <0.05 <0.05 ng/mL   GLUCOSE, POC    Collection Time: 04/13/21  7:19 AM   Result Value Ref Range    Glucose (POC) 282 (H) 65 - 100 mg/dL    Performed by Champ SAMN

## 2021-04-13 NOTE — PROGRESS NOTES
Patient was admitted to the hospital today so was not available for her visit. A user error has taken place: encounter opened in error, closed for administrative reasons.

## 2021-04-13 NOTE — CONSULTS
NEUROLOGY NOTE     Chief Complaint   Patient presents with    Transfer Of Care     Patient was seen at [facility] and transfered here for an ortho consult/reduction. Patient was dizzy and ended up falling on the floor and broke her left ankle in three places when it happened. Patient has a #20 in her left AC and received 50mcg of Fentanyl and 10mg of Morphine total. Patient arrives with left ankle in a soft cast/splint. Reason for Consult  I have been asked by Mary Nuñez MD to see the patient in neurological consultation to render advice and opinion regarding stroke    HPI  The patient is a 62-year-old woman who presents to the hospital because of fall. She did have COVID-19 vaccine yesterday and states that she has been to get her checked. Also, she has had diabetes and when she woke up in the morning, she felt weak and lightheaded and states that it felt to her as if her blood sugar was low. She stood up and was holding onto [employer] when she fell down. She came to the hospital and had CT scan of the head which was normal.  She did have x-ray of the left ankle which did show ankle dislocation and small chip fractures. She did have closed reduction of the joint.   Neurology has been consulted for possible stroke    ROS  A ten system review of constitutional, cardiovascular, respiratory, musculoskeletal, endocrine, skin, SHEENT, genitourinary, psychiatric and neurologic systems was obtained and is unremarkable except as stated in HPI     PMH  Past Medical History:   Diagnosis Date    Anemia     Arthritis     Asthma 4/8/2014    Rx for same    Diabetes ([facility] [address] 26yo    Rx to control    GERD (gastroesophageal reflux disease)     Headache(784.0)     Hepatitis B infection     Hypertension     Rx for same    Kidney transplanted 04/2017    Sebaceous cyst 8/27/2019       FH  Family History   Problem Relation Age of Onset    Hypertension Mother     Hypertension Sister     Diabetes Maternal Grandfather     Cancer Father         UNKNOWN    Hypertension Maternal Grandmother     Diabetes Son     Asthma Son     Cancer Maternal Aunt         UNKNOWN    Anesth Problems Neg Hx        SH  Social History     Socioeconomic History    Marital status: SINGLE     Spouse name: Not on file    Number of children: Not on file    Years of education: Not on file    Highest education level: Not on file   Tobacco Use    Smoking status: Never Smoker    Smokeless tobacco: Never Used   Substance and Sexual Activity    Alcohol use: No     Alcohol/week: 0.0 standard drinks    Drug use: No    Sexual activity: Not Currently     Birth control/protection: None   Social History Narrative    [facility] and 1 son and 1 daughter. Also has another son. Works as a CNA. No hobbies.        ALLERGIES  Allergies   Allergen Reactions    Latex Shortness of Breath    Keflex [Cephalexin] Nausea and Vomiting     Stomach cramping    Tramadol Itching       PHYSICAL EXAMINATION:   Patient Vitals for the past 24 hrs:   Temp Pulse Resp BP SpO2   04/13/21 1427 98.6 °F (37 °C) 79 18 (!) 140/65 100 %   04/13/21 1150 98.3 °F (36.8 °C) 80 18 (!) 144/59 98 %   04/13/21 1113    125/62    04/13/21 1018     99 %   04/13/21 0930  78 23 125/62 96 %   04/13/21 0837  77 18 (!) 129/48 96 %   04/13/21 0835  79 18 134/62 97 %   04/13/21 0825  82 21 124/61 99 %   04/13/21 0800  78 23 (!) 124/58 100 %   04/13/21 0735  83 20 130/63 100 %   04/13/21 0730  77 21 (!) 118/57 100 %   04/13/21 0725  78 22 (!) 118/49 100 %   04/13/21 0724  77 24 (!) 118/49 100 %   04/13/21 0723  78 20 124/66 100 %   04/13/21 0720  80 24 124/66 100 %   04/13/21 0715  81 18 (!) 131/55 100 %   04/13/21 0715  78 26 (!) 131/55 100 %   04/13/21 0709  79 18 (!) 130/52 100 %   04/13/21 0703  79 20 (!) 131/59 99 %   04/13/21 0700  81 22 (!) 81/60 99 %   04/13/21 0659  84 20 (!) 79/56 98 %   04/13/21 0655  82 20 137/66 100 % 04/13/21 0611 99 °F (37.2 °C) 79 20 122/61 97 %   04/13/21 0449 99 °F (37.2 °C) 76 20 118/63 95 %        General:   General appearance: Pt is in no acute distress   Distal pulses are preserved  Fundoscopic exam: attempted    Neurological Examination:   Mental Status:  AAO x3. Speech is fluent. Follows commands, has normal fund of knowledge, attention, short term recall, comprehension and insight. Cranial Nerves: Visual fields are full. PERRL, Extraocular movements are full. Facial sensation intact. Facial movement intact. Hearing intact to conversation. Palate elevates symmetrically. Shoulder shrug symmetric. Tongue midline. Motor: Strength is 5/5 in all 4 ext. Normal tone. No atrophy. Sensation: Light touch - Normal    Reflexes: DTRs 2+ throughout. Plantar responses downgoing. Coordination/Cerebellar: Intact to finger-nose-finger     Skin: No significant bruising or lacerations.     LAB DATA REVIEWED:    Recent Results (from the past 24 hour(s))   GLUCOSE, POC    Collection Time: 04/13/21 12:18 AM   Result Value Ref Range    Glucose (POC) 141 (H) 65 - 100 mg/dL    Performed by Marino Pierre  (RN)    EKG, 12 LEAD, INITIAL    Collection Time: 04/13/21 12:24 AM   Result Value Ref Range    Ventricular Rate 89 BPM    Atrial Rate 89 BPM    P-R Interval 150 ms    QRS Duration 76 ms    Q-T Interval 356 ms    QTC Calculation (Bezet) 433 ms    Calculated P Axis 45 degrees    Calculated R Axis 61 degrees    Calculated T Axis 11 degrees    Diagnosis       Normal sinus rhythm  Normal ECG  No previous ECGs available     CBC WITH AUTOMATED DIFF    Collection Time: 04/13/21 12:25 AM   Result Value Ref Range    WBC 10.1 3.6 - 11.0 K/uL    RBC 3.91 3.80 - 5.20 M/uL    HGB 10.9 (L) 11.5 - 16.0 g/dL    HCT 32.7 (L) 35.0 - 47.0 %    MCV 83.6 80.0 - 99.0 FL    MCH 27.9 26.0 - 34.0 PG    MCHC 33.3 30.0 - 36.5 g/dL    RDW 13.8 11.5 - 14.5 %    PLATELET 069 529 - 820 K/uL    MPV 11.2 8.9 - 12.9 FL    NRBC 0.0 0  WBC ABSOLUTE NRBC 0.00 0.00 - 0.01 K/uL    NEUTROPHILS 82 (H) 32 - 75 %    LYMPHOCYTES 9 (L) 12 - 49 %    MONOCYTES 8 5 - 13 %    EOSINOPHILS 1 0 - 7 %    BASOPHILS 0 0 - 1 %    IMMATURE GRANULOCYTES 0 0.0 - 0.5 %    ABS. NEUTROPHILS 8.2 (H) 1.8 - 8.0 K/UL    ABS. LYMPHOCYTES 0.9 0.8 - 3.5 K/UL    ABS. MONOCYTES 0.8 0.0 - 1.0 K/UL    ABS. EOSINOPHILS 0.1 0.0 - 0.4 K/UL    ABS. BASOPHILS 0.0 0.0 - 0.1 K/UL    ABS. IMM. GRANS. 0.0 0.00 - 0.04 K/UL    DF AUTOMATED     METABOLIC PANEL, COMPREHENSIVE    Collection Time: 04/13/21 12:25 AM   Result Value Ref Range    Sodium 138 136 - 145 mmol/L    Potassium 4.4 3.5 - 5.1 mmol/L    Chloride 99 97 - 108 mmol/L    CO2 28 21 - 32 mmol/L    Anion gap 11 5 - 15 mmol/L    Glucose 152 (H) 65 - 100 mg/dL    BUN 25 (H) 6 - 20 MG/DL    Creatinine 1.50 (H) 0.55 - 1.02 MG/DL    BUN/Creatinine ratio 17 12 - 20      GFR est AA 44 (L) >60 ml/min/1.73m2    GFR est non-AA 36 (L) >60 ml/min/1.73m2    Calcium 10.7 (H) 8.5 - 10.1 MG/DL    Bilirubin, total 0.4 0.2 - 1.0 MG/DL    ALT (SGPT) 30 12 - 78 U/L    AST (SGOT) 23 15 - 37 U/L    Alk.  phosphatase 65 45 - 117 U/L    Protein, total 7.1 6.4 - 8.2 g/dL    Albumin 3.9 3.5 - 5.0 g/dL    Globulin 3.2 2.0 - 4.0 g/dL    A-G Ratio 1.2 1.1 - 2.2     TROPONIN I    Collection Time: 04/13/21 12:25 AM   Result Value Ref Range    Troponin-I, Qt. <0.05 <0.05 ng/mL   GLUCOSE, POC    Collection Time: 04/13/21  7:19 AM   Result Value Ref Range    Glucose (POC) 282 (H) 65 - 100 mg/dL    Performed by Maikol MONTIEL    DUPLEX CAROTID BILATERAL    Collection Time: 04/13/21 11:02 AM   Result Value Ref Range    Right ICA dist sys 81.0 cm/s    Right ICA dist vieira 30.5 cm/s    Right ICA mid sys 92.0 cm/s    Right ICA mid vieira 26.1 cm/s    Right ICA prox sys 56.9 cm/s    Right ICA prox vieira 15.1 cm/s    Right eca sys 70.0 cm/s    RIGHT EXTERNAL CAROTID ARTERY D 0.00 cm/s    Right vertebral sys 42.9 cm/s    RIGHT VERTEBRAL ARTERY D 13.40 cm/s    Right subclavian sys 262.2 cm/s    RIGHT SUBCLAVIAN ARTERY D 0.00 cm/s    Right cca dist sys 61.2 cm/s    Right CCA dist vieira 15.1 cm/s    Right CCA prox sys 83.2 cm/s    Right CCA prox vieira 21.7 cm/s    Right ICA/CCA sys 1.5     Left ICA dist sys 87.5 cm/s    Left ICA dist vieira 34.5 cm/s    Left ICA mid sys 87.5 cm/s    Left ICA mid vieira 25.4 cm/s    Left ICA prox sys 38.1 cm/s    Left ICA prox vieira 12.6 cm/s    Left ECA sys 82.0 cm/s    LEFT EXTERNAL CAROTID ARTERY D 0.00 cm/s    Left vertebral sys 58.3 cm/s    LEFT VERTEBRAL ARTERY D 14.40 cm/s    Left subclavian sys 196.6 cm/s    LEFT SUBCLAVIAN ARTERY D 0.00 cm/s    Left CCA dist sys 59.3 cm/s    Left CCA dist vieira 17.9 cm/s    Left CCA prox sys 80.0 cm/s    Left CCA prox vieira 17.9 cm/s    Left ICA/CCA sys 1.50    GLUCOSE, POC    Collection Time: 04/13/21 11:58 AM   Result Value Ref Range    Glucose (POC) 221 (H) 65 - 100 mg/dL    Performed by Robin WEISS    ECHO ADULT COMPLETE    Collection Time: 04/13/21 12:36 PM   Result Value Ref Range    IVSd 1.47 (A) 0.60 - 0.90 cm    LVIDd 4.23 3.90 - 5.30 cm    LVIDs 2.43 cm    LVOT d 2.05 cm    LVPWd 1.41 (A) 0.60 - 0.90 cm    LVOT Cardiac Output 6.28 liter/minute    LVOT Peak Gradient 4.37 mmHg    Left Ventricular Outflow Tract Mean Gradient 2.23 mmHg    LVOT SV 83.7 mL    LVOT Peak Velocity 104.54 cm/s    LVOT VTI 25.37 cm    Left Atrium Major Axis 3.39 cm    Aortic Valve Area by Continuity of Peak Velocity 2.35 cm2    Aortic Valve Area by Continuity of VTI 3.05 cm2    AoV PG 8.58 mmHg    Aortic Valve Systolic Mean Gradient 5.35 mmHg    Aortic Valve Systolic Peak Velocity 767.19 cm/s    AoV VTI 27.47 cm    MV A Edward 131.64 cm/s    Mitral Valve E Wave Deceleration Time 187.56 ms    MV E Edward 98.87 cm/s    E/E' ratio (averaged) 17.41     E/E' lateral 14.31     E/E' septal 20.51     LV E' Lateral Velocity 6.91 cm/s    LV E' Septal Velocity 4.82 cm/s    MVA VTI 3.18 cm2    TR Max Velocity 407.20 cm/s    MV Peak Gradient 6.13 mmHg MV Mean Gradient 2.55 mmHg    Mitral Valve Pressure Half-time 53.43 ms    Mitral Valve Max Velocity 123.78 cm/s    Mitral Valve Annulus Velocity Time Integral 26.30 cm    MVA (PHT) 4.12 cm2    Pulmonic Valve Systolic Peak Instantaneous Gradient 4.15 mmHg    Pulmonic Valve Max Velocity 101.82 cm/s    AO ASC D 2.77 cm    Ao Root D 2.78 cm    MV E/A 0.75     LV Mass .7 67.0 - 162.0 g    LV Mass AL Index 127.4 43.0 - 95.0 g/m2    Left Atrium Minor Axis 1.82 cm    CHAYO/BSA Pk Edward 1.3 cm2/m2    CHAYO/BSA VTI 1.6 cm2/m2        Imaging review:  CT head  Normal    HOME MEDS  Prior to Admission Medications   Prescriptions Last Dose Informant Patient Reported? Taking? Aluminum Hydrox-Magnesium Carb (Gaviscon Extra Strength) 254-237.5 mg/5 mL susp   Yes No   Sig: Gaviscon Extra Strength 254 mg-237.5 mg/5 mL oral suspension   SHAKE WELL AND TAKE 20ML BY MOUTH FOUR TIMES DAILY AS NEEDED FOR ABDOMINAL PAIN   Blood Sugar Diagnostic, Drum (ACCU-CHEK COMPACT PLUS TEST) strp   No No   Sig: Test blood sugar 4 times daily   Blood-Glucose Meter, Drum-type (ACCU-CHEK COMPACT PLUS CARE) kit   No No   Sig: Test blood sugar 4 times daily   Eliquis 5 mg tablet   Yes No   Sig: Take 1 Tab by mouth two (2) times a day. Fiasp FlexTouch U-100 Insulin 100 unit/mL (3 mL) inpn   No No   Sig: AS DIRECTED FOR high glucoses AND FOR carbohydrate intake UP TO 50 units/day   FreeStyle Sekou 14 Day [place] misc   No No   Sig: Use as directed   FreeStyle Sekou 14 Day Sensor kit   No No   Sig: Use as directed every 14 days   Insulin Needles, Disposable, (Unifine Pentips) 32 gauge x 5/32\" ndle   No No   Sig: USE TO INJECT INSULIN 5 TIMES DAILY   K-Phos-NeutraL 250 mg tablet   Yes No   Sig: TAKE 1 TABLET BY MOUTH EVERY DAY   OneTouch Ultra Blue Test Strip strip   No No   Sig: Test 4 times daily.   DX E11.9   albuterol (PROVENTIL HFA, VENTOLIN HFA, PROAIR HFA) 90 mcg/actuation inhaler   No No   Sig: Take 2 Puffs by inhalation every four (4) hours as needed for Wheezing. aspirin delayed-release 81 mg tablet   Yes No   Sig: Take  by mouth daily. atorvastatin (LIPITOR) 40 mg tablet   No No   Sig: TAKE 1 TABLET BY MOUTH EVERY DAY   butalbital-acetaminophen-caffeine (FIORICET, ESGIC) -40 mg per tablet   No No   Sig: Take 1 Tab by mouth three (3) times daily as needed for Pain. Indications: Tension Headache   cinacalcet (SENSIPAR) 30 mg tablet   Yes No   Sig: Take 30 mg by mouth daily. fenofibrate nanocrystallized (Tricor) 145 mg tablet   No No   Sig: Take 1 Tab by mouth daily. gabapentin (NEURONTIN) 300 mg capsule   Yes No   Sig: TAKE 1 CAPSULE BY MOUTH THREE TIMES DAILY   insulin detemir U-100 (Levemir FlexTouch U-100 Insuln) 100 unit/mL (3 mL) inpn   Yes No   Sig: Levemir FlexTouch U-100 Insulin 100 unit/mL (3 mL) subcutaneous pen   INJECT 35 UNITS UNDER THE SKIN TWICE DAILY   labetaloL (NORMODYNE) 200 mg tablet   Yes No   Sig: Take 1 Tab by mouth nightly. lancets (ONE TOUCH DELICA) 33 gauge misc   No No   Si times daily. Diagnosis code: E11.8   liraglutide (VICTOZA) 0.6 mg/0.1 mL (18 mg/3 mL) pnij   No No   Si.8 mg by SubCUTAneous route daily. magnesium oxide (MAG-OX) 400 mg tablet   No No   Sig: Take 1 Tab by mouth daily. Patient taking differently: Take 800 mg by mouth two (2) times a day. metFORMIN ER (GLUCOPHAGE XR) 500 mg tablet   No No   Sig: Take 1 Tab by mouth two (2) times daily (after meals). mycophenolate mofetil (CELLCEPT) 250 mg capsule   Yes No   Sig: Take 250 mg by mouth. 2 tabs in the am and 4 tabs in the pm   pantoprazole (PROTONIX) 40 mg tablet   No No   Sig: TAKE ONE TABLET BY MOUTH ONCE DAILY   predniSONE (DELTASONE) 5 mg tablet   Yes No   Sig: Take 1 Tab by mouth daily. tacrolimus (PROGRAF) 1 mg capsule   Yes No   Sig: Take 2 mg by mouth.  Indications: taking 1 mg in am and 2 mg at QHS   terbinafine HCL (LAMISIL) 250 mg tablet   Yes No   Sig: TAKE 1 TABLET BY MOUTH DAILY THEN get labs & fill remaining quantity FOR a total of 21 DAYS   traZODone (DESYREL) 100 mg tablet   No No   Sig: Take 1 Tab by mouth nightly. Patient taking differently: Take 100 mg by mouth nightly as needed. Facility-Administered Medications: None       CURRENT MEDS  Current Facility-Administered Medications   Medication Dose Route Frequency    aspirin delayed-release tablet 81 mg  81 mg Oral DAILY    atorvastatin (LIPITOR) tablet 40 mg  40 mg Oral QHS    cinacalcet (SENSIPAR) tablet 30 mg  30 mg Oral DAILY    apixaban (ELIQUIS) tablet 5 mg  5 mg Oral BID    fenofibrate nanocrystallized (TRICOR) tablet 145 mg  145 mg Oral DAILY    gabapentin (NEURONTIN) capsule 300 mg  300 mg Oral TID    insulin glargine (LANTUS) injection 35 Units  35 Units SubCUTAneous QHS    labetaloL (NORMODYNE) tablet 200 mg  200 mg Oral QHS    magnesium oxide (MAG-OX) tablet 400 mg  400 mg Oral DAILY    [START ON 4/14/2021] mycophenolate mofetil (CELLCEPT) capsule 500 mg  500 mg Oral QAM    mycophenolate mofetil (CELLCEPT) capsule 1,000 mg  1,000 mg Oral QPM    [START ON 4/14/2021] pantoprazole (PROTONIX) tablet 40 mg  40 mg Oral ACB    predniSONE (DELTASONE) tablet 5 mg  5 mg Oral DAILY    [START ON 4/14/2021] tacrolimus (PROGRAF) capsule 1 mg  1 mg Oral QAM    tacrolimus (PROGRAF) capsule 2 mg  2 mg Oral QPM    terbinafine HCL (LAMISIL) tablet 250 mg  250 mg Oral DAILY    insulin lispro (HUMALOG) injection   SubCUTAneous AC&HS    0.9% sodium chloride infusion  75 mL/hr IntraVENous CONTINUOUS       IMPRESSION/RECOMMENDATIONS:  Daniel Torres is a 50 y.o. female who presents with dizziness and fall. She states that she had her Covid vaccine yesterday and was feeling lightheaded and dizzy. She woke up in the morning and it felt to her as if she had low blood sugar. She has had diabetes since the last 25 years. She does not complain of any room spinning sensation. There was no focal weakness or numbness.     She did have ankle dislocation on the left with a fall.  Her blood pressure was low and she was orthostatic. I do suspect her dizziness and lightheadedness secondary to orthostatic hypotension and generalized symptoms secondary to the vaccine. I do not suspect that she has a posterior circulation infarct. No further neurological work-up. Thank you very much for this consultation.      David Gomez MD  Neurologist

## 2021-04-13 NOTE — PROGRESS NOTES
MRI Pending:    Need completed MRI screening form. Sign and fax to [phone]. Call [phone] once faxed.

## 2021-04-13 NOTE — ED NOTES
TRANSFER - IN REPORT:    [place] [facility] [facility] [place] [address]. Report consisted of patients Situation, Background, Assessment and   Recommendations(SBAR). Information from the following report(s) SBAR and ED Summary was reviewed with the receiving nurse. Opportunity for questions and clarification was provided. 7381: Medicated pt per orders. 5725: Pt weaned down to RA at this time. Pt also placed on bedpan. 1270: Pt repositioned to POC. RA sats remain normal at this time. Will continue to monitor. 6199: Attempted to ambulate pt with crutches. Pt became very light headed and dizzy. Notified Dr. Sumaya Webber. Pt place back in bed at this time. 6712: Medicated pt per orders. 1015: Dr. Abdi Mendez at bedside. Pt provided with water. Pt resting on stretcher in POC with call bell in reach. Pt remains on monitor x 3. VSS at this time. 1044: Pt off the floor to vascular. 1101: Dr. Karl Alcantara notified of neurology consult placed via perfect serve. 1257: Attempted to call report twice. Will attempt again. 1303: Attempted to call report. 1304: Continuing to attempt to call report. No answer from either [facility] number available. 1310: Attempted to call report. Placed on hold. No answer. 1313: Pt provided with lunch tray. 1323: Attempted to call report. Transferred to nurse but no answer. Transport here to take pt to room.

## 2021-04-13 NOTE — ED NOTES
[facility] arrived @0230 to [facility] have not entered building yet. 0244 [facility] at nurses station. Pain still 7 -Dr Yury Boothe aware. pain meds ordered. waitingm for EMTALA to be completed by MD    [id number] paperwork complete and given to [facility]. To bedside to load patient. Sean Goes at [facility] updated.    Mandy Dakin [facility] leaving with patient.

## 2021-04-13 NOTE — ED PROVIDER NOTES
EMERGENCY DEPARTMENT HISTORY AND PHYSICAL EXAM      Date: 4/13/2021  Patient Name: Cassidy Ryan    History of Presenting Illness     Chief Complaint   Patient presents with    Transfer Of Care     Patient was seen at [facility] and transfered here for an ortho consult/reduction. Patient was dizzy and ended up falling on the floor and broke her left ankle in three places when it happened. Patient has a #20 in her left AC and received 50mcg of Fentanyl and 10mg of Morphine total. Patient arrives with left ankle in a soft cast/splint. History Provided By: Patient and review of records from [facility] ED    HPI: Cassidy Ryan, 50 y.o. female with PMHx significant for diabetes, DVT on Eliquis, GERD, hypertension, status post kidney transplant (stable on antirejection meds for the past 4 years) presents to the ED as a transfer from [facility] ED for a left trimalleolar ankle fracture/dislocation. Patient received her first Covid vaccine yesterday. Last night, she started feeling dizzy like her sugar was dropping. She tried to get up to get something to eat and got very lightheaded and fell. When she fell, she twisted her left ankle and it bent under her. She denies any palpitations or chest pain. Denies shortness of breath, headache. Does report right arm pain at the site of her Covid injection. States she did not hit her head when she fell. In the ED at [facility] she was noted to have a trimalleolar fracture dislocation of her left ankle. ED physician was concerned about trying to reduce it and decided to place in short leg splint and transfer her to  [address] for Ortho evaluation. Patient reports pain in her left ankle and some nausea. Patient has received multiple doses of morphine prior to arrival in our ED. She reports she did not have any nausea or vomiting prior to to receiving pain medication. She denies any dysuria or hematuria.     PCP: Jayda Almeida MD    Current Facility-Administered Medications on File Prior to Encounter   Medication Dose Route Frequency Provider Last Rate Last Admin    [COMPLETED] ondansetron (ZOFRAN) injection 4 mg  4 mg IntraVENous NOW Howard Santiago MD   4 mg at 04/13/21 0130    [COMPLETED] morphine injection 4 mg  4 mg IntraVENous NOW Howard Santiago MD   4 mg at 04/13/21 0130    [COMPLETED] morphine injection 4 mg  4 mg IntraVENous NOW Howard Santiago MD   4 mg at 04/13/21 0218    [COMPLETED] morphine injection 2 mg  2 mg IntraVENous NOW Howard Santiago MD   2 mg at 04/13/21 0249    [COMPLETED] sodium chloride (NS) flush 5-10 mL  5-10 mL IntraVENous Mary Carrington MD   10 mL at 04/13/21 0029    [COMPLETED] ondansetron (ZOFRAN) injection 4 mg  4 mg IntraVENous NOW Howard Santiago MD   4 mg at 04/13/21 0027    [COMPLETED] fentaNYL citrate (PF) injection 50 mcg  50 mcg IntraVENous NOW Howard Santiago MD   50 mcg at 04/13/21 0029     Current Outpatient Medications on File Prior to Encounter   Medication Sig Dispense Refill    insulin detemir U-100 (Levemir FlexTouch U-100 Insuln) 100 unit/mL (3 mL) inpn Levemir FlexTouch U-100 Insulin 100 unit/mL (3 mL) subcutaneous pen   INJECT 35 UNITS UNDER THE SKIN TWICE DAILY      Eliquis 5 mg tablet Take 1 Tab by mouth two (2) times a day.  gabapentin (NEURONTIN) 300 mg capsule TAKE 1 CAPSULE BY MOUTH THREE TIMES DAILY      Aluminum Hydrox-Magnesium Carb (Gaviscon Extra Strength) 254-237.5 mg/5 mL susp Gaviscon Extra Strength 254 mg-237.5 mg/5 mL oral suspension   SHAKE WELL AND TAKE 20ML BY MOUTH FOUR TIMES DAILY AS NEEDED FOR ABDOMINAL PAIN      metFORMIN ER (GLUCOPHAGE XR) 500 mg tablet Take 1 Tab by mouth two (2) times daily (after meals).  60 Tab 3    atorvastatin (LIPITOR) 40 mg tablet TAKE 1 TABLET BY MOUTH EVERY DAY 90 Tab 1    Fiasp FlexTouch U-100 Insulin 100 unit/mL (3 mL) inpn AS DIRECTED FOR high glucoses AND FOR carbohydrate intake UP TO 50 units/day 15 mL 11    fenofibrate nanocrystallized (Tricor) 145 mg tablet Take 1 Tab by mouth daily. 30 Tab 11    liraglutide (VICTOZA) 0.6 mg/0.1 mL (18 mg/3 mL) pnij 1.8 mg by SubCUTAneous route daily. 9 mL 11    OneTouch Ultra Blue Test Strip strip Test 4 times daily. DX E11.9 400 Strip 3    labetaloL (NORMODYNE) 200 mg tablet Take 1 Tab by mouth nightly. 360 Tab 1    FreeStyle Sekou 14 Day Sensor kit Use as directed every 14 days 2 Kit 11    FreeStyle Sekou 14 Day [place] misc Use as directed 1 Each 0    K-Phos-NeutraL 250 mg tablet TAKE 1 TABLET BY MOUTH EVERY DAY      terbinafine HCL (LAMISIL) 250 mg tablet TAKE 1 TABLET BY MOUTH DAILY THEN get labs & fill remaining quantity FOR a total of 21 DAYS      Insulin Needles, Disposable, (Unifine Pentips) 32 gauge x 5/32\" ndle USE TO INJECT INSULIN 5 TIMES DAILY 500 Pen Needle 3    butalbital-acetaminophen-caffeine (FIORICET, ESGIC) -40 mg per tablet Take 1 Tab by mouth three (3) times daily as needed for Pain. Indications: Tension Headache 60 Tab 1    magnesium oxide (MAG-OX) 400 mg tablet Take 1 Tab by mouth daily. (Patient taking differently: Take 800 mg by mouth two (2) times a day.) 90 Tab 3    traZODone (DESYREL) 100 mg tablet Take 1 Tab by mouth nightly. (Patient taking differently: Take 100 mg by mouth nightly as needed.) 30 Tab 3    pantoprazole (PROTONIX) 40 mg tablet TAKE ONE TABLET BY MOUTH ONCE DAILY 90 Tab 1    Blood-Glucose Meter, Drum-type (ACCU-CHEK COMPACT PLUS CARE) kit Test blood sugar 4 times daily 1 Kit 0    Blood Sugar Diagnostic, Drum (ACCU-CHEK COMPACT PLUS TEST) strp Test blood sugar 4 times daily 100 Strip 3    lancets (ONE TOUCH DELICA) 33 gauge misc 4 times daily. Diagnosis code: E11.8 125 Lancet 11    albuterol (PROVENTIL HFA, VENTOLIN HFA, PROAIR HFA) 90 mcg/actuation inhaler Take 2 Puffs by inhalation every four (4) hours as needed for Wheezing. 1 Inhaler 3    predniSONE (DELTASONE) 5 mg tablet Take 1 Tab by mouth daily.   11    cinacalcet (SENSIPAR) 30 mg tablet Take 30 mg by mouth daily.  aspirin delayed-release 81 mg tablet Take  by mouth daily.  mycophenolate mofetil (CELLCEPT) 250 mg capsule Take 250 mg by mouth. 2 tabs in the am and 4 tabs in the pm      tacrolimus (PROGRAF) 1 mg capsule Take 2 mg by mouth. Indications: taking 1 mg in am and 2 mg at [facility]HS         Past History     Past Medical History:  Past Medical History:   Diagnosis Date    Anemia     Arthritis     Asthma 4/8/2014    Rx for same    Diabetes ([facility] [address] 26yo    Rx to control    GERD (gastroesophageal reflux disease)     Headache(784.0)     Hepatitis B infection     Hypertension     Rx for same    Kidney transplanted 04/2017    Sebaceous cyst 8/27/2019       Past Surgical History:  Past Surgical History:   Procedure Laterality Date    COLONOSCOPY N/A 11/1/2016    COLONOSCOPY performed by Kumar Lopez MD at [address] [facility] [place]  [address]    laparoscopic    HX COLONOSCOPY      HX ENDOSCOPY      HX MOHS PROCEDURES Left     HX OTHER SURGICAL  7/30/15    excision of left Axilla Hidradenitis    HX RENAL BIOPSY  2011    HX RENAL TRANSPLANT  04/05/2017    HX UROLOGICAL  2012    KIDNEY BX RIGHT - pt states she has only had one kidney bx as of 10/31/2016    HX UROLOGICAL  2017    transplant    HX VASCULAR ACCESS  1/2013    CHEST CATHETER FOR DIALYSIS    HX VASCULAR ACCESS Right     A-V fistula    HX WISDOM TEETH EXTRACTION         Family History:  Family History   Problem Relation Age of Onset    Hypertension Mother     Hypertension Sister     Diabetes Maternal Grandfather     Cancer Father         UNKNOWN    Hypertension Maternal Grandmother     Diabetes Son     Asthma Son     Cancer Maternal Aunt         UNKNOWN    Anesth Problems Neg Hx        Social History:  Social History     Tobacco Use    Smoking status: Never Smoker    Smokeless tobacco: Never Used   Substance Use Topics    Alcohol use:  No Alcohol/week: 0.0 standard drinks    Drug use: No       Allergies: Allergies   Allergen Reactions    Latex Shortness of Breath    Keflex [Cephalexin] Nausea and Vomiting     Stomach cramping    Tramadol Itching         Review of Systems   Review of Systems   Constitutional: Negative for chills and fever. HENT: Negative for congestion, ear pain, rhinorrhea and sore throat. Eyes: Negative. Respiratory: Negative for cough, chest tightness, shortness of breath and wheezing. Cardiovascular: Positive for leg swelling. Negative for chest pain and palpitations. Gastrointestinal: Positive for nausea. Negative for abdominal pain, diarrhea and vomiting. Genitourinary: Negative for dysuria, flank pain and hematuria. Musculoskeletal: Positive for arthralgias, gait problem and joint swelling. Skin: Negative for rash and wound. Neurological: Positive for dizziness and light-headedness. Negative for syncope, weakness and headaches. Psychiatric/Behavioral: Negative for confusion. The patient is nervous/anxious. All other systems reviewed and are negative.         Physical Exam    General appearance -overweight, well appearing, and in no distress  Eyes - pupils equal and reactive, extraocular eye movements intact  ENT - mucous membranes moist, pharynx normal without lesions  Neck - supple, no significant adenopathy; non-tender to palpation  Chest - clear to auscultation, no wheezes, rales or rhonchi; non-tender to palpation  Heart - normal rate and regular rhythm, S1 and S2 normal, no murmurs noted  Abdomen - soft, nontender, nondistended, no masses or organomegaly  Musculoskeletal -left ankle is in a short leg splint, and splint removed, patient noted to have normal capillary refill and normal pulses and normal sensation, obvious deformity at left ankle with significant swelling, very limited range of motion of left ankle  Extremities - peripheral pulses normal, no pedal edema  Skin - normal coloration and turgor, no rashes  Neurological - alert, oriented x3, normal speech, no focal findings or movement disorder noted    Diagnostic Study Results     Labs -     Recent Results (from the past 12 hour(s))   GLUCOSE, POC    Collection Time: 04/13/21 12:18 AM   Result Value Ref Range    Glucose (POC) 141 (H) 65 - 100 mg/dL    Performed by Tamara BrambilaRN)    EKG, 12 LEAD, INITIAL    Collection Time: 04/13/21 12:24 AM   Result Value Ref Range    Ventricular Rate 89 BPM    Atrial Rate 89 BPM    P-R Interval 150 ms    QRS Duration 76 ms    Q-T Interval 356 ms    QTC Calculation (Bezet) 433 ms    Calculated P Axis 45 degrees    Calculated R Axis 61 degrees    Calculated T Axis 11 degrees    Diagnosis       Normal sinus rhythm  Normal ECG  No previous ECGs available     CBC WITH AUTOMATED DIFF    Collection Time: 04/13/21 12:25 AM   Result Value Ref Range    WBC 10.1 3.6 - 11.0 K/uL    RBC 3.91 3.80 - 5.20 M/uL    HGB 10.9 (L) 11.5 - 16.0 g/dL    HCT 32.7 (L) 35.0 - 47.0 %    MCV 83.6 80.0 - 99.0 FL    MCH 27.9 26.0 - 34.0 PG    MCHC 33.3 30.0 - 36.5 g/dL    RDW 13.8 11.5 - 14.5 %    PLATELET 263 534 - 354 K/uL    MPV 11.2 8.9 - 12.9 FL    NRBC 0.0 0  WBC    ABSOLUTE NRBC 0.00 0.00 - 0.01 K/uL    NEUTROPHILS 82 (H) 32 - 75 %    LYMPHOCYTES 9 (L) 12 - 49 %    MONOCYTES 8 5 - 13 %    EOSINOPHILS 1 0 - 7 %    BASOPHILS 0 0 - 1 %    IMMATURE GRANULOCYTES 0 0.0 - 0.5 %    ABS. NEUTROPHILS 8.2 (H) 1.8 - 8.0 K/UL    ABS. LYMPHOCYTES 0.9 0.8 - 3.5 K/UL    ABS. MONOCYTES 0.8 0.0 - 1.0 K/UL    ABS. EOSINOPHILS 0.1 0.0 - 0.4 K/UL    ABS. BASOPHILS 0.0 0.0 - 0.1 K/UL    ABS. IMM.  GRANS. 0.0 0.00 - 0.04 K/UL    DF AUTOMATED     METABOLIC PANEL, COMPREHENSIVE    Collection Time: 04/13/21 12:25 AM   Result Value Ref Range    Sodium 138 136 - 145 mmol/L    Potassium 4.4 3.5 - 5.1 mmol/L    Chloride 99 97 - 108 mmol/L    CO2 28 21 - 32 mmol/L    Anion gap 11 5 - 15 mmol/L    Glucose 152 (H) 65 - 100 mg/dL    BUN 25 (H) 6 - 20 MG/DL    Creatinine 1.50 (H) 0.55 - 1.02 MG/DL    BUN/Creatinine ratio 17 12 - 20      GFR est AA 44 (L) >60 ml/min/1.73m2    GFR est non-AA 36 (L) >60 ml/min/1.73m2    Calcium 10.7 (H) 8.5 - 10.1 MG/DL    Bilirubin, total 0.4 0.2 - 1.0 MG/DL    ALT (SGPT) 30 12 - 78 U/L    AST (SGOT) 23 15 - 37 U/L    Alk. phosphatase 65 45 - 117 U/L    Protein, total 7.1 6.4 - 8.2 g/dL    Albumin 3.9 3.5 - 5.0 g/dL    Globulin 3.2 2.0 - 4.0 g/dL    A-G Ratio 1.2 1.1 - 2.2     TROPONIN I    Collection Time: 04/13/21 12:25 AM   Result Value Ref Range    Troponin-I, Qt. <0.05 <0.05 ng/mL   GLUCOSE, POC    Collection Time: 04/13/21  7:19 AM   Result Value Ref Range    Glucose (POC) 282 (H) 65 - 100 mg/dL    Performed by Ismael SAMN        Radiologic Studies -   XR ANKLE LT AP/LAT   Final Result   Improved alignment status post reduction. XR ANKLE LT AP/LAT   Final Result   Persistent dislocation. CT HEAD WO CONT    (Results Pending)     CT Results  (Last 48 hours)    None        CXR Results  (Last 48 hours)    None            Medical Decision Making   I am the first provider for this patient. I reviewed the vital signs, available nursing notes, past medical history, past surgical history, family history and social history. Vital Signs-Reviewed the patient's vital signs.   Patient Vitals for the past 12 hrs:   Temp Pulse Resp BP SpO2   04/13/21 0837  77 18 (!) 129/48 96 %   04/13/21 0835  79 18 134/62 97 %   04/13/21 0825  82 21 124/61 99 %   04/13/21 0735  83 20 130/63 100 %   04/13/21 0730  77 21 (!) 118/57 100 %   04/13/21 0725  78 22 (!) 118/49 100 %   04/13/21 0724  77 24 (!) 118/49 100 %   04/13/21 0723  78 20 124/66 100 %   04/13/21 0720  80 24 124/66 100 %   04/13/21 0715  81 18 (!) 131/55 100 %   04/13/21 0715  78 26 (!) 131/55 100 %   04/13/21 0709  79 18 (!) 130/52 100 %   04/13/21 0703  79 20 (!) 131/59 99 %   04/13/21 0700  81 22 (!) 81/60 99 %   04/13/21 0659  84 20 (!) 79/56 98 % 04/13/21 0655  82 20 137/66 100 %   04/13/21 0611 99 °F (37.2 °C) 79 20 122/61 97 %   04/13/21 0449 99 °F (37.2 °C) 76 20 118/63 95 %           Records Reviewed: Nursing Notes and Old Medical Records    Provider Notes (Medical Decision Making):   Differential diagnosis: Fracture, dislocation of left ankle, vasovagal episode, dehydration, benign positional vertigo, orthostatic hypotension  We will have Ortho evaluate patient. Will likely need sedation and reduction of left ankle fracture dislocation. ED Course:   Initial assessment performed. The patients presenting problems have been discussed, and they are in agreement with the care plan formulated and outlined with them. I have encouraged them to ask questions as they arise throughout their visit. Progress Notes:  Case discussed with Dr. Carmelo Sanchez and [facility]-ray shows persistent dislocation. Lazarus Ip came into evaluate patient and reduced fracture dislocation in the ED and placed in a short leg splint. Postreduction film shows adequate reduction. Procedure Note - Procedural Sedation:     Indication:  Procedural sedation is required to perform the following procedure: Left ankle reduction    Informed Consent:  The reason for the procedure as well as the risks, benefits, and alternatives to the procedure have been explained to the patient and She consents to the procedure. The consent has been signed by the patient/representative, the medical provider and witnessed by the patients nurse. Anesthesia Risks: The ASA score is ASA 2 - Mild systemic disease. The patient is having all vital signs monitored by an RN as well as pulse oximetry. Mallampati Score Reference: The patient has a patent airway with a Mallampati Classification Score of III (soft palate, base of uvula visible).                               Pre-Procedure Sedation Assessment:  Sedation Start Time: 655amTime Out was performed to confirm patient identity, laterality and indication prior to procedural sedation. Post Procedure Sedation Assessment:   Sedation End Time: 0715am    The patient was sedated with a total of 60mg propofol/DIPRIVAN  via IV. Reversal agents and resuscitation equipment were at the bedside. The ankle reduction was done and the patient tolerated the procedure well with no complications. Reversal agents were not used. Patient tolerated procedure well. Complications: None   Gabby No MD  ED Course as of Apr 13 0841   Tue Apr 13, 2021   0840  Fatou Cowan has discussed case with his attending (Dr. Jen Dakin) and is instructing patient to follow-up in the office within a week. Patient should be nonweightbearing with crutches. Attempted to have patient ambulate with crutches. She is very dizzy and unsteady. Unsafe for discharge as I am afraid she will fall again. Case discussed with Dr. Una Correa who will see and admit pt. head CT ordered. Orthostatics done and are unremarkable   [AO]      ED Course User Index  [AO] Gabby No MD       Disposition:  Admit to hospitalist        Diagnosis     Clinical Impression:   1. Trimalleolar fracture of ankle, closed, left, initial encounter    2. Ankle dislocation, left, initial encounter    3. Renal transplant recipient    4.  Dizziness

## 2021-04-13 NOTE — ED TRIAGE NOTES
Pt arrived post fall with LT ankle pain and swelling. Reports she was dizzy before fall and family gave her OJ before EMS arrrived.   glucose 202 when they got there

## 2021-04-14 PROBLEM — S82.853A TRIMALLEOLAR FRACTURE: Status: ACTIVE | Noted: 2021-04-14

## 2021-04-14 LAB
ATRIAL RATE: 89 BPM
CALCULATED P AXIS, ECG09: 45 DEGREES
CALCULATED R AXIS, ECG10: 61 DEGREES
CALCULATED T AXIS, ECG11: 11 DEGREES
CHOLEST SERPL-MCNC: 120 MG/DL
DIAGNOSIS, 93000: NORMAL
EST. AVERAGE GLUCOSE BLD GHB EST-MCNC: 229 MG/DL
GLUCOSE BLD STRIP.AUTO-MCNC: 187 MG/DL (ref 65–100)
GLUCOSE BLD STRIP.AUTO-MCNC: 189 MG/DL (ref 65–100)
GLUCOSE BLD STRIP.AUTO-MCNC: 249 MG/DL (ref 65–100)
GLUCOSE BLD STRIP.AUTO-MCNC: 321 MG/DL (ref 65–100)
HBA1C MFR BLD: 9.6 % (ref 4–5.6)
HDLC SERPL-MCNC: 60 MG/DL
HDLC SERPL: 2 {RATIO} (ref 0–5)
LDLC SERPL CALC-MCNC: 45.8 MG/DL (ref 0–100)
LEFT CCA DIST DIAS: 17.9 CM/S
LEFT CCA DIST SYS: 59.3 CM/S
LEFT CCA PROX DIAS: 17.9 CM/S
LEFT CCA PROX SYS: 80 CM/S
LEFT ECA DIAS: 0 CM/S
LEFT ECA SYS: 82 CM/S
LEFT ICA DIST DIAS: 34.5 CM/S
LEFT ICA DIST SYS: 87.5 CM/S
LEFT ICA MID DIAS: 25.4 CM/S
LEFT ICA MID SYS: 87.5 CM/S
LEFT ICA PROX DIAS: 12.6 CM/S
LEFT ICA PROX SYS: 38.1 CM/S
LEFT ICA/CCA SYS: 1.5
LEFT SUBCLAVIAN DIAS: 0 CM/S
LEFT SUBCLAVIAN SYS: 196.6 CM/S
LEFT VERTEBRAL DIAS: 14.4 CM/S
LEFT VERTEBRAL SYS: 58.3 CM/S
LIPID PROFILE,FLP: NORMAL
P-R INTERVAL, ECG05: 150 MS
Q-T INTERVAL, ECG07: 356 MS
QRS DURATION, ECG06: 76 MS
QTC CALCULATION (BEZET), ECG08: 433 MS
RIGHT CCA DIST DIAS: 15.1 CM/S
RIGHT CCA DIST SYS: 61.2 CM/S
RIGHT CCA PROX DIAS: 21.7 CM/S
RIGHT CCA PROX SYS: 83.2 CM/S
RIGHT ECA DIAS: 0 CM/S
RIGHT ECA SYS: 70 CM/S
RIGHT ICA DIST DIAS: 30.5 CM/S
RIGHT ICA DIST SYS: 81 CM/S
RIGHT ICA MID DIAS: 26.1 CM/S
RIGHT ICA MID SYS: 92 CM/S
RIGHT ICA PROX DIAS: 15.1 CM/S
RIGHT ICA PROX SYS: 56.9 CM/S
RIGHT ICA/CCA SYS: 1.5
RIGHT SUBCLAVIAN DIAS: 0 CM/S
RIGHT SUBCLAVIAN SYS: 262.2 CM/S
RIGHT VERTEBRAL DIAS: 13.4 CM/S
RIGHT VERTEBRAL SYS: 42.9 CM/S
SERVICE CMNT-IMP: ABNORMAL
T4 FREE SERPL-MCNC: 1.1 NG/DL (ref 0.8–1.5)
TRIGL SERPL-MCNC: 71 MG/DL (ref ?–150)
TSH SERPL DL<=0.05 MIU/L-ACNC: 0.38 UIU/ML (ref 0.36–3.74)
VENTRICULAR RATE, ECG03: 89 BPM
VLDLC SERPL CALC-MCNC: 14.2 MG/DL

## 2021-04-14 PROCEDURE — 96376 TX/PRO/DX INJ SAME DRUG ADON: CPT

## 2021-04-14 PROCEDURE — 80061 LIPID PANEL: CPT

## 2021-04-14 PROCEDURE — 36415 COLL VENOUS BLD VENIPUNCTURE: CPT

## 2021-04-14 PROCEDURE — 99218 HC RM OBSERVATION: CPT

## 2021-04-14 PROCEDURE — 2709999900 HC NON-CHARGEABLE SUPPLY

## 2021-04-14 PROCEDURE — 82962 GLUCOSE BLOOD TEST: CPT

## 2021-04-14 PROCEDURE — 74011250636 HC RX REV CODE- 250/636: Performed by: INTERNAL MEDICINE

## 2021-04-14 PROCEDURE — 97161 PT EVAL LOW COMPLEX 20 MIN: CPT

## 2021-04-14 PROCEDURE — A9270 NON-COVERED ITEM OR SERVICE: HCPCS | Performed by: INTERNAL MEDICINE

## 2021-04-14 PROCEDURE — 83036 HEMOGLOBIN GLYCOSYLATED A1C: CPT

## 2021-04-14 PROCEDURE — 74011636637 HC RX REV CODE- 636/637: Performed by: INTERNAL MEDICINE

## 2021-04-14 PROCEDURE — 84439 ASSAY OF FREE THYROXINE: CPT

## 2021-04-14 PROCEDURE — 97530 THERAPEUTIC ACTIVITIES: CPT

## 2021-04-14 PROCEDURE — 97530 THERAPEUTIC ACTIVITIES: CPT | Performed by: OCCUPATIONAL THERAPIST

## 2021-04-14 PROCEDURE — 84443 ASSAY THYROID STIM HORMONE: CPT

## 2021-04-14 PROCEDURE — 97535 SELF CARE MNGMENT TRAINING: CPT | Performed by: OCCUPATIONAL THERAPIST

## 2021-04-14 PROCEDURE — 65660000000 HC RM CCU STEPDOWN

## 2021-04-14 PROCEDURE — 97166 OT EVAL MOD COMPLEX 45 MIN: CPT | Performed by: OCCUPATIONAL THERAPIST

## 2021-04-14 PROCEDURE — 97116 GAIT TRAINING THERAPY: CPT

## 2021-04-14 PROCEDURE — 74011250637 HC RX REV CODE- 250/637: Performed by: INTERNAL MEDICINE

## 2021-04-14 RX ORDER — DOCUSATE SODIUM 100 MG/1
100 CAPSULE, LIQUID FILLED ORAL 2 TIMES DAILY
Qty: 60 CAP | Refills: 2 | Status: SHIPPED | OUTPATIENT
Start: 2021-04-14 | End: 2021-07-13

## 2021-04-14 RX ORDER — MORPHINE SULFATE 2 MG/ML
2 INJECTION, SOLUTION INTRAMUSCULAR; INTRAVENOUS
Status: DISCONTINUED | OUTPATIENT
Start: 2021-04-14 | End: 2021-04-16 | Stop reason: HOSPADM

## 2021-04-14 RX ORDER — OXYCODONE HYDROCHLORIDE 5 MG/1
5 TABLET ORAL
Qty: 20 TAB | Refills: 0 | Status: SHIPPED | OUTPATIENT
Start: 2021-04-14 | End: 2021-04-17

## 2021-04-14 RX ADMIN — TACROLIMUS 1 MG: 1 CAPSULE ORAL at 08:48

## 2021-04-14 RX ADMIN — OXYCODONE 10 MG: 5 TABLET ORAL at 22:11

## 2021-04-14 RX ADMIN — APIXABAN 5 MG: 5 TABLET, FILM COATED ORAL at 22:12

## 2021-04-14 RX ADMIN — ASPIRIN 81 MG: 81 TABLET, COATED ORAL at 08:43

## 2021-04-14 RX ADMIN — OXYCODONE 5 MG: 5 TABLET ORAL at 03:56

## 2021-04-14 RX ADMIN — MORPHINE SULFATE 2 MG: 2 INJECTION, SOLUTION INTRAMUSCULAR; INTRAVENOUS at 23:29

## 2021-04-14 RX ADMIN — CINACALCET HYDROCHLORIDE 30 MG: 30 TABLET, FILM COATED ORAL at 10:12

## 2021-04-14 RX ADMIN — OXYCODONE 5 MG: 5 TABLET ORAL at 12:51

## 2021-04-14 RX ADMIN — TACROLIMUS 2 MG: 1 CAPSULE ORAL at 17:55

## 2021-04-14 RX ADMIN — ATORVASTATIN CALCIUM 40 MG: 40 TABLET, FILM COATED ORAL at 22:12

## 2021-04-14 RX ADMIN — INSULIN LISPRO 1 UNITS: 100 INJECTION, SOLUTION INTRAVENOUS; SUBCUTANEOUS at 22:13

## 2021-04-14 RX ADMIN — FENOFIBRATE 145 MG: 145 TABLET ORAL at 08:44

## 2021-04-14 RX ADMIN — APIXABAN 5 MG: 5 TABLET, FILM COATED ORAL at 08:42

## 2021-04-14 RX ADMIN — PANTOPRAZOLE SODIUM 40 MG: 40 TABLET, DELAYED RELEASE ORAL at 08:43

## 2021-04-14 RX ADMIN — MORPHINE SULFATE 2 MG: 2 INJECTION, SOLUTION INTRAMUSCULAR; INTRAVENOUS at 19:37

## 2021-04-14 RX ADMIN — MAGNESIUM OXIDE 400 MG (241.3 MG MAGNESIUM) TABLET 400 MG: TABLET at 08:43

## 2021-04-14 RX ADMIN — INSULIN LISPRO 4 UNITS: 100 INJECTION, SOLUTION INTRAVENOUS; SUBCUTANEOUS at 16:24

## 2021-04-14 RX ADMIN — LABETALOL HYDROCHLORIDE 200 MG: 100 TABLET, FILM COATED ORAL at 22:12

## 2021-04-14 RX ADMIN — OXYCODONE 10 MG: 5 TABLET ORAL at 16:24

## 2021-04-14 RX ADMIN — OXYCODONE 5 MG: 5 TABLET ORAL at 08:44

## 2021-04-14 RX ADMIN — MYCOPHENOLATE MOFETIL 500 MG: 250 CAPSULE ORAL at 08:48

## 2021-04-14 RX ADMIN — MYCOPHENOLATE MOFETIL 1000 MG: 250 CAPSULE ORAL at 19:37

## 2021-04-14 RX ADMIN — INSULIN GLARGINE 35 UNITS: 100 INJECTION, SOLUTION SUBCUTANEOUS at 22:13

## 2021-04-14 RX ADMIN — PREDNISONE 5 MG: 10 TABLET ORAL at 08:43

## 2021-04-14 RX ADMIN — SODIUM CHLORIDE 75 ML/HR: 9 INJECTION, SOLUTION INTRAVENOUS at 06:15

## 2021-04-14 RX ADMIN — TERBINAFINE 250 MG: 250 TABLET ORAL at 10:12

## 2021-04-14 NOTE — PROGRESS NOTES
Problem: Self Care Deficits Care Plan (Adult)  Goal: *Acute Goals and Plan of Care (Insert Text)  Description: FUNCTIONAL STATUS PRIOR TO ADMISSION: Patient was independent and active without use of DME. Works as a private duty CNA    HOME SUPPORT PRIOR TO ADMISSION: The patient lived with significant other and son. Occupational Therapy Goals:  Initiated 4/14/2021  1. Patient will perform lower body dressing with modified independence within 7 days. 2. Patient will perform toileting with modified independence within 7 days. 3. Patient will be independent with home exercise program for increased UE strength with transfers within 7 days. 4. Patient will improve dynamic standing balance to modified independence within 7 days. 5. Patient will transfer from toilet with modified independence using the least restrictive device and appropriate durable medical equipment within 7 days. Outcome: Not Met      OCCUPATIONAL THERAPY EVALUATION  Patient: Derek Lozano (72 y.o. female)  Date: 4/14/2021  Primary Diagnosis: Dizziness [R42]       Precautions:  Fall, NWB, Bed Alarm(LLE, BP or sticks RUE)    ASSESSMENT  Based on the objective data described below, the patient presents with increased pain in left ankle with mobility attempts. Pt will have surgery at a later date but ortho is waiting for the swelling to go down prior to attempting surgery. Educated pt on strict NWB status on LLE and adaptive strategies for ADLS and mobility with NWB status. Educated pt on the importance of having LLE elevated seated and using ice packs provided at rest in the hospital and at home. She voiced understanding. Discussed that she needs increased initial assist at home and will need to sponge bathe at this time. Pt was able to perform bed mobility with min assist overall. CGA for sit to stand and increased effort and time to hop to chair with rolling walker.   Pt needed full support of RW in standing and is only able to stand pivot at this time. Pt reports that she will have assist at discharge. Vitals:    04/14/21 0800 04/14/21 1018 04/14/21 1026 04/14/21 1033   BP:  (!) 148/71 (!) 153/67 130/60   BP 1 Location:  Left upper arm Left upper arm Right upper arm   BP Patient Position:  Supine Sitting Sitting   Pulse: 84 86 94 94   Resp:       Temp:       SpO2:       Weight:       Height:           Current Level of Function Impacting Discharge (ADLs/self-care): min assist bed mobility  Transfers:  Sit to Stand: Contact guard assistance; Additional time  Stand to Sit: Contact guard assistance; Additional time  Bed to Chair: Contact guard assistance; Additional time;Assist x1(hopping NWB LLE with RW to chair)  Bathroom Mobility: (unable)  Toilet Transfer : Contact guard assistance; Additional time(to [facility])    ADL Assessment:  Feeding: Setup    Oral Facial Hygiene/Grooming: Setup(seated)    Bathing: Moderate assistance    Upper Body Dressing: Setup    Lower Body Dressing: Moderate assistance    Toileting: Moderate assistance    Functional Outcome Measure: The patient scored 55/100 on the barthel outcome measure which is indicative of moderate decline in mobility and ADLS. Other factors to consider for discharge: need initial assist at discharge      Patient will benefit from skilled therapy intervention to address the above noted impairments. PLAN :  Recommendations and Planned Interventions: self care training, functional mobility training, therapeutic exercise, balance training, therapeutic activities, patient education, home safety training and family training/education    Frequency/Duration: Patient will be followed by occupational therapy 4 times a week to address goals.     Recommendation for discharge: (in order for the patient to meet his/her long term goals)  Occupational therapy at least 2 days/week in the home AND ensure assist and/or supervision for safety with mobility and ADLS    This discharge recommendation:  Has been made in collaboration with the attending provider and/or case management    IF patient discharges home will need the following DME: bedside commode (wheelchair will not fit in bathroom and pt is not yet able to hop a sufficient distance)       SUBJECTIVE:   Patient stated That was hard.     OBJECTIVE DATA SUMMARY:   HISTORY:   Past Medical History:   Diagnosis Date    Anemia     Arthritis     Asthma 4/8/2014    Rx for same    Diabetes ([address] 26yo    Rx to control    GERD (gastroesophageal reflux disease)     Headache(784.0)     Hepatitis B infection     Hypertension     Rx for same    Kidney transplanted 04/2017    Sebaceous cyst 8/27/2019     Past Surgical History:   Procedure Laterality Date    COLONOSCOPY N/A 11/1/2016    COLONOSCOPY performed by Richard Lilly MD at [address] [facility] [address] [facility]    laparoscopic    HX COLONOSCOPY      HX ENDOSCOPY      HX MOHS PROCEDURES Left     HX OTHER SURGICAL  7/30/15    excision of left Axilla Hidradenitis    HX RENAL BIOPSY  2011    HX RENAL TRANSPLANT  04/05/2017    HX UROLOGICAL  2012    KIDNEY BX RIGHT - pt states she has only had one kidney bx as of 10/31/2016    HX UROLOGICAL  2017    transplant    HX VASCULAR ACCESS  1/2013    CHEST CATHETER FOR DIALYSIS    HX VASCULAR ACCESS Right     A-V fistula    HX WISDOM TEETH EXTRACTION         Expanded or extensive additional review of patient history:     Home Situation  Home Environment: Private residence  # Steps to Enter: 1  Rails to Enter: No  One/Two Story Residence: One story  Living Alone: No  Support Systems: Spouse/Significant Other/Partner(son)  Current DME Used/Available at Home: Wheelchair, Shower chair, Walker, rolling, Transfer bench  Tub or Shower Type: Tub/Shower combination    Hand dominance: Right    EXAMINATION OF PERFORMANCE DEFICITS:  Cognitive/Behavioral Status:  Neurologic State: Alert  Orientation Level: Oriented X4  Cognition: Appropriate decision making; Appropriate for age attention/concentration; Appropriate safety awareness  Perception: Appears intact  Perseveration: No perseveration noted  Safety/Judgement: Awareness of environment; Fall prevention;Home safety; Insight into deficits        Hearing: Auditory  Auditory Impairment: None    Vision/Perceptual:                           Acuity: Within Defined Limits         Range of Motion:    AROM: Within functional limits(except for LLE not tested due to splint)  PROM: Within functional limits                      Strength:  Generally decreased but functional                   Coordination:  Coordination: Within functional limits  Fine Motor Skills-Upper: Left Intact; Right Intact    Gross Motor Skills-Upper: Left Intact; Right Intact    Tone & Sensation:    Tone: Normal  Sensation: Intact(has left sided sciatica)                      Balance:  Sitting: Intact  Standing: Impaired  Standing - Static: Constant support; Fair  Standing - Dynamic : Fair;Constant support    Functional Mobility and Transfers for ADLs:  Bed Mobility:  Rolling: Minimum assistance(LLE)  Supine to Sit: Minimum assistance(operative LE)  Scooting: Contact guard assistance    Transfers:  Sit to Stand: Contact guard assistance; Additional time  Stand to Sit: Contact guard assistance; Additional time  Bed to Chair: Contact guard assistance; Additional time;Assist x1(hopping NWB LLE with RW to chair)  Bathroom Mobility: (unable)  Toilet Transfer : Contact guard assistance; Additional time(to [facility])    ADL Assessment:  Feeding: Setup    Oral Facial Hygiene/Grooming: Setup(seated)    Bathing: Moderate assistance    Upper Body Dressing: Setup    Lower Body Dressing: Moderate assistance    Toileting:  Moderate assistance                ADL Intervention and task modifications:     Educated on donning clothing over LLE first and doff last.  Educated to sit and weight shift to pull up and down clothing at this time Cognitive Retraining  Safety/Judgement: Awareness of environment; Fall prevention;Home safety; Insight into deficits    Functional Measure:  Barthel Index:    Bathin  Bladder: 10  Bowels: 10  Groomin  Dressin  Feeding: 10  Mobility: 0  Stairs: 0  Toilet Use: 5  Transfer (Bed to Chair and Back): 10  Total: 55/100        The Barthel ADL Index: Guidelines  1. The index should be used as a record of what a patient does, not as a record of what a patient could do. 2. The main aim is to establish degree of independence from any help, physical or verbal, however minor and for whatever reason. 3. The need for supervision renders the patient not independent. 4. A patient's performance should be established using the best available evidence. Asking the patient, friends/relatives and nurses are the usual sources, but direct observation and common sense are also important. However direct testing is not needed. 5. Usually the patient's performance over the preceding 24-48 hours is important, but occasionally longer periods will be relevant. 6. Middle categories imply that the patient supplies over 50 per cent of the effort. 7. Use of aids to be independent is allowed. Ino Beltran., Barthel, DJeanineW. (2803). Functional evaluation: the Barthel Index. [facility] (14)2. EDUARDO Sesay, Daniel Frost., Hanny Desai., [place], [address] [place] [address] (). Measuring the change indisability after inpatient rehabilitation; comparison of the responsiveness of the Barthel Index and Functional Grant Measure. Journal of Neurology, Neurosurgery, and Psychiatry, 66(4), 078-085. Bradley Warner, N.BELLA.AZUCENA, BEAN Mendoza, & Keke Tomas MQUANG. (2004.) Assessment of post-stroke quality of life in cost-effectiveness studies: The usefulness of the Barthel Index and the EuroQoL-5D.  Quality of Life Research, 15, 952-16         Occupational Therapy Evaluation Charge Determination   History Examination Decision-Making   LOW Complexity : Brief history review  LOW Complexity : 1-3 performance deficits relating to physical, cognitive , or psychosocial skils that result in activity limitations and / or participation restrictions  LOW Complexity : No comorbidities that affect functional and no verbal or physical assistance needed to complete eval tasks       Based on the above components, the patient evaluation is determined to be of the following complexity level: LOW   Pain Rating: Moderate with activity    Activity Tolerance:   Fair    After treatment patient left in no apparent distress:    Sitting in chair, Call bell within reach and Bed / chair alarm activated, LE elevated in recliner, ice packs issued    COMMUNICATION/EDUCATION:   The patients plan of care was discussed with: Physical therapist, Registered nurse and patient. Home safety education was provided and the patient/caregiver indicated understanding., Patient/family have participated as able in goal setting and plan of care. and Patient/family agree to work toward stated goals and plan of care. This patients plan of care is appropriate for delegation to [facility].     Thank you for this referral.  Shan Aly OTR/L  Time Calculation: 27 mins

## 2021-04-14 NOTE — PROGRESS NOTES
End of Shift Note    Bedside shift change report given to Tino Callejas RN (oncoming nurse) by Magdalene Dodd RN (offgoing nurse).   Report included the following information SBAR    Shift worked:  DAYS   Shift summary and any significant changes:     -PATIENT COMPLETED MRI OF BRAIN AND DUPLEX TODAY       Concerns for physician to address:  NONE   Zone phone for oncoming shift:   [phone]     Patient [address]  50 y.o.  4/13/2021  4:35 AM by Lissette Giles MD. Selvin Soria was admitted from Home    Problem List  Patient Active Problem List    Diagnosis Date Noted    Closed left ankle fracture, initial encounter 04/13/2021    Ankle dislocation, left, initial encounter 04/13/2021    Dizziness 04/13/2021    Pelvic mass 09/22/2020    Cystitis bacillary, chronic 03/09/2020    History of DVT (deep vein thrombosis) 03/09/2020    Sebaceous cyst 08/27/2019    Type 2 diabetes mellitus without complication, with long-term current use of insulin ([address] 01/21/2019    Renal transplant recipient 12/12/2017    Dyslipidemia 02/19/2015    Mitral regurgitation 02/19/2015    Asthma 04/08/2014    Depression 08/26/2013    Anemia 11/19/2012    Hyperkalemia 11/19/2012    DM (diabetes mellitus) ([address] 11/19/2012    Diabetes mellitus ([address] 07/27/2012    Hypertension 07/17/2012    Kidney disease 07/17/2012    Migraines 07/17/2012     Past Medical History:   Diagnosis Date    Anemia     Arthritis     Asthma 4/8/2014    Rx for same    Diabetes ([address] 26yo    Rx to control    GERD (gastroesophageal reflux disease)     Headache(784.0)     Hepatitis B infection     Hypertension     Rx for same    Kidney transplanted 04/2017    Sebaceous cyst 8/27/2019       Core Measures:  CVA: No Not applicable  CHF:No Not applicable    Activity:  Activity Level: Bed Rest  Number times ambulated in hallways past shift: 0  Number of times OOB to chair past shift: 0    Cardiac:   Cardiac Monitoring: Yes      Cardiac Rhythm: Normal sinus rhythm    Access:   Current line(s): PIV       Genitourinary:   Urinary status: voiding    Respiratory:   O2 Device: None (Room air)  Chronic home O2 use?: NO  Incentive spirometer at bedside: N/A       GI:     Current diet:  DIET DIABETIC WITH OPTIONS Consistent Carb 1500-1600kcal; Regular  Passing flatus: YES  Tolerating current diet: YES       Pain Management:   Patient states pain is manageable on current regimen: YES    Skin:  Arnold Score: 18  Interventions: turn team    Patient Safety:  Fall Score:  Total Score: 3  Interventions: bed/chair alarm, assistive device (walker, cane, etc), pt to call before getting OOB and stay with me (per policy)  High Fall Risk: Yes  @Rollbelt  @dexterity to release roll belt  Yes/No ( must document dexterity  here by stating Yes or No here, otherwise this is a restraint and must follow restraint documentation policy.)    DVT prophylaxis:  DVT prophylaxis Med- Yes      Active Consults:  IP CONSULT TO ORTHOPEDIC SURGERY  IP CONSULT TO ORTHOPEDIC SURGERY  IP CONSULT TO NEUROLOGY    Length of Stay:  Expected LOS: - - -  Actual LOS: 0  Discharge Plan: No HOME      Patricio Ace RN

## 2021-04-14 NOTE — PROGRESS NOTES
Problem: Falls - Risk of  Goal: *Absence of Falls  Description: Document Bradley Jesusita Fall Risk and appropriate interventions in the flowsheet. Outcome: Progressing Towards Goal  Note: Fall Risk Interventions:  Mobility Interventions: Bed/chair exit alarm         Medication Interventions: Bed/chair exit alarm    Elimination Interventions: Call light in reach, Bed/chair exit alarm    History of Falls Interventions: Bed/chair exit alarm         Problem: Pressure Injury - Risk of  Goal: *Prevention of pressure injury  Description: Document Arnold Scale and appropriate interventions in the flowsheet. Outcome: Progressing Towards Goal  Note: Pressure Injury Interventions:  Sensory Interventions: Assess changes in LOC    Moisture Interventions: Absorbent underpads, Minimize layers    Activity Interventions: PT/OT evaluation    Mobility Interventions: Turn and reposition approx.  every two hours(pillow and wedges)    Nutrition Interventions: Document food/fluid/supplement intake    Friction and Shear Interventions: Minimize layers

## 2021-04-14 NOTE — PROGRESS NOTES
Problem: Mobility Impaired (Adult and Pediatric)  Goal: *Acute Goals and Plan of Care (Insert Text)  Description: FUNCTIONAL STATUS PRIOR TO ADMISSION: Patient was indep ambulating in the community without AD, works as a CNA in a private setting. HOME SUPPORT PRIOR TO ADMISSION: Patient was indep with ADLs    Physical Therapy Goals  Initiated 4/14/2021  1. Patient will move from supine to sit and sit to supine  in bed with independence within 7 day(s). 2.  Patient will transfer from bed to chair and chair to bed with supervision/set-up using the least restrictive device within 7 day(s). 3.  Patient will perform sit to stand with supervision/set-up within 7 day(s). 4.  Patient will ambulate with minimal assistance/contact guard assist for 15 feet with the least restrictive device within 7 day(s). Outcome: Not Met   PHYSICAL THERAPY EVALUATION  Patient: Marlyne Romberg (19 y.o. female)  Date: 4/14/2021  Primary Diagnosis: Dizziness [R42]        Precautions:   Fall, NWB, Bed Alarm(LLE, BP or sticks RUE)    ASSESSMENT  Based on the objective data described below, the patient presents with decreased tolerance of activity, expected pain following ankle fracture, mild dizziness, non WBing on left LE,  and impaired functional mobility following episode of dizziness and fall. Patient received in bed and agreeable to participate, was able to come to EOB with slow pace and min assist to support left LE due to pain. Patient was educated on non WBing status and sequencing for transfers and was able to come to stand with CGA to RW, then took a few small steps to turn and sit in chair with good adherence to keeping left LE from touching floor. Patient left sitting up in recliner, instructed in safety and falls prevention and use of equipment at home, will need BSC as w/c will not fit into bathroom.   Recommend HHPT for safety eval.      Current Level of Function Impacting Discharge (mobility/balance): min assist to chair with RW non WBing left    Functional Outcome Measure: The patient scored 55/100 on the Barthel  outcome measure which is indicative of moderate functional impairment    Other factors to consider for discharge: will need increased assistance at home     Patient will benefit from skilled therapy intervention to address the above noted impairments. PLAN :  Recommendations and Planned Interventions: bed mobility training, transfer training, gait training, therapeutic exercises, patient and family training/education, and therapeutic activities      Frequency/Duration: Patient will be followed by physical therapy:  4 times a week to address goals. Recommendation for discharge: (in order for the patient to meet his/her long term goals)  Physical therapy at least 2 days/week in the home AND ensure assist and/or supervision for safety with mobility    This discharge recommendation:  Has been made in collaboration with the attending provider and/or case management    IF patient discharges home will need the following DME: bedside commode         SUBJECTIVE:   Patient stated I have sciatica too, on the same side.     OBJECTIVE DATA SUMMARY:   HISTORY:    Past Medical History:   Diagnosis Date    Anemia     Arthritis     Asthma 4/8/2014    Rx for same    Diabetes [facility]) 28yo    Rx to control    GERD (gastroesophageal reflux disease)     Headache(784.0)     Hepatitis B infection     Hypertension     Rx for same    Kidney transplanted 04/2017    Sebaceous cyst 8/27/2019     Past Surgical History:   Procedure Laterality Date    COLONOSCOPY N/A 11/1/2016    COLONOSCOPY performed by Silver Rios MD at [address] [place] [address]    laparoscopic    HX COLONOSCOPY      HX ENDOSCOPY      HX MOHS PROCEDURES Left     HX OTHER SURGICAL  7/30/15    excision of left Axilla Hidradenitis    HX RENAL BIOPSY  2011    HX RENAL TRANSPLANT  04/05/2017    HX UROLOGICAL  2012    KIDNEY BX RIGHT - pt states she has only had one kidney bx as of 10/31/2016    HX UROLOGICAL  2017    transplant    HX VASCULAR ACCESS  1/2013    CHEST CATHETER FOR DIALYSIS    HX VASCULAR ACCESS Right     A-V fistula    HX WISDOM TEETH EXTRACTION         Personal factors and/or comorbidities impacting plan of care: DM, back pain    Home Situation  Home Environment: Private residence  # Steps to Enter: 1  Rails to Enter: No  Wheelchair Ramp: Yes  One/Two Story Residence: One story  Living Alone: No  Support Systems: Spouse/Significant Other/Partner(son)  Current DME Used/Available at Home: Wheelchair, Shower chair, Walker, rolling, Transfer bench  Tub or Shower Type: Tub/Shower combination    EXAMINATION/PRESENTATION/DECISION MAKING:   Critical Behavior:  Neurologic State: Alert  Orientation Level: Oriented X4  Cognition: Appropriate decision making, Appropriate for age attention/concentration, Appropriate safety awareness  Safety/Judgement: Awareness of environment, Fall prevention, Home safety, Insight into deficits  Hearing: Auditory  Auditory Impairment: None  Range Of Motion:  AROM: Within functional limits(except for LLE not tested due to splint)           PROM: Within functional limits           Strength: Tone & Sensation:   Tone: Normal              Sensation: Intact(has left sided sciatica)               Coordination:  Coordination: Within functional limits  Vision:   Acuity: Within Defined Limits  Functional Mobility:  Bed Mobility:  Rolling: Minimum assistance(LLE)  Supine to Sit: Minimum assistance(operative LE)     Scooting: Contact guard assistance  Transfers:  Sit to Stand: Contact guard assistance; Additional time  Stand to Sit: Contact guard assistance; Additional time        Bed to Chair: Contact guard assistance; Additional time;Assist x1(hopping NWB LLE with RW to chair)              Balance:   Sitting: Intact  Standing: Impaired  Standing - Static: Constant support; Aziza Phoenix  Standing - Dynamic : Fair;Constant support  Ambulation/Gait Training:  Distance (ft): 3 Feet (ft)  Assistive Device: Gait belt;Walker, rolling  Ambulation - Level of Assistance: Minimal assistance;Assist x2        Gait Abnormalities: Decreased step clearance     Left Side Weight Bearing: Non-weight bearing        Speed/Gretchen: Slow                        Stairs:                Functional Measure:  Barthel Index:    Bathin  Bladder: 10  Bowels: 10  Groomin  Dressin  Feeding: 10  Mobility: 0  Stairs: 0  Toilet Use: 5  Transfer (Bed to Chair and Back): 10  Total: 55/100       The Barthel ADL Index: Guidelines  1. The index should be used as a record of what a patient does, not as a record of what a patient could do. 2. The main aim is to establish degree of independence from any help, physical or verbal, however minor and for whatever reason. 3. The need for supervision renders the patient not independent. 4. A patient's performance should be established using the best available evidence. Asking the patient, friends/relatives and nurses are the usual sources, but direct observation and common sense are also important. However direct testing is not needed. 5. Usually the patient's performance over the preceding 24-48 hours is important, but occasionally longer periods will be relevant. 6. Middle categories imply that the patient supplies over 50 per cent of the effort. 7. Use of aids to be independent is allowed. Mennie Barthel., Barthel, D.W. ([id number]). Functional evaluation: the Barthel Index. [address] [facility] (14)2. EDUARDO Mejia, Irma Hernandez, Sam, [address] [place] [address] (). Measuring the change indisability after inpatient rehabilitation; comparison of the responsiveness of the Barthel Index and Functional Stanley Measure. Journal of Neurology, Neurosurgery, and Psychiatry, 66(4), 428-483.   Nancy Bernard, N.J.A, Daryl Hussein  WJeanineJ.M, & Yanet Elizondo, M.A. (2004.) Assessment of post-stroke quality of life in cost-effectiveness studies: The usefulness of the Barthel Index and the EuroQoL-5D. Quality of Life Research, 15, 855-37           Physical Therapy Evaluation Charge Determination   History Examination Presentation Decision-Making   LOW Complexity : Zero comorbidities / personal factors that will impact the outcome / POC LOW Complexity : 1-2 Standardized tests and measures addressing body structure, function, activity limitation and / or participation in recreation  LOW Complexity : Stable, uncomplicated  LOW Complexity : FOTO score of       Based on the above components, the patient evaluation is determined to be of the following complexity level: LOW     Pain Rating:      Activity Tolerance:   Fair    After treatment patient left in no apparent distress:   Sitting in chair, Call bell within reach, and Bed / chair alarm activated    COMMUNICATION/EDUCATION:   The patients plan of care was discussed with: Occupational therapist and Registered nurse. Fall prevention education was provided and the patient/caregiver indicated understanding. and Patient/family agree to work toward stated goals and plan of care.     Thank you for this referral.  Florecita Cummings, PT   Time Calculation: 28 mins

## 2021-04-14 NOTE — PROGRESS NOTES
End of Shift Note    Bedside shift change report given to BEREKET JOHNSON (oncoming nurse) by Home Dickerson RN (offgoing nurse).   Report included the following information SBAR    Shift worked: DAYS   Shift summary and any significant changes:    -PATIENT NOW HAS PRN MORPHINE FOR PAIN  -PATIENT WORKED WITH PT; PATIENT NOW CHAIR FAST TWO ASSIST     Concerns for physician to address: NONE   Zone phone for oncoming shift:   [phone]     Patient [address]  50 y.o.  4/13/2021  4:35 AM by Lisandro Kirby MD. Cassidy Ryan was admitted from Home    Problem List  Patient Active Problem List    Diagnosis Date Noted    Closed left ankle fracture, initial encounter 04/13/2021    Ankle dislocation, left, initial encounter 04/13/2021    Dizziness 04/13/2021    Pelvic mass 09/22/2020    Cystitis bacillary, chronic 03/09/2020    History of DVT (deep vein thrombosis) 03/09/2020    Sebaceous cyst 08/27/2019    Type 2 diabetes mellitus without complication, with long-term current use of insulin ([address] 01/21/2019    Renal transplant recipient 12/12/2017    Dyslipidemia 02/19/2015    Mitral regurgitation 02/19/2015    Asthma 04/08/2014    Depression 08/26/2013    Anemia 11/19/2012    Hyperkalemia 11/19/2012    DM (diabetes mellitus) ([address] 11/19/2012    Diabetes mellitus ([address] 07/27/2012    Hypertension 07/17/2012    Kidney disease 07/17/2012    Migraines 07/17/2012     Past Medical History:   Diagnosis Date    Anemia     Arthritis     Asthma 4/8/2014    Rx for same    Diabetes ([address] 28yo    Rx to control    GERD (gastroesophageal reflux disease)     Headache(784.0)     Hepatitis B infection     Hypertension     Rx for same    Kidney transplanted 04/2017    Sebaceous cyst 8/27/2019       Core Measures:  CVA: No Not applicable  CHF:No Not applicable    Activity:  Activity Level: Chair  Number times ambulated in hallways past shift: 0  Number of times OOB to chair past shift: 0    Cardiac: Cardiac Monitoring: Yes      Cardiac Rhythm: Normal sinus rhythm    Access:   Current line(s): PIV       Genitourinary:   Urinary status: voiding    Respiratory:   O2 Device: None (Room air)  Chronic home O2 use?: NO  Incentive spirometer at bedside: N/A       GI:  Last Bowel Movement Date: 04/13/21  Current diet:  DIET DIABETIC WITH OPTIONS Consistent Carb 1500-1600kcal; Regular  Passing flatus: YES  Tolerating current diet: YES       Pain Management:   Patient states pain is manageable on current regimen: YES    Skin:  Arnold Score: 18  Interventions: turn team    Patient Safety:  Fall Score:  Total Score: 4  Interventions: bed/chair alarm, assistive device (walker, cane, etc), pt to call before getting OOB and stay with me (per policy)  High Fall Risk: Yes  @Rollbelt  @dexterity to release roll belt  Yes/No ( must document dexterity  here by stating Yes or No here, otherwise this is a restraint and must follow restraint documentation policy.)    DVT prophylaxis:  DVT prophylaxis Med- Yes      Active Consults:  IP CONSULT TO ORTHOPEDIC SURGERY  IP CONSULT TO ORTHOPEDIC SURGERY  IP CONSULT TO NEUROLOGY    Length of Stay:  Expected LOS: - - -  Actual LOS: 0  Discharge Plan: No HOME      Liban Zimmerman RN

## 2021-04-14 NOTE — CONSULTS
ORTHO CONSULT    Subjective:     Date of Consultation:  April 14, 2021    Referring Physician:  ER    Derek Lozano is a 53-year-old diabetic renal transplant patient with history of DVT x2 on Eliquis who fell at home yesterday and has pain in her left ankle.  She is unable to bear weight. Lelia Mahajan has no hip or knee pain.  She states that she felt that her sugar was dropping and she was trying to get something to eat and felt lightheaded and tripped and fell.  She states that her foot twisted and bent under her. Lelia Mahajan has no numbness or tingling.  She has right shoulder pain from a Covid shot that she received earlier yesterday.  No syncope no chest pain heaviness pressure or shortness of breath or abdominal pain is noted.  No hip pain is noted.  No back pain is noted.  Patient did not hit her head she has no headache or neck pain.   She was admitted for workup of stroke symptoms.       Patient Active Problem List    Diagnosis Date Noted    Closed left ankle fracture, initial encounter 04/13/2021    Ankle dislocation, left, initial encounter 04/13/2021    Dizziness 04/13/2021    Pelvic mass 09/22/2020    Cystitis bacillary, chronic 03/09/2020    History of DVT (deep vein thrombosis) 03/09/2020    Sebaceous cyst 08/27/2019    Type 2 diabetes mellitus without complication, with long-term current use of insulin ([address] 01/21/2019    Renal transplant recipient 12/12/2017    Dyslipidemia 02/19/2015    Mitral regurgitation 02/19/2015    Asthma 04/08/2014    Depression 08/26/2013    Anemia 11/19/2012    Hyperkalemia 11/19/2012    DM (diabetes mellitus) ([address] 11/19/2012    Diabetes mellitus ([address] 07/27/2012    Hypertension 07/17/2012    Kidney disease 07/17/2012    Migraines 07/17/2012     Family History   Problem Relation Age of Onset    Hypertension Mother     Hypertension Sister     Diabetes Maternal Grandfather     Cancer Father         UNKNOWN    Hypertension Maternal Grandmother     Diabetes Son  Asthma Son     Cancer Maternal Aunt         UNKNOWN    Anesth Problems Neg Hx       Social History     Tobacco Use    Smoking status: Never Smoker    Smokeless tobacco: Never Used   Substance Use Topics    Alcohol use: No     Alcohol/week: 0.0 standard drinks     Past Medical History:   Diagnosis Date    Anemia     Arthritis     Asthma 4/8/2014    Rx for same    Diabetes ([address] 26yo    Rx to control    GERD (gastroesophageal reflux disease)     Headache(784.0)     Hepatitis B infection     Hypertension     Rx for same    Kidney transplanted 04/2017    Sebaceous cyst 8/27/2019      Past Surgical History:   Procedure Laterality Date    COLONOSCOPY N/A 11/1/2016    COLONOSCOPY performed by Amanda Cole MD at [address]  [address]    laparoscopic    HX COLONOSCOPY      HX ENDOSCOPY      HX MOHS PROCEDURES Left     HX OTHER SURGICAL  7/30/15    excision of left Axilla Hidradenitis    HX RENAL BIOPSY  2011    HX RENAL TRANSPLANT  04/05/2017    HX UROLOGICAL  2012    KIDNEY BX RIGHT - pt states she has only had one kidney bx as of 10/31/2016    HX UROLOGICAL  2017    transplant    HX VASCULAR ACCESS  1/2013    CHEST CATHETER FOR DIALYSIS    HX VASCULAR ACCESS Right     A-V fistula    HX WISDOM TEETH EXTRACTION        Prior to Admission medications    Medication Sig Start Date End Date Taking? Authorizing Provider   insulin detemir U-100 (Levemir FlexTouch U-100 Insuln) 100 unit/mL (3 mL) inpn Levemir FlexTouch U-100 Insulin 100 unit/mL (3 mL) subcutaneous pen   INJECT 35 UNITS UNDER THE SKIN TWICE DAILY    Other, MD Jagdish   Eliquis 5 mg tablet Take 1 Tab by mouth two (2) times a day.  3/23/21   Jagdish Prabhakar MD   gabapentin (NEURONTIN) 300 mg capsule TAKE 1 CAPSULE BY MOUTH THREE TIMES DAILY 4/8/21   Jagdish Prabhakar MD   Aluminum Hydrox-Magnesium Carb (Gaviscon Extra Strength) 254-237.5 mg/5 mL susp Gaviscon Extra Strength 254 mg-237.5 mg/5 mL oral suspension   SHAKE WELL AND TAKE 20ML BY MOUTH FOUR TIMES DAILY AS NEEDED FOR ABDOMINAL PAIN    Jagdish Prabhakar MD   metFORMIN ER (GLUCOPHAGE XR) 500 mg tablet Take 1 Tab by mouth two (2) times daily (after meals). 2/8/21   Soolmon Gill MD   atorvastatin (LIPITOR) 40 mg tablet TAKE 1 TABLET BY MOUTH EVERY DAY 11/6/20   Solomon Gill MD   Fiasp FlexTouch U-100 Insulin 100 unit/mL (3 mL) inpn AS DIRECTED FOR high glucoses AND FOR carbohydrate intake UP TO 50 units/day 11/5/20   Monica Vaughn MD   fenofibrate nanocrystallized (Tricor) 145 mg tablet Take 1 Tab by mouth daily. 10/23/20   Ene Arias MD   liraglutide (VICTOZA) 0.6 mg/0.1 mL (18 mg/3 mL) pnij 1.8 mg by SubCUTAneous route daily. 9/9/20   Monica Vaughn MD   OneTouch Ultra Blue Test Strip strip Test 4 times daily. DX E11.9 5/27/20   Monica Vaughn MD   labetaloL (NORMODYNE) 200 mg tablet Take 1 Tab by mouth nightly. 5/13/20   Monica Vaughn MD   FreeStyle Sekou 14 Day Sensor kit Use as directed every 14 days 5/13/20   MD Reginaldo LisaStyle Sekou 14 Day [place] misc Use as directed 5/13/20   Monica Vaughn MD   K-Phos-NeutraL 250 mg tablet TAKE 1 TABLET BY MOUTH EVERY DAY 4/24/20   Provider, Historical   terbinafine HCL (LAMISIL) 250 mg tablet TAKE 1 TABLET BY MOUTH DAILY THEN get labs & fill remaining quantity FOR a total of 21 DAYS 4/10/20   Provider, Historical   Insulin Needles, Disposable, (Unifine Pentips) 32 gauge x 5/32\" ndle USE TO INJECT INSULIN 5 TIMES DAILY 5/9/20   Monica Vaughn MD   butalbital-acetaminophen-caffeine (FIORICET, ESGIC) -40 mg per tablet Take 1 Tab by mouth three (3) times daily as needed for Pain. Indications: Tension Headache 11/18/19   Solomon Gill MD   magnesium oxide (MAG-OX) 400 mg tablet Take 1 Tab by mouth daily. Patient taking differently: Take 800 mg by mouth two (2) times a day.  9/25/19   Ene Arias MD   traZOTyree (DESYREL) 100 mg tablet Take 1 Tab by mouth nightly. Patient taking differently: Take 100 mg by mouth nightly as needed. 8/21/19   Shawn Leon MD   pantoprazole (PROTONIX) 40 mg tablet TAKE ONE TABLET BY MOUTH ONCE DAILY 6/25/19   Margarette Paez PA-C   Blood-Glucose Meter, Drum-type (ACCU-CHEK COMPACT PLUS CARE) kit Test blood sugar 4 times daily 2/12/19   Dax Manuel MD   Blood Sugar Diagnostic, Drum (ACCU-CHEK COMPACT PLUS TEST) strp Test blood sugar 4 times daily 2/12/19   Dax Manuel MD   lancets ([facility]) 33 gauge misc 4 times daily. Diagnosis code: E11.8 1/16/19   Dax Manuel MD   albuterol (PROVENTIL HFA, VENTOLIN HFA, PROAIR HFA) 90 mcg/actuation inhaler Take 2 Puffs by inhalation every four (4) hours as needed for Wheezing. 8/30/18   Shawn Leon MD   predniSONE (DELTASONE) 5 mg tablet Take 1 Tab by mouth daily. 4/20/18   Provider, Historical   cinacalcet (SENSIPAR) 30 mg tablet Take 30 mg by mouth daily. Provider, Historical   aspirin delayed-release 81 mg tablet Take  by mouth daily. Provider, Historical   mycophenolate mofetil (CELLCEPT) 250 mg capsule Take 250 mg by mouth. 2 tabs in the am and 4 tabs in the pm    Provider, Historical   tacrolimus (PROGRAF) 1 mg capsule Take 2 mg by mouth.  Indications: taking 1 mg in am and 2 mg at HS    Provider, Historical     Current Facility-Administered Medications   Medication Dose Route Frequency    aspirin delayed-release tablet 81 mg  81 mg Oral DAILY    atorvastatin (LIPITOR) tablet 40 mg  40 mg Oral QHS    cinacalcet (SENSIPAR) tablet 30 mg  30 mg Oral DAILY    apixaban (ELIQUIS) tablet 5 mg  5 mg Oral BID    fenofibrate nanocrystallized (TRICOR) tablet 145 mg  145 mg Oral DAILY    insulin glargine (LANTUS) injection 35 Units  35 Units SubCUTAneous QHS    labetaloL (NORMODYNE) tablet 200 mg  200 mg Oral QHS    magnesium oxide (MAG-OX) tablet 400 mg  400 mg Oral DAILY    mycophenolate mofetil (CELLCEPT) capsule 500 mg 500 mg Oral QAM    mycophenolate mofetil (CELLCEPT) capsule 1,000 mg  1,000 mg Oral QPM    pantoprazole (PROTONIX) tablet 40 mg  40 mg Oral ACB    predniSONE (DELTASONE) tablet 5 mg  5 mg Oral DAILY    tacrolimus (PROGRAF) capsule 1 mg  1 mg Oral QAM    tacrolimus (PROGRAF) capsule 2 mg  2 mg Oral QPM    terbinafine HCL (LAMISIL) tablet 250 mg  250 mg Oral DAILY    traZODone (DESYREL) tablet 100 mg  100 mg Oral QHS PRN    insulin lispro (HUMALOG) injection   SubCUTAneous AC&HS    glucose chewable tablet 16 g  4 Tab Oral PRN    dextrose (D50W) injection syrg 12.5-25 g  12.5-25 g IntraVENous PRN    glucagon (GLUCAGEN) injection 1 mg  1 mg IntraMUSCular PRN    acetaminophen (TYLENOL) tablet 650 mg  650 mg Oral Q4H PRN    Or    acetaminophen (TYLENOL) solution 650 mg  650 mg Per NG tube Q4H PRN    Or    acetaminophen (TYLENOL) suppository 650 mg  650 mg Rectal Q4H PRN    ondansetron (ZOFRAN) injection 4 mg  4 mg IntraVENous Q6H PRN    labetaloL (NORMODYNE;TRANDATE) injection 5 mg  5 mg IntraVENous Q10MIN PRN    meclizine (ANTIVERT) tablet 12.5 mg  12.5 mg Oral Q6H PRN    0.9% sodium chloride infusion  75 mL/hr IntraVENous CONTINUOUS    oxyCODONE IR (ROXICODONE) tablet 10 mg  10 mg Oral Q4H PRN    oxyCODONE IR (ROXICODONE) tablet 5 mg  5 mg Oral Q4H PRN     Allergies   Allergen Reactions    Latex Shortness of Breath    Keflex [Cephalexin] Nausea and Vomiting     Stomach cramping    Tramadol Itching        Review of Systems:  Pertinent items are noted in HPI. Objective:     Patient Vitals for the past 8 hrs:   BP Temp Pulse Resp SpO2   21 0731 (!) 133/58 98.1 °F (36.7 °C) 83 20 95 %   21 0353   87     21 0338 127/81 98.1 °F (36.7 °C) 78 18 100 %   21 0001 112/69 98.4 °F (36.9 °C) 84 18 100 %     Temp (24hrs), Av.4 °F (36.9 °C), Min:98.1 °F (36.7 °C), Max:98.6 °F (37 °C)      Physical Exam:  General:     Alert and oriented. No acute distress.     Chest: Respirations unlabored. Abdomen:     Extremities:   Soft, non-tender. No evidence of cyanosis. Pulses palpable in both upper and lower extremities. Guerrero's sign negative in bilateral lower extremities. Moving all extremities. Splint LLE intact. ttp lateral ankle +swelling       Neurologic:  No motor deficits. Sensation stable. Neurovascular exam within normal limits.                  Data Review   Recent Results (from the past 24 hour(s))   DUPLEX CAROTID BILATERAL    Collection Time: 04/13/21 11:02 AM   Result Value Ref Range    Right ICA dist sys 81.0 cm/s    Right ICA dist vieira 30.5 cm/s    Right ICA mid sys 92.0 cm/s    Right ICA mid vieira 26.1 cm/s    Right ICA prox sys 56.9 cm/s    Right ICA prox vieira 15.1 cm/s    Right eca sys 70.0 cm/s    RIGHT EXTERNAL CAROTID ARTERY D 0.00 cm/s    Right vertebral sys 42.9 cm/s    RIGHT VERTEBRAL ARTERY D 13.40 cm/s    Right subclavian sys 262.2 cm/s    RIGHT SUBCLAVIAN ARTERY D 0.00 cm/s    Right cca dist sys 61.2 cm/s    Right CCA dist vieira 15.1 cm/s    Right CCA prox sys 83.2 cm/s    Right CCA prox vieira 21.7 cm/s    Right ICA/CCA sys 1.5     Left ICA dist sys 87.5 cm/s    Left ICA dist vieira 34.5 cm/s    Left ICA mid sys 87.5 cm/s    Left ICA mid vieira 25.4 cm/s    Left ICA prox sys 38.1 cm/s    Left ICA prox vieira 12.6 cm/s    Left ECA sys 82.0 cm/s    LEFT EXTERNAL CAROTID ARTERY D 0.00 cm/s    Left vertebral sys 58.3 cm/s    LEFT VERTEBRAL ARTERY D 14.40 cm/s    Left subclavian sys 196.6 cm/s    LEFT SUBCLAVIAN ARTERY D 0.00 cm/s    Left CCA dist sys 59.3 cm/s    Left CCA dist vieira 17.9 cm/s    Left CCA prox sys 80.0 cm/s    Left CCA prox vieira 17.9 cm/s    Left ICA/CCA sys 1.50    GLUCOSE, POC    Collection Time: 04/13/21 11:58 AM   Result Value Ref Range    Glucose (POC) 221 (H) 65 - 100 mg/dL    Performed by Sandor Byrd. EDT    ECHO ADULT COMPLETE    Collection Time: 04/13/21 12:36 PM   Result Value Ref Range    IVSd 1.47 (A) 0.60 - 0.90 cm    LVIDd 4.23 3.90 - 5.30 cm    LVIDs 2.43 cm    LVOT d 2.05 cm    LVPWd 1.41 (A) 0.60 - 0.90 cm    LVOT Cardiac Output 6.28 liter/minute    LVOT Peak Gradient 4.37 mmHg    Left Ventricular Outflow Tract Mean Gradient 2.23 mmHg    LVOT SV 83.7 mL    LVOT Peak Velocity 104.54 cm/s    LVOT VTI 25.37 cm    Left Atrium Major Axis 3.39 cm    Aortic Valve Area by Continuity of Peak Velocity 2.35 cm2    Aortic Valve Area by Continuity of VTI 3.05 cm2    AoV PG 8.58 mmHg    Aortic Valve Systolic Mean Gradient 5.52 mmHg    Aortic Valve Systolic Peak Velocity 514.31 cm/s    AoV VTI 27.47 cm    MV A Edward 131.64 cm/s    Mitral Valve E Wave Deceleration Time 187.56 ms    MV E Edward 98.87 cm/s    E/E' ratio (averaged) 17.41     E/E' lateral 14.31     E/E' septal 20.51     LV E' Lateral Velocity 6.91 cm/s    LV E' Septal Velocity 4.82 cm/s    MVA VTI 3.18 cm2    TR Max Velocity 407.20 cm/s    MV Peak Gradient 6.13 mmHg    MV Mean Gradient 2.55 mmHg    Mitral Valve Pressure Half-time 53.43 ms    Mitral Valve Max Velocity 123.78 cm/s    Mitral Valve Annulus Velocity Time Integral 26.30 cm    MVA (PHT) 4.12 cm2    Pulmonic Valve Systolic Peak Instantaneous Gradient 4.15 mmHg    Pulmonic Valve Max Velocity 101.82 cm/s    AO ASC D 2.77 cm    Ao Root D 2.78 cm    MV E/A 0.75     LV Mass .7 67.0 - 162.0 g    LV Mass AL Index 127.4 43.0 - 95.0 g/m2    Left Atrium Minor Axis 1.82 cm    CHAYO/BSA Pk Edward 1.3 cm2/m2    CHAYO/BSA VTI 1.6 cm2/m2   GLUCOSE, POC    Collection Time: 04/13/21  4:22 PM   Result Value Ref Range    Glucose (POC) 204 (H) 65 - 100 mg/dL    Performed by Yamel Zabala RN    GLUCOSE, POC    Collection Time: 04/13/21  9:54 PM   Result Value Ref Range    Glucose (POC) 302 (H) 65 - 100 mg/dL    Performed by Sherine Busby RN    TSH 3RD GENERATION    Collection Time: 04/14/21  1:36 AM   Result Value Ref Range    TSH 0.38 0.36 - 3.74 uIU/mL   LIPID PANEL    Collection Time: 04/14/21  1:36 AM   Result Value Ref Range    LIPID PROFILE Cholesterol, total 120 <200 MG/DL    Triglyceride 71 <150 MG/DL    HDL Cholesterol 60 MG/DL    LDL, calculated 45.8 0 - 100 MG/DL    VLDL, calculated 14.2 MG/DL    CHOL/HDL Ratio 2.0 0.0 - 5.0     GLUCOSE, POC    Collection Time: 04/14/21  6:06 AM   Result Value Ref Range    Glucose (POC) 189 (H) 65 - 100 mg/dL    Performed by Junito Gresham RN      Mri Brain Wo Cont    Result Date: 4/13/2021  1. No masses no acute findings. 2. Minimal nonspecific white matter changes. Ct Head Wo Cont    Result Date: 4/13/2021  No significant abnormalities. Xray L ankle shows trimal ankle fx with nice reduction  Assessment/Plan:     trimal L ankle fx    Pain control  nwb LLE  Ice and elevate on multiple pillows  Can continue eliquis and will stop prior to surgery  Will need orif of left ankle but with swelling and current work up will delay any surgical treatment for one week.   If being discharged then she will need to follow up in clinic later this week or early next week  [address] [facility] DO [place]

## 2021-04-15 LAB
ANION GAP SERPL CALC-SCNC: 5 MMOL/L (ref 5–15)
BASOPHILS # BLD: 0 K/UL (ref 0–0.1)
BASOPHILS NFR BLD: 0 % (ref 0–1)
BUN SERPL-MCNC: 21 MG/DL (ref 6–20)
BUN/CREAT SERPL: 15 (ref 12–20)
CALCIUM SERPL-MCNC: 9.3 MG/DL (ref 8.5–10.1)
CHLORIDE SERPL-SCNC: 103 MMOL/L (ref 97–108)
CO2 SERPL-SCNC: 27 MMOL/L (ref 21–32)
CREAT SERPL-MCNC: 1.39 MG/DL (ref 0.55–1.02)
DIFFERENTIAL METHOD BLD: ABNORMAL
EOSINOPHIL # BLD: 0.1 K/UL (ref 0–0.4)
EOSINOPHIL NFR BLD: 2 % (ref 0–7)
ERYTHROCYTE [DISTWIDTH] IN BLOOD BY AUTOMATED COUNT: 13.9 % (ref 11.5–14.5)
GLUCOSE BLD STRIP.AUTO-MCNC: 182 MG/DL (ref 65–100)
GLUCOSE BLD STRIP.AUTO-MCNC: 221 MG/DL (ref 65–100)
GLUCOSE BLD STRIP.AUTO-MCNC: 235 MG/DL (ref 65–100)
GLUCOSE BLD STRIP.AUTO-MCNC: 271 MG/DL (ref 65–100)
GLUCOSE SERPL-MCNC: 206 MG/DL (ref 65–100)
HCT VFR BLD AUTO: 29.2 % (ref 35–47)
HGB BLD-MCNC: 9.4 G/DL (ref 11.5–16)
IMM GRANULOCYTES # BLD AUTO: 0 K/UL (ref 0–0.04)
IMM GRANULOCYTES NFR BLD AUTO: 0 % (ref 0–0.5)
LYMPHOCYTES # BLD: 1.4 K/UL (ref 0.8–3.5)
LYMPHOCYTES NFR BLD: 26 % (ref 12–49)
MCH RBC QN AUTO: 27.8 PG (ref 26–34)
MCHC RBC AUTO-ENTMCNC: 32.2 G/DL (ref 30–36.5)
MCV RBC AUTO: 86.4 FL (ref 80–99)
MONOCYTES # BLD: 0.7 K/UL (ref 0–1)
MONOCYTES NFR BLD: 13 % (ref 5–13)
NEUTS SEG # BLD: 3.2 K/UL (ref 1.8–8)
NEUTS SEG NFR BLD: 59 % (ref 32–75)
NRBC # BLD: 0 K/UL (ref 0–0.01)
NRBC BLD-RTO: 0 PER 100 WBC
PLATELET # BLD AUTO: 165 K/UL (ref 150–400)
PMV BLD AUTO: 11.4 FL (ref 8.9–12.9)
POTASSIUM SERPL-SCNC: 4.4 MMOL/L (ref 3.5–5.1)
RBC # BLD AUTO: 3.38 M/UL (ref 3.8–5.2)
SERVICE CMNT-IMP: ABNORMAL
SODIUM SERPL-SCNC: 135 MMOL/L (ref 136–145)
WBC # BLD AUTO: 5.4 K/UL (ref 3.6–11)

## 2021-04-15 PROCEDURE — 36415 COLL VENOUS BLD VENIPUNCTURE: CPT

## 2021-04-15 PROCEDURE — 65660000000 HC RM CCU STEPDOWN

## 2021-04-15 PROCEDURE — 80048 BASIC METABOLIC PNL TOTAL CA: CPT

## 2021-04-15 PROCEDURE — 74011250637 HC RX REV CODE- 250/637: Performed by: INTERNAL MEDICINE

## 2021-04-15 PROCEDURE — 74011250636 HC RX REV CODE- 250/636: Performed by: INTERNAL MEDICINE

## 2021-04-15 PROCEDURE — 97116 GAIT TRAINING THERAPY: CPT | Performed by: PHYSICAL THERAPIST

## 2021-04-15 PROCEDURE — 85025 COMPLETE CBC W/AUTO DIFF WBC: CPT

## 2021-04-15 PROCEDURE — 97530 THERAPEUTIC ACTIVITIES: CPT | Performed by: PHYSICAL THERAPIST

## 2021-04-15 PROCEDURE — 74011636637 HC RX REV CODE- 636/637: Performed by: INTERNAL MEDICINE

## 2021-04-15 PROCEDURE — 97535 SELF CARE MNGMENT TRAINING: CPT | Performed by: OCCUPATIONAL THERAPIST

## 2021-04-15 PROCEDURE — 82962 GLUCOSE BLOOD TEST: CPT

## 2021-04-15 RX ADMIN — MAGNESIUM OXIDE 400 MG (241.3 MG MAGNESIUM) TABLET 400 MG: TABLET at 09:15

## 2021-04-15 RX ADMIN — PANTOPRAZOLE SODIUM 40 MG: 40 TABLET, DELAYED RELEASE ORAL at 07:41

## 2021-04-15 RX ADMIN — TACROLIMUS 1 MG: 1 CAPSULE ORAL at 09:15

## 2021-04-15 RX ADMIN — APIXABAN 5 MG: 5 TABLET, FILM COATED ORAL at 07:41

## 2021-04-15 RX ADMIN — INSULIN LISPRO 2 UNITS: 100 INJECTION, SOLUTION INTRAVENOUS; SUBCUTANEOUS at 21:56

## 2021-04-15 RX ADMIN — OXYCODONE 10 MG: 5 TABLET ORAL at 13:21

## 2021-04-15 RX ADMIN — FENOFIBRATE 145 MG: 145 TABLET ORAL at 09:15

## 2021-04-15 RX ADMIN — MYCOPHENOLATE MOFETIL 500 MG: 250 CAPSULE ORAL at 18:17

## 2021-04-15 RX ADMIN — ASPIRIN 81 MG: 81 TABLET, COATED ORAL at 09:15

## 2021-04-15 RX ADMIN — INSULIN LISPRO 3 UNITS: 100 INJECTION, SOLUTION INTRAVENOUS; SUBCUTANEOUS at 18:15

## 2021-04-15 RX ADMIN — INSULIN GLARGINE 35 UNITS: 100 INJECTION, SOLUTION SUBCUTANEOUS at 21:55

## 2021-04-15 RX ADMIN — MYCOPHENOLATE MOFETIL 500 MG: 250 CAPSULE ORAL at 09:16

## 2021-04-15 RX ADMIN — MORPHINE SULFATE 2 MG: 2 INJECTION, SOLUTION INTRAMUSCULAR; INTRAVENOUS at 22:10

## 2021-04-15 RX ADMIN — PREDNISONE 5 MG: 10 TABLET ORAL at 09:15

## 2021-04-15 RX ADMIN — OXYCODONE 10 MG: 5 TABLET ORAL at 18:25

## 2021-04-15 RX ADMIN — INSULIN LISPRO 2 UNITS: 100 INJECTION, SOLUTION INTRAVENOUS; SUBCUTANEOUS at 07:41

## 2021-04-15 RX ADMIN — ATORVASTATIN CALCIUM 40 MG: 40 TABLET, FILM COATED ORAL at 21:45

## 2021-04-15 RX ADMIN — LABETALOL HYDROCHLORIDE 200 MG: 100 TABLET, FILM COATED ORAL at 22:10

## 2021-04-15 RX ADMIN — TACROLIMUS 2 MG: 1 CAPSULE ORAL at 18:18

## 2021-04-15 RX ADMIN — CINACALCET HYDROCHLORIDE 30 MG: 30 TABLET, FILM COATED ORAL at 09:15

## 2021-04-15 RX ADMIN — TERBINAFINE 250 MG: 250 TABLET ORAL at 09:16

## 2021-04-15 RX ADMIN — OXYCODONE 10 MG: 5 TABLET ORAL at 07:53

## 2021-04-15 RX ADMIN — APIXABAN 5 MG: 5 TABLET, FILM COATED ORAL at 21:45

## 2021-04-15 NOTE — PROGRESS NOTES
Problem: Self Care Deficits Care Plan (Adult)  Goal: *Acute Goals and Plan of Care (Insert Text)  Description: FUNCTIONAL STATUS PRIOR TO ADMISSION: Patient was independent and active without use of DME. Works as a private duty CNA    HOME SUPPORT PRIOR TO ADMISSION: The patient lived with significant other and son. Occupational Therapy Goals:  Initiated 4/14/2021  1. Patient will perform lower body dressing with modified independence within 7 days. 2. Patient will perform toileting with modified independence within 7 days. 3. Patient will be independent with home exercise program for increased UE strength with transfers within 7 days. 4. Patient will improve dynamic standing balance to modified independence within 7 days. 5. Patient will transfer from toilet with modified independence using the least restrictive device and appropriate durable medical equipment within 7 days. Outcome: Progressing Towards Goal   OCCUPATIONAL THERAPY TREATMENT  Patient: Brittany Taveras (5 y.o. female)  Date: 4/15/2021  Diagnosis: Dizziness [R42]  Trimalleolar fracture [S82.653A] Ankle dislocation, left, initial encounter       Precautions: Fall, NWB, Bed Alarm(LLE, BP or sticks RUE)  Chart, occupational therapy assessment, plan of care, and goals were reviewed. ASSESSMENT  Patient continues with skilled OT services and is progressing towards goals. Pt continues to have pain with activity but is much more controlled than previous session. Pt was shown how to use gait belt to assist LLE to EOB but pt was unable to perform this technique due to weakness and pain. Min assist needed to manage LE to EOB today. CGA to scoot and for sit to stand and stand to sit. Verbal cues needed for hand placement sit to stand and stand to sit. She had improved ability to hop with RW for support maintaining WB status. Pt was able to mobilize to and from bathroom with increased time and CGA.   Min assist for gown management in standing due to balance deficits. Continue to recommend home health with family assist.      Current Level of Function Impacting Discharge (ADLs): min assist LLE to EOB, CGA for hopping short distance with RW adhering to NWB LLE  Grooming  Washing Hands: Set-up(seated)      Lower Body Dressing Assistance  Pants With Button/Zipper: Minimum assistance(donning right sock seated EOB crossed leg technique (thread))  Educated to sit and laterally weight shift to pull up and down underwear/pants or to stand with assist with one hand off walker at a time with RLE supported against surface. Educated to pull up underwear and pants to mid thigh prior to attempting standing to prevent clothing from falling to the floor. Educated to be cautious with hopping with shoe on due to possible of decreased foot clearance and catching sole on floor. Informed pt that it would be appropriate to hop with a shoe on for stability and to prevent damage to her foot. She voiced understanding but did not have shoes present (recommended lace up type shoe with a rubber sole)    Educated to don clothing over LLE first and doff last for increased ease. Toileting  Bladder Hygiene: Independent  Clothing Management: Minimum assistance    Other factors to consider for discharge: needs assist at home         PLAN :  Patient continues to benefit from skilled intervention to address the above impairments. Continue treatment per established plan of care to address goals.     Recommend with staff: OOB to chair and to bathroom for toileting    Recommend next OT session: LB dressing    Recommendation for discharge: (in order for the patient to meet his/her long term goals)  Occupational therapy at least 2 days/week in the home AND ensure assist and/or supervision for safety with mobility and ADLS    This discharge recommendation:  Has been made in collaboration with the attending provider and/or case management    IF patient discharges home will need the following DME: bedside commode       SUBJECTIVE:   Patient stated I feel a little better today.     OBJECTIVE DATA SUMMARY:   Cognitive/Behavioral Status:  Neurologic State: Alert  Orientation Level: Oriented X4  Cognition: Appropriate decision making; Follows commands  Perception: Appears intact  Perseveration: No perseveration noted  Safety/Judgement: Awareness of environment; Fall prevention;Home safety; Insight into deficits    Functional Mobility and Transfers for ADLs:  Bed Mobility:  Rolling: Stand-by assistance  Supine to Sit: Minimum assistance(LLE to EOB, unable to use gait belt as leg )  Scooting: Contact guard assistance    Transfers:  Sit to Stand: Contact guard assistance; Additional time(verbal cues for correct hand placement)  Functional Transfers  Bathroom Mobility: Contact guard assistance(hopping with RW adhering to NWB)  Toilet Transfer : Contact guard assistance; Additional time(with RW, adhering to NWB)  Bed to Chair: Contact guard assistance; Additional time(wtih RW ) educated to take her time and slow down with change in brendan and thresholds to prevent falls    Balance:  Sitting: Intact  Standing - Static: Constant support;Good  Standing - Dynamic : Fair    ADL Intervention:       Grooming  Washing Hands: Set-up(seated)      Lower Body Dressing Assistance  Pants With Button/Zipper: Minimum assistance(donning right sock seated EOB crossed leg technique (thread))  Educated to sit and laterally weight shift to pull up and down underwear/pants or to stand with assist with one hand off walker at a time with RLE supported against surface. Educated to pull up underwear and pants to mid thigh prior to attempting standing to prevent clothing from falling to the floor. Educated to be cautious with hopping with shoe on due to possible of decreased foot clearance and catching sole on floor.  Informed pt that it would be appropriate to hop with a shoe on for stability and to prevent damage to her foot. She voiced understanding but did not have shoes present (recommended lace up type shoe with a rubber sole)    Educated to don clothing over LLE first and doff last for increased ease. Toileting  Bladder Hygiene: Independent  Clothing Management: Minimum assistance    Cognitive Retraining  Safety/Judgement: Awareness of environment; Fall prevention;Home safety; Insight into deficits         Pain:  Moderate LLE with mobility and at rest (pt was medicated prior to session by nursing)    Activity Tolerance:   Fair and requires rest breaks    After treatment patient left in no apparent distress:   Sitting in chair, Call bell within reach, Bed / chair alarm activated and ice packs on left ankle and LE elevated in recliner    COMMUNICATION/COLLABORATION:   The patients plan of care was discussed with: Physical therapist, Registered nurse and patient.      Luis A Tran OTR/L  Time Calculation: 31 mins

## 2021-04-15 NOTE — PROGRESS NOTES
End of Shift Note     Bedside shift change report given to (oncoming nurse) by Wally Tamez RN (offgoing nurse). Report included the following information     Shift worked: 7p-7a   Shift summary and any significant changes:     Oxycodone and Morphine given for pain. Pain was well managed.      Concerns for physician to address: NONE   Zone phone for oncoming shift:   [phone]      Patient [address]  50 y.o.  4/13/2021  4:35 AM by Carlyn Julien MD. Lico Charter was admitted from Home     Problem List       Patient Active Problem List     Diagnosis Date Noted    Closed left ankle fracture, initial encounter 04/13/2021    Ankle dislocation, left, initial encounter 04/13/2021    Dizziness 04/13/2021    Pelvic mass 09/22/2020    Cystitis bacillary, chronic 03/09/2020    History of DVT (deep vein thrombosis) 03/09/2020    Sebaceous cyst 08/27/2019    Type 2 diabetes mellitus without complication, with long-term current use of insulin ([address] 01/21/2019    Renal transplant recipient 12/12/2017    Dyslipidemia 02/19/2015    Mitral regurgitation 02/19/2015    Asthma 04/08/2014    Depression 08/26/2013    Anemia 11/19/2012    Hyperkalemia 11/19/2012    DM (diabetes mellitus) ([address] 11/19/2012    Diabetes mellitus ([address] 07/27/2012    Hypertension 07/17/2012    Kidney disease 07/17/2012    Migraines 07/17/2012           Past Medical History:   Diagnosis Date    Anemia      Arthritis      Asthma 4/8/2014     Rx for same    Diabetes ([address] 26yo     Rx to control    GERD (gastroesophageal reflux disease)      Headache(784.0)      Hepatitis B infection      Hypertension       Rx for same    Kidney transplanted 04/2017    Sebaceous cyst 8/27/2019         Core Measures:  CVA: No Not applicable  CHF:No Not applicable     Activity:  Activity Level: Chair  Number times ambulated in hallways past shift: 0  Number of times OOB to chair past shift: 0     Cardiac:   Cardiac Monitoring:  Yes Cardiac Rhythm: Normal sinus rhythm     Access:   Current line(s): PIV         Genitourinary:   Urinary status: voiding     Respiratory:   O2 Device: None (Room air)  Chronic home O2 use?: NO  Incentive spirometer at bedside: N/A     GI:  Last Bowel Movement Date: 04/13/21  Current diet:  DIET DIABETIC WITH OPTIONS Consistent Carb 1500-1600kcal; Regular  Passing flatus: YES  Tolerating current diet: YES     Pain Management:   Patient states pain is manageable on current regimen: YES     Skin:  Arnold Score: 18  Interventions: turn team    Patient Safety:  Fall Score: Total Score: 4  Interventions: bed/chair alarm, assistive device (walker, cane, etc), pt to call before getting OOB and stay with me (per policy)  High Fall Risk:  Yes       DVT prophylaxis:  DVT prophylaxis Med- Yes        Active Consults:  IP CONSULT TO ORTHOPEDIC SURGERY  IP CONSULT TO ORTHOPEDIC SURGERY  IP CONSULT TO NEUROLOGY     Length of Stay:  Expected LOS: - - -  Actual LOS: 0  Discharge Plan: No HOME

## 2021-04-15 NOTE — PROGRESS NOTES
Transition of Care Plan:     RUR: 26% - \"moderate risk\"  Disposition: Home with f/u apts, DME (BSC) to be provided/delivered by [address], and increased support/supervision - pt declined [facility] intervention 4/15/21  Follow up appointments: PCP and specialists as needed  DME needed: Patient has access to a w/c and rolling walker at home. DME (BSC) to be provided/delivered by [address] [facility] [facility] prior to d/c  Transportation at Discharge: Family - fiance or son  [address] or means to access home: Pt has access to her home  IM Medicare letter: 2nd IM to be provided prior to d/c  Caregiver Contact: Pt's friend Simeon Marx: [phone]) or son (Shira Jones: [phone])  Discharge Caregiver contacted prior to discharge? Caregiver to be contacted prior to d/c    Update - 3:19 PM: CM received update that [address] [facility] [facility] can provide requested DME (BSC) for d/c. [employer] to deliver requested DME to [facility] prior to d/c. CM placed [employer]'s information in pt's AVS for reference. Initial note: CM reviewed pt's chart and consulted with attending MD prior to moving forward with d/c planning. MD reported that he may d/c pt later this afternoon pending pain management. CM met with pt at bedside to discuss [facility] plan for d/c re: [facility] intervention. Upon conducting room visit, pt presented as A&O x4. CM inquired if pt was interested in utilizing [facility] intervention at the time of d/c; pt declined reporting that she may reconsider [facility] after receiving surgery. CM confirmed pt is agreeable to DME recommendation [facility]); pt informed referrals for DME are currently pending. CM inquired if pt had any immediate questions, concerns, or needs related to the d/c plan; pt declined. CM will continue to follow & remain accessible for d/c planning.     RAMIREZ Doty  Care Manager, [facility]

## 2021-04-15 NOTE — PROGRESS NOTES
Problem: Mobility Impaired (Adult and Pediatric)  Goal: *Acute Goals and Plan of Care (Insert Text)  Description: FUNCTIONAL STATUS PRIOR TO ADMISSION: Patient was indep ambulating in the community without AD, works as a CNA in a private setting. HOME SUPPORT PRIOR TO ADMISSION: Patient was indep with ADLs    Physical Therapy Goals  Initiated 4/14/2021  1. Patient will move from supine to sit and sit to supine  in bed with independence within 7 day(s). 2.  Patient will transfer from bed to chair and chair to bed with supervision/set-up using the least restrictive device within 7 day(s). 3.  Patient will perform sit to stand with supervision/set-up within 7 day(s). 4.  Patient will ambulate with minimal assistance/contact guard assist for 15 feet with the least restrictive device within 7 day(s). Outcome: Progressing Towards Goal  Note:   PHYSICAL THERAPY TREATMENT  Patient: Venessa Beasley (89 y.o. female)  Date: 4/15/2021  Diagnosis: Dizziness [R42]  Trimalleolar fracture [S82.117R] Ankle dislocation, left, initial encounter       Precautions: Fall, NWB, Bed Alarm(LLE, BP or sticks RUE)  Chart, physical therapy assessment, plan of care and goals were reviewed. ASSESSMENT  Patient continues with skilled PT services and is progressing towards goals. Patient able to ambulate further today while maintaining NWB status. Supine in bed upon arrival; agreeable to mobility. Bed mobility with min assist x 1 for L LE. Slightly lightheaded upon sitting but resolves quickly. Sit to stand with CGA. Able to take a few hops with walker and CGA/min assist to chair. After brief rest, patient able to ambulate into bathroom with rolling walker and min assist x 1, NWB L LE. Patient with slow pace and is slightly unsteady at times but no LOB noted. Up in chair with LE elevated at end of session. Recommend HHPT and additional assistance as needed at discharge.     Current Level of Function Impacting Discharge (mobility/balance): cGA/min assist    Other factors to consider for discharge: NWB status L LE         PLAN :  Patient continues to benefit from skilled intervention to address the above impairments. Continue treatment per established plan of care. to address goals. Recommendation for discharge: (in order for the patient to meet his/her long term goals)  Physical therapy at least 2 days/week in the home AND ensure assist and/or supervision for safety with mobility    This discharge recommendation:  Has been made in collaboration with the attending provider and/or case management    IF patient discharges home will need the following DME: bedside commode       SUBJECTIVE:   Patient stated It's hurting.     OBJECTIVE DATA SUMMARY:   Critical Behavior:  Neurologic State: Alert  Orientation Level: Oriented X4  Cognition: Appropriate decision making, Follows commands  Safety/Judgement: Awareness of environment, Fall prevention, Home safety, Insight into deficits  Functional Mobility Training:  Bed Mobility:  Rolling: Stand-by assistance  Supine to Sit: Minimum assistance     Scooting: Contact guard assistance        Transfers:  Sit to Stand: Contact guard assistance; Additional time  Stand to Sit: Contact guard assistance; Additional time        Bed to Chair: Contact guard assistance; Additional time                    Balance:  Sitting: Intact  Standing: Impaired  Standing - Static: Constant support;Good  Standing - Dynamic : Constant support; Fair  Ambulation/Gait Training:  Distance (ft): 24 Feet (ft)(12' x 2)     Ambulation - Level of Assistance: Minimal assistance;Assist x1        Gait Abnormalities: Decreased step clearance     Left Side Weight Bearing: Non-weight bearing        Speed/Gretchen: Slow                         Pain Rating:  No number reported    Activity Tolerance:   Good    After treatment patient left in no apparent distress:   Sitting in chair, Heels elevated for pressure relief, Call bell within reach, and Bed / chair alarm activated    COMMUNICATION/COLLABORATION:   The patients plan of care was discussed with: Registered nurse.      Hipolito Morin, PT   Time Calculation: 31 mins

## 2021-04-15 NOTE — PROGRESS NOTES
Hospitalist Progress Note    NAME: Betsy Fairchild   :  1965   MRN:  [id number]       Assessment / Plan:  Dizziness, rule out stroke  Ground-level fall  Left trimalleolar fracture  -Fall at home, felt lightheaded and dizzy after first shot of Covid vaccine   -Normal CT of the head  -Neuro consult appreciated/noted. No further work-up  -PT/OT. Currently limited by pain  -Ortho consult noted and appreciated  -Nonweightbearing LLE, ice, elevation  -Okay to continue Eliquis as per Ortho  -We will need ORIF but that plan is on hold until next week as per Ortho  -Outpatient follow-up with Ortho to plan for surgery  -Currently mobilizes splint  -Has walker at home  -Continue pain control with oxycodone     Elevated creatinine  Creatinine 1.5, last creatinine on file was in  most likely before her renal transplant  Start gentle hydration     Hypertension  Continue with home BP meds    Diabetes mellitus with hyperglycemia  Resume home dose insulin regiment, Sliding scale insulin high sensitivity     History of end-stage renal disease on hemodialysis, now is status post renal transplant  Continue with tacrolimus, CellCept, home dose prednisone    History of DVT  Continue with Eliquis    Hyperlipidemia  Continue with statin and fenofibrate     Code Status: Full code  Surrogate Decision Maker: Son     DVT Prophylaxis: Eliquis  GI Prophylaxis: not indicated     Baseline: Ambulatory     Subjective:     Chief Complaint / Reason for Physician Visit  Still having some breakthrough pain, especially after PT/OT. Denies chest pain/shortness of breath/abdominal pain    Discussed with RN events overnight.      Review of Systems:  Symptom Y/N Comments  Symptom Y/N Comments   Fever/Chills n   Chest Pain n    Poor Appetite n   Edema y LLE   Cough n   Abdominal Pain     Sputum n   Joint Pain y Lt ankle   SOB/MUSTAFA n   Pruritis/Rash     Nausea/vomit n   Tolerating PT/OT     Diarrhea n   Tolerating Diet y    Constipation n Other       Could NOT obtain due to:      Objective:     VITALS:   Last 24hrs VS reviewed since prior progress note. Most recent are:  Patient Vitals for the past 24 hrs:   Temp Pulse Resp BP SpO2   04/14/21 2122 97.8 °F (36.6 °C) 86 16 (!) 141/71 98 %   04/14/21 2000  86      04/14/21 1620 99.2 °F (37.3 °C)       04/14/21 1600  93      04/14/21 1539 100.4 °F (38 °C) 89 20 135/63 100 %   04/14/21 1200  96      04/14/21 1133 99.9 °F (37.7 °C) 85 18 130/68 97 %   04/14/21 1033  94  130/60    04/14/21 1026  94  (!) 153/67    04/14/21 1018  86  (!) 148/71    04/14/21 0800  84      04/14/21 0731 98.1 °F (36.7 °C) 83 20 (!) 133/58 95 %   04/14/21 0353  87      04/14/21 0338 98.1 °F (36.7 °C) 78 18 127/81 100 %   04/14/21 0001 98.4 °F (36.9 °C) 84 18 112/69 100 %   04/13/21 2359  80        No intake or output data in the 24 hours ending 04/14/21 2320     I had a face to face encounter and independently examined this patient on 4/14/2021, as outlined below:  PHYSICAL EXAM:  General: WD, WN. Alert, cooperative, no acute distress    EENT:  EOMI. Anicteric sclerae. MMM  Resp:  CTA bilaterally, no wheezing or rales. No accessory muscle use  CV:  Regular  rhythm,  No edema  GI:  Soft, Non distended, Non tender. +Bowel sounds  Neurologic:  Alert and oriented X 3, normal speech,   Psych:   Good insight. Not anxious nor agitated  Skin:  No rashes.   No jaundice  MSK:   Lt ankle splinted    Reviewed most current lab test results and cultures  YES  Reviewed most current radiology test results   YES  Review and summation of old records today    NO  Reviewed patient's current orders and MAR    YES  PMH/SH reviewed - no change compared to H&P  ________________________________________________________________________  Care Plan discussed with:    Comments   Patient x    Family      RN x    Care Manager x    Consultant                       x Multidiciplinary team rounds were held today with case manager, nursing, pharmacist and clinical coordinator. Patient's plan of care was discussed; medications were reviewed and discharge planning was addressed. ________________________________________________________________________  Total NON critical care TIME:  35   Minutes    Total CRITICAL CARE TIME Spent:   Minutes non procedure based      Comments   >50% of visit spent in counseling and coordination of care x    ________________________________________________________________________  Danielle Quach DO     Procedures: see electronic medical records for all procedures/Xrays and details which were not copied into this note but were reviewed prior to creation of Plan. LABS:  I reviewed today's most current labs and imaging studies.   Pertinent labs include:  Recent Labs     04/13/21  0025   WBC 10.1   HGB 10.9*   HCT 32.7*        Recent Labs     04/13/21  0025      K 4.4   CL 99   CO2 28   *   BUN 25*   CREA 1.50*   CA 10.7*   ALB 3.9   TBILI 0.4   ALT 30       Signed: Scott Merchant DO

## 2021-04-15 NOTE — PROGRESS NOTES
0730- Bedside and Verbal shift change report given to Ya RN (oncoming nurse) by Sandra Garcia RN (offgoing nurse). Report included the following information SBAR, Kardex, Procedure Summary, Intake/Output, MAR, Recent Results and Cardiac Rhythm SR.   2501- Shift assessment completed- see flowsheets. PRN oxy given for leg pain. 1130- Bedside shift change report given to Tashi Chery RN (oncoming nurse) by Kristine Rankin RN (offgoing nurse). Report included the following information SBAR, Kardex, Intake/Output, MAR, Accordion, Recent Results and Cardiac Rhythm SR. Problem: Falls - Risk of  Goal: *Absence of Falls  Description: Document Adam Maillard Fall Risk and appropriate interventions in the flowsheet. Outcome: Progressing Towards Goal  Note: Fall Risk Interventions:  Mobility Interventions: Patient to call before getting OOB         Medication Interventions: Bed/chair exit alarm    Elimination Interventions: Patient to call for help with toileting needs    History of Falls Interventions: Door open when patient unattended, Consult care management for discharge planning         Problem: Patient Education: Go to Patient Education Activity  Goal: Patient/Family Education  Outcome: Progressing Towards Goal     Problem: Pressure Injury - Risk of  Goal: *Prevention of pressure injury  Description: Document Arnold Scale and appropriate interventions in the flowsheet.   Outcome: Progressing Towards Goal  Note: Pressure Injury Interventions:  Sensory Interventions: Assess changes in LOC    Moisture Interventions: Absorbent underpads, Minimize layers    Activity Interventions: Increase time out of bed    Mobility Interventions: HOB 30 degrees or less    Nutrition Interventions: Document food/fluid/supplement intake    Friction and Shear Interventions: Minimize layers       Problem: Patient Education: Go to Patient Education Activity  Goal: Patient/Family Education  Outcome: Progressing Towards Goal     Problem: Diabetes Self-Management  Goal: *Disease process and treatment process  Description: Define diabetes and identify own type of diabetes; list 3 options for treating diabetes. Outcome: Progressing Towards Goal  Goal: *Incorporating nutritional management into lifestyle  Description: Describe effect of type, amount and timing of food on blood glucose; list 3 methods for planning meals. Outcome: Progressing Towards Goal  Goal: *Incorporating physical activity into lifestyle  Description: State effect of exercise on blood glucose levels. Outcome: Progressing Towards Goal  Goal: *Developing strategies to promote health/change behavior  Description: Define the ABC's of diabetes; identify appropriate screenings, schedule and personal plan for screenings. Outcome: Progressing Towards Goal  Goal: *Using medications safely  Description: State effect of diabetes medications on diabetes; name diabetes medication taking, action and side effects. Outcome: Progressing Towards Goal  Goal: *Monitoring blood glucose, interpreting and using results  Description: Identify recommended blood glucose targets  and personal targets. Outcome: Progressing Towards Goal  Goal: *Prevention, detection, treatment of acute complications  Description: List symptoms of hyper- and hypoglycemia; describe how to treat low blood sugar and actions for lowering  high blood glucose level. Outcome: Progressing Towards Goal  Goal: *Prevention, detection and treatment of chronic complications  Description: Define the natural course of diabetes and describe the relationship of blood glucose levels to long term complications of diabetes.   Outcome: Progressing Towards Goal  Goal: *Developing strategies to address psychosocial issues  Description: Describe feelings about living with diabetes; identify support needed and support network  Outcome: Progressing Towards Goal  Goal: *Insulin pump training  Outcome: Progressing Towards Goal  Goal: *Sick day guidelines  Outcome: Progressing Towards Goal  Goal: *Patient Specific Goal (EDIT GOAL, INSERT TEXT)  Outcome: Progressing Towards Goal     Problem: Patient Education: Go to Patient Education Activity  Goal: Patient/Family Education  Outcome: Progressing Towards Goal

## 2021-04-15 NOTE — PROGRESS NOTES
Transition of Care Plan:    RUR: 26%  Disposition: Home w/ home health and family support  Follow up appointments: PCP and specialists as needed  DME needed: Patient has w/c, rolling walker. BSC requested and order sent   Transportation at Discharge: Family - fiance or son  Nellie Jamison or means to access home:      Yes  IM Medicare letter: 2nd IM letter needed prior to discharge  Caregiver Contact: Friend Alysha German - [phone] or son Sandra Montana - [phone]  Discharge Caregiver contacted prior to discharge? Caregiver will need to be updated prior to discharge    Reason for Admission:   Left trimalleolar ankle fracture                  RUR Score:     26%             PCP: First and Last name:   Davi Perry MD     Name of Practice:    Are you a current patient: Yes/No: yes   Approximate date of last visit: unknown   Can you participate in a virtual visit if needed: yes    Do you (patient/family) have any concerns for transition/discharge?      Unable to bear weight - anticipate wheelchair will not fit through doorways - has rolling walker - CM to obtain [facility]. for utilizing home health:   Patient willing to have home health at discharge - will need OT/PT for safety and gait training    Current Advanced Directive/Advance Care Plan:  Full Code   [address] (ACP) Conversation      Date of Conversation: 4/14/2021  Conducted with: Patient with Decision Making Capacity    Healthcare Decision Maker:       Content/Action Overview:   DECLINED ACP conversation - will revisit periodically     Length of Voluntary ACP Conversation in minutes:  16 minutes    Munira Hernández RN       Healthcare Decision Maker:   Click here to complete [address] including selection of the Healthcare Decision Maker Relationship (ie \"Primary\")              Transition of Care Plan:                 Patient is 49 yo female - alert and oriented, but in obvious discomfort from ankle fracture. Patient has PMH significant for diabetic renal transplant with Hx of DVT x 2 on Eliquis. Hospitalist and Ortho following patient. \    Patient lives in one story home with her fiance and sons. She has a daughter that can assist as well, but does not live at the home. Patient states friends can assist her with transportation. Patient has verified all demographic information and will be open to having home health visit her in the home. She reports having rolling walker and wheelchair. Will need BSC and CM to assist with acquiring. Care Management Interventions  PCP Verified by CM: Yes(Dr. Roro Choe - [phone])  Transition of Care Consult (CM Consult): Other(Initial CM Assessment)  Current Support Network:  Other  Discharge Location  Discharge Placement: Home with home health(Anticipate home health needs )    Karyn De Leon RN, BSN, [address] [place] [address]    Coordinator  [phone]

## 2021-04-16 VITALS
TEMPERATURE: 98.3 F | DIASTOLIC BLOOD PRESSURE: 59 MMHG | SYSTOLIC BLOOD PRESSURE: 128 MMHG | HEIGHT: 63 IN | RESPIRATION RATE: 16 BRPM | OXYGEN SATURATION: 99 % | HEART RATE: 88 BPM | WEIGHT: 182 LBS | BODY MASS INDEX: 32.25 KG/M2

## 2021-04-16 LAB
GLUCOSE BLD STRIP.AUTO-MCNC: 161 MG/DL (ref 65–100)
GLUCOSE BLD STRIP.AUTO-MCNC: 181 MG/DL (ref 65–100)
SERVICE CMNT-IMP: ABNORMAL
SERVICE CMNT-IMP: ABNORMAL

## 2021-04-16 PROCEDURE — 97116 GAIT TRAINING THERAPY: CPT

## 2021-04-16 PROCEDURE — 74011250637 HC RX REV CODE- 250/637: Performed by: INTERNAL MEDICINE

## 2021-04-16 PROCEDURE — 82962 GLUCOSE BLOOD TEST: CPT

## 2021-04-16 PROCEDURE — 97530 THERAPEUTIC ACTIVITIES: CPT

## 2021-04-16 PROCEDURE — 74011250636 HC RX REV CODE- 250/636: Performed by: INTERNAL MEDICINE

## 2021-04-16 PROCEDURE — 74011636637 HC RX REV CODE- 636/637: Performed by: INTERNAL MEDICINE

## 2021-04-16 RX ORDER — CALCIUM CARBONATE 200(500)MG
200 TABLET,CHEWABLE ORAL
Status: DISCONTINUED | OUTPATIENT
Start: 2021-04-16 | End: 2021-04-16 | Stop reason: HOSPADM

## 2021-04-16 RX ADMIN — MYCOPHENOLATE MOFETIL 500 MG: 250 CAPSULE ORAL at 08:49

## 2021-04-16 RX ADMIN — TERBINAFINE 250 MG: 250 TABLET ORAL at 11:05

## 2021-04-16 RX ADMIN — ASPIRIN 81 MG: 81 TABLET, COATED ORAL at 08:51

## 2021-04-16 RX ADMIN — APIXABAN 5 MG: 5 TABLET, FILM COATED ORAL at 08:51

## 2021-04-16 RX ADMIN — FENOFIBRATE 145 MG: 145 TABLET ORAL at 08:51

## 2021-04-16 RX ADMIN — CINACALCET HYDROCHLORIDE 30 MG: 30 TABLET, FILM COATED ORAL at 11:05

## 2021-04-16 RX ADMIN — PANTOPRAZOLE SODIUM 40 MG: 40 TABLET, DELAYED RELEASE ORAL at 08:51

## 2021-04-16 RX ADMIN — TACROLIMUS 1 MG: 1 CAPSULE ORAL at 11:05

## 2021-04-16 RX ADMIN — CALCIUM CARBONATE (ANTACID) CHEW TAB 500 MG 200 MG: 500 CHEW TAB at 11:05

## 2021-04-16 RX ADMIN — MAGNESIUM OXIDE 400 MG (241.3 MG MAGNESIUM) TABLET 400 MG: TABLET at 08:50

## 2021-04-16 RX ADMIN — MORPHINE SULFATE 2 MG: 2 INJECTION, SOLUTION INTRAMUSCULAR; INTRAVENOUS at 11:04

## 2021-04-16 RX ADMIN — OXYCODONE 10 MG: 5 TABLET ORAL at 08:51

## 2021-04-16 RX ADMIN — PREDNISONE 5 MG: 10 TABLET ORAL at 08:51

## 2021-04-16 RX ADMIN — OXYCODONE 10 MG: 5 TABLET ORAL at 14:29

## 2021-04-16 NOTE — PROGRESS NOTES
End of Shift Note    Bedside shift change report given to Timothy Medina RN (oncoming nurse) by Miriam Esquivel (offgoing nurse). Report included the following information SBAR    Shift worked: nights   Shift summary and any significant changes:    1 dose of pain meds given. Should be d/c home then back for surgery.      Concerns for physician to address: NONE   Zone phone for oncoming shift:   [phone]     Patient [id number]  50 y.o.  4/13/2021  4:35 AM by Mariama Carvalho, [address] [place] [address] was admitted from Home    Problem List  Patient Active Problem List    Diagnosis Date Noted    Trimalleolar fracture 04/14/2021    Closed left ankle fracture, initial encounter 04/13/2021    Ankle dislocation, left, initial encounter 04/13/2021    Dizziness 04/13/2021    Pelvic mass 09/22/2020    Cystitis bacillary, chronic 03/09/2020    History of DVT (deep vein thrombosis) 03/09/2020    Sebaceous cyst 08/27/2019    Type 2 diabetes mellitus without complication, with long-term current use of insulin ([address] 01/21/2019    Renal transplant recipient 12/12/2017    Dyslipidemia 02/19/2015    Mitral regurgitation 02/19/2015    Asthma 04/08/2014    Depression 08/26/2013    Anemia 11/19/2012    Hyperkalemia 11/19/2012    DM (diabetes mellitus) ([address] 11/19/2012    Diabetes mellitus ([address] 07/27/2012    Hypertension 07/17/2012    Kidney disease 07/17/2012    Migraines 07/17/2012     Past Medical History:   Diagnosis Date    Anemia     Arthritis     Asthma 4/8/2014    Rx for same    Diabetes ([address] 28yo    Rx to control    GERD (gastroesophageal reflux disease)     Headache(784.0)     Hepatitis B infection     Hypertension     Rx for same    Kidney transplanted 04/2017    Sebaceous cyst 8/27/2019       Core Measures:  CVA: No Not applicable  CHF:No Not applicable    Activity:  Activity Level: Bed Rest  Number times ambulated in hallways past shift: 0  Number of times OOB to chair past shift: 0    Cardiac:   Cardiac Monitoring: Yes      Cardiac Rhythm: Normal sinus rhythm    Access:   Current line(s): PIV       Genitourinary:   Urinary status: voiding    Respiratory:   O2 Device: None (Room air)  Chronic home O2 use?: NO  Incentive spirometer at bedside: N/A       GI:  Last Bowel Movement Date: 04/12/21  Current diet:  DIET DIABETIC WITH OPTIONS Consistent Carb 1500-1600kcal; Regular  Passing flatus: YES  Tolerating current diet: YES       Pain Management:   Patient states pain is manageable on current regimen: YES    Skin:  Arnold Score: 19  Interventions: turn team    Patient Safety:  Fall Score:  Total Score: 4  Interventions: bed/chair alarm, assistive device (walker, cane, etc), pt to call before getting OOB and stay with me (per policy)  )    DVT prophylaxis:  DVT prophylaxis Med- Yes      Active Consults:  IP CONSULT TO ORTHOPEDIC SURGERY  IP CONSULT TO ORTHOPEDIC SURGERY  IP CONSULT TO NEUROLOGY    Length of Stay:  Expected LOS: 2d 0h  Actual LOS: 2  Discharge Plan: No HOME      Huma Sick

## 2021-04-16 NOTE — PROGRESS NOTES
End of Shift Note    Bedside shift change report given to Evelyn Favre (oncoming nurse) by Tarun Mccallum RN (offgoing nurse). Report included the following information SBAR    Shift worked: 11a-7p   Shift summary and any significant changes:    Up in chair for a few hours swelling noted in leg after sitting up and pain more difficult manage immediately after getting up. IV fluids discontinued     Concerns for physician to address: NONE   Zone phone for oncoming shift:   [phone]     Patient [id number]  50 y.o.  4/13/2021  4:35 AM by Gregory Mcdermott, [address] [place] [address] was admitted from Home    Problem List  Patient Active Problem List    Diagnosis Date Noted    Trimalleolar fracture 04/14/2021    Closed left ankle fracture, initial encounter 04/13/2021    Ankle dislocation, left, initial encounter 04/13/2021    Dizziness 04/13/2021    Pelvic mass 09/22/2020    Cystitis bacillary, chronic 03/09/2020    History of DVT (deep vein thrombosis) 03/09/2020    Sebaceous cyst 08/27/2019    Type 2 diabetes mellitus without complication, with long-term current use of insulin ([address] 01/21/2019    Renal transplant recipient 12/12/2017    Dyslipidemia 02/19/2015    Mitral regurgitation 02/19/2015    Asthma 04/08/2014    Depression 08/26/2013    Anemia 11/19/2012    Hyperkalemia 11/19/2012    DM (diabetes mellitus) ([address] 11/19/2012    Diabetes mellitus ([address] 07/27/2012    Hypertension 07/17/2012    Kidney disease 07/17/2012    Migraines 07/17/2012     Past Medical History:   Diagnosis Date    Anemia     Arthritis     Asthma 4/8/2014    Rx for same    Diabetes ([address] 26yo    Rx to control    GERD (gastroesophageal reflux disease)     Headache(784.0)     Hepatitis B infection     Hypertension     Rx for same    Kidney transplanted 04/2017    Sebaceous cyst 8/27/2019       Core Measures:  CVA: No Not applicable  CHF:No Not applicable    Activity:  Activity Level:  Up with Assistance  Number times ambulated in hallways past shift: 0  Number of times OOB to chair past shift: 0    Cardiac:   Cardiac Monitoring: Yes      Cardiac Rhythm: Normal sinus rhythm    Access:   Current line(s): PIV       Genitourinary:   Urinary status: voiding    Respiratory:   O2 Device: None (Room air)  Chronic home O2 use?: NO  Incentive spirometer at bedside: N/A       GI:  Last Bowel Movement Date: 04/12/21  Current diet:  DIET DIABETIC WITH OPTIONS Consistent Carb 1500-1600kcal; Regular  Passing flatus: YES  Tolerating current diet: YES       Pain Management:   Patient states pain is manageable on current regimen: YES    Skin:  Arnold Score: 18  Interventions: turn team    Patient Safety:  Fall Score:  Total Score: 4  Interventions: bed/chair alarm, assistive device (walker, cane, etc), pt to call before getting OOB and stay with me (per policy)  )    DVT prophylaxis:  DVT prophylaxis Med- Yes      Active Consults:  IP CONSULT TO ORTHOPEDIC SURGERY  IP CONSULT TO ORTHOPEDIC SURGERY  IP CONSULT TO NEUROLOGY    Length of Stay:  Expected LOS: 2d 0h  Actual LOS: 1  Discharge Plan: No HOME      Maegan Aguilera RN

## 2021-04-16 NOTE — PROGRESS NOTES
Problem: Mobility Impaired (Adult and Pediatric)  Goal: *Acute Goals and Plan of Care (Insert Text)  Description: FUNCTIONAL STATUS PRIOR TO ADMISSION: Patient was indep ambulating in the community without AD, works as a CNA in a private setting. HOME SUPPORT PRIOR TO ADMISSION: Patient was indep with ADLs    Physical Therapy Goals  Initiated 4/14/2021  1. Patient will move from supine to sit and sit to supine  in bed with independence within 7 day(s). 2.  Patient will transfer from bed to chair and chair to bed with supervision/set-up using the least restrictive device within 7 day(s). 3.  Patient will perform sit to stand with supervision/set-up within 7 day(s). 4.  Patient will ambulate with minimal assistance/contact guard assist for 15 feet with the least restrictive device within 7 day(s). Outcome: Progressing Towards Goal   PHYSICAL THERAPY TREATMENT  Patient: Arcenio Ordonez (78 y.o. female)  Date: 4/16/2021  Diagnosis: Dizziness [R42]  Trimalleolar fracture [S82.853A] Ankle dislocation, left, initial encounter       Precautions: Fall, NWB, Bed Alarm(LLE, BP or sticks RUE)  Chart, physical therapy assessment, plan of care and goals were reviewed. ASSESSMENT  Patient continues with skilled PT services and is slowly progressing towards goals. Patient received in bed and agreeable to participate, but reports continued significant pain in left LE and splint feels tight. Patient also complains of tingling in toes and they do appear to be slightly more swollen. Patient required min assist to come from supine to sit to help move left LE and requires slow pace due to pain. Patient can sit EOB unsupported, worked on gentle stretching, weight shifts, core activation and general mobility with good tolerance. Patient able to come to stand with CGA to RW and ambulated non WBing on left a short distance to chair with CGA. Patient positioned with LEs elevated and pillows for comfort.   RN in room at end of visit and discussed  my concern that splint may be too tight, and recommend that ortho come to assess before patient is discharged. Current Level of Function Impacting Discharge (mobility/balance): min assist for bed mobility, CGA to transfer and ambulate    Other factors to consider for discharge: pain levels, limited caregivers, non WBing with increased falls risk         PLAN :  Patient continues to benefit from skilled intervention to address the above impairments. Continue treatment per established plan of care. to address goals. Recommendation for discharge: (in order for the patient to meet his/her long term goals)  Physical therapy at least 2 days/week in the home     This discharge recommendation:  Has been made in collaboration with the attending provider and/or case management    IF patient discharges home will need the following DME: bedside commode       SUBJECTIVE:   Patient stated my pain is worse and this splint feels too tight.     OBJECTIVE DATA SUMMARY:   Critical Behavior:  Neurologic State: Alert  Orientation Level: Oriented X4  Cognition: Appropriate decision making, Appropriate for age attention/concentration, Appropriate safety awareness, Follows commands  Safety/Judgement: Awareness of environment, Fall prevention, Home safety, Insight into deficits  Functional Mobility Training:  Bed Mobility:  Rolling: Stand-by assistance  Supine to Sit: Minimum assistance     Scooting: Stand-by assistance        Transfers:  Sit to Stand: Contact guard assistance  Stand to Sit: Contact guard assistance        Bed to Chair: Contact guard assistance                    Balance:  Sitting: Intact  Standing: Impaired  Standing - Static: Constant support;Good  Standing - Dynamic : Constant support; Fair  Ambulation/Gait Training:  Distance (ft): 4 Feet (ft)  Assistive Device: Gait belt;Walker, rolling  Ambulation - Level of Assistance: Contact guard assistance        Gait Abnormalities: Decreased step clearance              Speed/Gretchen: Pace decreased (<100 feet/min)                       Stairs: Therapeutic Exercises:   Seated EOB for shoulder rolls, reaching, trunk rotations and lateral stretches, head turns, weight shift, and right LE LAQ    Activity Tolerance:   Fair, pain limited    After treatment patient left in no apparent distress:   Sitting in chair, Call bell within reach, Bed / chair alarm activated, and LE elevated    COMMUNICATION/COLLABORATION:   The patients plan of care was discussed with: Registered nurse.      Israel Shelton, PT   Time Calculation: 38 mins

## 2021-04-16 NOTE — PROGRESS NOTES
Transition of Care Plan:     RUR: 26% - \"moderate risk\"  Disposition: Home [facility] (RN/PT), DME (BSC) to be provided/delivered by  [facility], & f/u apts  Follow up appointments: PCP and Orthopedic Surgery  DME needed: Patient has access to a standard wheel chair and rolling walker at home. DME (BSC) to be delivered by  [facility] to pt's home address  Transportation at Discharge: Pt's daughter Anuel Stern) present at bedside to provide transport at d/c  [place] or means to access home: Pt has access to her home  IM Medicare letter: 2nd IM provided 21  Caregiver Contact: Pt's daughter Anuel Stern: [phone])  Discharge Caregiver contacted prior to discharge? Pt's daughter present at bedside; daughter agreeable to the d/c plan    Update - 2:51 PM: CM received update that [facility] accepted referral for requested services at d/c; CM placed [facility]'s information in pt's AVS for reference. CM met with pt and daughter at bedside to provide update regarding [facility] services. CM provided [facility] flyer & explained the purpose of the resource. CM placed [employer]'s information in pt's AVS for reference. CM confirmed both pt and daughter are agreeable to the d/c plan; no additional questions or concerns identified. Medicare pt has received, reviewed, and provided verbal consent for 2nd IM letter informing them of their right to appeal the discharge. Copy has been placed on pt bedside chart. No further CM needs identified. CM notified pt's nurse of d/c.    Update - 2:10 PM: CM met with pt and her daughter Anuel Stern: [phone]) at bedside to check in and review plan for d/c. CM inquired if pt was interested in re-considering utilizing [facility] intervention at d/c just to ensure she is moving safely around her home; pt agreeable. Pt identified [facility] as preference in provider. FOC offered, verbal consent received.  Copy of  [address] placed on chart. CM actively working to [place] [address] services for d/c; updates to be provided once available. CM informed pt and daughter of outstanding balance with Dr. Ivet Wallace office; CM provided the billing department's contact information & urged pt/daughter to contact them asap to address balance. Pt's daughter reported she will provide transportation once pt is ready from RN/CM standpoint. Update - 1:44 PM: CM acknowledged d/c. CM confirmed pt has PCP f/u apt secured for d/c; details reflected in AVS. CM attempted to secure f/u apt with Orthopedic Surgeon, Dr. Chantale Padilla ([phone]) for early next week. Upon contacting office, CM was informed pt's account has an outstanding balance & an apt can not be secured until she addresses it.  provided information for the billing department and urged pt to contact them asap so an apt can be scheduled on her behalf (billing department: [phone], ext [phone]). CM placed information in pt's AVS for reference. CM will also reiterate information at bedside. CM will meet with pt at bedside to check in and review final plan for d/c. Initial note: CM reviewed pt's chart prior to moving forward with d/c planning. CM met with pt at bedside to check in and address any immediate questions or concerns. Pt reported that her pain is still not managed. Pt expressed concerns about d/c home today with her current pain level. CM to inform MD of pt's concerns. CM inquired if pt heard from [facility] regarding DME [facility]); pt confirmed & reported that [facility] should be delivering the [facility] to her home sometime today. CM will continue to follow & remain accessible for d/c planning. Care Management Interventions  PCP Verified by CM: Yes  Mode of Transport at Discharge:  Other (see comment)(Pt's daughter to provide transport at d/c)  Transition of Care Consult (CM Consult): Discharge Planning, [address]: No  Discharge Durable Medical Equipment: Yes(BSC to be provided/delivered by [facility])  Physical Therapy Consult: Yes  Occupational Therapy Consult: Yes  Speech Therapy Consult: No  Current Support Network: Own Home, Family Lives Nearby  Confirm Follow Up Transport: Family  The Plan for Transition of Care is Related to the Following Treatment Goals : HH (RN/PT)  The Patient and/or Patient Representative was Provided with a Choice of Provider and Agrees with the Discharge Plan?: Yes  Name of the Patient Representative Who was Provided with a Choice of Provider and Agrees with the Discharge Plan: patient [facility])  Freedom of Choice List was Provided with Basic Dialogue that Supports the Patient's Individualized Plan of Care/Goals, Treatment Preferences and Shares the Quality Data Associated with the Providers?: Yes   Resource Information Provided?: No  Discharge Location  Discharge Placement: Home with home health(Home [facility] (RN/PT), f/u apts, and DME (BSC) to be provided/delivered by [facility])    Mallie Goldberg, [facility], [address] [place] [address]

## 2021-04-16 NOTE — PROGRESS NOTES
Physician Progress Note      Lita Peres  [place] #:                  [id number]  :                       1965  ADMIT DATE:       2021 4:35 AM  DISCH DATE:  RESPONDING  PROVIDER #:        Camille Rendon DO          QUERY TEXT:    Patient admitted with dizziness. noted to have orthostatic hypotension. If possible, please document in progress notes and discharge summary if you are evaluating and/or treating any of the following: The medical record reflects the following:  Risk Factors: 55 BF w/hx: DM received COVID vaccine 2 days PTA    Clinical Indicators:  Ortho VS: Lying HR: 79, BP: 134/62, Sitting: HR: 77,  BP: 129/48,  per Neuro \" I do suspect her dizziness and lightheadedness secondary to orthostatic hypotension and generalized symptoms secondary to the vaccine\",   MRI brain \"1. No masses no acute findings. 2. Minimal nonspecific white matter changes. \"  Per H&P \"Dizziness most likely due to dehydration/hypovolemia patient creatinine was mildly elevated and had a drop in blood pressure\"    Treatment: Neuro consult, NS @ 75 ml/hr IV, 1000 ml NS bolus IV in ED    Please call with questions. Thank you. [email or web address]  [phone]  Options provided:  -- Dizziness due to dehydration  -- Dizziness due to orthostatic hypotension  -- Dizziness due to other hypotension  -- Other - I will add my own diagnosis  -- Disagree - Not applicable / Not valid  -- Disagree - Clinically unable to determine / Unknown  -- Refer to Clinical Documentation Reviewer    PROVIDER RESPONSE TEXT:    Dizziness following Covid vaccination, possible orthostasis    Query created by: Margarita Smart on 4/15/2021 11:21 AM      Electronically signed by:  Camille Rendon DO 2021 2:42 PM

## 2021-04-16 NOTE — PROGRESS NOTES
Problem: Falls - Risk of  Goal: *Absence of Falls  Description: Document Stas Schimke Fall Risk and appropriate interventions in the flowsheet. Outcome: Progressing Towards Goal  Note: Fall Risk Interventions:  Mobility Interventions: Bed/chair exit alarm         Medication Interventions: Bed/chair exit alarm    Elimination Interventions: Call light in reach, Bed/chair exit alarm    History of Falls Interventions: Bed/chair exit alarm         Problem: Pressure Injury - Risk of  Goal: *Prevention of pressure injury  Description: Document Arnold Scale and appropriate interventions in the flowsheet. Outcome: Progressing Towards Goal  Note: Pressure Injury Interventions:  Sensory Interventions: Assess changes in LOC    Moisture Interventions: Absorbent underpads    Activity Interventions: Pressure redistribution bed/mattress(bed type)    Mobility Interventions: HOB 30 degrees or less    Nutrition Interventions: Document food/fluid/supplement intake    Friction and Shear Interventions: Minimize layers                Problem: Diabetes Self-Management  Goal: *Monitoring blood glucose, interpreting and using results  Description: Identify recommended blood glucose targets  and personal targets.   Outcome: Progressing Towards Goal

## 2021-04-16 NOTE — PROGRESS NOTES
Hospitalist Progress Note    NAME: Yuni Friend   :  1965   MRN:  [id number]       Assessment / Plan:  Dizziness, rule out stroke  Ground-level fall  Left trimalleolar fracture  -Fall at home, felt lightheaded and dizzy after first shot of Covid vaccine   -Normal CT of the head  -Neuro consult appreciated/noted. No further work-up  -PT/OT. Currently limited by pain  -Ortho consult noted and appreciated  -Nonweightbearing LLE, ice, elevation  -Okay to continue Eliquis as per Ortho  -We will need ORIF but that plan is on hold until next week as per Ortho  -Outpatient follow-up with Ortho to plan for surgery  -Currently mobilizes splint  -Has walker at home  -Continue pain control with oxycodone     Elevated creatinine  Creatinine 1.5, last creatinine on file was in  most likely before her renal transplant  Start gentle hydration     Hypertension  Continue with home BP meds    Diabetes mellitus with hyperglycemia  Resume home dose insulin regiment, Sliding scale insulin high sensitivity     History of end-stage renal disease on hemodialysis, now is status post renal transplant  Continue with tacrolimus, CellCept, home dose prednisone    History of DVT  Continue with Eliquis    Hyperlipidemia  Continue with statin and fenofibrate     Code Status: Full code  Surrogate Decision Maker: Son     DVT Prophylaxis: Eliquis  GI Prophylaxis: not indicated     Baseline: Ambulatory     Subjective:     Chief Complaint / Reason for Physician Visit  Pain marginally improved    Discussed with RN events overnight.      Review of Systems:  Symptom Y/N Comments  Symptom Y/N Comments   Fever/Chills n   Chest Pain n    Poor Appetite n   Edema y LLE   Cough n   Abdominal Pain     Sputum n   Joint Pain y Lt ankle   SOB/MUSATFA n   Pruritis/Rash     Nausea/vomit n   Tolerating PT/OT     Diarrhea n   Tolerating Diet y    Constipation n   Other       Could NOT obtain due to:      Objective:     VITALS:   Last 24hrs VS reviewed since prior progress note. Most recent are:  Patient Vitals for the past 24 hrs:   Temp Pulse Resp BP SpO2   04/15/21 2251 98.6 °F (37 °C) 84 18 135/65 95 %   04/15/21 1942 98.6 °F (37 °C) 85 18 129/64 100 %   04/15/21 1506 98.4 °F (36.9 °C) 82 18 120/65 96 %   04/15/21 1147 98.7 °F (37.1 °C) 86 18 (!) 113/58 97 %   04/15/21 0735 98.2 °F (36.8 °C) 80 18 134/62 93 %   04/15/21 0414 98.2 °F (36.8 °C) 79 18 (!) 116/55 99 %   04/15/21 0400  79      04/15/21 0017 98.2 °F (36.8 °C) 79 17 (!) 126/59 94 %   04/15/21 0000  80          Intake/Output Summary (Last 24 hours) at 4/15/2021 2337  Last data filed at 4/15/2021 1700  Gross per 24 hour   Intake 840 ml   Output    Net 840 ml        I had a face to face encounter and independently examined this patient on 4/15/2021, as outlined below:  PHYSICAL EXAM:  General: WD, WN. Alert, cooperative, no acute distress    EENT:  EOMI. Anicteric sclerae. MMM  Resp:  CTA bilaterally, no wheezing or rales. No accessory muscle use  CV:  Regular  rhythm,  No edema  GI:  Soft, Non distended, Non tender. +Bowel sounds  Neurologic:  Alert and oriented X 3, normal speech,   Psych:   Good insight. Not anxious nor agitated  Skin:  No rashes. No jaundice  MSK:   Lt ankle splinted    Reviewed most current lab test results and cultures  YES  Reviewed most current radiology test results   YES  Review and summation of old records today    NO  Reviewed patient's current orders and MAR    YES  PMH/SH reviewed - no change compared to H&P  ________________________________________________________________________  Care Plan discussed with:    Comments   Patient x    Family      RN x    Care Manager x    Consultant                       x Multidiciplinary team rounds were held today with , nursing, pharmacist and clinical coordinator. Patient's plan of care was discussed; medications were reviewed and discharge planning was addressed. ________________________________________________________________________  Total NON critical care TIME:  35   Minutes    Total CRITICAL CARE TIME Spent:   Minutes non procedure based      Comments   >50% of visit spent in counseling and coordination of care x    ________________________________________________________________________  Gil Britosin, DO     Procedures: see electronic medical records for all procedures/Xrays and details which were not copied into this note but were reviewed prior to creation of Plan. LABS:  I reviewed today's most current labs and imaging studies.   Pertinent labs include:  Recent Labs     04/15/21  0344 04/13/21  0025   WBC 5.4 10.1   HGB 9.4* 10.9*   HCT 29.2* 32.7*    212     Recent Labs     04/15/21  0344 04/13/21  0025   * 138   K 4.4 4.4    99   CO2 27 28   * 152*   BUN 21* 25*   CREA 1.39* 1.50*   CA 9.3 10.7*   ALB  --  3.9   TBILI  --  0.4   ALT  --  30       Signed: Jose Enrique Merchant DO

## 2021-04-16 NOTE — PROGRESS NOTES
Ortho:    Left ankle fracture/dislocation  Pt with C/O intermittent throbbing pain, pain with any motion or contact with the LLE.  States she doesn't want to elevated the LLE due to sciatica pain in the left buttock    LLE splinted NV exam intact LLE, toes are warm with brisk cap refill, intct flex/ext of the digits(painful)  Proximal/exposed calf tissue soft/nontender to palpation  Ace wrap loosened, NV exam intact pre/post splinting  NWB on the LLE    Encourage aggressive elevation on 5+ pillows/blankets  PO oxycodone Q4-6  Continue to use ICE about the Left ankle  Keep splint clean and dry  I reviewed realistic expectations and treatment plan as previously outline by Dr Keerthi Redmond    Call orthopedic office Monday for appt  Surgery(ORIF) planning(10-14 post injury) as soft tissues allow    Plan per Dr Aimee Tolbert PA-C

## 2021-04-19 ENCOUNTER — PATIENT OUTREACH (OUTPATIENT)
Dept: CASE MANAGEMENT | Age: 56
End: 2021-04-19

## 2021-04-19 NOTE — PROGRESS NOTES
Care Transitions Initial Follow Up Call    Call within 2 business days of discharge: Yes     Patient: Marina Wiley Patient : 1965 MRN: [id number]    Last Discharge [address]       Complaint Diagnosis Description Type Department Provider    21 Transfer Of Care Trimalleolar fracture of ankle, closed, left, initial encounter . .. ED to Hosp-Admission (Discharged) (ADMIT) [id number] Geri Dove, DO; [facility]. .. Was this an external facility discharge? No    Challenges to be reviewed by the provider   Additional needs identified to be addressed with provider yes  advance care planning- No ACP on file       Method of communication with provider : face to face, chart routing, staff message, phone, none    Discussed COVID-19 related testing which was not done at this time. Test results were not done. Advance Care Planning:   Does patient have an Advance Directive: not on file; education provided     Inpatient Readmission Risk score: Unplanned Readmit Risk Score: 32    Was this a readmission? no   Patient stated reason for the admission: dizzy and fell, hurt left ankle    Patients top risk factors for readmission: functional physical ability and medical condition-Currently non weight bearing on left lower extremity. diabetes  Interventions to address risk factors: Scheduled appointment with PCP-  9:15am, Scheduled appointment with Specialist-reminded patient to call and schedule ortho f/u visit and Obtained and reviewed discharge summary and/or continuity of care documents    Care Transition Nurse (CTN) contacted the patient by telephone to perform post hospital discharge assessment. Verified name and  with patient as identifiers. Provided introduction to self, and explanation of the CTN role. Reports she is feeling a little better. Pain about #5. Last dose of Oxycodone she took was yesterday am. Waiting for home health nurse to come this am to see her.      CTN reviewed discharge instructions, medical action plan and red flags with patient who verbalized understanding. Were discharge instructions available to patient? yes. Reviewed appropriate site of care based on symptoms and resources available to patient including: PCP, Specialist, Urgent [address] and When to call 911. Patient given an opportunity to ask questions and does not have any further questions or concerns at this time. The patient agrees to contact the PCP office for questions related to their healthcare. Medication reconciliation was performed with patient, who verbalizes understanding of administration of home medications. Advised obtaining a 90-day supply of all daily and as-needed medications. Referral to Pharm D needed: no     Home Health/Outpatient orders at discharge: home health care, PT and [address]: [facility]  Date of initial visit: 4/19/21    Durable Medical Equipment ordered at discharge: Bedside commode  [address]: [address] [facility]. [place] received: yes    Covid Risk Education    Patient has following risk factors of: immunocompromised and diabetes. Education provided regarding infection prevention, and signs and symptoms of COVID-19 and when to seek medical attention with patient who verbalized understanding. Discussed exposure protocols and quarantine From CDC: Are you at higher risk for severe illness?  and given an opportunity for questions and concerns. The patient agrees to contact the COVID-19 hotline [phone] or PCP office for questions related to COVID-19. For more information on steps you can take to protect yourself, see CDC's How to Protect Yourself     Was patient discharged with a pulse oximeter? no Discussed and confirmed pulse oximeter discharge instructions and when to notify provider or seek emergency care. Discussed follow-up appointments.  If no appointment was previously scheduled, appointment scheduling offered: yes Is follow up appointment scheduled within 7 days of discharge? yes   [facility] follow up appointment(s):   Future Appointments   Date Time Provider Trisha Culver   4/26/2021  9:15 AM MD JOHN Benito BS AMB   4/27/2021 12:10 PM Svetlana Rapp MD RDE VICKIE [address] BS AMB     Non-[facility] follow up appointment(s): Dr.Dustin Andres-reminded patient to call and schedule f/u appt. Plan for follow-up call in 7-10 days based on severity of symptoms and risk factors. Plan for next call: symptom management-assess current symptoms, self management-following discharge instructions, follow up appointment-attended FRANCESCA and ortho f/u and medication management-taking meds as ordered. CTN provided contact information for future needs. Goals Addressed                 This Visit's Progress     Prevent complications post hospitalization. 4/19/21 [facility] 4/13-4/16 Left ankle dislocation s/p fall   Reviewed discharge instructions with patient   Reviewed meds- education provided on new meds.  Reviewed red flags: increased pain in ankle despite pain meds, fever,nausea,vomiting,diarrhea, sob, chest pain.  FRANCESCA with pcp 4/26 at 9:15am.   Reminded to call ortho, , for f/u appt.  [facility] nurse scheduled to see her this am.   Given info on Azar Ornelas as resource.  Given CTN contact info if any questions or concerns.    CTN to check back in about a week, sooner [email or web address]

## 2021-04-22 ENCOUNTER — OFFICE VISIT (OUTPATIENT)
Dept: INTERNAL MEDICINE CLINIC | Age: 56
End: 2021-04-22
Payer: MEDICARE

## 2021-04-22 VITALS
SYSTOLIC BLOOD PRESSURE: 120 MMHG | HEART RATE: 85 BPM | HEIGHT: 63 IN | DIASTOLIC BLOOD PRESSURE: 51 MMHG | TEMPERATURE: 97.6 F | BODY MASS INDEX: 32.24 KG/M2 | RESPIRATION RATE: 16 BRPM

## 2021-04-22 DIAGNOSIS — Z99.2 TYPE 1 DIABETES MELLITUS WITH CHRONIC KIDNEY DISEASE ON CHRONIC DIALYSIS (HCC): ICD-10-CM

## 2021-04-22 DIAGNOSIS — Z79.4 TYPE 2 DIABETES MELLITUS WITH COMPLICATION, WITH LONG-TERM CURRENT USE OF INSULIN (HCC): ICD-10-CM

## 2021-04-22 DIAGNOSIS — Z00.00 MEDICARE ANNUAL WELLNESS VISIT, SUBSEQUENT: ICD-10-CM

## 2021-04-22 DIAGNOSIS — E11.8 TYPE 2 DIABETES MELLITUS WITH COMPLICATION, WITH LONG-TERM CURRENT USE OF INSULIN (HCC): ICD-10-CM

## 2021-04-22 DIAGNOSIS — E10.22 TYPE 1 DIABETES MELLITUS WITH CHRONIC KIDNEY DISEASE ON CHRONIC DIALYSIS (HCC): ICD-10-CM

## 2021-04-22 DIAGNOSIS — N18.6 TYPE 1 DIABETES MELLITUS WITH CHRONIC KIDNEY DISEASE ON CHRONIC DIALYSIS (HCC): ICD-10-CM

## 2021-04-22 DIAGNOSIS — Z13.31 SCREENING FOR DEPRESSION: ICD-10-CM

## 2021-04-22 DIAGNOSIS — S93.05XS ANKLE DISLOCATION, LEFT, SEQUELA: Primary | ICD-10-CM

## 2021-04-22 PROCEDURE — G9717 DOC PT DX DEP/BP F/U NT REQ: HCPCS | Performed by: INTERNAL MEDICINE

## 2021-04-22 PROCEDURE — G8427 DOCREV CUR MEDS BY ELIG CLIN: HCPCS | Performed by: INTERNAL MEDICINE

## 2021-04-22 PROCEDURE — 3046F HEMOGLOBIN A1C LEVEL >9.0%: CPT | Performed by: INTERNAL MEDICINE

## 2021-04-22 PROCEDURE — G8417 CALC BMI ABV UP PARAM F/U: HCPCS | Performed by: INTERNAL MEDICINE

## 2021-04-22 PROCEDURE — 1111F DSCHRG MED/CURRENT MED MERGE: CPT | Performed by: INTERNAL MEDICINE

## 2021-04-22 PROCEDURE — G9231 DOC ESRD DIA TRANS PREG: HCPCS | Performed by: INTERNAL MEDICINE

## 2021-04-22 PROCEDURE — 2022F DILAT RTA XM EVC RTNOPTHY: CPT | Performed by: INTERNAL MEDICINE

## 2021-04-22 PROCEDURE — 3017F COLORECTAL CA SCREEN DOC REV: CPT | Performed by: INTERNAL MEDICINE

## 2021-04-22 PROCEDURE — G0439 PPPS, SUBSEQ VISIT: HCPCS | Performed by: INTERNAL MEDICINE

## 2021-04-22 PROCEDURE — G9899 SCRN MAM PERF RSLTS DOC: HCPCS | Performed by: INTERNAL MEDICINE

## 2021-04-22 NOTE — PROGRESS NOTES
Chief Complaint   Patient presents with    Complete Physical    Medication Refill     90 day refills          1. Have you been to the ER, urgent care clinic since your last visit? Hospitalized since your last visit? No    2. Have you seen or consulted any other health care providers outside of the [address] [place] [address] since your last visit? Include any pap smears or colon screening.  No

## 2021-04-23 NOTE — PROGRESS NOTES
This is the Subsequent Medicare Annual Wellness Exam, performed 12 months or more after the Initial AWV or the last Subsequent AWV    I have reviewed the patient's medical history in detail and updated the computerized patient record. Assessment/Plan   Education and counseling provided:  Are appropriate based on today's review and evaluation  Screening Mammography    1. Medicare annual wellness visit, subsequent  2. Screening for depression  -     DEPRESSION SCREEN ANNUAL       Depression Risk Factor Screening     3 most recent PHQ Screens 4/12/2021   PHQ Not Done -   Little interest or pleasure in doing things Not at all   Feeling down, depressed, irritable, or hopeless Not at all   Total Score PHQ 2 0       Alcohol Risk Screen    Do you average more than 1 drink per night or more than 7 drinks a week: On any one occasion in the past three months have you have had more than 3 drinks containing alcohol:          Functional Ability and Level of Safety    Hearing: Hearing is good. Activities of Daily Living: The home contains: no safety equipment. Patient does total self care      Ambulation: with difficulty, uses a wheelchair as non weight bearing now     Fall Risk:  No flowsheet data found.    Abuse Screen:  Patient is not abused       Cognitive Screening    Has your family/caregiver stated any concerns about your memory: no     Cognitive Screening: Normal - Verbal Fluency Test    Health Maintenance Due     Health Maintenance Due   Topic Date Due    Hepatitis C Screening  Never done    DTaP/Tdap/Td series (1 - Tdap) Never done    Pneumococcal 0-64 years (2 of 3 - PCV13) 02/01/2014    Shingrix Vaccine Age 50> (1 of 2) Never done    Foot Exam Q1  09/10/2019       Patient Care Team   Patient Care Team:  Davi Perry MD as PCP - General (Internal Medicine)  Davi Perry MD as PCP - [facility]  Michaela Fam MD as Surgeon (General Surgery)  Edmund Kirby MD (Obstetrics & Gynecology)  Jane Britton MD (Nephrology)  Joe Huynh MD as Consulting Provider (Endocrinology)  Kenia Romano RN as Care Transitions Nurse (Family Medicine)    History     Patient Active Problem List   Diagnosis Code    Hypertension I10    Kidney disease N28.9    Migraines G43.909    Diabetes mellitus ([facility] E11.9    Anemia D64.9    Hyperkalemia E87.5    DM (diabetes mellitus) ([facility] E11.9    Depression F32.9    Asthma J45.909    Dyslipidemia E78.5    Mitral regurgitation I34.0    Renal transplant recipient Z94.0    Type 2 diabetes mellitus without complication, with long-term current use of insulin (HCC) E11.9, Z79.4    Sebaceous cyst L72.3    Cystitis bacillary, chronic N30.20    History of DVT (deep vein thrombosis) Z86.718    Pelvic mass R19.00    Closed left ankle fracture, initial encounter S82.892A    Ankle dislocation, left, initial encounter S93. 05XA    Dizziness R42    Trimalleolar fracture [id number]     Past Medical History:   Diagnosis Date    Anemia     Arthritis     Asthma 4/8/2014    Rx for same    Diabetes ([facility] 26yo    Rx to control    GERD (gastroesophageal reflux disease)     Headache(784.0)     Hepatitis B infection     Hypertension     Rx for same    Kidney transplanted 04/2017    Sebaceous cyst 8/27/2019      Past Surgical History:   Procedure Laterality Date    COLONOSCOPY N/A 11/1/2016    COLONOSCOPY performed by Don Ponce MD at [address]    laparoscopic    HX COLONOSCOPY      HX ENDOSCOPY      HX MOHS PROCEDURES Left     HX OTHER SURGICAL  7/30/15    excision of left Axilla Hidradenitis    HX RENAL BIOPSY  2011    HX RENAL TRANSPLANT  04/05/2017    HX UROLOGICAL  2012    KIDNEY BX RIGHT - pt states she has only had one kidney bx as of 10/31/2016    HX UROLOGICAL  2017    transplant    HX VASCULAR ACCESS  1/2013    CHEST CATHETER FOR DIALYSIS    HX VASCULAR ACCESS Right     A-V fistula    HX WISDOM TEETH EXTRACTION       Current Outpatient Medications   Medication Sig Dispense Refill    docusate sodium (Dulcolax Stool Softener, dss,) 100 mg capsule Take 1 Cap by mouth two (2) times a day for 90 days. 60 Cap 2    insulin detemir U-100 (Levemir FlexTouch U-100 Insuln) 100 unit/mL (3 mL) inpn Levemir FlexTouch U-100 Insulin 100 unit/mL (3 mL) subcutaneous pen   INJECT 35 UNITS UNDER THE SKIN TWICE DAILY      Eliquis 5 mg tablet Take 1 Tab by mouth two (2) times a day.  gabapentin (NEURONTIN) 300 mg capsule TAKE 1 CAPSULE BY MOUTH THREE TIMES DAILY      Aluminum Hydrox-Magnesium Carb (Gaviscon Extra Strength) 254-237.5 mg/5 mL susp Gaviscon Extra Strength 254 mg-237.5 mg/5 mL oral suspension   SHAKE WELL AND TAKE 20ML BY MOUTH FOUR TIMES DAILY AS NEEDED FOR ABDOMINAL PAIN      metFORMIN ER (GLUCOPHAGE XR) 500 mg tablet Take 1 Tab by mouth two (2) times daily (after meals). 60 Tab 3    atorvastatin (LIPITOR) 40 mg tablet TAKE 1 TABLET BY MOUTH EVERY DAY 90 Tab 1    Fiasp FlexTouch U-100 Insulin 100 unit/mL (3 mL) inpn AS DIRECTED FOR high glucoses AND FOR carbohydrate intake UP TO 50 units/day 15 mL 11    fenofibrate nanocrystallized (Tricor) 145 mg tablet Take 1 Tab by mouth daily. 30 Tab 11    liraglutide (VICTOZA) 0.6 mg/0.1 mL (18 mg/3 mL) pnij 1.8 mg by SubCUTAneous route daily. 9 mL 11    OneTouch Ultra Blue Test Strip strip Test 4 times daily. DX E11.9 400 Strip 3    labetaloL (NORMODYNE) 200 mg tablet Take 1 Tab by mouth nightly. 360 Tab 1    K-Phos-NeutraL 250 mg tablet TAKE 1 TABLET BY MOUTH EVERY DAY      Insulin Needles, Disposable, (Unifine Pentips) 32 gauge x 5/32\" ndle USE TO INJECT INSULIN 5 TIMES DAILY 500 Pen Needle 3    butalbital-acetaminophen-caffeine (FIORICET, ESGIC) -40 mg per tablet Take 1 Tab by mouth three (3) times daily as needed for Pain.  Indications: Tension Headache 60 Tab 1    magnesium oxide (MAG-OX) 400 mg tablet Take 1 Tab by mouth daily. (Patient taking differently: Take 800 mg by mouth two (2) times a day.) 90 Tab 3    traZODone (DESYREL) 100 mg tablet Take 1 Tab by mouth nightly. (Patient taking differently: Take 100 mg by mouth nightly as needed.) 30 Tab 3    pantoprazole (PROTONIX) 40 mg tablet TAKE ONE TABLET BY MOUTH ONCE DAILY 90 Tab 1    Blood-Glucose Meter, Drum-type (ACCU-CHEK COMPACT PLUS CARE) kit Test blood sugar 4 times daily 1 Kit 0    Blood Sugar Diagnostic, Drum (ACCU-CHEK COMPACT PLUS TEST) strp Test blood sugar 4 times daily 100 Strip 3    lancets (ONE TOUCH DELICA) 33 gauge misc 4 times daily. Diagnosis code: E11.8 125 Lancet 11    albuterol (PROVENTIL HFA, VENTOLIN HFA, PROAIR HFA) 90 mcg/actuation inhaler Take 2 Puffs by inhalation every four (4) hours as needed for Wheezing. 1 Inhaler 3    predniSONE (DELTASONE) 5 mg tablet Take 1 Tab by mouth daily. 11    cinacalcet (SENSIPAR) 30 mg tablet Take 30 mg by mouth daily.  aspirin delayed-release 81 mg tablet Take  by mouth daily.  mycophenolate mofetil (CELLCEPT) 250 mg capsule Take 250 mg by mouth. 2 tabs in the am and 4 tabs in the pm      tacrolimus (PROGRAF) 1 mg capsule Take 2 mg by mouth. Indications: taking 1 mg in am and 2 mg at QHS       Allergies   Allergen Reactions    Latex Shortness of Breath    Keflex [Cephalexin] Nausea and Vomiting     Stomach cramping    Tramadol Itching       Family History   Problem Relation Age of Onset    Hypertension Mother     Hypertension Sister     Diabetes Maternal Grandfather     Cancer Father         UNKNOWN    Hypertension Maternal Grandmother     Diabetes Son     Asthma Son     Cancer Maternal Aunt         UNKNOWN    Anesth Problems Neg Hx      Social History     Tobacco Use    Smoking status: Never Smoker    Smokeless tobacco: Never Used   Substance Use Topics    Alcohol use: No     Alcohol/week: 0.0 standard drinks         Lb Vasquez MD       1.  Medicare annual wellness visit, subsequent  reviewed    2. Screening for depression  no  - DEPRESSION SCREEN ANNUAL    3. Ankle dislocation, left, sequela  New problem    4. Type 1 diabetes mellitus with chronic kidney disease on chronic dialysis ([facility]  controlled    5.  Type 2 diabetes mellitus with complication, with long-term current use of insulin ([facility]

## 2021-04-23 NOTE — PATIENT INSTRUCTIONS

## 2021-04-27 ENCOUNTER — VIRTUAL VISIT (OUTPATIENT)
Dept: ENDOCRINOLOGY | Age: 56
End: 2021-04-27
Payer: MEDICARE

## 2021-04-27 ENCOUNTER — TELEPHONE (OUTPATIENT)
Dept: ENDOCRINOLOGY | Age: 56
End: 2021-04-27

## 2021-04-27 DIAGNOSIS — E11.8 TYPE 2 DIABETES MELLITUS WITH COMPLICATION, WITH LONG-TERM CURRENT USE OF INSULIN (HCC): Primary | ICD-10-CM

## 2021-04-27 DIAGNOSIS — Z79.4 TYPE 2 DIABETES MELLITUS WITH COMPLICATION, WITH LONG-TERM CURRENT USE OF INSULIN (HCC): Primary | ICD-10-CM

## 2021-04-27 DIAGNOSIS — I10 ESSENTIAL HYPERTENSION: ICD-10-CM

## 2021-04-27 DIAGNOSIS — E78.5 DYSLIPIDEMIA: ICD-10-CM

## 2021-04-27 PROCEDURE — G9899 SCRN MAM PERF RSLTS DOC: HCPCS | Performed by: INTERNAL MEDICINE

## 2021-04-27 PROCEDURE — 1111F DSCHRG MED/CURRENT MED MERGE: CPT | Performed by: INTERNAL MEDICINE

## 2021-04-27 PROCEDURE — G9717 DOC PT DX DEP/BP F/U NT REQ: HCPCS | Performed by: INTERNAL MEDICINE

## 2021-04-27 PROCEDURE — 3017F COLORECTAL CA SCREEN DOC REV: CPT | Performed by: INTERNAL MEDICINE

## 2021-04-27 PROCEDURE — G9231 DOC ESRD DIA TRANS PREG: HCPCS | Performed by: INTERNAL MEDICINE

## 2021-04-27 PROCEDURE — 2022F DILAT RTA XM EVC RTNOPTHY: CPT | Performed by: INTERNAL MEDICINE

## 2021-04-27 PROCEDURE — 99214 OFFICE O/P EST MOD 30 MIN: CPT | Performed by: INTERNAL MEDICINE

## 2021-04-27 PROCEDURE — G8427 DOCREV CUR MEDS BY ELIG CLIN: HCPCS | Performed by: INTERNAL MEDICINE

## 2021-04-27 PROCEDURE — 3046F HEMOGLOBIN A1C LEVEL >9.0%: CPT | Performed by: INTERNAL MEDICINE

## 2021-04-27 NOTE — PROGRESS NOTES
Chief Complaint   Patient presents with   [employer] Diabetes     [phone] doxy    Other     pcp and pharmacy confirmed       **THIS IS A VIRTUAL VISIT VIA A VIDEO SYNCHRONOUS DISCUSSION. PATIENT AGREED TO HAVE THEIR CARE DELIVERED OVER A DOXY. ME VIDEO VISIT IN PLACE OF THEIR REGULARLY SCHEDULED OFFICE VISIT**    History of Present Illness: Arcenio Ordonez is a 49 y.o. female here for follow up of diabetes. On the morning of 4/12/21 got her COVID shot and then had an eye appointment and started feeling warm and tired and then that night, she was having a low sugar and fell and broke her left ankle and then was admitted and needed surgery but will need a 2nd surgery in 2 days with Dr. Jose Wu. She has been taking 50 units of levemir BID along with her victoza and fiasp per the scale 3 times daily and her sugars are staying over 200 due to the pain she is in since the surgery as she has not been eating very much. Compliant with atorvastatin. she has the following indications to continue treatment with Freestyle Sekou:  1) she has type 2 diabetes (E11.65) and is on an intensive insulin regimen with 5 injections per day  2) she tests her blood sugar 4 times per day and makes treatment decisions off her blood sugar readings and off freestyle sekou sensor readings  3) she requires frequent adjustments to her insulin injection doses based on her freestyle sekou sensor readings  4) she has benefitted from therapeutic continuous glucose monitoring and I recommend that she begin this  5) she is seen in my office every 4-6 months      Current Outpatient Medications   Medication Sig    docusate sodium (Dulcolax Stool Softener, dss,) 100 mg capsule Take 1 Cap by mouth two (2) times a day for 90 days.  insulin detemir U-100 (Levemir FlexTouch U-100 Insuln) 100 unit/mL (3 mL) inpn 50 Units two (2) times a day.  Eliquis 5 mg tablet Take 1 Tab by mouth two (2) times a day.     Aluminum Hydrox-Magnesium Carb (Gaviscon Extra Strength) 254-237.5 mg/5 mL susp Gaviscon Extra Strength 254 mg-237.5 mg/5 mL oral suspension   SHAKE WELL AND TAKE 20ML BY MOUTH FOUR TIMES DAILY AS NEEDED FOR ABDOMINAL PAIN    metFORMIN ER (GLUCOPHAGE XR) 500 mg tablet Take 1 Tab by mouth two (2) times daily (after meals).  atorvastatin (LIPITOR) 40 mg tablet TAKE 1 TABLET BY MOUTH EVERY DAY    Fiasp FlexTouch U-100 Insulin 100 unit/mL (3 mL) inpn AS DIRECTED FOR high glucoses AND FOR carbohydrate intake UP TO 50 units/day    fenofibrate nanocrystallized (Tricor) 145 mg tablet Take 1 Tab by mouth daily.  liraglutide (VICTOZA) 0.6 mg/0.1 mL (18 mg/3 mL) pnij 1.8 mg by SubCUTAneous route daily.  labetaloL (NORMODYNE) 200 mg tablet Take 1 Tab by mouth nightly.  K-Phos-NeutraL 250 mg tablet TAKE 1 TABLET BY MOUTH EVERY DAY    Insulin Needles, Disposable, (Unifine Pentips) 32 gauge x 5/32\" ndle USE TO INJECT INSULIN 5 TIMES DAILY    butalbital-acetaminophen-caffeine (FIORICET, ESGIC) -40 mg per tablet Take 1 Tab by mouth three (3) times daily as needed for Pain. Indications: Tension Headache    magnesium oxide (MAG-OX) 400 mg tablet Take 1 Tab by mouth daily. (Patient taking differently: Take 800 mg by mouth two (2) times a day.)    traZODone (DESYREL) 100 mg tablet Take 1 Tab by mouth nightly. (Patient taking differently: Take 100 mg by mouth nightly as needed.)    pantoprazole (PROTONIX) 40 mg tablet TAKE ONE TABLET BY MOUTH ONCE DAILY    lancets (ONE TOUCH DELICA) 33 gauge misc 4 times daily. Diagnosis code: E11.8    albuterol (PROVENTIL HFA, VENTOLIN HFA, PROAIR HFA) 90 mcg/actuation inhaler Take 2 Puffs by inhalation every four (4) hours as needed for Wheezing.  predniSONE (DELTASONE) 5 mg tablet Take 1 Tab by mouth daily.  cinacalcet (SENSIPAR) 30 mg tablet Take 30 mg by mouth daily.  aspirin delayed-release 81 mg tablet Take  by mouth daily.  mycophenolate mofetil (CELLCEPT) 250 mg capsule Take 250 mg by mouth.  2 tabs in the am and 4 tabs in the pm    tacrolimus (PROGRAF) 1 mg capsule Take 2 mg by mouth. Indications: taking 1 mg in am and 2 mg at QHS    OneTouch Ultra Blue Test Strip strip Test 4 times daily. DX E11.9    Blood-Glucose Meter, Drum-type (ACCU-CHEK COMPACT PLUS CARE) kit Test blood sugar 4 times daily    Blood Sugar Diagnostic, Drum (ACCU-CHEK COMPACT PLUS TEST) strp Test blood sugar 4 times daily     No current facility-administered medications for this visit.       Allergies   Allergen Reactions    Latex Shortness of Breath    Keflex [Cephalexin] Nausea and Vomiting     Stomach cramping    Tramadol Itching     Review of Systems: PER HPI    Physical Examination:  - GENERAL: NCAT, Appears well nourished   - EYES: EOMI, non-icteric, no proptosis   - Ear/Nose/Throat: NCAT, no visible inflammation or masses   - CARDIOVASCULAR: no cyanosis, no visible JVD   - RESPIRATORY: respiratory effort normal without any distress or labored breathing   - MUSCULOSKELETAL: Normal ROM of neck and upper extremities observed   - SKIN: No rash on face  - NEUROLOGIC:  No facial asymmetry (Cranial nerve 7 motor function), No gaze palsy   - PSYCHIATRIC: Normal affect, Normal insight and judgement       Data Reviewed:   Component      Latest Ref Rng & Units 4/15/2021 4/14/2021 4/14/2021 4/14/2021           3:44 AM  1:36 AM  1:36 AM  1:36 AM   Sodium      136 - 145 mmol/L 135 (L)      Potassium      3.5 - 5.1 mmol/L 4.4      Chloride      97 - 108 mmol/L 103      CO2      21 - 32 mmol/L 27      Anion gap      5 - 15 mmol/L 5      Glucose      65 - 100 mg/dL 206 (H)      BUN      6 - 20 MG/DL 21 (H)      Creatinine      0.55 - 1.02 MG/DL 1.39 (H)      BUN/Creatinine ratio      12 - 20   15      GFR est AA      >60 ml/min/1.73m2 48 (L)      GFR est non-AA      >60 ml/min/1.73m2 40 (L)      Calcium      8.5 - 10.1 MG/DL 9.3      Cholesterol, total      <200 MG/DL   120    Triglyceride      <150 MG/DL   71    HDL Cholesterol      MG/DL   60 LDL, calculated      0 - 100 MG/DL   45.8    VLDL, calculated      MG/DL   14.2    CHOL/HDL Ratio      0.0 - 5.0     2.0    Hemoglobin A1c, (calculated)      4.0 - 5.6 %  9.6 (H)     Est. average glucose      mg/dL  229     TSH      0.36 - 3.74 uIU/mL    0.38     Labs from 1/5/21:  - Hgb A1c 8.2%  - lipids: total 136,  HDL 64, TG 90, LDL 54  - ALT 19, AST 10  - BUN/Cr 30/1.65  - microalbumin 8    Assessment/Plan:     1. Type 2 diabetes mellitus with complication, with long-term current use of insulin ([facility]): her most recent Hgb A1c was 9.6% in 4/21 up from 8.2% in 1/21 down from 9.1% in 11/20 up from 9.2% in 8/20 up from 8.2% in 12/19. Will increase her levemir to bring her sugars down while in pain. - increase levemir to 55 units bid  - cont metformin  mg 1 tab bid  - cont victoza to 1.8 mg daily  - cont fiasp 3:50 > 150 for correction 3 times daily  - check bs 4 times per day due to fluctuating sugars  - cont freestyle jaycee  - foot exam due at next visit  - microalbumin nl 1/21  - optho UTD 2/20  - check Hgb A1c, bmp, and microalbumin prior to next visit        2. Essential hypertension: BP has been controlled per her report  -  cont current regimen for now      3. Dyslipidemia: LDL 69 in 10/19 and 41 in 11/20 and 54 in 1/21 and 45 in 4/21 on atorva 40  - cont atorva 40 mg daily   - check lipids in 4/22         Patient Instructions   1) Your Hemoglobin A1c (3 month test of blood sugar) is 9.6% and goal is to get this  to 7% or less. 2) While you are in more pain, increase your levemir to 55 units twice daily. Give this 4 days and if your sugars are still over 150 in the morning and during the day, then go up to 60 units twice daily. 3) Your creatinine (kidney test) improved from 1.6 to 1.3 during your hospital stay. 4) Your cholesterol is at goal.    5) Please come for a follow up visit on 8/10/21 at 12:10pm in our [place] office.     6) I put an order directly into the [facility] system to repeat your labs in the 1-2 weeks prior to your next visit so just ask for the order under my name and you will receive a courtesy reminder through [employer] to have these drawn.             Follow-up and Dispositions    · Return 8/10/21 at 12:10pm.               Copy sent to:  Abdifatah Carreon MD as PCP - General (Internal Medicine)  Nida Phan MD as Physician (Cardiology)  Kt Flores MD (Nephrology)    Lab follow up: 6/12/21  Received labs from transplant clinic from 5/26/21:  - Hgb A1c 8.4%  - lipids: total 143,  HDL 57, LDL 54  - ALT 18, AST 6  - BUN/Cr 25/1.39

## 2021-04-27 NOTE — PATIENT INSTRUCTIONS
1) Your Hemoglobin A1c (3 month test of blood sugar) is 9.6% and goal is to get this  to 7% or less. 2) While you are in more pain, increase your levemir to 55 units twice daily. Give this 4 days and if your sugars are still over 150 in the morning and during the day, then go up to 60 units twice daily. 3) Your creatinine (kidney test) improved from 1.6 to 1.3 during your hospital stay. 4) Your cholesterol is at goal.    5) Please come for a follow up visit on 8/10/21 at 12:10pm in our [place] office. 6) I put an order directly into the [facility] system to repeat your labs in the 1-2 weeks prior to your next visit so just ask for the order under my name and you will receive a courtesy reminder through [employer] to have these drawn.

## 2021-04-27 NOTE — TELEPHONE ENCOUNTER
Can you see if there have been any [employer] forms for her freestyle jaycee sensors? If so, can you see if Dr. Tania Rowe can sign them and then fax my most recent progress note to go with the form. Thanks.

## 2021-04-27 NOTE — TELEPHONE ENCOUNTER
No forms received as of today I called Ms. Carline alex  to have her call [employer] to put Iin the request but her mailbox is full.

## 2021-04-28 ENCOUNTER — PATIENT OUTREACH (OUTPATIENT)
Dept: CASE MANAGEMENT | Age: 56
End: 2021-04-28

## 2021-04-28 NOTE — TELEPHONE ENCOUNTER
Patient was notified that we have not received forms from [facility]. She will be calling them to have them send us the forms.

## 2021-04-30 ENCOUNTER — PATIENT OUTREACH (OUTPATIENT)
Dept: CASE MANAGEMENT | Age: 56
End: 2021-04-30

## 2021-04-30 NOTE — PROGRESS NOTES
Called and spoke to patient today. Goals      Follows diet recommendations and achieves/maintains goal weight      2/12/19  Patient instructed to limit carbohydrate intake to 30 grams per meal for diabetes and weight control. Patient will review education materials on carbohydrate counting with NN at next contact. Patient will learn what is a carbohydrate, its worth in grams and plan her meals accordingly in the next month. YHW    4/8/19  Patient visited with a registered dietitian on 3/29/19. Patient was started on a 1400 calorie/day nutritional plan with an increase in protein and decrease in carbohydrates per meal.  YHW           Patient verbalizes understanding of self -management goals of living with Diabetes. 4/8/19  Patient is currently taking the following medications for diabetes management: Levemir 40 units, Metformin 500 mg 2 x daily, Victoza 1.2 mg daily and Novolog per scale. Patient will test her blood sugar 1-2 times daily and review with nurse navigator in 2 weeks. YHW         Prevent complications post hospitalization. 4/19/21 [facility] 4/13-4/16 Left ankle dislocation s/p fall   Reviewed discharge instructions with patient   Reviewed meds- education provided on new meds.  Reviewed red flags: increased pain in ankle despite pain meds, fever,nausea,vomiting,diarrhea, sob, chest pain.  FRANCESCA with pcp 4/26 at 9:15am.   Reminded to call carlos, , for f/u appt.  [facility] nurse scheduled to see her this am.   Given info on [employer] as resource.  Given CTN contact info if any questions or concerns.  CTN to check back in about a week, sooner prn.mbt  4/30/21  Patient reports she is doing ok. Had ankle surgery yest at [facility]. Leg still numb but knows the pain will be coming back when anesthesia wears off. Reminded to take the Oxycodone before pain is out of control and don't forget to take the stool softener. Has appt to see ortho on 5/6.   Reminded to call if concerns. CTN to check back in about a week. mbt

## 2021-05-06 ENCOUNTER — PATIENT OUTREACH (OUTPATIENT)
Dept: CASE MANAGEMENT | Age: 56
End: 2021-05-06

## 2021-05-06 NOTE — TELEPHONE ENCOUNTER
Please let her know we received the form from [facility] and they are requesting office notes and a recent copy of patient's [employer]. I sent her an e-mail invite to share her data with me so I can print this out so have her please do this as soon as possible so I can send her information back next week as I will be out of the office until Monday.

## 2021-05-06 NOTE — PROGRESS NOTES
Called and spoke to patient. Goals      Follows diet recommendations and achieves/maintains goal weight      2/12/19  Patient instructed to limit carbohydrate intake to 30 grams per meal for diabetes and weight control. Patient will review education materials on carbohydrate counting with NN at next contact. Patient will learn what is a carbohydrate, its worth in grams and plan her meals accordingly in the next month. YHW    4/8/19  Patient visited with a registered dietitian on 3/29/19. Patient was started on a 1400 calorie/day nutritional plan with an increase in protein and decrease in carbohydrates per meal.  YHW           Patient verbalizes understanding of self -management goals of living with Diabetes. 4/8/19  Patient is currently taking the following medications for diabetes management: Levemir 40 units, Metformin 500 mg 2 x daily, Victoza 1.2 mg daily and Novolog per scale. Patient will test her blood sugar 1-2 times daily and review with nurse navigator in 2 weeks. YHW         Prevent complications post hospitalization. 4/19/21 [id number] 4/13-4/16 Left ankle dislocation s/p fall   Reviewed discharge instructions with patient   Reviewed meds- education provided on new meds.  Reviewed red flags: increased pain in ankle despite pain meds, fever,nausea,vomiting,diarrhea, sob, chest pain.  FRANCESCA with pcp 4/26 at 9:15am.   Reminded to call carlos, , for f/u appt.  [facility] nurse scheduled to see her this am.   Given info on [employer] as resource.  Given CTN contact info if any questions or concerns.  CTN to check back in about a week, sooner prn.mbt  4/30/21  Patient reports she is doing ok. Had ankle surgery yest at [facility]. Leg still numb but knows the pain will be coming back when anesthesia wears off. Reminded to take the Oxycodone before pain is out of control and don't forget to take the stool softener. Has appt to see ortho on 5/6.   Reminded to call if concerns. CTN to check back in about a week. mbt  5/6/21  Reports she is doing better. Had her second surgery last Thursday. Pain has lessened up a bit. Has Oxycodone but not taking unless absolutely necessary. Continues to be non-weight bearing for now. Has appt to see ortho, , today. Reminded to call if concerns. Otherwise, CTN to check back in about a week. mbt

## 2021-05-10 RX ORDER — PEN NEEDLE, DIABETIC 31 GX3/16"
NEEDLE, DISPOSABLE MISCELLANEOUS
Qty: 500 PEN NEEDLE | Refills: 3 | Status: SHIPPED | OUTPATIENT
Start: 2021-05-10 | End: 2022-05-12 | Stop reason: SDUPTHER

## 2021-05-10 NOTE — DISCHARGE SUMMARY
Hospitalist Discharge Summary     Patient ID:  Marina Height  [id number]  49 y.o.  1965 4/13/2021    PCP on record: Gabi Jeffers MD    Admit date: 4/13/2021  Discharge date and time: 4/16/2021  DISCHARGE DIAGNOSIS:    See below    CONSULTATIONS:  IP CONSULT TO ORTHOPEDIC SURGERY  IP CONSULT TO ORTHOPEDIC SURGERY  IP CONSULT TO NEUROLOGY    Excerpted HPI from H&P of Gregory Mcdermott DO:  Adelso Kirby is a 50 y.o.  female with past medical history of diabetes mellitus, hypertension, end-stage renal disease status post right renal transplant, diabetic neuropathy who presents after a ground-level fall. patient reported that she had her Covid shot 2 days ago, after her Covid shot, she was feeling weak and tired. Overnight she tried to get out of the bed, her left leg went under the dresser and she fell. Reported that she was feeling dizzy before the fall. She denies any fever, chest pain, palpitation,  In the ED, her initial blood pressure was within normal limit, subsequent blood pressure showed no readings with systolic blood pressure in the 70s and 80s. Labs are unremarkable except for elevated creatinine  X-ray of her ankle showedTrimalleolar fracture dislocation. Patient was seen by Ortho in the ED, underwent reduction of her fracture and was placed into a splint. X-ray post reduction showed alignment of the fracture  When the ED physician tried to stand up and ambulate the patient, she felt dizzy and unable to walk  Therefore patient is admitted for further work-up of her dizziness. ______________________________________________________________________  DISCHARGE SUMMARY/HOSPITAL COURSE:  for full details see H&P, daily progress notes, labs, consult notes.      Dizziness, rule out stroke  Ground-level fall  Left trimalleolar fracture  -Fall at home, felt lightheaded and dizzy after first shot of Covid vaccine 4/12  -Normal CT of the head  -Neuro consult appreciated/noted. No further work-up  -PT/OT. Currently limited by pain  -Ortho consult noted and appreciated  -Nonweightbearing LLE, ice, elevation  -Okay to continue Eliquis as per Ortho  -We will need ORIF but that plan is on hold until next week as per Ortho  -Outpatient follow-up with Ortho to plan for surgery  -Currently mobilizes splint  -Has walker at home  -Continue pain control with oxycodone     Elevated creatinine  Creatinine 1.5, last creatinine on file was in 2016 most likely before her renal transplant  Start gentle hydration     Hypertension  Continue with home BP meds     Diabetes mellitus with hyperglycemia  Resume home dose insulin regiment, Sliding scale insulin high sensitivity     History of end-stage renal disease on hemodialysis, now is status post renal transplant  Continue with tacrolimus, CellCept, home dose prednisone     History of DVT  Continue with Eliquis     Hyperlipidemia  Continue with statin and fenofibrate    _______________________________________________________________________  Patient seen and examined by me on discharge day. Pertinent Findings:  Gen:    Not in distress  Chest: Clear lungs  CVS:   Regular rhythm. No edema  Abd:  Soft, not distended, not tender  Neuro:  Alert, oriented x 3  _______________________________________________________________________  DISCHARGE MEDICATIONS:   Discharge Medication List as of 4/16/2021  2:58 PM      START taking these medications    Details   oxyCODONE IR (ROXICODONE) 5 mg immediate release tablet Take 1 Tab by mouth every four (4) hours as needed for Pain for up to 3 days. Max Daily Amount: 30 mg., Print, Disp-20 Tab, R-0      docusate sodium (Dulcolax Stool Softener, dss,) 100 mg capsule Take 1 Cap by mouth two (2) times a day for 90 days. , Normal, Disp-60 Cap, R-2         CONTINUE these medications which have NOT CHANGED    Details   insulin detemir U-100 (Levemir FlexTouch U-100 Insuln) 100 unit/mL (3 mL) inpn Levemir FlexTouch U-100 Insulin 100 unit/mL (3 mL) subcutaneous pen   INJECT 35 UNITS UNDER THE SKIN TWICE DAILY, Historical Med      Eliquis 5 mg tablet Take 1 Tab by mouth two (2) times a day., Historical Med, NEHAL      Aluminum Hydrox-Magnesium Carb (Gaviscon Extra Strength) 254-237.5 mg/5 mL susp Gaviscon Extra Strength 254 mg-237.5 mg/5 mL oral suspension   SHAKE WELL AND TAKE 20ML BY MOUTH FOUR TIMES DAILY AS NEEDED FOR ABDOMINAL PAIN, Historical Med      gabapentin (NEURONTIN) 300 mg capsule TAKE 1 CAPSULE BY MOUTH THREE TIMES DAILY, Historical Med      metFORMIN ER (GLUCOPHAGE XR) 500 mg tablet Take 1 Tab by mouth two (2) times daily (after meals). , Normal, Disp-60 Tab, R-3      atorvastatin (LIPITOR) 40 mg tablet TAKE 1 TABLET BY MOUTH EVERY DAY, Normal, Disp-90 Tab, R-1      Fiasp FlexTouch U-100 Insulin 100 unit/mL (3 mL) inpn AS DIRECTED FOR high glucoses AND FOR carbohydrate intake UP TO 50 units/day, Normal, Disp-15 mL,R-11This prescription was filled on 10/21/2020. Any refills authorized will be placed on file. fenofibrate nanocrystallized (Tricor) 145 mg tablet Take 1 Tab by mouth daily. , Normal, Disp-30 Tab,R-11      liraglutide (VICTOZA) 0.6 mg/0.1 mL (18 mg/3 mL) pnij 1.8 mg by SubCUTAneous route daily. , Normal, Disp-9 mL,R-11      OneTouch Ultra Blue Test Strip strip Test 4 times daily.   DX E11.9, Normal, Disp-400 Strip,R-3,NEHAL      labetaloL (NORMODYNE) 200 mg tablet Take 1 Tab by mouth nightly., Historical Med, Disp-360 Tab, R-1      FreeStyle Sekou 14 Day Sensor kit Use as directed every 14 days, Normal, Disp-2 Kit, R-11, NEHAL      FreeStyle Sekou 14 Day [place] misc Use as directed, Normal, Disp-1 Each, R-0, NEHAL      K-Phos-NeutraL 250 mg tablet TAKE 1 TABLET BY MOUTH EVERY DAY, Historical Med, NEHAL      terbinafine HCL (LAMISIL) 250 mg tablet TAKE 1 TABLET BY MOUTH DAILY THEN get labs & fill remaining quantity FOR a total of 21 DAYS, Historical Med      Insulin Needles, Disposable, (Unifine Pentips) 32 gauge x 5/32\" ndle USE TO INJECT INSULIN 5 TIMES DAILY, Normal, Disp-500 Pen Needle, R-3This prescription was filled on 04/24/2020. Any refills authorized will be placed on file. butalbital-acetaminophen-caffeine (FIORICET, ESGIC) -40 mg per tablet Take 1 Tab by mouth three (3) times daily as needed for Pain. Indications: Tension Headache, Normal, Disp-60 Tab, R-1      magnesium oxide (MAG-OX) 400 mg tablet Take 1 Tab by mouth daily. , Normal, Disp-90 Tab, R-3      traZODone (DESYREL) 100 mg tablet Take 1 Tab by mouth nightly., Normal, Disp-30 Tab, R-3      pantoprazole (PROTONIX) 40 mg tablet TAKE ONE TABLET BY MOUTH ONCE DAILY, Normal, Disp-90 Tab, R-1      Blood-Glucose Meter, Drum-type (ACCU-CHEK COMPACT PLUS CARE) kit Test blood sugar 4 times daily, Normal, Disp-1 Kit, R-0      Blood Sugar Diagnostic, Drum (ACCU-CHEK COMPACT PLUS TEST) strp Test blood sugar 4 times daily, Normal, Disp-100 Strip, R-3      lancets (ONE TOUCH DELICA) 33 gauge misc 4 times daily. Diagnosis code: E11.8, Normal, Disp-125 Lancet, R-11      albuterol (PROVENTIL HFA, VENTOLIN HFA, PROAIR HFA) 90 mcg/actuation inhaler Take 2 Puffs by inhalation every four (4) hours as needed for Wheezing., Normal, Disp-1 Inhaler, R-3      predniSONE (DELTASONE) 5 mg tablet Take 1 Tab by mouth daily. , Historical Med, R-11      cinacalcet (SENSIPAR) 30 mg tablet Take 30 mg by mouth daily. , Historical Med      aspirin delayed-release 81 mg tablet Take  by mouth daily. , Historical Med      mycophenolate mofetil (CELLCEPT) 250 mg capsule Take 250 mg by mouth. 2 tabs in the am and 4 tabs in the pm, Historical Med      tacrolimus (PROGRAF) 1 mg capsule Take 2 mg by mouth. Indications: taking 1 mg in am and 2 mg at QHS, Historical Med             Patient Follow Up Instructions:    Activity: as per ortho: non-weight bearing LLE, aggressive elevation on 5+pillows/blankets (LLE splinted)  Diet: Diabetic Diet}    Follow-up:    Follow-up Information     Follow up With Specialties Details Why Contact Info    [facility] Emergency Medicine  If symptoms worsen [address] [place] [address]  [phone]    Kyra Crawford, DO Orthopedic Surgery Call today Contact the office's billing department asa ([phone], ext [phone]) to address balance on account. Office unable to schedule f/u apt until outstanding balance is addressed [address] [facility]  [address] [address] [phone]      Gabi Jeffers MD Internal Medicine Go on 4/26/2021 For hospital follow up appointment at [address] [facility]  Provider of bedside commode. Contact the office with any questions or concerns [address] [address]  [address]   As needed Mobile urgent care  [address]  This is your home health agency.  If you don't hear from them within 24-48 hours, contact the office directly [address] [facility]  [phone]        ________________________________________________________________    Risk of deterioration: Moderate    Condition at Discharge:  Stable  __________________________________________________________________    Disposition  Home with family and home health services    ____________________________________________________________________    Code Status: Full Code  ___________________________________________________________________      Total time in minutes spent coordinating this discharge (includes going over instructions, follow-up, prescriptions, and preparing report for sign off to her PCP) :  >30 minutes    Signed:  rGegory Mcdermott DO

## 2021-05-14 ENCOUNTER — TRANSCRIBE ORDER (OUTPATIENT)
Dept: SCHEDULING | Age: 56
End: 2021-05-14

## 2021-05-14 DIAGNOSIS — M19.012 OSTEOARTHRITIS, LOCALIZED, SHOULDER, LEFT: Primary | ICD-10-CM

## 2021-05-14 DIAGNOSIS — M25.512 PAIN OF LEFT SHOULDER REGION: ICD-10-CM

## 2021-05-18 ENCOUNTER — PATIENT OUTREACH (OUTPATIENT)
Dept: CASE MANAGEMENT | Age: 56
End: 2021-05-18

## 2021-05-18 NOTE — PROGRESS NOTES
Patient resolved from Transition of Care episode on 5/18/21. ACM/CTN was unsuccessful at contacting this patient today. Patient/family was provided the following resources and education related to COVID-19 during the initial call:                         Signs, symptoms and red flags related to COVID-19            CDC exposure and quarantine guidelines            [employer] exposure contact - [phone]            Contact for their local Department of Health                 Patient has not had any additional ED or hospital visits. No further outreach scheduled with this CTN/ACM. Episode of Care resolved. Patient has this CTN/ACM contact information if future needs arise.

## 2021-05-20 DIAGNOSIS — E78.5 DYSLIPIDEMIA: ICD-10-CM

## 2021-05-20 RX ORDER — ATORVASTATIN CALCIUM 40 MG/1
TABLET, FILM COATED ORAL
Qty: 90 TABLET | Refills: 1 | Status: SHIPPED | OUTPATIENT
Start: 2021-05-20 | End: 2021-11-16

## 2021-06-21 RX ORDER — METFORMIN HYDROCHLORIDE 500 MG/1
500 TABLET, EXTENDED RELEASE ORAL
Qty: 60 TABLET | Refills: 3 | OUTPATIENT
Start: 2021-06-21

## 2021-06-28 RX ORDER — METFORMIN HYDROCHLORIDE 500 MG/1
500 TABLET, EXTENDED RELEASE ORAL
Qty: 180 TABLET | Refills: 3 | Status: SHIPPED | OUTPATIENT
Start: 2021-06-28 | End: 2022-06-23 | Stop reason: SDUPTHER

## 2021-06-28 NOTE — TELEPHONE ENCOUNTER
----- Message from Jose Hernandez sent at 6/28/2021 10:10 AM EDT -----  Regarding: Dr. Ej Whitaker: [phone]  Medication Refill    Caller (if not patient):Emily      Relationship of caller (if not patient):pharm tech      Best contact number(s):[phone]      Name of medication and dosage if known:metformin 500mg ER tablets      Is patient out of this medication (yes/no):yes      Pharmacy name:[facility]    Pharmacy listed in chart? (yes/no):yes  Pharmacy phone number:[phone]      Details to clarify the request:n/a      Jose Hernandez

## 2021-07-19 RX ORDER — INSULIN DETEMIR 100 [IU]/ML
INJECTION, SOLUTION SUBCUTANEOUS
Qty: 30 ML | Refills: 11 | Status: SHIPPED | OUTPATIENT
Start: 2021-07-19 | End: 2022-02-11

## 2021-07-27 DIAGNOSIS — I10 ESSENTIAL HYPERTENSION: ICD-10-CM

## 2021-07-27 DIAGNOSIS — E11.8 TYPE 2 DIABETES MELLITUS WITH COMPLICATION, WITH LONG-TERM CURRENT USE OF INSULIN (HCC): ICD-10-CM

## 2021-07-27 DIAGNOSIS — Z79.4 TYPE 2 DIABETES MELLITUS WITH COMPLICATION, WITH LONG-TERM CURRENT USE OF INSULIN (HCC): ICD-10-CM

## 2021-07-27 DIAGNOSIS — E78.5 DYSLIPIDEMIA: ICD-10-CM

## 2021-08-10 ENCOUNTER — OFFICE VISIT (OUTPATIENT)
Dept: ENDOCRINOLOGY | Age: 56
End: 2021-08-10
Payer: MEDICARE

## 2021-08-10 VITALS
HEART RATE: 81 BPM | BODY MASS INDEX: 32.24 KG/M2 | SYSTOLIC BLOOD PRESSURE: 138 MMHG | HEIGHT: 63 IN | DIASTOLIC BLOOD PRESSURE: 79 MMHG

## 2021-08-10 DIAGNOSIS — Z79.4 TYPE 2 DIABETES MELLITUS WITH COMPLICATION, WITH LONG-TERM CURRENT USE OF INSULIN (HCC): Primary | ICD-10-CM

## 2021-08-10 DIAGNOSIS — I10 ESSENTIAL HYPERTENSION: ICD-10-CM

## 2021-08-10 DIAGNOSIS — E11.8 TYPE 2 DIABETES MELLITUS WITH COMPLICATION, WITH LONG-TERM CURRENT USE OF INSULIN (HCC): Primary | ICD-10-CM

## 2021-08-10 DIAGNOSIS — E78.5 DYSLIPIDEMIA: ICD-10-CM

## 2021-08-10 LAB — HBA1C MFR BLD HPLC: 10.8 %

## 2021-08-10 PROCEDURE — 3017F COLORECTAL CA SCREEN DOC REV: CPT | Performed by: INTERNAL MEDICINE

## 2021-08-10 PROCEDURE — 99214 OFFICE O/P EST MOD 30 MIN: CPT | Performed by: INTERNAL MEDICINE

## 2021-08-10 PROCEDURE — 83036 HEMOGLOBIN GLYCOSYLATED A1C: CPT | Performed by: INTERNAL MEDICINE

## 2021-08-10 PROCEDURE — G8417 CALC BMI ABV UP PARAM F/U: HCPCS | Performed by: INTERNAL MEDICINE

## 2021-08-10 PROCEDURE — 3046F HEMOGLOBIN A1C LEVEL >9.0%: CPT | Performed by: INTERNAL MEDICINE

## 2021-08-10 PROCEDURE — 2022F DILAT RTA XM EVC RTNOPTHY: CPT | Performed by: INTERNAL MEDICINE

## 2021-08-10 PROCEDURE — G9231 DOC ESRD DIA TRANS PREG: HCPCS | Performed by: INTERNAL MEDICINE

## 2021-08-10 PROCEDURE — G8427 DOCREV CUR MEDS BY ELIG CLIN: HCPCS | Performed by: INTERNAL MEDICINE

## 2021-08-10 PROCEDURE — G9899 SCRN MAM PERF RSLTS DOC: HCPCS | Performed by: INTERNAL MEDICINE

## 2021-08-10 PROCEDURE — G9717 DOC PT DX DEP/BP F/U NT REQ: HCPCS | Performed by: INTERNAL MEDICINE

## 2021-08-10 NOTE — PATIENT INSTRUCTIONS
1) When you know you are going to be eating at least 30-45 grams of carbs, plan on taking 12 units of fiasp 5-10 min before you eat to try and prevent your sugars from going over 180. If you are going over 180 after eating then the 12 units wasn't enough and next time you either need to cut back on the carbs or try 15-18 units of fiasp to prevent the spike. 2) Try not to eat more than 45 grams of carbs per meal (1 palm/fist sized serving = 30 grams; carbs are potatoes, rice, pasta, bread, fruit, corn, peas, cereal, desserts)    3) Your Hemoglobin A1c (3 month test of blood sugar) yoli from 8.4% to 10.8% and we want to get this back down. Check your average glucose on the [employer] lacy to see how you are doing and you want your value to be as close to 150 or less as possible as this is an A1c of 7%. 4) Your blood pressure is controlled.

## 2021-08-10 NOTE — PROGRESS NOTES
Chief Complaint   Patient presents with    Diabetes     pcp and pharmacy confirmed     History of Present Illness: Connor Aquino is a 49 y.o. female here for follow up of diabetes. She had her 2nd left ankle surgery this spring and is still wearing a boot on this leg. Has been taking levemir 60 units bid along with victoza 1.8 mg daily and metformin 1 tab bid along with the fiasp correctional scale. Review of her [employer] lacy shows that she is spending 54% of time with readings over 240 and tends to have high spikes in the 200-400 range often in the afternoon after eating lunch and does tend to have more carbs in the middle of the day. Compliant with her BP and lipid regimen. she has the following indications to continue treatment with Freestyle Sekou:  1) she has type 2 diabetes (E11.65) and is on an intensive insulin regimen with 5 injections per day  2) she tests her blood sugar 4 times per day and makes treatment decisions off her blood sugar readings and off freestyle sekou sensor readings  3) she requires frequent adjustments to her insulin injection doses based on her freestyle sekou sensor readings  4) she has benefitted from therapeutic continuous glucose monitoring and I recommend that she begin this  5) she is seen in my office every 4-6 months    Current Outpatient Medications   Medication Sig    liraglutide (VICTOZA) 0.6 mg/0.1 mL (18 mg/3 mL) pnij 1.8 mg by SubCUTAneous route daily.  insulin detemir U-100 (Levemir FlexTouch U-100 Insuln) 100 unit/mL (3 mL) inpn Inject 55-60 units twice daily    metFORMIN ER (GLUCOPHAGE XR) 500 mg tablet Take 1 Tablet by mouth two (2) times daily (after meals).  atorvastatin (LIPITOR) 40 mg tablet TAKE 1 TABLET BY MOUTH EVERY DAY    Insulin Needles, Disposable, (Unifine Pentips) 32 gauge x 5/32\" ndle USE TO INJECT INSULIN 5 TIMES DAILY    Eliquis 5 mg tablet Take 1 Tab by mouth two (2) times a day.     Fiasp FlexTouch U-100 Insulin 100 unit/mL (3 mL) inpn AS DIRECTED FOR high glucoses AND FOR carbohydrate intake UP TO 50 units/day    fenofibrate nanocrystallized (Tricor) 145 mg tablet Take 1 Tab by mouth daily.  OneTouch Ultra Blue Test Strip strip Test 4 times daily. DX E11.9    labetaloL (NORMODYNE) 200 mg tablet Take 1 Tab by mouth nightly.  K-Phos-NeutraL 250 mg tablet TAKE 1 TABLET BY MOUTH EVERY DAY    butalbital-acetaminophen-caffeine (FIORICET, ESGIC) -40 mg per tablet Take 1 Tab by mouth three (3) times daily as needed for Pain. Indications: Tension Headache    magnesium oxide (MAG-OX) 400 mg tablet Take 1 Tab by mouth daily. (Patient taking differently: Take 800 mg by mouth two (2) times a day.)    traZODone (DESYREL) 100 mg tablet Take 1 Tab by mouth nightly. (Patient taking differently: Take 100 mg by mouth nightly as needed.)    pantoprazole (PROTONIX) 40 mg tablet TAKE ONE TABLET BY MOUTH ONCE DAILY    Blood-Glucose Meter, Drum-type (ACCU-CHEK COMPACT PLUS CARE) kit Test blood sugar 4 times daily    Blood Sugar Diagnostic, Drum (ACCU-CHEK COMPACT PLUS TEST) strp Test blood sugar 4 times daily    lancets (ONE TOUCH DELICA) 33 gauge misc 4 times daily. Diagnosis code: E11.8    albuterol (PROVENTIL HFA, VENTOLIN HFA, PROAIR HFA) 90 mcg/actuation inhaler Take 2 Puffs by inhalation every four (4) hours as needed for Wheezing.  predniSONE (DELTASONE) 5 mg tablet Take 1 Tab by mouth daily.  cinacalcet (SENSIPAR) 30 mg tablet Take 30 mg by mouth daily.  aspirin delayed-release 81 mg tablet Take  by mouth daily.  mycophenolate mofetil (CELLCEPT) 250 mg capsule Take 250 mg by mouth. 2 tabs in the am and 4 tabs in the pm    tacrolimus (PROGRAF) 1 mg capsule Take 2 mg by mouth. Indications: taking 1 mg in am and 2 mg at QHS     No current facility-administered medications for this visit.      Allergies   Allergen Reactions    Latex Shortness of Breath    Keflex [Cephalexin] Nausea and Vomiting     Stomach cramping    Tramadol Itching     Review of Systems: PER HPI    Physical Examination:  Blood pressure 138/79, pulse 81, height 5' 3\" (1.6 m). - General: pleasant, no distress, good eye contact   - Neck: no carotid bruits  - Cardiovascular: regular, normal rate, nl s1 and s2, no m/r/g,   - Respiratory: clear bilaterally  - Integumentary: no edema,   - Psychiatric: normal mood and affect    Data Reviewed:   Component      Latest Ref Rng & Units 8/10/2021          12:26 PM   Hemoglobin A1c (POC)      % 10.8       Assessment/Plan:     1. Type 2 diabetes mellitus with complication, with long-term current use of insulin ([facility]): her most recent Hgb A1c was 10.8% in 7/21 up from 8.4% in 5/21 down from 9.6% in 4/21 up from 8.2% in 1/21 down from 9.1% in 11/20 up from 9.2% in 8/20 up from 8.2% in 12/19. She needs to proactively take a dose of fiasp for higher carb meals to prevent spikes. - cont levemir 60 units bid  - cont metformin  mg 1 tab bid  - cont victoza 1.8 mg daily  - cont fiasp 3:50 > 150 for correction 3 times daily and take 12 units for higher carb meals  - check bs 4 times per day due to fluctuating sugars  - cont freestyle jaycee  - foot exam due at next visit--wearing leg boot today  - microalbumin nl 1/21  - optho UTD 2/20  - check Hgb A1c, cmp, and microalbumin prior to next visit        2. Essential hypertension: BP at goal < 140/90  -  cont current regimen for now      3. Dyslipidemia: LDL 69 in 10/19 and 41 in 11/20 and 54 in 1/21 and 45 in 4/21 and 54 in 5/21 on atorva 40  - cont atorva 40 mg daily   - check lipids prior to next visit         Patient Instructions   1) When you know you are going to be eating at least 30-45 grams of carbs, plan on taking 12 units of fiasp 5-10 min before you eat to try and prevent your sugars from going over 180.   If you are going over 180 after eating then the 12 units wasn't enough and next time you either need to cut back on the carbs or try 15-18 units of fiasp to prevent the spike. 2) Try not to eat more than 45 grams of carbs per meal (1 palm/fist sized serving = 30 grams; carbs are potatoes, rice, pasta, bread, fruit, corn, peas, cereal, desserts)    3) Your Hemoglobin A1c (3 month test of blood sugar) yoli from 8.4% to 10.8% and we want to get this back down. Check your average glucose on the [employer] lacy to see how you are doing and you want your value to be as close to 150 or less as possible as this is an A1c of 7%. 4) Your blood pressure is controlled. Follow-up and Dispositions    · Return in about 6 months (around 2/10/2022). Copy sent to:  Paola Corley MD as PCP - General (Internal Medicine)  Chino Zabala MD as Physician (Cardiology)  Maddi Neves MD (Nephrology)    Lab follow up: 12/30/21    Component      Latest Ref Rng & Units 12/20/2021 12/20/2021 12/20/2021 12/20/2021           9:12 AM  9:12 AM  9:12 AM  9:12 AM   Glucose      65 - 99 mg/dL    183 (H)   BUN      6 - 24 mg/dL    29 (H)   Creatinine      0.57 - 1.00 mg/dL    1.58 (H)   GFR est non-AA      >59 mL/min/1.73    37 (L)   GFR est AA      >59 mL/min/1.73    42 (L)   BUN/Creatinine ratio      9 - 23    18   Sodium      134 - 144 mmol/L    137   Potassium      3.5 - 5.2 mmol/L    4.2   Chloride      96 - 106 mmol/L    99   CO2      20 - 29 mmol/L    25   Calcium      8.7 - 10.2 mg/dL    10.0   Protein, total      6.0 - 8.5 g/dL    6.9   Albumin      3.8 - 4.9 g/dL    4.4   GLOBULIN, TOTAL      1.5 - 4.5 g/dL    2.5   A-G Ratio      1.2 - 2.2    1.8   Bilirubin, total      0.0 - 1.2 mg/dL    0.3   Alk.  phosphatase      44 - 121 IU/L    72   AST      0 - 40 IU/L    12   ALT      0 - 32 IU/L    16   Cholesterol, total      100 - 199 mg/dL   147    Triglyceride      0 - 149 mg/dL   123    HDL Cholesterol      >39 mg/dL   56    VLDL, calculated      5 - 40 mg/dL   22    LDL, calculated      0 - 99 mg/dL   69    Creatinine, urine      Not Estab. mg/dL  114.6     Microalbumin, urine      Not Estab. ug/mL  34.5     Microalbumin/Creat. Ratio      0 - 29 mg/g creat  30 (H)     Hemoglobin A1c, (calculated)      4.8 - 5.6 % 10.0 (H)      Estimated average glucose      mg/dL 240        Sent her the following message through [employer]:    Hemoglobin A1c is a 3 month marker of your diabetes control. Goal is less than 7%. Yours has improved slightly from 10.8% to 10%. Have you been taking the fiasp before higher carb meals? Do you think your sugars are still staying high due to your diet or some other reason? Let me know when you have a chance. -------------------------------------------------------------------------------------------------------------------  Total Cholesterol is the total number of cholesterol particles in your blood. Goal is less than 200. Triglycerides are the short term fats in your blood. Goal is less than 150. HDL is the good cholesterol in your blood. Goal is more than 50 if you are a woman and 40 if you are a man. LDL is the bad cholesterol in your blood. Goal is less than 100 unless you have a history of heart disease or stroke and then goal is under 70. Your cholesterol is at goal.    Continue to follow a low cholesterol diet. Try to limit the amount of fried foods, fatty foods, butter, gravy, red meat, ice cream, cheese, and eggs in your diet, which are all high in cholesterol.  -------------------------------------------------------------------------------------------------------------------  BUN and creatinine are markers of kidney function. Your values are abnormal and slightly higher than last check. -------------------------------------------------------------------------------------------------------------------  ALT and AST are markers of liver function.   Your values are normal.  -------------------------------------------------------------------------------------------------------------------  Microalbumin/creatinine ratio is a marker of the amount of protein in your urine. Goal is less than 30. Your value is just barely abnormal at 30. This indicates that your kidneys are being very slightly affected by your uncontrolled diabetes and/or blood pressure.  -------------------------------------------------------------------------------------------------------------------  I don't think you need a full set of labs before your next visit in February and we can just check your A1c at that time at the visit.

## 2021-09-15 ENCOUNTER — TRANSCRIBE ORDER (OUTPATIENT)
Dept: SCHEDULING | Age: 56
End: 2021-09-15

## 2021-09-15 DIAGNOSIS — Z12.31 VISIT FOR SCREENING MAMMOGRAM: Primary | ICD-10-CM

## 2021-09-16 ENCOUNTER — OFFICE VISIT (OUTPATIENT)
Dept: INTERNAL MEDICINE CLINIC | Age: 56
End: 2021-09-16
Payer: MEDICARE

## 2021-09-16 VITALS
DIASTOLIC BLOOD PRESSURE: 75 MMHG | OXYGEN SATURATION: 96 % | WEIGHT: 172 LBS | SYSTOLIC BLOOD PRESSURE: 152 MMHG | BODY MASS INDEX: 30.48 KG/M2 | TEMPERATURE: 98.2 F | RESPIRATION RATE: 18 BRPM | HEIGHT: 63 IN | HEART RATE: 80 BPM

## 2021-09-16 DIAGNOSIS — M25.512 ACUTE PAIN OF LEFT SHOULDER: Primary | ICD-10-CM

## 2021-09-16 DIAGNOSIS — M77.12 LEFT TENNIS ELBOW: ICD-10-CM

## 2021-09-16 PROCEDURE — G0463 HOSPITAL OUTPT CLINIC VISIT: HCPCS | Performed by: INTERNAL MEDICINE

## 2021-09-16 PROCEDURE — G8417 CALC BMI ABV UP PARAM F/U: HCPCS | Performed by: INTERNAL MEDICINE

## 2021-09-16 PROCEDURE — 3017F COLORECTAL CA SCREEN DOC REV: CPT | Performed by: INTERNAL MEDICINE

## 2021-09-16 PROCEDURE — G9717 DOC PT DX DEP/BP F/U NT REQ: HCPCS | Performed by: INTERNAL MEDICINE

## 2021-09-16 PROCEDURE — G9231 DOC ESRD DIA TRANS PREG: HCPCS | Performed by: INTERNAL MEDICINE

## 2021-09-16 PROCEDURE — G8427 DOCREV CUR MEDS BY ELIG CLIN: HCPCS | Performed by: INTERNAL MEDICINE

## 2021-09-16 PROCEDURE — 99213 OFFICE O/P EST LOW 20 MIN: CPT | Performed by: INTERNAL MEDICINE

## 2021-09-16 PROCEDURE — G9899 SCRN MAM PERF RSLTS DOC: HCPCS | Performed by: INTERNAL MEDICINE

## 2021-09-16 RX ORDER — DICLOFENAC SODIUM 10 MG/G
GEL TOPICAL
Qty: 100 G | Refills: 5 | Status: SHIPPED | OUTPATIENT
Start: 2021-09-16

## 2021-09-16 RX ORDER — DICLOFENAC SODIUM 10 MG/G
GEL TOPICAL
COMMUNITY
Start: 2021-08-23 | End: 2021-09-16 | Stop reason: SDUPTHER

## 2021-09-16 RX ORDER — BUTALBITAL, ACETAMINOPHEN AND CAFFEINE 50; 325; 40 MG/1; MG/1; MG/1
1 TABLET ORAL
Qty: 60 TABLET | Refills: 3 | Status: SHIPPED | OUTPATIENT
Start: 2021-09-16

## 2021-09-16 RX ORDER — PAROXETINE 7.5 MG/1
CAPSULE ORAL
COMMUNITY

## 2021-09-16 NOTE — PROGRESS NOTES
SUBJECTIVE:  Soledad Zuniga is a 49 y.o. female who sustained a left shoulder pain 1 month(s) ago. Mechanism of injury: none. Immediate symptoms: delayed pain. Symptoms have been chronic since that time. Prior history of related problems: previous surgery of left shoulder 5 to 6 yeatrsa ago    Past Surgical History:   Procedure Laterality Date    COLONOSCOPY N/A 11/1/2016    COLONOSCOPY performed by Donny Youssef MD at [address]    laparoscopic    HX COLONOSCOPY      HX ENDOSCOPY      HX MOHS PROCEDURES Left     HX OTHER SURGICAL  7/30/15    excision of left Axilla Hidradenitis    HX RENAL BIOPSY  2011    HX RENAL TRANSPLANT  04/05/2017    HX UROLOGICAL  2012    KIDNEY BX RIGHT - pt states she has only had one kidney bx as of 10/31/2016    HX UROLOGICAL  2017    transplant    HX VASCULAR ACCESS  1/2013    CHEST CATHETER FOR DIALYSIS    HX VASCULAR ACCESS Right     A-V fistula    HX WISDOM TEETH EXTRACTION       . OBJECTIVE:  Vital signs as noted above. Appearance: alert, well appearing, and in no distress and oriented to person, place, and time. Shoulder exam: reduced range of motion of left shoulder, left lat epicondyle tender. X-ray: not indicated. ASSESSMENT:  Shoulder exacerbation of chronic rotator cuff    Has tennis elbpw left    PLAN:    Diagnoses and all orders for this visit:    1. Acute pain of left shoulder  -     REFERRAL TO ORTHOPEDICS    2. Left tennis elbow    Other orders  -     diclofenac (VOLTAREN) 1 % gel; 2 (TWO) GRAM APPLY TO AFFECTED AREA FOUR TIMES DAILY  -     butalbital-acetaminophen-caffeine (FIORICET, ESGIC) -40 mg per tablet; Take 1 Tablet by mouth three (3) times daily as needed for Headache.  Indications: tension headache      Refill fioricet for headaches interaction noted with transplant med discussed    Ice tennis elbow brace

## 2021-09-16 NOTE — PROGRESS NOTES
Chief Complaint   Patient presents with    Shoulder Pain    Headache     1. Have you been to the ER, urgent care clinic since your last visit? Hospitalized since your last visit? No    2. Have you seen or consulted any other health care providers outside of the [address] [place] [address] since your last visit? Include any pap smears or colon screening.  No    Visit Vitals  BP (!) 152/75   Pulse 80   Temp 98.2 °F (36.8 °C) (Temporal)   Resp 18   Ht 5' 3\" (1.6 m)   Wt 172 lb (78 kg)   SpO2 96%   BMI 30.47 kg/m²

## 2021-09-25 LAB — SARS-COV-2, NAA: NOT DETECTED

## 2021-09-28 ENCOUNTER — DOCUMENTATION ONLY (OUTPATIENT)
Dept: CARDIOLOGY CLINIC | Age: 56
End: 2021-09-28

## 2021-09-28 NOTE — PROGRESS NOTES
Patient did not show for appt today    Please see if she'd like to reschedule for the week of Oct 11th    Future Appointments   Date Time Provider Trisha Culver   10/1/2021  1:00 PM [facility]IAT[facility] [place] [facility] [address]   2/11/2022 11:30 AM Lonnie Joiner MD RDE VICKIE [address] BS AMB

## 2021-09-28 NOTE — PROGRESS NOTES
9/28/2021 at 5:13 spoke with patient - rescheduled today's missed appointment with Peter HEAD NP     Future Appointments   Date Time Provider Trisha Culver   10/1/2021  1:00 PM [facility] PSYCHIATRIC [facility] [address]   10/22/2021 10:20 AM Hamilton Marroquin NP CAVREY BS AMB   2/11/2022 11:30 AM Kj Brown MD RDE VICKIE Rakes[address] BS AMB

## 2021-09-30 ENCOUNTER — TELEPHONE (OUTPATIENT)
Dept: OBGYN CLINIC | Age: 56
End: 2021-09-30

## 2021-09-30 NOTE — TELEPHONE ENCOUNTER
[facility] pharmacy calling for a refill of     PARoxetine mesylate,menop.sym, 7.5 mg cap     This RN advised pharmacy pt needs AE before refills. This RN to advise pt to schedule appt via [employer] message.

## 2021-10-01 ENCOUNTER — HOSPITAL ENCOUNTER (OUTPATIENT)
Dept: MAMMOGRAPHY | Age: 56
Discharge: HOME | End: 2021-10-01
Attending: INTERNAL MEDICINE
Payer: MEDICARE

## 2021-10-01 DIAGNOSIS — Z12.31 VISIT FOR SCREENING MAMMOGRAM: ICD-10-CM

## 2021-10-01 PROCEDURE — 77067 SCR MAMMO BI INCL CAD: CPT

## 2021-10-08 RX ORDER — FENOFIBRATE 145 MG/1
145 TABLET, COATED ORAL DAILY
Qty: 30 TABLET | Refills: 0 | Status: SHIPPED | OUTPATIENT
Start: 2021-10-08 | End: 2021-11-16

## 2021-10-08 NOTE — TELEPHONE ENCOUNTER
Requested Prescriptions     Signed Prescriptions Disp Refills    fenofibrate nanocrystallized (Tricor) 145 mg tablet 30 Tablet 0     Sig: Take 1 Tablet by mouth daily.  MUST BE SEEN IN CLINIC FOR REFILLS     Authorizing Provider: Luz Elena Pagan     Ordering User: Elena Kaye     Per orders

## 2021-10-22 ENCOUNTER — OFFICE VISIT (OUTPATIENT)
Dept: CARDIOLOGY CLINIC | Age: 56
End: 2021-10-22
Payer: MEDICARE

## 2021-10-22 VITALS
DIASTOLIC BLOOD PRESSURE: 80 MMHG | BODY MASS INDEX: 31.18 KG/M2 | OXYGEN SATURATION: 97 % | WEIGHT: 176 LBS | HEIGHT: 63 IN | RESPIRATION RATE: 14 BRPM | SYSTOLIC BLOOD PRESSURE: 130 MMHG | HEART RATE: 80 BPM

## 2021-10-22 DIAGNOSIS — I34.0 NONRHEUMATIC MITRAL VALVE REGURGITATION: ICD-10-CM

## 2021-10-22 DIAGNOSIS — I73.9 PAD (PERIPHERAL ARTERY DISEASE) (HCC): ICD-10-CM

## 2021-10-22 DIAGNOSIS — I10 ESSENTIAL HYPERTENSION: Primary | ICD-10-CM

## 2021-10-22 DIAGNOSIS — E78.5 DYSLIPIDEMIA: ICD-10-CM

## 2021-10-22 PROCEDURE — G9899 SCRN MAM PERF RSLTS DOC: HCPCS | Performed by: NURSE PRACTITIONER

## 2021-10-22 PROCEDURE — 3017F COLORECTAL CA SCREEN DOC REV: CPT | Performed by: NURSE PRACTITIONER

## 2021-10-22 PROCEDURE — 99214 OFFICE O/P EST MOD 30 MIN: CPT | Performed by: NURSE PRACTITIONER

## 2021-10-22 PROCEDURE — G8427 DOCREV CUR MEDS BY ELIG CLIN: HCPCS | Performed by: NURSE PRACTITIONER

## 2021-10-22 PROCEDURE — G9231 DOC ESRD DIA TRANS PREG: HCPCS | Performed by: NURSE PRACTITIONER

## 2021-10-22 PROCEDURE — G8417 CALC BMI ABV UP PARAM F/U: HCPCS | Performed by: NURSE PRACTITIONER

## 2021-10-22 PROCEDURE — G9717 DOC PT DX DEP/BP F/U NT REQ: HCPCS | Performed by: NURSE PRACTITIONER

## 2021-10-22 NOTE — PROGRESS NOTES
[facility] [place]:   [phone]    HPI: Majo Torres, a 49y.o. year-old who presents for follow up regarding her HTN and mitral regurgitation. She is doing well, she has had a lot going on recently and is under a lot of stress  She had some blood in her urine and yesterday had a cystoscopy with [facility]/Dr. Virginia Garcia  For her history of DVT she is now taking Eliquis and sometimes has nosebleeds  No blood in her stool  She has been having some headaches recently with pain on the left side of her head radiating down into her neck  Usually episodes are brief and she feels achy, she has checked her blood pressure during these times and it is normal  She had a recent normal mammogram  Blood pressure has been well controlled, she has some dizziness with bending over or early in the morning so I encouraged her to drink water early in the morning to help combat this  No chest pain   No dyspnea with exertion or PND  No palpitations  She had labs in August with Dr. Haven Catherine at [facility], sees Dr. Jenni Pa today  I asked her to request any lab results be sent to us for review  Had sleep study which did not show LOUIS  Still has outstanding bill with [facility] so cannot get labs there    Assessment/Plan:  1. Overweight Body mass index is 31.18 kg/m². encouraged regular exercise   2. HTN- malignant, better controlled post renal transplant on labetalol, previously on amlodipine  -she will follow up with Dr. Gus Moffett in 6 months and myself again in 1 year   3. Dyslipidemia - continue atorvastatin and tricor, LDL 45 in 4/21   4. Diabetes Mellitus - on insulin and oral agents   5. Mitral Regurgitation - mild by TTE   6. Stage V CKD - s/p right renal transplant 4/5/17, on cellcept, prograf, prednisone, followed by Dr. Jenni Pa and Dr. Haven Catherine [facility])  7. CAD prevention- on ASA 81mg daily and atorvastatin, no angina symptoms  8. Right leg DVT - on eliquis 5mg BID, followed by Dr. Haven Catherine at transplant center  9.    Hx of gastritis/esophageal stricture  - on protonix, last EGD and colonoscopy showed diverticulosis, gastritis without hemorrhage and her esophagus was dilated, followed by Dr. Germaine Davila  11. PAD - mild right ICA stenosis noted on duplex 4/21, on atorvastatin, LDL 45   12. Ovarian cyst - seeing GYN    Echo 4/21 - EF 60-65%, mod cLVH, no obvious PFO but image quality not adequate to definitively evaluate for tiny PFO  Carotid duplex 4/21 - left ICA normal, <50% right ICA stenosis   Echo 9/20 EF 65% gr1 dd mild cLVH mild MR  5/18 TTE - normal LVEF, mild MR   5/17 TTE - normal LVEF, mod MR   2/16 TTE - LVEF 55 % to 60 %, no WMA, grade 1 dd, mod MR  3/14 TTE LVEF 55 % to 60 %, no WMA, mild cLVH, mild to mod MR  1/13 TTE LVEF 55%, no WMA, moderate increased wall thickness, grade 1 diastolic dysfunction, moderate MR    Soc no tob, no etoh  Fhx no early CAD    She  has a past medical history of Anemia, Arthritis, Asthma (4/8/2014), Diabetes ([address] (28yo), GERD (gastroesophageal reflux disease), Headache(784.0), Hepatitis B infection, Hypertension, Kidney transplanted (04/2017), and Sebaceous cyst (8/27/2019). She also has no past medical history of Adverse effect of anesthesia, Glomerulosclerosis due to secondary diabetes ([address] or Sleep apnea. Cardiovascular ROS: no chest pain or palpitations   Respiratory ROS: denies cough, wheezing  Neurological ROS: no TIA or stroke symptoms  All other systems negative except as above. PE  Vitals:    10/22/21 1027   BP: 130/80   Pulse: 80   Resp: 14   SpO2: 97%   Weight: 176 lb (79.8 kg)   Height: 5' 3\" (1.6 m)    Body mass index is 31.18 kg/m².   General appearance - alert, well appearing, and in no distress  Mental status - affect appropriate to mood  Eyes - sclera anicteric, moist mucous membranes  Neck - supple  Lymphatics - not assessed   Chest - clear to auscultation, no wheezes, rales or rhonchi  Heart - normal rate, regular rhythm, normal S1, S2, 2/6 TIANNA   Abdomen - soft, nontender, nondistended  Back exam - full range of motion, no tenderness  Neurological - cranial nerves II through XII grossly intact, no focal deficit  Musculoskeletal - no muscular tenderness noted, normal strength  Extremities - peripheral pulses normal, no LE edema    Skin - normal coloration  no rashes    Recent Labs:  Lab Results   Component Value Date/Time    Cholesterol, total 120 04/14/2021 01:36 AM     No results found for: ZANE  Lab Results   Component Value Date/Time    BUN 21 (H) 04/15/2021 03:44 AM     Lab Results   Component Value Date/Time    Potassium 4.4 04/15/2021 03:44 AM     Lab Results   Component Value Date/Time    Hemoglobin A1c 9.6 (H) 04/14/2021 01:36 AM    Hemoglobin A1c, External 8.2 01/05/2021 12:00 AM     No components found for: HGBI,  IHGB,  HGB,  HGBP,  WBHGB  Lab Results   Component Value Date/Time    PLATELET 586 38/55/9238 03:44 AM       Reviewed:  Past Medical History:   Diagnosis Date    Anemia     Arthritis     Asthma 4/8/2014    Rx for same    Diabetes ([facility] 26yo    Rx to control    GERD (gastroesophageal reflux disease)     Headache(784.0)     Hepatitis B infection     Hypertension     Rx for same    Kidney transplanted 04/2017    Sebaceous cyst 8/27/2019     Social History     Tobacco Use   Smoking Status Never Smoker   Smokeless Tobacco Never Used     Social History     Substance and Sexual Activity   Alcohol Use No    Alcohol/week: 0.0 standard drinks     Allergies   Allergen Reactions    Latex Shortness of Breath    Keflex [Cephalexin] Nausea and Vomiting     Stomach cramping    Tramadol Itching       Current Outpatient Medications   Medication Sig    fenofibrate nanocrystallized (Tricor) 145 mg tablet Take 1 Tablet by mouth daily.  MUST BE SEEN IN CLINIC FOR REFILLS    PARoxetine mesylate,menop.sym, 7.5 mg cap paroxetine mesylate (menopausal symptoms suppressant) 7.5 mg capsule   TAKE 1 CAPSULE BY MOUTH EVERY DAY    diclofenac (VOLTAREN) 1 % gel 2 (TWO) GRAM APPLY TO AFFECTED AREA FOUR TIMES DAILY    butalbital-acetaminophen-caffeine (FIORICET, ESGIC) -40 mg per tablet Take 1 Tablet by mouth three (3) times daily as needed for Headache. Indications: tension headache    liraglutide (VICTOZA) 0.6 mg/0.1 mL (18 mg/3 mL) pnij 1.8 mg by SubCUTAneous route daily.  insulin detemir U-100 (Levemir FlexTouch U-100 Insuln) 100 unit/mL (3 mL) inpn Inject 55-60 units twice daily    metFORMIN ER (GLUCOPHAGE XR) 500 mg tablet Take 1 Tablet by mouth two (2) times daily (after meals).  atorvastatin (LIPITOR) 40 mg tablet TAKE 1 TABLET BY MOUTH EVERY DAY    Insulin Needles, Disposable, (Unifine Pentips) 32 gauge x 5/32\" ndle USE TO INJECT INSULIN 5 TIMES DAILY    Eliquis 5 mg tablet Take 1 Tab by mouth two (2) times a day.  Fiasp FlexTouch U-100 Insulin 100 unit/mL (3 mL) inpn AS DIRECTED FOR high glucoses AND FOR carbohydrate intake UP TO 50 units/day    OneTouch Ultra Blue Test Strip strip Test 4 times daily. DX E11.9    labetaloL (NORMODYNE) 200 mg tablet Take 1 Tab by mouth nightly.  K-Phos-NeutraL 250 mg tablet TAKE 1 TABLET BY MOUTH EVERY DAY    magnesium oxide (MAG-OX) 400 mg tablet Take 1 Tab by mouth daily. (Patient taking differently: Take 800 mg by mouth two (2) times a day.)    traZODone (DESYREL) 100 mg tablet Take 1 Tab by mouth nightly. (Patient taking differently: Take 100 mg by mouth nightly as needed.)    pantoprazole (PROTONIX) 40 mg tablet TAKE ONE TABLET BY MOUTH ONCE DAILY    Blood-Glucose Meter, Drum-type (ACCU-CHEK COMPACT PLUS CARE) kit Test blood sugar 4 times daily    Blood Sugar Diagnostic, Drum (ACCU-CHEK COMPACT PLUS TEST) strp Test blood sugar 4 times daily    lancets (ONE TOUCH DELICA) 33 gauge misc 4 times daily. Diagnosis code: E11.8    albuterol (PROVENTIL HFA, VENTOLIN HFA, PROAIR HFA) 90 mcg/actuation inhaler Take 2 Puffs by inhalation every four (4) hours as needed for Wheezing.     predniSONE (DELTASONE) 5 mg tablet Take 1 Tab by mouth daily.  cinacalcet (SENSIPAR) 30 mg tablet Take 30 mg by mouth daily.  aspirin delayed-release 81 mg tablet Take  by mouth daily.  mycophenolate mofetil (CELLCEPT) 250 mg capsule Take 250 mg by mouth. 2 tabs in the am and 4 tabs in the pm    tacrolimus (PROGRAF) 1 mg capsule Take 2 mg by mouth. Indications: taking 1 mg in am and 2 mg at QHS     No current facility-administered medications for this visit.        Geneva Caceres NP  [employer] [facility] [place]  [address], [address], [address]

## 2021-10-28 ENCOUNTER — TELEPHONE (OUTPATIENT)
Dept: OBGYN CLINIC | Age: 56
End: 2021-10-28

## 2021-10-28 NOTE — TELEPHONE ENCOUNTER
[facility] pharmacy calling to get a refill of     PARoxetine mesylate,menop.sym, 7.5 mg cap     Pt needs AE. This RN advised pt to schedule an appt before we can refill. Pt stated she will call back and make appt.

## 2021-11-05 VITALS — BODY MASS INDEX: 30.65 KG/M2 | HEIGHT: 63 IN | WEIGHT: 173 LBS

## 2021-11-05 PROBLEM — M75.102 TEAR OF LEFT ROTATOR CUFF: Status: ACTIVE | Noted: 2021-11-05

## 2021-11-05 PROBLEM — M25.512 LEFT SHOULDER PAIN: Status: ACTIVE | Noted: 2021-11-05

## 2021-11-06 RX ORDER — INSULIN ASPART INJECTION 100 [IU]/ML
INJECTION, SOLUTION SUBCUTANEOUS
Qty: 15 ML | Refills: 11 | Status: SHIPPED | OUTPATIENT
Start: 2021-11-06 | End: 2022-02-11

## 2021-11-08 ENCOUNTER — OFFICE VISIT (OUTPATIENT)
Dept: ORTHOPEDIC SURGERY | Age: 56
End: 2021-11-08
Payer: MEDICARE

## 2021-11-08 ENCOUNTER — OFFICE VISIT (OUTPATIENT)
Dept: INTERNAL MEDICINE CLINIC | Age: 56
End: 2021-11-08
Payer: MEDICARE

## 2021-11-08 VITALS
BODY MASS INDEX: 31.71 KG/M2 | DIASTOLIC BLOOD PRESSURE: 59 MMHG | HEIGHT: 63 IN | WEIGHT: 179 LBS | SYSTOLIC BLOOD PRESSURE: 116 MMHG

## 2021-11-08 VITALS
RESPIRATION RATE: 20 BRPM | DIASTOLIC BLOOD PRESSURE: 59 MMHG | HEIGHT: 63 IN | OXYGEN SATURATION: 98 % | SYSTOLIC BLOOD PRESSURE: 116 MMHG | WEIGHT: 179 LBS | TEMPERATURE: 98.3 F | HEART RATE: 85 BPM | BODY MASS INDEX: 31.71 KG/M2

## 2021-11-08 DIAGNOSIS — M54.12 CERVICAL RADICULOPATHY: ICD-10-CM

## 2021-11-08 DIAGNOSIS — M77.11 RIGHT LATERAL EPICONDYLITIS: ICD-10-CM

## 2021-11-08 DIAGNOSIS — M25.512 ACUTE PAIN OF LEFT SHOULDER: Primary | ICD-10-CM

## 2021-11-08 DIAGNOSIS — M77.8 LEFT SHOULDER TENDINITIS: ICD-10-CM

## 2021-11-08 DIAGNOSIS — Z23 ENCOUNTER FOR IMMUNIZATION: ICD-10-CM

## 2021-11-08 DIAGNOSIS — Z87.39 HISTORY OF RIGHT TENNIS ELBOW: Primary | ICD-10-CM

## 2021-11-08 DIAGNOSIS — M79.2 NEURALGIA INVOLVING SCALP: ICD-10-CM

## 2021-11-08 PROCEDURE — G9231 DOC ESRD DIA TRANS PREG: HCPCS | Performed by: INTERNAL MEDICINE

## 2021-11-08 PROCEDURE — 3017F COLORECTAL CA SCREEN DOC REV: CPT | Performed by: ORTHOPAEDIC SURGERY

## 2021-11-08 PROCEDURE — 99213 OFFICE O/P EST LOW 20 MIN: CPT | Performed by: INTERNAL MEDICINE

## 2021-11-08 PROCEDURE — 3017F COLORECTAL CA SCREEN DOC REV: CPT | Performed by: INTERNAL MEDICINE

## 2021-11-08 PROCEDURE — G8427 DOCREV CUR MEDS BY ELIG CLIN: HCPCS | Performed by: INTERNAL MEDICINE

## 2021-11-08 PROCEDURE — G9899 SCRN MAM PERF RSLTS DOC: HCPCS | Performed by: ORTHOPAEDIC SURGERY

## 2021-11-08 PROCEDURE — G8417 CALC BMI ABV UP PARAM F/U: HCPCS | Performed by: INTERNAL MEDICINE

## 2021-11-08 PROCEDURE — G0463 HOSPITAL OUTPT CLINIC VISIT: HCPCS | Performed by: INTERNAL MEDICINE

## 2021-11-08 PROCEDURE — G9899 SCRN MAM PERF RSLTS DOC: HCPCS | Performed by: INTERNAL MEDICINE

## 2021-11-08 PROCEDURE — 99214 OFFICE O/P EST MOD 30 MIN: CPT | Performed by: ORTHOPAEDIC SURGERY

## 2021-11-08 PROCEDURE — G8427 DOCREV CUR MEDS BY ELIG CLIN: HCPCS | Performed by: ORTHOPAEDIC SURGERY

## 2021-11-08 PROCEDURE — G9231 DOC ESRD DIA TRANS PREG: HCPCS | Performed by: ORTHOPAEDIC SURGERY

## 2021-11-08 PROCEDURE — G8417 CALC BMI ABV UP PARAM F/U: HCPCS | Performed by: ORTHOPAEDIC SURGERY

## 2021-11-08 PROCEDURE — G9717 DOC PT DX DEP/BP F/U NT REQ: HCPCS | Performed by: ORTHOPAEDIC SURGERY

## 2021-11-08 PROCEDURE — 90686 IIV4 VACC NO PRSV 0.5 ML IM: CPT | Performed by: INTERNAL MEDICINE

## 2021-11-08 PROCEDURE — G9717 DOC PT DX DEP/BP F/U NT REQ: HCPCS | Performed by: INTERNAL MEDICINE

## 2021-11-08 RX ORDER — METHYLPREDNISOLONE 4 MG/1
TABLET ORAL
Qty: 1 DOSE PACK | Refills: 0 | Status: SHIPPED | OUTPATIENT
Start: 2021-11-08 | End: 2022-02-11

## 2021-11-08 NOTE — PATIENT INSTRUCTIONS
Tennis Elbow: Exercises  Introduction  Here are some examples of exercises for you to try. The exercises may be suggested for a condition or for rehabilitation. Start each exercise slowly. Ease off the exercises if you start to have pain. You will be told when to start these exercises and which ones will work best for you. How to do the exercises  Wrist flexor stretch    1. Extend your arm in front of you with your palm up. 2. Bend your wrist, pointing your hand toward the floor. 3. With your other hand, gently bend your wrist farther until you feel a mild to moderate stretch in your forearm. 4. Hold for at least 15 to 30 seconds. Repeat 2 to 4 times. Wrist extensor stretch    1. Repeat steps 1 to 4 of the stretch above but begin with your extended hand palm down. Ball or sock squeeze    1. Hold a tennis ball (or a rolled-up sock) in your hand. 2. Make a fist around the ball (or sock) and squeeze. 3. Hold for about 6 seconds, and then relax for up to 10 seconds. 4. Repeat 8 to 12 times. 5. Switch the ball (or sock) to your other hand and do 8 to 12 times. Wrist deviation    1. Sit so that your arm is supported but your hand hangs off the edge of a flat surface, such as a table. 2. Hold your hand out like you are shaking hands with someone. 3. Move your hand up and down. 4. Repeat this motion 8 to 12 times. 5. Switch arms. 6. Try to do this exercise twice with each hand. Wrist curls    1. Place your forearm on a table with your hand hanging over the edge of the table, palm up. 2. Place a 1- to 2-pound weight in your hand. This may be a dumbbell, a can of food, or a filled water bottle. 3. Slowly raise and lower the weight while keeping your forearm on the table and your palm facing up. 4. Repeat this motion 8 to 12 times. 5. Switch arms, and do steps 1 through 4.  6. Repeat with your hand facing down toward the floor. Switch arms. Biceps curls    1.  Sit leaning forward with your legs slightly spread and your left hand on your left thigh. 2. Place your right elbow on your right thigh, and hold the weight with your forearm horizontal.  3. Slowly curl the weight up and toward your chest.  4. Repeat this motion 8 to 12 times. 5. Switch arms, and do steps 1 through 4. Follow-up care is a key part of your treatment and safety. Be sure to make and go to all appointments, and call your doctor if you are having problems. It's also a good idea to know your test results and keep a list of the medicines you take. Where can you learn more? Go to http://www.gray.com/  Enter N309 in the search box to learn more about \"Tennis Elbow: Exercises. \"  Current as of: July 1, 2021               Content Version: 13.0  © 2006-2021 [employer]. Care instructions adapted under license by [employer] (which disclaims liability or warranty for this information). If you have questions about a medical condition or this instruction, always ask your healthcare professional. [address] any warranty or liability for your use of this information.

## 2021-11-08 NOTE — PROGRESS NOTES
Ira Miles (: 1965) is a 49 y.o. female, established patient, here for evaluation of the following chief complaint(s):  Shoulder Pain       ASSESSMENT/PLAN:  Below is the assessment and plan developed based on review of pertinent history, physical exam, labs, studies, and medications. Findings were discussed with the patient today. She will try a therapy regimen along with a Medrol Dosepak. We discussed that her blood glucose levels will be elevated for the next week to 2 weeks. She will continue to monitor closely. We discussed further work-up and treatment options if needed in the future. 1. Acute pain of left shoulder  -     REFERRAL TO PHYSICAL THERAPY  The following orders have not been finalized:  -     methylPREDNISolone (Medrol, Michael,) 4 mg tablet  2. Left shoulder tendinitis  -     REFERRAL TO PHYSICAL THERAPY  The following orders have not been finalized:  -     methylPREDNISolone (Medrol, Michael,) 4 mg tablet  3. Cervical radiculopathy  -     REFERRAL TO PHYSICAL THERAPY  The following orders have not been finalized:  -     methylPREDNISolone (Medrol, Michael,) 4 mg tablet  4. Right lateral epicondylitis  -     REFERRAL TO PHYSICAL THERAPY  The following orders have not been finalized:  -     methylPREDNISolone (Medrol, Michael,) 4 mg tablet      No follow-ups on file. SUBJECTIVE/OBJECTIVE:  Ira Miles (: 1965) is a 49 y.o. female. She notes continued left shoulder and arm pain. She does note rating pain from the neck into the elbow. She denies any obvious numbness or tingling. She does have some right lateral elbow pain as well.         Allergies   Allergen Reactions    Latex Shortness of Breath    Keflex [Cephalexin] Nausea and Vomiting     Stomach cramping    Tramadol Itching       Current Outpatient Medications   Medication Sig    Fiasp FlexTouch U-100 Insulin 100 unit/mL (3 mL) inpn AS DIRECTED FOR high glucoses AND FOR carbohydrate intake UP TO 50 units/day    fenofibrate nanocrystallized (Tricor) 145 mg tablet Take 1 Tablet by mouth daily. MUST BE SEEN IN CLINIC FOR REFILLS    PARoxetine mesylate,menop.sym, 7.5 mg cap paroxetine mesylate (menopausal symptoms suppressant) 7.5 mg capsule   TAKE 1 CAPSULE BY MOUTH EVERY DAY    diclofenac (VOLTAREN) 1 % gel 2 (TWO) GRAM APPLY TO AFFECTED AREA FOUR TIMES DAILY    butalbital-acetaminophen-caffeine (FIORICET, ESGIC) -40 mg per tablet Take 1 Tablet by mouth three (3) times daily as needed for Headache. Indications: tension headache    liraglutide (VICTOZA) 0.6 mg/0.1 mL (18 mg/3 mL) pnij 1.8 mg by SubCUTAneous route daily.  insulin detemir U-100 (Levemir FlexTouch U-100 Insuln) 100 unit/mL (3 mL) inpn Inject 55-60 units twice daily    metFORMIN ER (GLUCOPHAGE XR) 500 mg tablet Take 1 Tablet by mouth two (2) times daily (after meals).  atorvastatin (LIPITOR) 40 mg tablet TAKE 1 TABLET BY MOUTH EVERY DAY    Insulin Needles, Disposable, (Unifine Pentips) 32 gauge x 5/32\" ndle USE TO INJECT INSULIN 5 TIMES DAILY    Eliquis 5 mg tablet Take 1 Tab by mouth two (2) times a day.  OneTouch Ultra Blue Test Strip strip Test 4 times daily. DX E11.9    labetaloL (NORMODYNE) 200 mg tablet Take 1 Tab by mouth nightly.  K-Phos-NeutraL 250 mg tablet TAKE 1 TABLET BY MOUTH EVERY DAY    magnesium oxide (MAG-OX) 400 mg tablet Take 1 Tab by mouth daily. (Patient taking differently: Take 800 mg by mouth two (2) times a day.)    traZODone (DESYREL) 100 mg tablet Take 1 Tab by mouth nightly. (Patient taking differently: Take 100 mg by mouth nightly as needed.)    pantoprazole (PROTONIX) 40 mg tablet TAKE ONE TABLET BY MOUTH ONCE DAILY    Blood-Glucose Meter, Drum-type (ACCU-CHEK COMPACT PLUS CARE) kit Test blood sugar 4 times daily    Blood Sugar Diagnostic, Drum (ACCU-CHEK COMPACT PLUS TEST) strp Test blood sugar 4 times daily    lancets (ONE TOUCH DELICA) 33 gauge misc 4 times daily.  Diagnosis code: E11.8    albuterol (PROVENTIL HFA, VENTOLIN HFA, PROAIR HFA) 90 mcg/actuation inhaler Take 2 Puffs by inhalation every four (4) hours as needed for Wheezing.  predniSONE (DELTASONE) 5 mg tablet Take 1 Tab by mouth daily.  cinacalcet (SENSIPAR) 30 mg tablet Take 30 mg by mouth daily.  aspirin delayed-release 81 mg tablet Take  by mouth daily.  mycophenolate mofetil (CELLCEPT) 250 mg capsule Take 250 mg by mouth. 2 tabs in the am and 4 tabs in the pm    tacrolimus (PROGRAF) 1 mg capsule Take 2 mg by mouth. Indications: taking 1 mg in am and 2 mg at [facility]     No current facility-administered medications for this visit. Social History     Socioeconomic History    Marital status: SINGLE     Spouse name: Not on file    Number of children: Not on file    Years of education: Not on file    Highest education level: Not on file   Occupational History    Not on file   Tobacco Use    Smoking status: Never Smoker    Smokeless tobacco: Never Used   Substance and Sexual Activity    Alcohol use: No     Alcohol/week: 0.0 standard drinks    Drug use: No    Sexual activity: Yes     Partners: Male     Birth control/protection: Pill, None   Other Topics Concern    Not on file   Social History Narrative    Lives in [facility] with moni and 1 son and 1 daughter. Also has another son. Works as a CNA. No hobbies. Social Determinants of Health     Financial Resource Strain:     Difficulty of Paying Living Expenses: Not on file   Food Insecurity:     Worried About Running Out of Food in the Last Year: Not on file    Araceli of Food in the Last Year: Not on file   Transportation Needs:     Lack of Transportation (Medical): Not on file    Lack of Transportation (Non-Medical):  Not on file   Physical Activity:     Days of Exercise per Week: Not on file    Minutes of Exercise per Session: Not on file   Stress:     Feeling of Stress : Not on file   Social Connections:     Frequency of Communication with Friends and Family: Not on file    Frequency of Social Gatherings with Friends and Family: Not on file    Attends [employer] Services: Not on file    Active Member of Clubs or Organizations: Not on file    Attends Club or Organization Meetings: Not on file    Marital Status: Not on file   Intimate Partner Violence:     Fear of Current or Ex-Partner: Not on file    Emotionally Abused: Not on file    Physically Abused: Not on file    Sexually Abused: Not on file   Housing Stability:     Unable to Pay for Housing in the Last Year: Not on file    Number of [place] in the Last Year: Not on file    Unstable Housing in the Last Year: Not on file       Past Surgical History:   Procedure Laterality Date    COLONOSCOPY N/A 2016    COLONOSCOPY performed by Eliel Caballero MD at [address] [facility]   [id number] [facility]    laparoscopic    HX COLONOSCOPY      HX ENDOSCOPY      HX MOHS PROCEDURES Left     HX OTHER SURGICAL  7/30/15    excision of left Axilla Hidradenitis    HX RENAL BIOPSY  2011    HX RENAL TRANSPLANT  2017    HX UROLOGICAL  2012    KIDNEY BX RIGHT - pt states she has only had one kidney bx as of 10/31/2016    HX UROLOGICAL  2017    transplant    HX VASCULAR ACCESS  2013    CHEST CATHETER FOR DIALYSIS    HX VASCULAR ACCESS Right     A-V fistula    HX WISDOM TEETH EXTRACTION      RI ANESTH,SURGERY OF SHOULDER         Family History   Problem Relation Age of Onset    Hypertension Mother     Arthritis-osteo Mother     Hypertension Sister     Diabetes Maternal Grandfather     Cancer Father         UNKNOWN    Hypertension Maternal Grandmother     Diabetes Son     Asthma Son     Cancer Maternal Aunt         UNKNOWN    Breast Cancer Maternal Aunt     Asthma Brother     Anesth Problems Neg Hx         OB History        4    Para   4    Term   4            AB        Living           SAB IAB        Ectopic        Molar        Multiple        Live Births                       REVIEW OF SYSTEMS:  ROS     Positive for: Musculoskeletal    Last edited by Nabeel Hinton on 11/8/2021  3:17 PM. (History)        Patient denies any recent fever, chills, nausea, vomiting, chest pain, or shortness of breath. Vitals:  BP (!) 116/59   Ht 5' 3\" (1.6 m)   Wt 179 lb (81.2 kg)   BMI 31.71 kg/m²    Body mass index is 31.71 kg/m². PHYSICAL EXAM:  General exam: Patient is awake, alert, and oriented x3. Well-appearing. No acute distress. Ambulates with a normal gait    Neck: There is tenderness to palpation in the paraspinal region. No obvious midline tenderness to palpation. There is stiffness with flexion and extension of the cervical spine. Negative Spurling's exam.  No erythema or ecchymosis. Left shoulder: Neurovascular and sensory intact. There is tenderness to palpation at the anterior lateral shoulder. Slight limitation in passive range of motion on exam.  There is pain with active overhead range of motion. Pain is noted with impingement testing including Khalil exam.  Pain is also noted with rotator cuff strength testing including resisted abduction and resisted external rotation. Normal stability. There is some tenderness palpation at the [facility] joint and pain with crossarm exam.    Right elbow: Neurovascular and sensory intact. There is tenderness palpation of the lateral epicondyle. Resisted wrist extension and forearm supination recreates this pain. No significant erythema or ecchymosis. Normal stability of the elbow and normal strength with flexion and extension of the elbow noted. IMAGING:  MRI of the left shoulder shows evidence of her previous acromioplasty and distal clavicle resection.   No signs of rotator cuff tear        Orders Placed This Encounter    REFERRAL TO PHYSICAL THERAPY     Referral Priority:   Routine     Referral Type:   PT/OT/ST     Referral Reason: Specialty Services Required     Requested Specialty:   Physical Therapy     Number of Visits Requested:   1              An electronic signature was used to authenticate this note.   -- Mary Ruffin, DO

## 2021-11-08 NOTE — LETTER
11/8/2021    Patient: Diana Singleton   YOB: 1965   Date of Visit: 11/8/2021     Chadd Ibrahim MD  [facility], [address]  Via In Basket    Dear Chadd Ibrahim MD,      Thank you for referring Ms. John Bloom to [facility] for evaluation. My notes for this consultation are attached. If you have questions, please do not hesitate to call me. I look forward to following your patient along with you.       Sincerely,    Anibal Duque, DO

## 2021-11-08 NOTE — PROGRESS NOTES
Chief Complaint   Patient presents with    Diabetes    Headache     1. Have you been to the ER, urgent care clinic since your last visit? Hospitalized since your last visit? No    2. Have you seen or consulted any other health care providers outside of the [address] [place] [address] since your last visit? Include any pap smears or colon screening.  Yes- ovarian cyst.     Visit Vitals  BP (!) 116/59   Pulse 85   Temp 98.3 °F (36.8 °C) (Temporal)   Resp 20   Ht 5' 3\" (1.6 m)   Wt 179 lb (81.2 kg)   SpO2 98%   BMI 31.71 kg/m²

## 2021-11-09 NOTE — PROGRESS NOTES
Chief Complaint   Patient presents with    Diabetes    Headache     SUBJECTIVE:  Flex Barnes is a 49 y.o. female who sustained a right elbow injury 2 month(s) ago. Mechanism of injury: none . Immediate symptoms: delayed pain. Symptoms have been chronic since that time. Prior history of related problems: no prior problems with this area in the past.    OBJECTIVE:  Vital signs as noted above. Appearance: alert, well appearing, and in no distress and oriented to person, place, and time. Elbow exam: soft tissue tenderness and swelling at the lat malleolus. X-ray: not indicated. ASSESSMENT:  elbow tendon injury    PLAN:  rest the injured area as much as practical, apply ice packs, referral to Physical Therapy  See orders for this visit as documented in the electronic medical record. Topical nsaids    Exercise program for specific area of concern is given with written instructions      Also describes a sharp lancinating pain may be happens twice a week for 30 to 60 seconds in the left temple area. It is usually related to wearing her glasses where she wears a hair piece on her head. There is nothing on exam.  I suspect she is having some cranial neuralgia may be possibly related to the tight hair piece or the glasses. No therapy. No imaging necessary. Vitals:    11/08/21 1324   BP: (!) 116/59   Pulse: 85   Resp: 20   Temp: 98.3 °F (36.8 °C)   TempSrc: Temporal   SpO2: 98%   Weight: 179 lb (81.2 kg)   Height: 5' 3\" (1.6 m)     Diagnoses and all orders for this visit:    1. History of right tennis elbow    2. Neuralgia involving scalp    3.  Encounter for immunization  -     INFLUENZA VIRUS VAC QUAD,SPLIT,PRESV FREE SYRINGE IM

## 2021-11-16 DIAGNOSIS — E78.5 DYSLIPIDEMIA: ICD-10-CM

## 2021-11-16 RX ORDER — ATORVASTATIN CALCIUM 40 MG/1
TABLET, FILM COATED ORAL
Qty: 90 TABLET | Refills: 1 | Status: SHIPPED | OUTPATIENT
Start: 2021-11-16 | End: 2022-05-10

## 2021-11-29 ENCOUNTER — TELEPHONE (OUTPATIENT)
Dept: CARDIOLOGY CLINIC | Age: 56
End: 2021-11-29

## 2021-11-29 NOTE — TELEPHONE ENCOUNTER
Patient is low risk for cardiovascular complications during non-cardiac procedure  Scheduled for EGD 1/12/22 with Dr. Aurea Bloom  Can hold Eliquis x 48 hours prior to procedure  Clearance request is on my desk - will bring it to you later    Future Appointments   Date Time Provider Trisha Culver   2/11/2022 11:30 AM MD HE Middleton BS AMB   4/22/2022 11:20 AM MD HALLE Ryder AMB   10/24/2022  8:40 AM Naldo Marroquin, AUGUSTIN NERI BS AMB

## 2021-12-21 LAB
ALBUMIN SERPL-MCNC: 4.4 G/DL (ref 3.8–4.9)
ALBUMIN/CREAT UR: 30 MG/G CREAT (ref 0–29)
ALBUMIN/GLOB SERPL: 1.8 {RATIO} (ref 1.2–2.2)
ALP SERPL-CCNC: 72 IU/L (ref 44–121)
ALT SERPL-CCNC: 16 IU/L (ref 0–32)
AST SERPL-CCNC: 12 IU/L (ref 0–40)
BILIRUB SERPL-MCNC: 0.3 MG/DL (ref 0–1.2)
BUN SERPL-MCNC: 29 MG/DL (ref 6–24)
BUN/CREAT SERPL: 18 (ref 9–23)
CALCIUM SERPL-MCNC: 10 MG/DL (ref 8.7–10.2)
CHLORIDE SERPL-SCNC: 99 MMOL/L (ref 96–106)
CHOLEST SERPL-MCNC: 147 MG/DL (ref 100–199)
CO2 SERPL-SCNC: 25 MMOL/L (ref 20–29)
CREAT SERPL-MCNC: 1.58 MG/DL (ref 0.57–1)
CREAT UR-MCNC: 114.6 MG/DL
EST. AVERAGE GLUCOSE BLD GHB EST-MCNC: 240 MG/DL
GLOBULIN SER CALC-MCNC: 2.5 G/DL (ref 1.5–4.5)
GLUCOSE SERPL-MCNC: 183 MG/DL (ref 65–99)
HBA1C MFR BLD: 10 % (ref 4.8–5.6)
HDLC SERPL-MCNC: 56 MG/DL
IMP & REVIEW OF LAB RESULTS: NORMAL
INTERPRETATION: NORMAL
LDLC SERPL CALC-MCNC: 69 MG/DL (ref 0–99)
MICROALBUMIN UR-MCNC: 34.5 UG/ML
POTASSIUM SERPL-SCNC: 4.2 MMOL/L (ref 3.5–5.2)
PROT SERPL-MCNC: 6.9 G/DL (ref 6–8.5)
SODIUM SERPL-SCNC: 137 MMOL/L (ref 134–144)
TRIGL SERPL-MCNC: 123 MG/DL (ref 0–149)
VLDLC SERPL CALC-MCNC: 22 MG/DL (ref 5–40)

## 2022-01-07 ENCOUNTER — HOSPITAL ENCOUNTER (OUTPATIENT)
Dept: PREADMISSION TESTING | Age: 57
Discharge: HOME | End: 2022-01-07
Attending: INTERNAL MEDICINE
Payer: MEDICARE

## 2022-01-07 ENCOUNTER — TRANSCRIBE ORDER (OUTPATIENT)
Dept: REGISTRATION | Age: 57
End: 2022-01-07

## 2022-01-07 DIAGNOSIS — Z01.812 PRE-PROCEDURE LAB EXAM: ICD-10-CM

## 2022-01-07 DIAGNOSIS — Z01.812 PRE-PROCEDURE LAB EXAM: Primary | ICD-10-CM

## 2022-01-07 PROCEDURE — U0005 INFEC AGEN DETEC AMPLI PROBE: HCPCS

## 2022-01-09 LAB
SARS-COV-2, XPLCVT: NOT DETECTED
SOURCE, COVRS: NORMAL

## 2022-01-12 ENCOUNTER — HOSPITAL ENCOUNTER (OUTPATIENT)
Age: 57
Setting detail: OUTPATIENT SURGERY
Discharge: HOME | End: 2022-01-12
Attending: INTERNAL MEDICINE | Admitting: INTERNAL MEDICINE
Payer: MEDICARE

## 2022-01-12 ENCOUNTER — ANESTHESIA EVENT (OUTPATIENT)
Dept: ENDOSCOPY | Age: 57
End: 2022-01-12
Payer: MEDICARE

## 2022-01-12 ENCOUNTER — ANESTHESIA (OUTPATIENT)
Dept: ENDOSCOPY | Age: 57
End: 2022-01-12
Payer: MEDICARE

## 2022-01-12 VITALS
HEART RATE: 79 BPM | OXYGEN SATURATION: 100 % | SYSTOLIC BLOOD PRESSURE: 162 MMHG | RESPIRATION RATE: 29 BRPM | BODY MASS INDEX: 31.71 KG/M2 | DIASTOLIC BLOOD PRESSURE: 86 MMHG | HEIGHT: 63 IN | WEIGHT: 179 LBS | TEMPERATURE: 97 F

## 2022-01-12 LAB
COVID-19 RAPID TEST, COVR: NOT DETECTED
SOURCE, COVRS: NORMAL

## 2022-01-12 PROCEDURE — 76040000019: Performed by: INTERNAL MEDICINE

## 2022-01-12 PROCEDURE — 74011000250 HC RX REV CODE- 250: Performed by: NURSE ANESTHETIST, CERTIFIED REGISTERED

## 2022-01-12 PROCEDURE — 74011250636 HC RX REV CODE- 250/636: Performed by: NURSE ANESTHETIST, CERTIFIED REGISTERED

## 2022-01-12 PROCEDURE — 2709999900 HC NON-CHARGEABLE SUPPLY: Performed by: INTERNAL MEDICINE

## 2022-01-12 PROCEDURE — 87635 SARS-COV-2 COVID-19 AMP PRB: CPT

## 2022-01-12 PROCEDURE — 76060000031 HC ANESTHESIA FIRST 0.5 HR: Performed by: INTERNAL MEDICINE

## 2022-01-12 RX ORDER — NALOXONE HYDROCHLORIDE 0.4 MG/ML
0.4 INJECTION, SOLUTION INTRAMUSCULAR; INTRAVENOUS; SUBCUTANEOUS
Status: DISCONTINUED | OUTPATIENT
Start: 2022-01-12 | End: 2022-01-12 | Stop reason: HOSPADM

## 2022-01-12 RX ORDER — PROPOFOL 10 MG/ML
INJECTION, EMULSION INTRAVENOUS AS NEEDED
Status: DISCONTINUED | OUTPATIENT
Start: 2022-01-12 | End: 2022-01-12 | Stop reason: HOSPADM

## 2022-01-12 RX ORDER — SODIUM CHLORIDE 0.9 % (FLUSH) 0.9 %
5-40 SYRINGE (ML) INJECTION EVERY 8 HOURS
Status: DISCONTINUED | OUTPATIENT
Start: 2022-01-12 | End: 2022-01-12 | Stop reason: HOSPADM

## 2022-01-12 RX ORDER — ATROPINE SULFATE 0.1 MG/ML
0.5 INJECTION INTRAVENOUS
Status: DISCONTINUED | OUTPATIENT
Start: 2022-01-12 | End: 2022-01-12 | Stop reason: HOSPADM

## 2022-01-12 RX ORDER — LIDOCAINE HYDROCHLORIDE 20 MG/ML
INJECTION, SOLUTION EPIDURAL; INFILTRATION; INTRACAUDAL; PERINEURAL AS NEEDED
Status: DISCONTINUED | OUTPATIENT
Start: 2022-01-12 | End: 2022-01-12 | Stop reason: HOSPADM

## 2022-01-12 RX ORDER — SODIUM CHLORIDE 0.9 % (FLUSH) 0.9 %
5-40 SYRINGE (ML) INJECTION AS NEEDED
Status: DISCONTINUED | OUTPATIENT
Start: 2022-01-12 | End: 2022-01-12 | Stop reason: HOSPADM

## 2022-01-12 RX ORDER — FLUMAZENIL 0.1 MG/ML
0.2 INJECTION INTRAVENOUS
Status: DISCONTINUED | OUTPATIENT
Start: 2022-01-12 | End: 2022-01-12 | Stop reason: HOSPADM

## 2022-01-12 RX ORDER — MIDAZOLAM HYDROCHLORIDE 1 MG/ML
.25-5 INJECTION, SOLUTION INTRAMUSCULAR; INTRAVENOUS
Status: DISCONTINUED | OUTPATIENT
Start: 2022-01-12 | End: 2022-01-12 | Stop reason: HOSPADM

## 2022-01-12 RX ORDER — EPINEPHRINE 0.1 MG/ML
1 INJECTION INTRACARDIAC; INTRAVENOUS
Status: DISCONTINUED | OUTPATIENT
Start: 2022-01-12 | End: 2022-01-12 | Stop reason: HOSPADM

## 2022-01-12 RX ORDER — DEXTROMETHORPHAN/PSEUDOEPHED 2.5-7.5/.8
1.2 DROPS ORAL
Status: DISCONTINUED | OUTPATIENT
Start: 2022-01-12 | End: 2022-01-12 | Stop reason: HOSPADM

## 2022-01-12 RX ORDER — SODIUM CHLORIDE 9 MG/ML
INJECTION, SOLUTION INTRAVENOUS
Status: DISCONTINUED | OUTPATIENT
Start: 2022-01-12 | End: 2022-01-12 | Stop reason: HOSPADM

## 2022-01-12 RX ORDER — FENTANYL CITRATE 50 UG/ML
25-200 INJECTION, SOLUTION INTRAMUSCULAR; INTRAVENOUS
Status: DISCONTINUED | OUTPATIENT
Start: 2022-01-12 | End: 2022-01-12 | Stop reason: HOSPADM

## 2022-01-12 RX ORDER — SODIUM CHLORIDE 9 MG/ML
50 INJECTION, SOLUTION INTRAVENOUS CONTINUOUS
Status: DISCONTINUED | OUTPATIENT
Start: 2022-01-12 | End: 2022-01-12 | Stop reason: HOSPADM

## 2022-01-12 RX ADMIN — LIDOCAINE HYDROCHLORIDE 100 MG: 20 INJECTION, SOLUTION EPIDURAL; INFILTRATION; INTRACAUDAL; PERINEURAL at 08:21

## 2022-01-12 RX ADMIN — SODIUM CHLORIDE: 900 INJECTION, SOLUTION INTRAVENOUS at 08:15

## 2022-01-12 RX ADMIN — PROPOFOL 20 MG: 10 INJECTION, EMULSION INTRAVENOUS at 08:23

## 2022-01-12 RX ADMIN — PROPOFOL 50 MG: 10 INJECTION, EMULSION INTRAVENOUS at 08:21

## 2022-01-12 RX ADMIN — PROPOFOL 30 MG: 10 INJECTION, EMULSION INTRAVENOUS at 08:22

## 2022-01-12 NOTE — H&P
[address]   [address], [address]      History and Physical       NAME:  Cyndee Hicks   :   1965   MRN:   [id number]             History of Present Illness:  Patient is a 49 y. o. who is seen for epigastric pain. PMH:  Past Medical History:   Diagnosis Date    Anemia     Arthritis     Asthma 2014    Rx for same    Chronic kidney disease 2012    Diabetes ([address] 26yo    Rx to control    GERD (gastroesophageal reflux disease)     Headache(784.0)     Hepatitis B infection     Hypertension     Rx for same    Kidney transplanted 2017    Left shoulder pain 2021    Sebaceous cyst 2019    Tear of left rotator cuff 2021    Thromboembol[address] ([facility] [address]        PSH:  Past Surgical History:   Procedure Laterality Date    COLONOSCOPY N/A 2016    COLONOSCOPY performed by Boom Miranda MD at [address] [facility]       [address]     [address]    laparoscopic    HX COLONOSCOPY      HX ENDOSCOPY      HX MOHS PROCEDURES Left     HX OTHER SURGICAL  7/30/15    excision of left Axilla Hidradenitis    HX RENAL BIOPSY  2011    HX RENAL TRANSPLANT  2017    HX UROLOGICAL  2012    KIDNEY BX RIGHT - pt states she has only had one kidney bx as of 10/31/2016    HX UROLOGICAL  2017    transplant    HX VASCULAR ACCESS  2013    CHEST CATHETER FOR DIALYSIS    HX VASCULAR ACCESS Right     A-V fistula    HX WISDOM TEETH EXTRACTION      VT ANESTH,SURGERY OF SHOULDER         Allergies: Allergies   Allergen Reactions    Latex Shortness of Breath    Keflex [Cephalexin] Nausea and Vomiting     Stomach cramping    Tramadol Itching       Home Medications:  Prior to Admission Medications   Prescriptions Last Dose Informant Patient Reported? Taking?    Blood Sugar Diagnostic, Drum (ACCU-CHEK COMPACT PLUS TEST) strp 2022 at Unknown time  No Yes   Sig: Test blood sugar 4 times daily Blood-Glucose Meter, Drum-type (ACCU-CHEK COMPACT PLUS CARE) kit 2022 at Unknown time  No Yes   Sig: Test blood sugar 4 times daily   Eliquis 5 mg tablet 2022  Yes No   Sig: Take 1 Tab by mouth two (2) times a day. Fiasp FlexTouch U-100 Insulin 100 unit/mL (3 mL) inpn 2022 at Unknown time  No Yes   Sig: AS DIRECTED FOR high glucoses AND FOR carbohydrate intake UP TO 50 units/day   Insulin Needles, Disposable, (Unifine Pentips) 32 gauge x \" ndle 2022 at Unknown time  No Yes   Sig: USE TO INJECT INSULIN 5 TIMES DAILY   K-Phos-NeutraL 250 mg tablet 2022 at Unknown time  Yes Yes   Sig: TAKE 1 TABLET BY MOUTH EVERY DAY   OneTouch Ultra Blue Test Strip strip 2022 at Unknown time  No Yes   Sig: Test 4 times daily. DX E11.9   PARoxetine mesylate,menop.sym, 7.5 mg cap 2022 at Unknown time  Yes Yes   Sig: paroxetine mesylate (menopausal symptoms suppressant) 7.5 mg capsule   TAKE 1 CAPSULE BY MOUTH EVERY DAY   albuterol (PROVENTIL HFA, VENTOLIN HFA, PROAIR HFA) 90 mcg/actuation inhaler 2021 at Unknown time  No Yes   Sig: Take 2 Puffs by inhalation every four (4) hours as needed for Wheezing. aspirin delayed-release 81 mg tablet 2022 at Unknown time  Yes Yes   Sig: Take  by mouth daily. atorvastatin (LIPITOR) 40 mg tablet 2022 at Unknown time  No Yes   Sig: TAKE 1 TABLET BY MOUTH EVERY DAY   butalbital-acetaminophen-caffeine (FIORICET, ESGIC) -40 mg per tablet 2022 at Unknown time  No Yes   Sig: Take 1 Tablet by mouth three (3) times daily as needed for Headache. Indications: tension headache   cinacalcet (SENSIPAR) 30 mg tablet 2022 at Unknown time  Yes Yes   Sig: Take 30 mg by mouth daily.    diclofenac (VOLTAREN) 1 % gel 2022 at Unknown time  No Yes   Si (TWO) GRAM APPLY TO AFFECTED AREA FOUR TIMES DAILY   fenofibrate nanocrystallized (TRICOR) 145 mg tablet 2022 at Unknown time  No Yes   Sig: TAKE 1 TABLET BY MOUTH EVERY DAY insulin detemir U-100 (Levemir FlexTouch U-100 Insuln) 100 unit/mL (3 mL) inpn 2022 at Unknown time  No Yes   Sig: Inject 55-60 units twice daily   labetaloL (NORMODYNE) 200 mg tablet 2022 at Unknown time  Yes Yes   Sig: Take 1 Tab by mouth nightly. lancets (ONE TOUCH DELICA) 33 gauge misc 4468 at Unknown time  No Yes   Si times daily. Diagnosis code: E11.8   liraglutide (VICTOZA) 0.6 mg/0.1 mL (18 mg/3 mL) pnij 2022 at Unknown time  No Yes   Si.8 mg by SubCUTAneous route daily. magnesium oxide (MAG-OX) 400 mg tablet 2022 at Unknown time  No Yes   Sig: Take 1 Tab by mouth daily. Patient taking differently: Take 800 mg by mouth two (2) times a day. metFORMIN ER (GLUCOPHAGE XR) 500 mg tablet 2022 at Unknown time  No Yes   Sig: Take 1 Tablet by mouth two (2) times daily (after meals). methylPREDNISolone (Medrol, Michael,) 4 mg tablet 2022 at Unknown time  No Yes   Sig: Use as directed   mycophenolate mofetil (CELLCEPT) 250 mg capsule 2022 at Unknown time  Yes Yes   Sig: Take 250 mg by mouth. 2 tabs in the am and 4 tabs in the pm   pantoprazole (PROTONIX) 40 mg tablet 2022 at Unknown time  No Yes   Sig: TAKE ONE TABLET BY MOUTH ONCE DAILY   predniSONE (DELTASONE) 5 mg tablet 2022 at Unknown time  Yes Yes   Sig: Take 1 Tab by mouth daily. tacrolimus (PROGRAF) 1 mg capsule 2022 at Unknown time  Yes Yes   Sig: Take 2 mg by mouth. Indications: taking 1 mg in am and 2 mg at QHS   traZODone (DESYREL) 100 mg tablet 2022 at Unknown time  No Yes   Sig: Take 1 Tab by mouth nightly. Patient taking differently: Take 100 mg by mouth nightly as needed.       Facility-Administered Medications: None       Hospital Medications:  Current Facility-Administered Medications   Medication Dose Route Frequency    0.9% sodium chloride infusion  50 mL/hr IntraVENous CONTINUOUS    sodium chloride (NS) flush 5-40 mL  5-40 mL IntraVENous Q8H    sodium chloride (NS) flush 5-40 mL  5-40 mL IntraVENous PRN    midazolam (VERSED) injection 0.25-5 mg  0.25-5 mg IntraVENous Multiple    fentaNYL citrate (PF) injection  mcg   mcg IntraVENous Multiple    naloxone (NARCAN) injection 0.4 mg  0.4 mg IntraVENous Multiple    flumazeniL (ROMAZICON) 0.1 mg/mL injection 0.2 mg  0.2 mg IntraVENous Multiple    simethicone (MYLICON) 15LM/7.2HB oral drops 80 mg  1.2 mL Oral Multiple    atropine injection 0.5 mg  0.5 mg IntraVENous ONCE PRN    EPINEPHrine (ADRENALIN) 0.1 mg/mL syringe 1 mg  1 mg Endoscopically ONCE PRN       Social History:  Social History     Tobacco Use    Smoking status: Never Smoker    Smokeless tobacco: Never Used   Substance Use Topics    Alcohol use: No     Alcohol/week: 0.0 standard drinks       Family History:  Family History   Problem Relation Age of Onset    Hypertension Mother     OSTEOARTHRITIS Mother     Hypertension Sister     Diabetes Maternal Grandfather     Cancer Father         UNKNOWN    Hypertension Maternal Grandmother     Diabetes Son     Asthma Son     Cancer Maternal Aunt         UNKNOWN    Breast Cancer Maternal Aunt     Asthma Brother     Anesth Problems Neg Hx          The patient was counseled at length about the risks of ann Covid-19 in the melody-operative and post-operative states including the recovery window of their procedure. The patient was made aware that ann Covid-19 after a surgical procedure may worsen their prognosis for recovering from the virus and lend to a higher morbidity and or mortality risk. The patient was given the options of postponing their procedure. All of the risks, benefits, and alternatives were discussed. The patient does  wish to proceed with the procedure.     Review of Systems:      Constitutional: negative fever, negative chills, negative weight loss  Eyes:   negative visual changes  ENT:   negative sore throat, tongue or lip swelling  Respiratory:  negative cough, negative dyspnea  Cards:  negative for chest pain, palpitations, lower extremity edema  GI:   See HPI  :  negative for frequency, dysuria  Integument:  negative for rash and pruritus  Heme:  negative for easy bruising and gum/nose bleeding  Musculoskel: negative for myalgias,  back pain and muscle weakness  Neuro: negative for headaches, dizziness, vertigo  Psych:  negative for feelings of anxiety, depression       Objective:   No data found. No intake/output data recorded. No intake/output data recorded. EXAM:     NEURO-a&o   HEENT-wnl   LUNGS-clear    COR-regular rate and rhythym     ABD-soft , no tenderness, no rebound, good bs     EXT-no edema     Data Review     No results for input(s): WBC, HGB, HCT, PLT, HGBEXT, HCTEXT, PLTEXT in the last 72 hours. No results for input(s): NA, K, CL, CO2, BUN, CREA, GLU, PHOS, CA in the last 72 hours. No results for input(s): AP, TBIL, TP, ALB, GLOB, GGT, AML, LPSE in the last 72 hours. No lab exists for component: SGOT, GPT, AMYP, HLPSE  No results for input(s): INR, PTP, APTT, INREXT in the last 72 hours. Assessment:     · Epigastric pain     Patient Active Problem List   Diagnosis Code    Hypertension I10    Kidney disease N28.9    Migraines G43.909    Diabetes mellitus ([facility] [address] E11.9    Anemia D64.9    Hyperkalemia E87.5    DM (diabetes mellitus) ([facility] E11.9    Depression F32. A    Asthma J45.909    Dyslipidemia E78.5    Mitral regurgitation I34.0    Renal transplant recipient Z94.0    Type 2 diabetes mellitus without complication, with long-term current use of insulin (HCC) E11.9, Z79.4    Sebaceous cyst L72.3    Cystitis bacillary, chronic N30.20    History of DVT (deep vein thrombosis) Z86.718    Pelvic mass R19.00    Closed left ankle fracture, initial encounter S82.892A    Ankle dislocation, left, initial encounter S93. 05XA    Dizziness R42    Trimalleolar fracture [id number]    Left shoulder pain M25.512    Tear of left rotator cuff M75.102     Plan:   ·   · Endoscopic procedure with sedation     Signed By: Lisa Brooks MD     1/12/2022  8:09 AM

## 2022-01-12 NOTE — ANESTHESIA POSTPROCEDURE EVALUATION
Post-Anesthesia Evaluation and Assessment    Patient: Mikayla Acevedo MRN: [id number]  SSN: [id number]    YOB: 1965  Age: 49 y.o. Sex: female      I have evaluated the patient and they are stable and ready for discharge from the PACU. Cardiovascular Function/Vital Signs  Visit Vitals  /76   Pulse 78   Temp 36.6 °C (97.9 °F)   Resp 16   Ht 5' 3\" (1.6 m)   Wt 81.2 kg (179 lb)   SpO2 100%   Breastfeeding No   BMI 31.71 kg/m²       Patient is status post MAC anesthesia for Procedure(s):  ESOPHAGOGASTRODUODENOSCOPY (EGD)   :-.    Nausea/Vomiting: None    Postoperative hydration reviewed and adequate. Pain:  Pain Scale 1: Numeric (0 - 10) (01/12/22 0818)  Pain Intensity 1: 0 (01/12/22 0818)   Managed    Neurological Status: At baseline    Mental Status, Level of Consciousness: Alert and  oriented to person, place, and time    Pulmonary Status:   O2 Device: None (01/12/22 0829)   Adequate oxygenation and airway patent    Complications related to anesthesia: None    Post-anesthesia assessment completed. No concerns    Signed By: Krystle Walker MD     January 12, 2022              Procedure(s):  ESOPHAGOGASTRODUODENOSCOPY (EGD)   :-.    MAC    <BSHSIANPOST>    INITIAL Post-op Vital signs: No vitals data found for the desired time range.

## 2022-01-12 NOTE — ROUTINE PROCESS
[address] [facility]  1965  [id number]    Situation:  Verbal report received from: Colan Face  Procedure: Procedure(s):  ESOPHAGOGASTRODUODENOSCOPY (EGD)   :-    Background:    Preoperative diagnosis: NASEAU;ABDOMINAL PAIN;EARLY SATIETY  Postoperative diagnosis: Retained food, otherwise normal    :  Dr. Dennie Molt  Assistant(s): Endoscopy Technician-1: Maddi Mancuso  Endoscopy RN-1: Russell Vivar RN    Specimens: no  H.  Pylori  no    Assessment:  Intra-procedure medications     Anesthesia gave intra-procedure sedation and medications, see anesthesia flow sheet    Intravenous fluids: NS@ KVO     Vital signs stable     Abdominal assessment: round and soft     Recommendation:  Discharge patient per MD order  Family -yes  Permission to share finding with family -yes

## 2022-01-12 NOTE — DISCHARGE INSTRUCTIONS
[address], [address]    EGD DISCHARGE INSTRUCTIONS    Kellee Saeed  [id number]  1965    Discomfort:  Sore throat- throat lozenges or warm salt water gargle  redness at IV site- apply warm compress to area; if redness or soreness persist- contact your physician  Gaseous discomfort- walking, belching will help relieve any discomfort  You may not operate a vehicle for 12 hours  You may not engage in an occupation involving machinery or appliances for rest of today  You may not drink alcoholic beverages for at least 12 hours  Avoid making any critical decisions for at least 24 hour  DIET  You may resume your regular diet - however -  remember your colon is empty and a heavy meal will produce gas. Avoid these foods:  vegetables, fried / greasy foods, carbonated drinks    ACTIVITY  You may resume your normal daily activities   Spend the remainder of the day resting -  avoid any strenuous activity. CALL M.D. ANY SIGN OF   Increasing pain, nausea, vomiting  Abdominal distension (swelling)  New increased bleeding (oral or rectal)  Fever (chills)  Pain in chest area  Bloody discharge from nose or mouth  Shortness of breath    Follow-up Instructions:   Call Dr. Jessie Shultz for any questions or problems and follow up with him in 6 weeks  Telephone # [phone]  Will mail you gastroparesis diet  Needs tight glycemic control  Will order gastric emptying scan to measure the motility of your stomach     ENDOSCOPY FINDINGS:   Your endoscopy was normal, there was some undigested food in stomach, indicative of slow emptying of stomach ( gastroparesis). Signed By: Jessie Shultz MD     1/12/2022  8:29 AM         Learning About Coronavirus (COVID-19)  Coronavirus (COVID-19): Overview  What is coronavirus ([id number])? The coronavirus disease (COVID-19) is caused by a virus. It is an illness that was first found in [place], [place], in December 2019.  It has since spread worldwide. The virus can cause fever, cough, and trouble breathing. In severe cases, it can cause pneumonia and make it hard to breathe without help. It can cause death. Coronaviruses are a large group of viruses. They cause the common cold. They also cause more serious illnesses like Middle East respiratory syndrome (MERS) and severe acute respiratory syndrome (SARS). COVID-19 is caused by a novel coronavirus. That means it's a new type that has not been seen in people before. This virus spreads person-to-person through droplets from coughing and sneezing. It can also spread when you are close to someone who is infected. And it can spread when you touch something that has the virus on it, such as a doorknob or a tabletop. What can you do to protect yourself from coronavirus (COVID-19)? The best way to protect yourself from getting sick is to:  · Avoid areas where there is an outbreak. · Avoid contact with people who may be infected. · Wash your hands often with soap or alcohol-based hand sanitizers. · Avoid crowds and try to stay at least 6 feet away from other people. · Wash your hands often, especially after you cough or sneeze. Use soap and water, and scrub for at least 20 seconds. If soap and water aren't available, use an alcohol-based hand . · Avoid touching your mouth, nose, and eyes. What can you do to avoid spreading the virus to others? To help avoid spreading the virus to others:  · Cover your mouth with a tissue when you cough or sneeze. Then throw the tissue in the trash. · Use a disinfectant to clean things that you touch often. · Stay home if you are sick or have been exposed to the virus. Don't go to school, work, or public areas. And don't use public transportation. · If you are sick:  ? Leave your home only if you need to get medical care. But call the doctor's office first so they know you're coming.  And wear a face mask, if you have one.  ? If you have a face mask, wear it whenever you're around other people. It can help stop the spread of the virus when you cough or sneeze. ? Clean and disinfect your home every day. Use household  and disinfectant wipes or sprays. Take special care to clean things that you grab with your hands. These include doorknobs, remote controls, phones, and handles on your refrigerator and microwave. And don't forget countertops, tabletops, bathrooms, and computer keyboards. When to call for help  Call 911 anytime you think you may need emergency care. For example, call if:  · You have severe trouble breathing. (You can't talk at all.)  · You have constant chest pain or pressure. · You are severely dizzy or lightheaded. · You are confused or can't think clearly. · Your face and lips have a blue color. · You pass out (lose consciousness) or are very hard to wake up. Call your doctor now if you develop symptoms such as:  · Shortness of breath. · Fever. · Cough. If you need to get care, call ahead to the doctor's office for instructions before you go. Make sure you wear a face mask, if you have one, to prevent exposing other people to the virus. Where can you get the latest information? The following health organizations are tracking and studying this virus. Their websites contain the most up-to-date information. [place] also learn what to do if you think you may have been exposed to the virus. · U.S. Centers for Disease Control and Prevention (CDC): The CDC provides updated news about the disease and travel advice. The website also tells you how to prevent the spread of infection. www.cdc.gov  · World Health Organization [facility]): WHO offers information about the virus outbreaks. WHO also has travel advice. www.who.int  Current as of: April 1, 2020               Content Version: 12.4  © 5214-4200 [employer].    Care instructions adapted under license by your healthcare professional. If you have questions about a medical condition or this instruction, always ask your healthcare professional. [place] [address] any warranty or liability for your use of this information.

## 2022-01-12 NOTE — ANESTHESIA PREPROCEDURE EVALUATION
Anesthetic History   No history of anesthetic complications            Review of Systems / Medical History  Patient summary reviewed, nursing notes reviewed and pertinent labs reviewed    Pulmonary  Within defined limits          Asthma        Neuro/Psych         Headaches and psychiatric history     Cardiovascular  Within defined limits  Hypertension                   GI/Hepatic/Renal     GERD  Hepatitis: type B  Renal disease  Liver disease     Endo/Other    Diabetes: type 2    Arthritis and anemia     Other Findings   Comments: Kidney TX           Physical Exam    Airway  Mallampati: II  TM Distance: > 6 cm  Neck ROM: normal range of motion   Mouth opening: Normal     Cardiovascular  Regular rate and rhythm,  S1 and S2 normal,  no murmur, click, rub, or gallop             Dental    Dentition: Caps/crowns     Pulmonary  Breath sounds clear to auscultation               Abdominal  GI exam deferred       Other Findings            Anesthetic Plan    ASA: 3  Anesthesia type: MAC          Induction: Intravenous  Anesthetic plan and risks discussed with: Patient

## 2022-01-12 NOTE — PROCEDURES
[address] [facility], [facility] (EGD) Procedure Note    [address]  1965  [id number]      Procedure: Endoscopic Gastroduodenoscopy --diagnostic    Indication:  Abdominal pain, epigastric     Pre-operative Diagnosis: see indication above    Post-operative Diagnosis: see findings below    : Ced Chilel MD    Surgical Assistant: Endoscopy Technician-1: Blanquita Alfredo  Endoscopy RN-1: Anthony Moreno RN    Implants:  None    Referring Provider:  Supriya Polanco MD      Anesthesia/Sedation:  MAC anesthesia Propofol        Procedure Details     After infomed consent was obtained for the procedure, with all risks and benefits of procedure explained the patient was taken to the endoscopy suite and placed in the left lateral decubitus position. Following sequential administration of sedation as per above, the endoscope was inserted into the mouth and advanced under direct vision to third portion of the duodenum. A careful inspection was made as the gastroscope was withdrawn, including a retroflexed view of the proximal stomach; findings and interventions are described below. Findings:   Esophagus:normal  Stomach: normal   There some undigested food in stomach, suggestive of gastroparesis    Duodenum/jejunum: normal      Therapies:  none    Specimens: none         EBL: None      Complications:   None; patient tolerated the procedure well. Impression:    -See post-procedure diagnoses.     Recommendations:  -Continue acid suppression.  -suspect gastroparesis, her HB a1c WAS 10 LAST AUGUST  -will provide her gastroparesis diet  -will order gastric emptying scan  -f/u in 6 weeks    Signed By: Ced Chilel MD     1/12/2022  8:26 AM

## 2022-01-13 ENCOUNTER — TRANSCRIBE ORDER (OUTPATIENT)
Dept: SCHEDULING | Age: 57
End: 2022-01-13

## 2022-01-13 DIAGNOSIS — K31.84 GASTROPARESIS: Primary | ICD-10-CM

## 2022-02-04 ENCOUNTER — HOSPITAL ENCOUNTER (OUTPATIENT)
Dept: NUCLEAR MEDICINE | Age: 57
Discharge: HOME | End: 2022-02-04
Attending: INTERNAL MEDICINE
Payer: MEDICARE

## 2022-02-04 DIAGNOSIS — K31.84 GASTROPARESIS: ICD-10-CM

## 2022-02-04 PROCEDURE — 78264 GASTRIC EMPTYING IMG STUDY: CPT

## 2022-02-04 RX ORDER — TECHNETIUM TC 99M SULFUR COLLOID 2 MG
0.52 KIT MISCELLANEOUS
Status: COMPLETED | OUTPATIENT
Start: 2022-02-04 | End: 2022-02-04

## 2022-02-04 RX ADMIN — TECHNETIUM TC 99M SULFUR COLLOID 0.52 MILLICURIE: KIT at 09:55

## 2022-02-11 ENCOUNTER — OFFICE VISIT (OUTPATIENT)
Dept: ENDOCRINOLOGY | Age: 57
End: 2022-02-11
Payer: MEDICARE

## 2022-02-11 VITALS
HEIGHT: 63 IN | DIASTOLIC BLOOD PRESSURE: 71 MMHG | HEART RATE: 85 BPM | SYSTOLIC BLOOD PRESSURE: 134 MMHG | BODY MASS INDEX: 32.07 KG/M2 | WEIGHT: 181 LBS

## 2022-02-11 DIAGNOSIS — Z79.4 TYPE 2 DIABETES MELLITUS WITH COMPLICATION, WITH LONG-TERM CURRENT USE OF INSULIN (HCC): Primary | ICD-10-CM

## 2022-02-11 DIAGNOSIS — I10 ESSENTIAL HYPERTENSION: ICD-10-CM

## 2022-02-11 DIAGNOSIS — E78.5 DYSLIPIDEMIA: ICD-10-CM

## 2022-02-11 DIAGNOSIS — E11.8 TYPE 2 DIABETES MELLITUS WITH COMPLICATION, WITH LONG-TERM CURRENT USE OF INSULIN (HCC): Primary | ICD-10-CM

## 2022-02-11 PROCEDURE — G9899 SCRN MAM PERF RSLTS DOC: HCPCS | Performed by: INTERNAL MEDICINE

## 2022-02-11 PROCEDURE — 3046F HEMOGLOBIN A1C LEVEL >9.0%: CPT | Performed by: INTERNAL MEDICINE

## 2022-02-11 PROCEDURE — G9717 DOC PT DX DEP/BP F/U NT REQ: HCPCS | Performed by: INTERNAL MEDICINE

## 2022-02-11 PROCEDURE — G8417 CALC BMI ABV UP PARAM F/U: HCPCS | Performed by: INTERNAL MEDICINE

## 2022-02-11 PROCEDURE — 99214 OFFICE O/P EST MOD 30 MIN: CPT | Performed by: INTERNAL MEDICINE

## 2022-02-11 PROCEDURE — 2022F DILAT RTA XM EVC RTNOPTHY: CPT | Performed by: INTERNAL MEDICINE

## 2022-02-11 PROCEDURE — G9231 DOC ESRD DIA TRANS PREG: HCPCS | Performed by: INTERNAL MEDICINE

## 2022-02-11 PROCEDURE — 3017F COLORECTAL CA SCREEN DOC REV: CPT | Performed by: INTERNAL MEDICINE

## 2022-02-11 PROCEDURE — G8427 DOCREV CUR MEDS BY ELIG CLIN: HCPCS | Performed by: INTERNAL MEDICINE

## 2022-02-11 RX ORDER — ERGOCALCIFEROL 1.25 MG/1
CAPSULE ORAL
COMMUNITY
End: 2022-08-26

## 2022-02-11 RX ORDER — INSULIN ASPART INJECTION 100 [IU]/ML
INJECTION, SOLUTION SUBCUTANEOUS
Qty: 60 ML | Refills: 3 | Status: SHIPPED | OUTPATIENT
Start: 2022-02-11

## 2022-02-11 RX ORDER — INSULIN DETEMIR 100 [IU]/ML
INJECTION, SOLUTION SUBCUTANEOUS
Qty: 105 ML | Refills: 3 | Status: SHIPPED | OUTPATIENT
Start: 2022-02-11

## 2022-02-11 NOTE — PROGRESS NOTES
Chief Complaint   Patient presents with    Diabetes     pcp and pharmacy verified     History of Present Illness: Rowan Rascon is a 49 y.o. female here for follow up of diabetes. Is currently taking 70 units of levemir bid over the past month. Has been trying to take fiasp 12 units before breakfast and dinner and metformin  mg bid and victoza 1.8 mg daily and will adjust her fiasp dose based on her freestyle jaycee readings. Fasting sugars can be in the 150-190 range but has seen 2-3 times a week under 150 with some down to the 80s at the lowest.  Can see spikes in the 250-260 range in the afternoon and does have lunch. Compliant with BP and lipid regimen. Started on vitamin D by Dr. Sarah Roldan.     she has the following indications to continue treatment with Freestyle Jaycee:  1) she has type 2 diabetes (E11.65) and is on an intensive insulin regimen with 5 injections per day  2) she tests her blood sugar 4 times per day and makes treatment decisions off her blood sugar readings and off freestyle jaycee sensor readings  3) she requires frequent adjustments to her insulin injection doses based on her freestyle jaycee sensor readings  4) she has benefitted from therapeutic continuous glucose monitoring and I recommend that she begin this  5) she is seen in my office every 4-6 months    Current Outpatient Medications   Medication Sig    ergocalciferol (ERGOCALCIFEROL) 1,250 mcg (50,000 unit) capsule ergocalciferol (vitamin D2) 1,250 mcg (50,000 unit) capsule   TAKE 1 CAPSULE BY MOUTH every WEEK FOR 90 DAYS    atorvastatin (LIPITOR) 40 mg tablet TAKE 1 TABLET BY MOUTH EVERY DAY    fenofibrate nanocrystallized (TRICOR) 145 mg tablet TAKE 1 TABLET BY MOUTH EVERY DAY    Fiasp FlexTouch U-100 Insulin 100 unit/mL (3 mL) inpn AS DIRECTED FOR high glucoses AND FOR carbohydrate intake UP TO 50 units/day    PARoxetine mesylate,menop.sym, 7.5 mg cap paroxetine mesylate (menopausal symptoms suppressant) 7.5 mg capsule   TAKE 1 CAPSULE BY MOUTH EVERY DAY    diclofenac (VOLTAREN) 1 % gel 2 (TWO) GRAM APPLY TO AFFECTED AREA FOUR TIMES DAILY    butalbital-acetaminophen-caffeine (FIORICET, ESGIC) -40 mg per tablet Take 1 Tablet by mouth three (3) times daily as needed for Headache. Indications: tension headache    liraglutide (VICTOZA) 0.6 mg/0.1 mL (18 mg/3 mL) pnij 1.8 mg by SubCUTAneous route daily.  insulin detemir U-100 (Levemir FlexTouch U-100 Insuln) 100 unit/mL (3 mL) inpn Inject 55-60 units twice daily (Patient taking differently: 70 units twice a day)    metFORMIN ER (GLUCOPHAGE XR) 500 mg tablet Take 1 Tablet by mouth two (2) times daily (after meals).  Insulin Needles, Disposable, (Unifine Pentips) 32 gauge x 5/32\" ndle USE TO INJECT INSULIN 5 TIMES DAILY    Eliquis 5 mg tablet Take 1 Tab by mouth two (2) times a day.  OneTouch Ultra Blue Test Strip strip Test 4 times daily. DX E11.9    labetaloL (NORMODYNE) 200 mg tablet Take 1 Tab by mouth nightly.  K-Phos-NeutraL 250 mg tablet TAKE 1 TABLET BY MOUTH EVERY DAY    magnesium oxide (MAG-OX) 400 mg tablet Take 1 Tab by mouth daily. (Patient taking differently: Take 800 mg by mouth two (2) times a day.)    traZODone (DESYREL) 100 mg tablet Take 1 Tab by mouth nightly. (Patient taking differently: Take 100 mg by mouth nightly as needed.)    pantoprazole (PROTONIX) 40 mg tablet TAKE ONE TABLET BY MOUTH ONCE DAILY    Blood-Glucose Meter, Drum-type (ACCU-CHEK COMPACT PLUS CARE) kit Test blood sugar 4 times daily    Blood Sugar Diagnostic, Drum (ACCU-CHEK COMPACT PLUS TEST) strp Test blood sugar 4 times daily    lancets (ONE TOUCH DELICA) 33 gauge misc 4 times daily. Diagnosis code: E11.8    predniSONE (DELTASONE) 5 mg tablet Take 1 Tab by mouth daily.  cinacalcet (SENSIPAR) 30 mg tablet Take 30 mg by mouth daily.  aspirin delayed-release 81 mg tablet Take  by mouth daily.     mycophenolate mofetil (CELLCEPT) 250 mg capsule Take 250 mg by mouth. 2 tabs in the am and 4 tabs in the pm    tacrolimus (PROGRAF) 1 mg capsule Take 2 mg by mouth. Indications: taking 1 mg in am and 2 mg at QHS    albuterol (PROVENTIL HFA, VENTOLIN HFA, PROAIR HFA) 90 mcg/actuation inhaler Take 2 Puffs by inhalation every four (4) hours as needed for Wheezing. (Patient not taking: Reported on 2/11/2022)     No current facility-administered medications for this visit. Allergies   Allergen Reactions    Latex Shortness of Breath    Keflex [Cephalexin] Nausea and Vomiting     Stomach cramping    Tramadol Itching     Review of Systems: PER HPI    Physical Examination:  Blood pressure 134/71, pulse 85, height 5' 3\" (1.6 m), weight 181 lb (82.1 kg). - General: pleasant, no distress, good eye contact   - Neck: no carotid bruits  - Cardiovascular: regular, normal rate, nl s1 and s2, no m/r/g,   - Respiratory: clear bilaterally  - Integumentary: no edema,   - Psychiatric: normal mood and affect    Data Reviewed:   Component      Latest Ref Rng & Units 12/20/2021 12/20/2021 12/20/2021 12/20/2021           9:12 AM  9:12 AM  9:12 AM  9:12 AM   Glucose      65 - 99 mg/dL    183 (H)   BUN      6 - 24 mg/dL    29 (H)   Creatinine      0.57 - 1.00 mg/dL    1.58 (H)   GFR est non-AA      >59 mL/min/1.73    37 (L)   GFR est AA      >59 mL/min/1.73    42 (L)   BUN/Creatinine ratio      9 - 23    18   Sodium      134 - 144 mmol/L    137   Potassium      3.5 - 5.2 mmol/L    4.2   Chloride      96 - 106 mmol/L    99   CO2      20 - 29 mmol/L    25   Calcium      8.7 - 10.2 mg/dL    10.0   Protein, total      6.0 - 8.5 g/dL    6.9   Albumin      3.8 - 4.9 g/dL    4.4   GLOBULIN, TOTAL      1.5 - 4.5 g/dL    2.5   A-G Ratio      1.2 - 2.2    1.8   Bilirubin, total      0.0 - 1.2 mg/dL    0.3   Alk.  phosphatase      44 - 121 IU/L    72   AST      0 - 40 IU/L    12   ALT      0 - 32 IU/L    16   Cholesterol, total      100 - 199 mg/dL   147    Triglyceride      0 - 149 mg/dL   123    HDL Cholesterol      >39 mg/dL   56    VLDL, calculated      5 - 40 mg/dL   22    LDL, calculated      0 - 99 mg/dL   69    Creatinine, urine      Not Estab. mg/dL  114.6     Microalbumin, urine      Not Estab. ug/mL  34.5     Microalbumin/Creat. Ratio      0 - 29 mg/g creat  30 (H)     Hemoglobin A1c, (calculated)      4.8 - 5.6 % 10.0 (H)      Estimated average glucose      mg/dL 240          Assessment/Plan:     1. Type 2 diabetes mellitus with complication, with long-term current use of insulin ([facility] [address]): her most recent Hgb A1c was 10% in 12/21 down from 10.8% in 7/21 up from 8.4% in 5/21 down from 9.6% in 4/21 up from 8.2% in 1/21 down from 9.1% in 11/20 up from 9.2% in 8/20 up from 8.2% in 12/19. She needs to take fiasp before all meals and gave her a new scale to adjust her dose. Will decrease her levemir to avoid lows. - decrease levemir to 60 units bid  - cont metformin  mg 1 tab bid  - cont victoza 1.8 mg daily  - begin fiasp 16 units before meals + 4 units for every 50 mg/dl above 150 mg/dl  - check bs 4 times per day due to fluctuating sugars  - cont freestyle jaycee  - foot exam due at next visit  - microalbumin 30 12/21  - optho UTD 2/20  - check A1c and bmp in 3 months  - check Hgb A1c, cmp, and microalbumin prior to next visit          2. Essential hypertension: BP at goal < 140/90  -  cont current regimen for now        3. Dyslipidemia: LDL 69 in 10/19 and 41 in 11/20 and 54 in 1/21 and 45 in 4/21 and 54 in 5/21 and 69 in 12/21on atorva 40  - cont atorva 40 mg daily   - check lipids prior to next visit         Patient Instructions   1) Please accept the invite I sent to your e-mail so you can share your [facility] data with me and I can see if there are any trends that warrant changes to your insulin regimen. Once you have done this, please let me know over mychart. Thanks. 2) Decrease the levemir back to 60 units twice daily.     3) Check your sugar before each meal and bedtime using the jaycee and adjust your fiasp (fast acting) based on the scales below:    Before meals  Blood sugar  Insulin dose          Less than 90  Eat first, take 12 units       16 units    151-200  20 units      201-250  24 units     251-300  28 units      301-350  32 units      351-400  36 units  401-450  40 units  451-500  44 units  Over 500  48 units    Bedtime  Under 150  None (just take the levemir)  151-200  4 units      201-250  8 units     251-300  12 units      301-350  16 units      351-400  20 units  401-450  24 units  451-500  28 units  Over 500  32 units    4) Your blood pressure is controlled. 5) I put an order directly into the [employer] system to repeat your labs in 3 months and in the 1-2 weeks prior to your next visit so just ask for the order under my name and you will receive a courtesy reminder through [employer] to have these drawn. Follow-up and Dispositions    · Return in about 6 months (around 8/11/2022). Copy sent to:  Arpita Gamboa MD as PCP - General (Internal Medicine)  Daria Pena MD as Physician (Cardiology)  Abilio Roach MD (Nephrology)    Lab follow up: 05/12/22  Received labs from transplant clinic from 4/13/22:  - Hgb A1c 8.8%  - lipids: total 150,  HDL 58, LDL 72  - ALT 18, AST 10  - BUN/Cr 25/1.46    Sent her the following message through [employer]:  I received your labs from transplant clinic from 4/13/22 that showed your A1c was 8.8% down from 10% in 12/21. I got a chance to review your [place] data and it appears you have some very good readings in the  range but still have many spikes in the 200-300 range. Is this from your diet or not taking your fiasp before you eat?   Your creatinine (kidney test) improved from 1.58 in 12/21 to 1.46 and your liver tests were normal and your cholesterol is at goal.

## 2022-02-11 NOTE — PATIENT INSTRUCTIONS
1) Please accept the invite I sent to your e-mail so you can share your emil[facility] data with me and I can see if there are any trends that warrant changes to your insulin regimen. Once you have done this, please let me know over [employer]. Thanks. 2) Decrease the levemir back to 60 units twice daily. 3) Check your sugar before each meal and bedtime using the jaycee and adjust your fiasp (fast acting) based on the scales below:    Before meals  Blood sugar  Insulin dose          Less than 90  Eat first, take 12 units       16 units    151-200  20 units      201-250  24 units     251-300  28 units      301-350  32 units      351-400  36 units  401-450  40 units  451-500  44 units  Over 500  48 units    Bedtime  Under 150  None (just take the levemir)  151-200  4 units      201-250  8 units     251-300  12 units      301-350  16 units      351-400  20 units  401-450  24 units  451-500  28 units  Over 500  32 units    4) Your blood pressure is controlled. 5) I put an order directly into the "[employer] system to repeat your labs in 3 months and in the 1-2 weeks prior to your next visit so just ask for the order under my name and you will receive a courtesy reminder through [employer] to have these drawn.

## 2022-02-25 ENCOUNTER — TELEPHONE (OUTPATIENT)
Dept: ENDOCRINOLOGY | Age: 57
End: 2022-02-25

## 2022-02-25 NOTE — TELEPHONE ENCOUNTER
Please call pt to let her know she has an unread message in [employer]:    From   Sarah Grayson MD To   [address] [place] [address] and Delivered   2/23/2022  6:25 PM   I just sent it again right now.  Let me know if you don't get it.  If you do, let me know if you are able to allow access so I can take a look at your readings. Thanks. Previous Messages    ----- Message -----   [address]   Sent:2/23/2022  4:35 PM EST          To:Kadeem Adkins MD     Subject:lab results     Good evening  can you please send the request again so I can share my information for my blood sugar levels please thank. Sorry I tryed to look for it. [email or web address] thanks again.

## 2022-03-18 PROBLEM — M75.102 TEAR OF LEFT ROTATOR CUFF: Status: ACTIVE | Noted: 2021-11-05

## 2022-03-18 PROBLEM — S82.892A CLOSED LEFT ANKLE FRACTURE, INITIAL ENCOUNTER: Status: ACTIVE | Noted: 2021-04-13

## 2022-03-18 PROBLEM — M25.512 LEFT SHOULDER PAIN: Status: ACTIVE | Noted: 2021-11-05

## 2022-03-18 PROBLEM — R42 DIZZINESS: Status: ACTIVE | Noted: 2021-04-13

## 2022-03-19 PROBLEM — Z79.4 TYPE 2 DIABETES MELLITUS WITHOUT COMPLICATION, WITH LONG-TERM CURRENT USE OF INSULIN (HCC): Status: ACTIVE | Noted: 2019-01-21

## 2022-03-19 PROBLEM — Z86.718 HISTORY OF DVT (DEEP VEIN THROMBOSIS): Status: ACTIVE | Noted: 2020-03-09

## 2022-03-19 PROBLEM — S82.853A TRIMALLEOLAR FRACTURE: Status: ACTIVE | Noted: 2021-04-14

## 2022-03-19 PROBLEM — E11.9 TYPE 2 DIABETES MELLITUS WITHOUT COMPLICATION, WITH LONG-TERM CURRENT USE OF INSULIN (HCC): Status: ACTIVE | Noted: 2019-01-21

## 2022-03-19 PROBLEM — L72.3 SEBACEOUS CYST: Status: ACTIVE | Noted: 2019-08-27

## 2022-03-19 PROBLEM — R19.00 PELVIC MASS: Status: ACTIVE | Noted: 2020-09-22

## 2022-03-20 PROBLEM — S93.05XA ANKLE DISLOCATION, LEFT, INITIAL ENCOUNTER: Status: ACTIVE | Noted: 2021-04-13

## 2022-03-20 PROBLEM — N30.20 CYSTITIS BACILLARY, CHRONIC: Status: ACTIVE | Noted: 2020-03-09

## 2022-03-20 PROBLEM — Z94.0 RENAL TRANSPLANT RECIPIENT: Status: ACTIVE | Noted: 2017-12-12

## 2022-03-30 ENCOUNTER — PATIENT OUTREACH (OUTPATIENT)
Dept: CASE MANAGEMENT | Age: 57
End: 2022-03-30

## 2022-04-01 NOTE — PROGRESS NOTES
Ambulatory Care Management Note    Date/Time:  4/1/2022 11:35 AM    This patient was received as a referral from [facility]. Ambulatory Care Manager patient to offer care management services. Unable to reach patient by phone. Messages left requesting call back.

## 2022-04-12 ENCOUNTER — TELEPHONE (OUTPATIENT)
Dept: ENDOCRINOLOGY | Age: 57
End: 2022-04-12

## 2022-04-12 NOTE — TELEPHONE ENCOUNTER
Cristóbal Peres from [address] [place] [address] Dr padilla to follow up on a clinical notes request dated 02/11/2022. Cristóbal Peres can be reach@ [phone] [id number]. The note can be fax to [phone].

## 2022-04-13 LAB
CREATININE, EXTERNAL: 1.46
HBA1C MFR BLD HPLC: 8.8 %
LDL-C, EXTERNAL: 72

## 2022-05-10 DIAGNOSIS — E78.5 DYSLIPIDEMIA: ICD-10-CM

## 2022-05-10 RX ORDER — ATORVASTATIN CALCIUM 40 MG/1
TABLET, FILM COATED ORAL
Qty: 90 TABLET | Refills: 1 | Status: SHIPPED | OUTPATIENT
Start: 2022-05-10

## 2022-05-11 ENCOUNTER — PATIENT OUTREACH (OUTPATIENT)
Dept: CASE MANAGEMENT | Age: 57
End: 2022-05-11

## 2022-05-11 DIAGNOSIS — I10 ESSENTIAL HYPERTENSION: ICD-10-CM

## 2022-05-11 DIAGNOSIS — E11.8 TYPE 2 DIABETES MELLITUS WITH COMPLICATION, WITH LONG-TERM CURRENT USE OF INSULIN (HCC): ICD-10-CM

## 2022-05-11 DIAGNOSIS — E78.5 DYSLIPIDEMIA: ICD-10-CM

## 2022-05-11 DIAGNOSIS — Z79.4 TYPE 2 DIABETES MELLITUS WITH COMPLICATION, WITH LONG-TERM CURRENT USE OF INSULIN (HCC): ICD-10-CM

## 2022-05-11 NOTE — PROGRESS NOTES
Ambulatory Care Management Note    Date/Time:  5/11/2022 11:14 AM    This patient was received as a referral from [facility]. Ambulatory Care Manager outreached to patient today to offer care management services. Introduction to self and role of care manager provided. Patient accepted care management services at this time. Follow up call scheduled at this time. Patient has Ambulatory Care Manager's contact number for for any questions or concerns.

## 2022-05-12 RX ORDER — PEN NEEDLE, DIABETIC 31 GX3/16"
NEEDLE, DISPOSABLE MISCELLANEOUS
Qty: 500 PEN NEEDLE | Refills: 3 | Status: SHIPPED | OUTPATIENT
Start: 2022-05-12

## 2022-05-18 ENCOUNTER — TELEPHONE (OUTPATIENT)
Dept: FAMILY MEDICINE CLINIC | Age: 57
End: 2022-05-18

## 2022-05-18 NOTE — TELEPHONE ENCOUNTER
Patient was notified that she has an Mychart message from Dr. Polly Early. She stated she would read it today and send a response.

## 2022-05-18 NOTE — TELEPHONE ENCOUNTER
Please call pt to let her know she has an unread message in MyChart:     I received your labs from transplant clinic from 4/13/22 that showed your A1c was 8.8% down from 10% in 12/21.  I got a chance to review your [address] and it appears you have some very good readings in the  range but still have many spikes in the 200-300 range.  Is this from your diet or not taking your fiasp before you eat?  Your creatinine (kidney test) improved from 1.58 in 12/21 to 1.46 and your liver tests were normal and your cholesterol is at goal.     Since you had these labs drawn, you won't need to do additional labs for me now and can just go again prior to your visit in August.

## 2022-05-24 ENCOUNTER — PATIENT OUTREACH (OUTPATIENT)
Dept: CASE MANAGEMENT | Age: 57
End: 2022-05-24

## 2022-05-25 NOTE — PROGRESS NOTES
Ambulatory Care Management Note    Date/Time:  5/25/2022 1:05 PM    Outreach made to patient for follow-up care management. Patient states that she is bust at this time and requests call back Thursday or Friday around 1200. Follow-up call scheduled.      PCP/Specialist follow up:   Future Appointments   Date Time Provider Trisha Culver   6/24/2022  1:20 PM MD HALLE Lopez BS AMB   8/26/2022 11:50 AM MD HE Simon BS AMB   10/24/2022  8:40 AM Jez Marroquin NP CAVREY BS AMB

## 2022-05-27 ENCOUNTER — PATIENT OUTREACH (OUTPATIENT)
Dept: CASE MANAGEMENT | Age: 57
End: 2022-05-27

## 2022-05-27 NOTE — PROGRESS NOTES
Ambulatory Care Management Note    Date/Time:  5/27/2022 12:41 PM    Attempted to contact patient for care management follow-up. Message left requesting return call. Also sent [employer] message with contact information.        PCP/Specialist follow up:   Future Appointments   Date Time Provider Trisha Culver   6/24/2022  1:20 PM MD HALLE Soriano BS AMB   8/26/2022 11:50 AM MD HE Flores BS AMB   10/24/2022  8:40 AM Antonio Marroquin, AUGUSTIN SAM AMB

## 2022-06-02 ENCOUNTER — TELEPHONE (OUTPATIENT)
Dept: CARDIOLOGY CLINIC | Age: 57
End: 2022-06-02

## 2022-06-02 NOTE — TELEPHONE ENCOUNTER
Left message for patient to reschedule appointment on 0624/2022 with Dr Javier Sanchez. Provider will not be in office that day.  Please reschedule.     Danielle Score

## 2022-06-09 ENCOUNTER — PATIENT OUTREACH (OUTPATIENT)
Dept: CASE MANAGEMENT | Age: 57
End: 2022-06-09

## 2022-06-10 NOTE — PROGRESS NOTES
Patient resolved from the Complex Case Management  program on 6/10/22. Have been unable to connect with patient by phone or mychart for continuing care management. Goals Addressed    None         Patient has Ambulatory Care Manager's contact information for any further questions, concerns, or needs.   Patients upcoming visits:    Future Appointments   Date Time Provider Trisha Culver   7/8/2022  1:20 PM MD HALLE Quinonez AMB   8/26/2022 11:50 AM MD HE Neil VICKIE Rakesh BS AMB   10/24/2022  8:40 AM Chang Marroquin, AUGUSTIN SAM AMB

## 2022-06-23 RX ORDER — METFORMIN HYDROCHLORIDE 500 MG/1
500 TABLET, EXTENDED RELEASE ORAL
Qty: 180 TABLET | Refills: 3 | Status: SHIPPED | OUTPATIENT
Start: 2022-06-23

## 2022-07-08 ENCOUNTER — OFFICE VISIT (OUTPATIENT)
Dept: CARDIOLOGY CLINIC | Age: 57
End: 2022-07-08
Payer: MEDICARE

## 2022-07-08 VITALS
BODY MASS INDEX: 31.5 KG/M2 | WEIGHT: 177.8 LBS | DIASTOLIC BLOOD PRESSURE: 82 MMHG | HEART RATE: 61 BPM | HEIGHT: 63 IN | OXYGEN SATURATION: 99 % | SYSTOLIC BLOOD PRESSURE: 124 MMHG | RESPIRATION RATE: 18 BRPM

## 2022-07-08 DIAGNOSIS — I34.0 MITRAL VALVE INSUFFICIENCY, UNSPECIFIED ETIOLOGY: ICD-10-CM

## 2022-07-08 DIAGNOSIS — I20.0 UNSTABLE ANGINA (HCC): Primary | ICD-10-CM

## 2022-07-08 DIAGNOSIS — Z94.0 RENAL TRANSPLANT RECIPIENT: ICD-10-CM

## 2022-07-08 DIAGNOSIS — I10 BENIGN ESSENTIAL HTN: ICD-10-CM

## 2022-07-08 DIAGNOSIS — R07.89 OTHER CHEST PAIN: ICD-10-CM

## 2022-07-08 PROCEDURE — 99214 OFFICE O/P EST MOD 30 MIN: CPT | Performed by: INTERNAL MEDICINE

## 2022-07-08 PROCEDURE — G8427 DOCREV CUR MEDS BY ELIG CLIN: HCPCS | Performed by: INTERNAL MEDICINE

## 2022-07-08 PROCEDURE — G0463 HOSPITAL OUTPT CLINIC VISIT: HCPCS | Performed by: INTERNAL MEDICINE

## 2022-07-08 PROCEDURE — G8417 CALC BMI ABV UP PARAM F/U: HCPCS | Performed by: INTERNAL MEDICINE

## 2022-07-08 PROCEDURE — G9231 DOC ESRD DIA TRANS PREG: HCPCS | Performed by: INTERNAL MEDICINE

## 2022-07-08 PROCEDURE — 3017F COLORECTAL CA SCREEN DOC REV: CPT | Performed by: INTERNAL MEDICINE

## 2022-07-08 PROCEDURE — G9899 SCRN MAM PERF RSLTS DOC: HCPCS | Performed by: INTERNAL MEDICINE

## 2022-07-08 PROCEDURE — G9717 DOC PT DX DEP/BP F/U NT REQ: HCPCS | Performed by: INTERNAL MEDICINE

## 2022-07-08 NOTE — PROGRESS NOTES
[facility] [place]: Zee Malave  [phone]    History of Present Illness:   Ms. Rahul Saini is a 55 yo F seen in the past by Dr. Carlos Fernandez with history of essential hypertension, mitral regurgitation just to a mild degree, right leg DVT on Eliquis followed by Dr. Bhargavi Israel at the [facility] transplant Center, end stage renal disease status post right renal transplant in 2017 followed by Dr. Donna Salas and Dr. Bhargavi Israel, on insulin for diabetes, dyslipidemia, history of gastritis/esophageal stricture, PAD with right ICA stenosis. She has noted episode of chest pain that was substernal lasting about a minute when she was bringing in groceries. She did have some shortness of breath with this. It happened a few weeks ago. She also has had just rare palpitations a few times a month lasting one to two minutes, but no associated lightheadedness, dizziness or symptoms. They have been following a cyst on her kidneys and she had recent imaging done and has followup with nephrology. She is compensated on exam with clear lungs and no lower extremity edema. Assessment and Plan:   1. Unstable angina. Chest pain and shortness of breath with typical and atypical features; will proceed with a treadmill stress echocardiogram for further evaluation. If this is unrevealing, would consider musculoskeletal etiology. She will otherwise follow up with Barbra Mccain in six months. 2. Mitral regurgitation. This has just been to a mild degree. 3. End stage renal disease, status post right renal transplant in 2017. Followed closely by Nephrology, Dr. Donna Salas and Dr. Bhargavi Israel at [facility]. 4. Diabetes. On insulin and oral agents. 5. Dyslipidemia. Tolerating statin. 6. Essential hypertension. Blood pressure is controlled. Adjustments if needed per nephrology given her renal transplant. 7. PAD. Right ICA stenosis noted on Duplex in 2021.   8. Dyslipidemia. Tolerating statin.         Echo 4/21 - EF 60-65%, mod cLVH, no obvious PFO but image quality not adequate to definitively evaluate for tiny PFO  Carotid duplex 4/21 - left ICA normal, <50% right ICA stenosis   Echo 9/20 EF 65% gr1 dd mild cLVH mild MR  5/18 TTE - normal LVEF, mild MR   5/17 TTE - normal LVEF, mod MR   2/16 TTE - LVEF 55 % to 60 %, no WMA, grade 1 dd, mod MR  3/14 TTE LVEF 55 % to 60 %, no WMA, mild cLVH, mild to mod MR  1/13 TTE LVEF 55%, no WMA, moderate increased wall thickness, grade 1 diastolic dysfunction, moderate MR    Soc no tob, no etoh  Fhx no early CAD      She  has a past medical history of Anemia, Arthritis, Asthma (4/8/2014), Chronic kidney disease (2012), Diabetes ([address] (28yo), GERD (gastroesophageal reflux disease), Headache(784.0), Hepatitis B infection, Hypertension, Kidney transplanted (04/2017), Left shoulder pain (11/5/2021), Sebaceous cyst (8/27/2019), Tear of left rotator cuff (11/5/2021), and Thromboembolus ([address] (2017). She has no past medical history of Adverse effect of anesthesia, Glomerulosclerosis due to secondary diabetes ([address] or Sleep apnea. All other systems negative except as above. PE  Vitals:    07/08/22 1300   BP: 124/82   Pulse: 61   Resp: 18   SpO2: 99%   Weight: 177 lb 12.8 oz (80.6 kg)   Height: 5' 3\" (1.6 m)    Body mass index is 31.5 kg/m².    General appearance - alert, well appearing, and in no distress  Mental status - affect appropriate to mood  Eyes - sclera anicteric, moist mucous membranes  Neck - supple, no JVD  Chest - clear to auscultation, no wheezes, rales or rhonchi  Heart - normal rate, regular rhythm, normal S1, S2, no murmurs, rubs, clicks or gallops  Abdomen - soft, nontender, nondistended, no masses or organomegaly  Neurological - no focal deficit  Extremities - peripheral pulses normal, no pedal edema      Recent Labs:  Lab Results   Component Value Date/Time    Cholesterol, total 147 12/20/2021 09:12 AM    HDL Cholesterol 56 12/20/2021 09:12 AM    LDL, calculated 69 12/20/2021 09:12 AM    LDL, calculated 45.8 04/14/2021 01:36 AM    Triglyceride 123 12/20/2021 09:12 AM    CHOL/HDL Ratio 2.0 04/14/2021 01:36 AM     Lab Results   Component Value Date/Time    Creatinine (POC) 9.6 (H) 04/09/2013 08:16 AM    Creatinine 1.58 (H) 12/20/2021 09:12 AM     Lab Results   Component Value Date/Time    BUN 29 (H) 12/20/2021 09:12 AM    BUN (POC) 75 (H) 04/09/2013 08:16 AM     Lab Results   Component Value Date/Time    Potassium 4.2 12/20/2021 09:12 AM     Lab Results   Component Value Date/Time    Hemoglobin A1c 10.0 (H) 12/20/2021 09:12 AM    Hemoglobin A1c, External 8.8 04/13/2022 12:00 AM     Lab Results   Component Value Date/Time    Hemoglobin (POC) 8.8 (L) 04/09/2013 08:16 AM    HGB 9.4 (L) 04/15/2021 03:44 AM     Lab Results   Component Value Date/Time    PLATELET 449 64/42/8930 03:44 AM       Reviewed:  Past Medical History:   Diagnosis Date    Anemia     Arthritis     Asthma 4/8/2014    Rx for same    Chronic kidney disease 2012    Diabetes ([address] 28yo    Rx to control    GERD (gastroesophageal reflux disease)     Headache(784.0)     Hepatitis B infection     Hypertension     Rx for same    Kidney transplanted 04/2017    Left shoulder pain 11/5/2021    Sebaceous cyst 8/27/2019    Tear of left rotator cuff 11/5/2021    Thromboembol[address] 2017     Social History     Tobacco Use   Smoking Status Never Smoker   Smokeless Tobacco Never Used     Social History     Substance and Sexual Activity   Alcohol Use No    Alcohol/week: 0.0 standard drinks     Allergies   Allergen Reactions    Latex Shortness of Breath    Keflex [Cephalexin] Nausea and Vomiting     Stomach cramping    Tramadol Itching       Current Outpatient Medications   Medication Sig    metFORMIN ER (GLUCOPHAGE XR) 500 mg tablet Take 1 Tablet by mouth two (2) times daily (after meals).     Insulin Needles, Disposable, (Unifine Pentips) 32 gauge x 5/32\" ndle USE TO INJECT INSULIN 5 TIMES DAILY    atorvastatin (LIPITOR) 40 mg tablet TAKE 1 TABLET BY MOUTH EVERY DAY    ergocalciferol (ERGOCALCIFEROL) 1,250 mcg (50,000 unit) capsule ergocalciferol (vitamin D2) 1,250 mcg (50,000 unit) capsule   TAKE 1 CAPSULE BY MOUTH every WEEK FOR 90 DAYS    insulin aspart, niacinamide, (Fiasp FlexTouch U-100 Insulin) 100 unit/mL (3 mL) inpn Inject 16 units before meals + 4 units for every 50 mg/dl above 150 mg/dl. Max 65 units/day    insulin detemir U-100 (Levemir FlexTouch U-100 Insuln) 100 unit/mL (3 mL) inpn Inject 60 units twice daily    fenofibrate nanocrystallized (TRICOR) 145 mg tablet TAKE 1 TABLET BY MOUTH EVERY DAY    PARoxetine mesylate,menop.sym, 7.5 mg cap paroxetine mesylate (menopausal symptoms suppressant) 7.5 mg capsule   TAKE 1 CAPSULE BY MOUTH EVERY DAY    diclofenac (VOLTAREN) 1 % gel 2 (TWO) GRAM APPLY TO AFFECTED AREA FOUR TIMES DAILY    butalbital-acetaminophen-caffeine (FIORICET, ESGIC) -40 mg per tablet Take 1 Tablet by mouth three (3) times daily as needed for Headache. Indications: tension headache    liraglutide (VICTOZA) 0.6 mg/0.1 mL (18 mg/3 mL) pnij 1.8 mg by SubCUTAneous route daily.  Eliquis 5 mg tablet Take 1 Tab by mouth two (2) times a day.  OneTouch Ultra Blue Test Strip strip Test 4 times daily. DX E11.9    labetaloL (NORMODYNE) 200 mg tablet Take 1 Tab by mouth nightly.  K-Phos-NeutraL 250 mg tablet TAKE 1 TABLET BY MOUTH EVERY DAY    magnesium oxide (MAG-OX) 400 mg tablet Take 1 Tab by mouth daily. (Patient taking differently: Take 800 mg by mouth two (2) times a day.)    traZODone (DESYREL) 100 mg tablet Take 1 Tab by mouth nightly.  (Patient taking differently: Take 100 mg by mouth nightly as needed.)    pantoprazole (PROTONIX) 40 mg tablet TAKE ONE TABLET BY MOUTH ONCE DAILY    Blood-Glucose Meter, Drum-type (ACCU-CHEK COMPACT PLUS CARE) kit Test blood sugar 4 times daily    Blood Sugar Diagnostic, Drum (ACCU-CHEK COMPACT PLUS TEST) strp Test blood sugar 4 times daily    lancets (ONE TOUCH DELICA) 33 gauge misc 4 times daily. Diagnosis code: E11.8    albuterol (PROVENTIL HFA, VENTOLIN HFA, PROAIR HFA) 90 mcg/actuation inhaler Take 2 Puffs by inhalation every four (4) hours as needed for Wheezing.  predniSONE (DELTASONE) 5 mg tablet Take 1 Tab by mouth daily.  cinacalcet (SENSIPAR) 30 mg tablet Take 30 mg by mouth daily.  aspirin delayed-release 81 mg tablet Take  by mouth daily.  mycophenolate mofetil (CELLCEPT) 250 mg capsule Take 250 mg by mouth. 2 tabs in the am and 4 tabs in the pm    tacrolimus (PROGRAF) 1 mg capsule Take 2 mg by mouth. Indications: taking 1 mg in am and 2 mg at QHS     No current facility-administered medications for this visit.        Roselia Baxter MD  [address] [facility] [place]  [address] [facility] [address]  [address]

## 2022-08-02 ENCOUNTER — OFFICE VISIT (OUTPATIENT)
Dept: INTERNAL MEDICINE CLINIC | Age: 57
End: 2022-08-02
Payer: MEDICARE

## 2022-08-02 VITALS
HEIGHT: 63 IN | OXYGEN SATURATION: 98 % | WEIGHT: 179 LBS | SYSTOLIC BLOOD PRESSURE: 119 MMHG | DIASTOLIC BLOOD PRESSURE: 68 MMHG | TEMPERATURE: 98.2 F | HEART RATE: 86 BPM | BODY MASS INDEX: 31.71 KG/M2 | RESPIRATION RATE: 16 BRPM

## 2022-08-02 DIAGNOSIS — N18.6 END STAGE RENAL DISEASE (HCC): Primary | ICD-10-CM

## 2022-08-02 DIAGNOSIS — E66.9 OBESITY (BMI 30.0-34.9): ICD-10-CM

## 2022-08-02 DIAGNOSIS — Z00.00 MEDICARE ANNUAL WELLNESS VISIT, SUBSEQUENT: ICD-10-CM

## 2022-08-02 DIAGNOSIS — I73.9 PAD (PERIPHERAL ARTERY DISEASE) (HCC): ICD-10-CM

## 2022-08-02 PROCEDURE — 3017F COLORECTAL CA SCREEN DOC REV: CPT | Performed by: INTERNAL MEDICINE

## 2022-08-02 PROCEDURE — G9899 SCRN MAM PERF RSLTS DOC: HCPCS | Performed by: INTERNAL MEDICINE

## 2022-08-02 PROCEDURE — G9231 DOC ESRD DIA TRANS PREG: HCPCS | Performed by: INTERNAL MEDICINE

## 2022-08-02 PROCEDURE — G0439 PPPS, SUBSEQ VISIT: HCPCS | Performed by: INTERNAL MEDICINE

## 2022-08-02 PROCEDURE — G8417 CALC BMI ABV UP PARAM F/U: HCPCS | Performed by: INTERNAL MEDICINE

## 2022-08-02 PROCEDURE — 99212 OFFICE O/P EST SF 10 MIN: CPT | Performed by: INTERNAL MEDICINE

## 2022-08-02 PROCEDURE — G9717 DOC PT DX DEP/BP F/U NT REQ: HCPCS | Performed by: INTERNAL MEDICINE

## 2022-08-02 PROCEDURE — G0463 HOSPITAL OUTPT CLINIC VISIT: HCPCS | Performed by: INTERNAL MEDICINE

## 2022-08-02 PROCEDURE — G8427 DOCREV CUR MEDS BY ELIG CLIN: HCPCS | Performed by: INTERNAL MEDICINE

## 2022-08-02 RX ORDER — ZOSTER VACCINE RECOMBINANT, ADJUVANTED 50 MCG/0.5
KIT INTRAMUSCULAR
Qty: 0.5 ML | Refills: 1 | Status: SHIPPED | OUTPATIENT
Start: 2022-08-02 | End: 2022-08-26

## 2022-08-02 NOTE — PROGRESS NOTES
This is the Subsequent Medicare Annual Wellness Exam, performed 12 months or more after the Initial AWV or the last Subsequent AWV    I have reviewed the patient's medical history in detail and updated the computerized patient record. Assessment/Plan   Education and counseling provided:  Are appropriate based on today's review and evaluation    1. End stage renal disease ([facility]  2. PAD (peripheral artery disease) ([facility]  3. Medicare annual wellness visit, subsequent  -     varicella-zoster recombinant, PF, (Shingrix, PF,) 50 mcg/0.5 mL susr injection; 0.5mL by IntraMUSCular route once now and then repeat in 2-6 months, Print, Disp-0.5 mL, R-1       Depression Risk Factor Screening     3 most recent PHQ Screens 7/8/2022   PHQ Not Done -   Little interest or pleasure in doing things Not at all   Feeling down, depressed, irritable, or hopeless Not at all   Total Score PHQ 2 0       Alcohol & Drug Abuse Risk Screen    Do you average more than 1 drink per night or more than 7 drinks a week: On any one occasion in the past three months have you have had more than 3 drinks containing alcohol:            Functional Ability and Level of Safety    Hearing: Hearing is good. Activities of Daily Living: The home contains: no safety equipment. Patient does total self care      Ambulation: with no difficulty     Fall Risk:  No flowsheet data found.    Abuse Screen:  Patient is not abused       Cognitive Screening    Has your family/caregiver stated any concerns about your memory: no     Cognitive Screening: Normal - Verbal Fluency Test    Health Maintenance Due     Health Maintenance Due   Topic Date Due    Hepatitis C Screening  Never done    Shingrix Vaccine Age 50> (1 of 2) Never done    DTaP/Tdap/Td series (1 - Tdap) Never done    Pneumococcal 0-64 years (2 - PCV) 02/01/2014    Foot Exam Q1  09/10/2019    COVID-19 Vaccine (3 - Moderna risk series) 12/15/2021       Patient Care Team   Patient Care Team:  Cyndee Maxwell Peirson MD as PCP - General (Internal Medicine Physician)  Ariadna Ridley MD as PCP - [facility]  Dylon Guzman MD as Surgeon (General Surgery)  Thony Baeza MD (Obstetrics & Gynecology)  Maria Elena Haji MD (Nephrology)  Donna Herrera MD as Consulting Provider (Endocrinology Physician)    History     Patient Active Problem List   Diagnosis Code    Hypertension I10    Kidney disease N28.9    Migraines G43.909    Diabetes mellitus ([address] E11.9    Anemia D64.9    Hyperkalemia E87.5    DM (diabetes mellitus) ([facility] E11.9    Depression F32. A    Asthma J45.909    Dyslipidemia E78.5    Mitral regurgitation I34.0    Renal transplant recipient Z94.0    Type 2 diabetes mellitus without complication, with long-term current use of insulin ([facility]) E11.9, Z79.4    Sebaceous cyst L72.3    Cystitis bacillary, chronic N30.20    History of DVT (deep vein thrombosis) Z86.718    Pelvic mass R19.00    Closed left ankle fracture, initial encounter S82.892A    Ankle dislocation, left, initial encounter S93. 05XA    Dizziness R42    Trimalleolar fracture S82.853A    Left shoulder pain M25.512    Tear of left rotator cuff M75.102    End stage renal disease (HCC) N18.6    PAD (peripheral artery disease) ([facility]) I73.9     Past Medical History:   Diagnosis Date    Anemia     Arthritis     Asthma 2014    Rx for same    Chronic kidney disease 2012    Diabetes ([facility] 28yo    Rx to control    GERD (gastroesophageal reflux disease)     Headache(784.0)     Hepatitis B infection     Hypertension     Rx for same    Kidney transplanted 2017    Left shoulder pain 2021    Sebaceous cyst 2019    Tear of left rotator cuff 2021    Thromboembolus ([facility]       Past Surgical History:   Procedure Laterality Date    COLONOSCOPY N/A 2016    COLONOSCOPY performed by Barbara Vera MD at [facility]      HX  SECTION  1982    [address]    laparoscopic    HX COLONOSCOPY      HX ENDOSCOPY      HX MOHS PROCEDURES Left     HX OTHER SURGICAL  7/30/15    excision of left Axilla Hidradenitis    HX RENAL BIOPSY  2011    HX RENAL TRANSPLANT  04/05/2017    HX UROLOGICAL  2012    KIDNEY BX RIGHT - pt states she has only had one kidney bx as of 10/31/2016    HX UROLOGICAL  2017    transplant    HX VASCULAR ACCESS  1/2013    CHEST CATHETER FOR DIALYSIS    HX VASCULAR ACCESS Right     A-V fistula    HX WISDOM TEETH EXTRACTION      AK ANESTH,SURGERY OF SHOULDER  2015     Current Outpatient Medications   Medication Sig Dispense Refill    varicella-zoster recombinant, PF, (Shingrix, PF,) 50 mcg/0.5 mL susr injection 0.5mL by IntraMUSCular route once now and then repeat in 2-6 months 0.5 mL 1    metFORMIN ER (GLUCOPHAGE XR) 500 mg tablet Take 1 Tablet by mouth two (2) times daily (after meals). 180 Tablet 3    Insulin Needles, Disposable, (Unifine Pentips) 32 gauge x 5/32\" ndle USE TO INJECT INSULIN 5 TIMES DAILY 500 Pen Needle 3    atorvastatin (LIPITOR) 40 mg tablet TAKE 1 TABLET BY MOUTH EVERY DAY 90 Tablet 1    ergocalciferol (ERGOCALCIFEROL) 1,250 mcg (50,000 unit) capsule ergocalciferol (vitamin D2) 1,250 mcg (50,000 unit) capsule   TAKE 1 CAPSULE BY MOUTH every WEEK FOR 90 DAYS      insulin aspart, niacinamide, (Fiasp FlexTouch U-100 Insulin) 100 unit/mL (3 mL) inpn Inject 16 units before meals + 4 units for every 50 mg/dl above 150 mg/dl. Max 65 units/day (Patient taking differently: Inject 16 units before meals + 4 units for every 50 mg/dl above 150 mg/dl.   Max 65 units/day) 60 mL 3    insulin detemir U-100 (Levemir FlexTouch U-100 Insuln) 100 unit/mL (3 mL) inpn Inject 60 units twice daily 105 mL 3    fenofibrate nanocrystallized (TRICOR) 145 mg tablet TAKE 1 TABLET BY MOUTH EVERY DAY 30 Tablet 3    PARoxetine mesylate,menop.sym, 7.5 mg cap paroxetine mesylate (menopausal symptoms suppressant) 7.5 mg capsule   TAKE 1 CAPSULE BY MOUTH EVERY DAY      diclofenac (VOLTAREN) 1 % gel 2 (TWO) GRAM APPLY TO AFFECTED AREA FOUR TIMES DAILY 100 g 5    butalbital-acetaminophen-caffeine (FIORICET, ESGIC) -40 mg per tablet Take 1 Tablet by mouth three (3) times daily as needed for Headache. Indications: tension headache 60 Tablet 3    liraglutide (VICTOZA) 0.6 mg/0.1 mL (18 mg/3 mL) pnij 1.8 mg by SubCUTAneous route daily. 9 mL 11    Eliquis 5 mg tablet Take 1 Tab by mouth two (2) times a day. labetaloL (NORMODYNE) 200 mg tablet Take 1 Tab by mouth nightly. 360 Tab 1    K-Phos-NeutraL 250 mg tablet TAKE 1 TABLET BY MOUTH EVERY DAY      magnesium oxide (MAG-OX) 400 mg tablet Take 1 Tab by mouth daily. (Patient taking differently: Take 800 mg by mouth two (2) times a day.) 90 Tab 3    pantoprazole (PROTONIX) 40 mg tablet TAKE ONE TABLET BY MOUTH ONCE DAILY 90 Tab 1    Blood-Glucose Meter, Drum-type (ACCU-CHEK COMPACT PLUS CARE) kit Test blood sugar 4 times daily 1 Kit 0    Blood Sugar Diagnostic, Drum (ACCU-CHEK COMPACT PLUS TEST) strp Test blood sugar 4 times daily 100 Strip 3    lancets (ONE TOUCH DELICA) 33 gauge misc 4 times daily. Diagnosis code: E11.8 125 Lancet 11    albuterol (PROVENTIL HFA, VENTOLIN HFA, PROAIR HFA) 90 mcg/actuation inhaler Take 2 Puffs by inhalation every four (4) hours as needed for Wheezing. 1 Inhaler 3    predniSONE (DELTASONE) 5 mg tablet Take 1 Tab by mouth daily. 11    cinacalcet (SENSIPAR) 30 mg tablet Take 30 mg by mouth daily. aspirin delayed-release 81 mg tablet Take  by mouth daily. mycophenolate mofetil (CELLCEPT) 250 mg capsule Take 250 mg by mouth. 2 tabs in the am and 4 tabs in the pm      tacrolimus (PROGRAF) 1 mg capsule Take 2 mg by mouth. Indications: taking 1 mg in am and 2 mg at QHS      OneTouch Ultra Blue Test Strip strip Test 4 times daily.   DX E11.9 (Patient not taking: Reported on 8/2/2022) 400 Strip 3     Allergies   Allergen Reactions    Latex Shortness of Breath    Keflex [Cephalexin] Nausea and Vomiting     Stomach cramping Tramadol Itching       Family History   Problem Relation Age of Onset    Hypertension Mother     OSTEOARTHRITIS Mother     Hypertension Sister     Diabetes Maternal Grandfather     Cancer Father         UNKNOWN    Hypertension Maternal Grandmother     Diabetes Son     Asthma Son     Cancer Maternal Aunt         UNKNOWN    Breast Cancer Maternal Aunt     Asthma Brother     Anesth Problems Neg Hx      Social History     Tobacco Use    Smoking status: Never    Smokeless tobacco: Never   Substance Use Topics    Alcohol use: No     Alcohol/week: 0.0 standard drinks        Dictation on: 08/02/2022  5:23 PM by: Elvi Fuller [[id number]]   Vitals:    08/02/22 0948   BP: 119/68   Pulse: 86   Resp: 16   Temp: 98.2 °F (36.8 °C)   TempSrc: Temporal   SpO2: 98%   Weight: 179 lb (81.2 kg)   Height: 5' 3\" (1.6 m)     Wt Readings from Last 3 Encounters:   08/02/22 179 lb (81.2 kg)   07/08/22 177 lb 12.8 oz (80.6 kg)   02/11/22 181 lb (82.1 kg)     Body mass index is 31.71 kg/m². S1 and S2 normal, no murmurs, clicks, gallops or rubs. Regular rate and rhythm. Chest is clear; no wheezes or rales. No edema or JVD. 1. End stage renal disease ([address]  Per transplant team    2. PAD (peripheral artery disease) (HCC)  No claudication or sores    3. Medicare annual wellness visit, subsequent  reviewed  - varicella-zoster recombinant, PF, (Shingrix, PF,) 50 mcg/0.5 mL susr injection; 0.5mL by IntraMUSCular route once now and then repeat in 2-6 months  Dispense: 0.5 mL; Refill: 1    4. Obesity (BMI 30.0-34. 9)  Trying hard will reduce calories extra 100 per day    Rodrigo Guillory MD

## 2022-08-02 NOTE — PROGRESS NOTES
Chief Complaint   Patient presents with    Hypertension    Diabetes       Vitals:    08/02/22 0948   BP: 119/68   Pulse: 86   Resp: 16   Temp: 98.2 °F (36.8 °C)   TempSrc: Temporal   SpO2: 98%   Weight: 179 lb (81.2 kg)   Height: 5' 3\" (1.6 m)   PainSc:   0 - No pain         Health Maintenance will be followed up by PCP. 1. Have you been to the ER, urgent care clinic since your last visit? Hospitalized since your last visit? No    2. Have you seen or consulted any other health care providers outside of the [address] [place] [address] since your last visit? Include any pap smears or colon screening.  No

## 2022-08-02 NOTE — PATIENT INSTRUCTIONS

## 2022-08-03 DIAGNOSIS — E11.8 TYPE 2 DIABETES MELLITUS WITH COMPLICATION, WITH LONG-TERM CURRENT USE OF INSULIN (HCC): ICD-10-CM

## 2022-08-03 DIAGNOSIS — Z79.4 TYPE 2 DIABETES MELLITUS WITH COMPLICATION, WITH LONG-TERM CURRENT USE OF INSULIN (HCC): ICD-10-CM

## 2022-08-03 DIAGNOSIS — E78.5 DYSLIPIDEMIA: ICD-10-CM

## 2022-08-03 DIAGNOSIS — I10 ESSENTIAL HYPERTENSION: ICD-10-CM

## 2022-08-03 NOTE — PROGRESS NOTES
Mrs. Crystal Aguilar is a 56 y.o. white female about five years post kidney transplant for end-stage renal disease. She has diabetes, hypertension, and has hot flashes. She says that she gets a little depressed at times. She tries very hard to lose weight, but it is very difficult. I told her that the antidepressant that Gynecology gave her is Paxil and that makes weight loss difficult. I also told her that taking 60 units twice a day of long-acting insulin plus as needed short-acting insulin makes weight loss difficult. She is eating a low-calorie diet. She is exercising moderate to high now, increasing her exercise, walking, and she is even riding a bike indoors and outdoors, but her weight remains about the same. She would be happy if she is 5 pounds lighter. Her diabetes has been tough to control. It is being followed carefully by both the transplant team and also by Dr. Petty Aly of Endocrine.

## 2022-08-08 LAB
CREATININE, EXTERNAL: 1.85
HBA1C MFR BLD HPLC: 9.2 %
HBA1C MFR BLD HPLC: 9.2 %

## 2022-08-26 ENCOUNTER — OFFICE VISIT (OUTPATIENT)
Dept: ENDOCRINOLOGY | Age: 57
End: 2022-08-26
Payer: MEDICARE

## 2022-08-26 VITALS
WEIGHT: 181.8 LBS | HEART RATE: 78 BPM | DIASTOLIC BLOOD PRESSURE: 59 MMHG | HEIGHT: 63 IN | BODY MASS INDEX: 32.21 KG/M2 | SYSTOLIC BLOOD PRESSURE: 136 MMHG

## 2022-08-26 DIAGNOSIS — E78.5 DYSLIPIDEMIA: ICD-10-CM

## 2022-08-26 DIAGNOSIS — E11.8 TYPE 2 DIABETES MELLITUS WITH COMPLICATION, WITH LONG-TERM CURRENT USE OF INSULIN (HCC): Primary | ICD-10-CM

## 2022-08-26 DIAGNOSIS — Z79.4 TYPE 2 DIABETES MELLITUS WITH COMPLICATION, WITH LONG-TERM CURRENT USE OF INSULIN (HCC): Primary | ICD-10-CM

## 2022-08-26 DIAGNOSIS — I10 ESSENTIAL HYPERTENSION: ICD-10-CM

## 2022-08-26 PROCEDURE — G9717 DOC PT DX DEP/BP F/U NT REQ: HCPCS | Performed by: INTERNAL MEDICINE

## 2022-08-26 PROCEDURE — G9231 DOC ESRD DIA TRANS PREG: HCPCS | Performed by: INTERNAL MEDICINE

## 2022-08-26 PROCEDURE — G8417 CALC BMI ABV UP PARAM F/U: HCPCS | Performed by: INTERNAL MEDICINE

## 2022-08-26 PROCEDURE — 3017F COLORECTAL CA SCREEN DOC REV: CPT | Performed by: INTERNAL MEDICINE

## 2022-08-26 PROCEDURE — 95251 CONT GLUC MNTR ANALYSIS I&R: CPT | Performed by: INTERNAL MEDICINE

## 2022-08-26 PROCEDURE — G9899 SCRN MAM PERF RSLTS DOC: HCPCS | Performed by: INTERNAL MEDICINE

## 2022-08-26 PROCEDURE — 3052F HG A1C>EQUAL 8.0%<EQUAL 9.0%: CPT | Performed by: INTERNAL MEDICINE

## 2022-08-26 PROCEDURE — 2022F DILAT RTA XM EVC RTNOPTHY: CPT | Performed by: INTERNAL MEDICINE

## 2022-08-26 PROCEDURE — G8427 DOCREV CUR MEDS BY ELIG CLIN: HCPCS | Performed by: INTERNAL MEDICINE

## 2022-08-26 PROCEDURE — 99214 OFFICE O/P EST MOD 30 MIN: CPT | Performed by: INTERNAL MEDICINE

## 2022-08-26 NOTE — PROGRESS NOTES
Chief Complaint   Patient presents with    Diabetes     PCP and pharmacy confirmed     History of Present Illness: Susi Weinberg is a 56 y.o. female here for follow up of diabetes. Weight stable since last visit in 2/22. She eats out for lunch most days of the week and typically does not take her insulin for this meal and this is leading to high spikes frequently close to 300 that is the main reason for her A1c being so high. Her levemir dose looks good as fasting sugars are under 150. Review of her [facility] [address] [place] [address] over the past 14 days shows she is only spending 49% of time in range. Compliant with BP and lipid regimen. she has the following indications to continue treatment with Freestyle Sekou:  1) she has type 2 diabetes (E11.65) and is on an intensive insulin regimen with 5 injections per day  2) she tests her blood sugar 4 times per day and makes treatment decisions off her blood sugar readings and off freestyle sekou sensor readings  3) she requires frequent adjustments to her insulin injection doses based on her freestyle sekou sensor readings  4) she has benefitted from therapeutic continuous glucose monitoring and I recommend that she begin this  5) she is seen in my office every 4-6 months            Current Outpatient Medications   Medication Sig    metFORMIN ER (GLUCOPHAGE XR) 500 mg tablet Take 1 Tablet by mouth two (2) times daily (after meals). Insulin Needles, Disposable, (Unifine Pentips) 32 gauge x 5/32\" ndle USE TO INJECT INSULIN 5 TIMES DAILY    atorvastatin (LIPITOR) 40 mg tablet TAKE 1 TABLET BY MOUTH EVERY DAY    insulin aspart, niacinamide, (Fiasp FlexTouch U-100 Insulin) 100 unit/mL (3 mL) inpn Inject 16 units before meals + 4 units for every 50 mg/dl above 150 mg/dl. Max 65 units/day (Patient taking differently: Inject 16 units before meals + 4 units for every 50 mg/dl above 150 mg/dl.   Max 65 units/day)    insulin detemir U-100 (Levemir FlexTouch U-100 Insuln) 100 unit/mL (3 mL) inpn Inject 60 units twice daily    fenofibrate nanocrystallized (TRICOR) 145 mg tablet TAKE 1 TABLET BY MOUTH EVERY DAY    PARoxetine mesylate,menop.sym, 7.5 mg cap paroxetine mesylate (menopausal symptoms suppressant) 7.5 mg capsule   TAKE 1 CAPSULE BY MOUTH EVERY DAY    diclofenac (VOLTAREN) 1 % gel 2 (TWO) GRAM APPLY TO AFFECTED AREA FOUR TIMES DAILY    butalbital-acetaminophen-caffeine (FIORICET, ESGIC) -40 mg per tablet Take 1 Tablet by mouth three (3) times daily as needed for Headache. Indications: tension headache    liraglutide (VICTOZA) 0.6 mg/0.1 mL (18 mg/3 mL) pnij 1.8 mg by SubCUTAneous route daily. Eliquis 5 mg tablet Take 1 Tab by mouth two (2) times a day. labetaloL (NORMODYNE) 200 mg tablet Take 1 Tab by mouth nightly. K-Phos-NeutraL 250 mg tablet TAKE 1 TABLET BY MOUTH EVERY DAY    magnesium oxide (MAG-OX) 400 mg tablet Take 1 Tab by mouth daily. (Patient taking differently: Take 1,200 mg by mouth two (2) times a day.)    pantoprazole (PROTONIX) 40 mg tablet TAKE ONE TABLET BY MOUTH ONCE DAILY    Blood-Glucose Meter, Drum-type (ACCU-CHEK COMPACT PLUS CARE) kit Test blood sugar 4 times daily    Blood Sugar Diagnostic, Drum (ACCU-CHEK COMPACT PLUS TEST) strp Test blood sugar 4 times daily    albuterol (PROVENTIL HFA, VENTOLIN HFA, PROAIR HFA) 90 mcg/actuation inhaler Take 2 Puffs by inhalation every four (4) hours as needed for Wheezing. predniSONE (DELTASONE) 5 mg tablet Take 1 Tab by mouth daily. cinacalcet (SENSIPAR) 30 mg tablet Take 30 mg by mouth daily. aspirin delayed-release 81 mg tablet Take  by mouth daily. mycophenolate mofetil (CELLCEPT) 250 mg capsule Take 250 mg by mouth. 2 tabs in the am and 4 tabs in the pm    tacrolimus (PROGRAF) 1 mg capsule Take 2 mg by mouth. Indications: taking 1 mg in am and 2 mg at QHS     No current facility-administered medications for this visit.      Allergies   Allergen Reactions    Latex Shortness of Breath    Keflex [Cephalexin] Nausea and Vomiting     Stomach cramping    Tramadol Itching     Review of Systems: PER HPI    Physical Examination:  Blood pressure (!) 136/59, pulse 78, height 5' 3\" (1.6 m), weight 181 lb 12.8 oz (82.5 kg). General: pleasant, no distress, good eye contact   Neck: no carotid bruits  Cardiovascular: regular, normal rate, nl s1 and s2, no m/r/g,   Respiratory: clear bilaterally  Integumentary: no edema,   Psychiatric: normal mood and affect    Diabetic foot exam:     Left Foot:   Visual Exam: callous - mild   Pulse DP: 2+ (normal)   Filament test: normal sensation    Vibratory sensation: diminished      Right Foot:   Visual Exam: callous - mild   Pulse DP: 2+ (normal)   Filament test: normal sensation    Vibratory sensation: diminished      Data Reviewed: 8/8/22  - Hgb A1c 9.2%  - ALT 21, AST 12  - BUN/Cr 36/1.85      Assessment/Plan:     1. Type 2 diabetes mellitus with complication, with long-term current use of insulin ([facility] her most recent Hgb A1c was 9.2 %in 8/22 up from 8.8% in 4/22 down from 10% in 12/21 down from 10.8% in 7/21 up from 8.4% in 5/21 down from 9.6% in 4/21 up from 8.2% in 1/21 down from 9.1% in 11/20 up from 9.2% in 8/20 up from 8.2% in 12/19. She is not taking fiasp before lunch and this is her biggest spike that is keeping her A1c up so will work on this. - cont levemir 60 units bid  - cont metformin  mg 1 tab bid  - cont victoza 1.8 mg daily  - begin fiasp 16 units before meals + 4 units for every 50 mg/dl above 150 mg/dl  - check bs 4 times per day due to fluctuating sugars  - cont freestyle jaycee  - foot exam done 8/22  - microalbumin 30 12/21  - optho UTD 10/21  - check Hgb A1c, cmp, and microalbumin prior to next visit          2. Essential hypertension: BP at goal < 140/90  -  cont current regimen for now        3.  Dyslipidemia: LDL 69 in 10/19 and 41 in 11/20 and 54 in 1/21 and 45 in 4/21 and 54 in 5/21 and 69 in 12/21 and 72 in 4/22 on atorva 40  - cont atorva 40 mg daily   - check lipids prior to next visit         Patient Instructions   1) Your Hemoglobin A1c (3 month test of blood sugar) yoli from 8.8% to 9.2% likely due to the high spikes after lunch. Do your best to dose your insulin before lunch everyday to keep from spiking. If you are following the scale but are still spiking over 180 frequently after lunch, you may want to try taking 4-6 more units of insulin for lunch since you are eating away from home and these meals tend to be higher in carbs. 2) Your blood pressure is controlled. 3) Your cholesterol was normal in 4/22. Follow-up and Dispositions    Return in about 6 months (around 2/26/2023).                Copy sent to:  Derrick Villalpando MD as PCP - General (Internal Medicine)  Suleman Moraes MD as Physician (Cardiology)  Henri Jones MD (Nephrology)

## 2022-08-26 NOTE — PATIENT INSTRUCTIONS
1) Your Hemoglobin A1c (3 month test of blood sugar) yoli from 8.8% to 9.2% likely due to the high spikes after lunch. Do your best to dose your insulin before lunch everyday to keep from spiking. If you are following the scale but are still spiking over 180 frequently after lunch, you may want to try taking 4-6 more units of insulin for lunch since you are eating away from home and these meals tend to be higher in carbs. 2) Your blood pressure is controlled. 3) Your cholesterol was normal in 4/22.

## 2022-09-22 ENCOUNTER — TRANSCRIBE ORDER (OUTPATIENT)
Dept: SCHEDULING | Age: 57
End: 2022-09-22

## 2022-09-22 DIAGNOSIS — Z12.31 SCREENING MAMMOGRAM FOR HIGH-RISK PATIENT: Primary | ICD-10-CM

## 2022-10-17 ENCOUNTER — TELEPHONE (OUTPATIENT)
Dept: CARDIOLOGY CLINIC | Age: 57
End: 2022-10-17

## 2022-11-14 ENCOUNTER — VIRTUAL VISIT (OUTPATIENT)
Dept: INTERNAL MEDICINE CLINIC | Age: 57
End: 2022-11-14

## 2022-11-14 ENCOUNTER — NURSE TRIAGE (OUTPATIENT)
Dept: OTHER | Facility: CLINIC | Age: 57
End: 2022-11-14

## 2022-11-14 NOTE — TELEPHONE ENCOUNTER
Location of patient: [address] [facility] with [employer]. Subjective: Caller states \"I noticed it last week - when I bend down I feel dizzy - the room is spinning\"     Current Symptoms: lightheadedness, dizziness, intermittent abdominal pain in LLQ, no fever, \"I feel bloated\", appetite is good, intermittent headache, no     Onset: 1 week ago; unchanged    Associated Symptoms: constipation    Pain Severity: 5/10; stabbing; intermittent (last week was an 8/9)    Temperature: denies     What has been tried: getting plenty of fluids    LMP: NA Pregnant: No    Recommended disposition: See in Office Today (Patient would also like to get her Flu shot)    Care advice provided, patient verbalizes understanding; denies any other questions or concerns; instructed to call back for any new or worsening symptoms. Patient/Caller agrees with recommended disposition; writer provided warm transfer to Mikey at [facility] for appointment scheduling    Attention Provider: Thank you for allowing me to participate in the care of your patient. The patient was connected to triage in response to information provided to the [facility]. Please do not respond through this encounter as the response is not directed to a shared pool.       Reason for Disposition   Patient wants to be seen    Protocols used: Dizziness-ADULT-[place]

## 2022-11-14 NOTE — PROGRESS NOTES
Reviewed record in preparation for visit and have obtained necessary documentation. Identified pt with two pt identifiers(name and ). No chief complaint on file. There were no vitals taken for this visit. Health Maintenance Due   Topic Date Due    Hepatitis C Test  Never done    Hepatitis B Vaccine (1 of 3 - Risk 3-dose series) Never done    Shingles Vaccine (1 of 2) Never done    DTaP/Tdap/Td  (1 - Tdap) Never done    COVID-19 Vaccine (3 - Moderna risk series) 12/15/2021    Yearly Flu Vaccine (1) 2022    Hemoglobin A1C    2022       Ms. Susanne Edge has a reminder for a \"due or due soon\" health maintenance. I have asked that she discuss this further with her primary care provider for follow-up on this health maintenance. Coordination of Care Questionnaire:  :     1) Have you been to an emergency room, urgent care clinic since your last visit? {yes/no default no: }   Hospitalized since your last visit? {yes/no date began:}             2) Have you seen or consulted any other health care providers outside of [address] [place] [address] since your last visit? {yes/no date began:}  (Include any pap smears or colon screenings in this section.)    3) In the event something were to happen to you and you were unable to speak on your behalf, do you have an Advance Directive/ Living Will in place stating your wishes?  {YES/NO:[id number]}

## 2022-11-21 ENCOUNTER — OFFICE VISIT (OUTPATIENT)
Dept: INTERNAL MEDICINE CLINIC | Age: 57
End: 2022-11-21
Payer: MEDICARE

## 2022-11-21 VITALS
TEMPERATURE: 97.9 F | SYSTOLIC BLOOD PRESSURE: 145 MMHG | OXYGEN SATURATION: 100 % | RESPIRATION RATE: 20 BRPM | HEART RATE: 117 BPM | HEIGHT: 63 IN | BODY MASS INDEX: 31.29 KG/M2 | WEIGHT: 176.6 LBS | DIASTOLIC BLOOD PRESSURE: 75 MMHG

## 2022-11-21 DIAGNOSIS — E65 TRUNCAL OBESITY: Primary | ICD-10-CM

## 2022-11-21 DIAGNOSIS — G43.009 MIGRAINE WITHOUT AURA AND WITHOUT STATUS MIGRAINOSUS, NOT INTRACTABLE: ICD-10-CM

## 2022-11-21 DIAGNOSIS — Z23 ENCOUNTER FOR IMMUNIZATION: ICD-10-CM

## 2022-11-21 PROCEDURE — G9899 SCRN MAM PERF RSLTS DOC: HCPCS | Performed by: INTERNAL MEDICINE

## 2022-11-21 PROCEDURE — G0463 HOSPITAL OUTPT CLINIC VISIT: HCPCS | Performed by: INTERNAL MEDICINE

## 2022-11-21 PROCEDURE — G8427 DOCREV CUR MEDS BY ELIG CLIN: HCPCS | Performed by: INTERNAL MEDICINE

## 2022-11-21 PROCEDURE — G9231 DOC ESRD DIA TRANS PREG: HCPCS | Performed by: INTERNAL MEDICINE

## 2022-11-21 PROCEDURE — 99213 OFFICE O/P EST LOW 20 MIN: CPT | Performed by: INTERNAL MEDICINE

## 2022-11-21 PROCEDURE — G8417 CALC BMI ABV UP PARAM F/U: HCPCS | Performed by: INTERNAL MEDICINE

## 2022-11-21 PROCEDURE — 3017F COLORECTAL CA SCREEN DOC REV: CPT | Performed by: INTERNAL MEDICINE

## 2022-11-21 PROCEDURE — 90686 IIV4 VACC NO PRSV 0.5 ML IM: CPT | Performed by: INTERNAL MEDICINE

## 2022-11-21 PROCEDURE — G9717 DOC PT DX DEP/BP F/U NT REQ: HCPCS | Performed by: INTERNAL MEDICINE

## 2022-11-21 RX ORDER — BUTALBITAL, ACETAMINOPHEN AND CAFFEINE 50; 325; 40 MG/1; MG/1; MG/1
1 TABLET ORAL
Qty: 60 TABLET | Refills: 3 | Status: SHIPPED | OUTPATIENT
Start: 2022-11-21

## 2022-11-21 NOTE — PROGRESS NOTES
Chief Complaint   Patient presents with    Follow-up     Patient states shes been getting dizzy when she stands up, bends over and has been having some depression      She ran out of paxil and has not restarted for 6 days feels poorly since lightheaded and woozy with more headaches  Has migraines usually but rare  Worried about belly swelling  normal BM and appetite  Patient Active Problem List    Diagnosis    End stage renal disease ([facility]    PAD (peripheral artery disease) ([facility]    Left shoulder pain    Tear of left rotator cuff    Trimalleolar fracture    Closed left ankle fracture, initial encounter    Ankle dislocation, left, initial encounter    Dizziness    Pelvic mass    Cystitis bacillary, chronic    History of DVT (deep vein thrombosis)    Sebaceous cyst    Type 2 diabetes mellitus without complication, with long-term current use of insulin ([facility]    Renal transplant recipient    Dyslipidemia    Mitral regurgitation    Asthma    Depression    Anemia    Hyperkalemia    DM (diabetes mellitus) ([facility]    Diabetes mellitus ([facility]    Hypertension    Kidney disease    Migraines     no apparent distress  Vitals:    11/21/22 1416 11/21/22 1430   BP: (!) 175/71 (!) 145/75   Pulse: (!) 117    Resp: 20    Temp: 97.9 °F (36.6 °C)    TempSrc: Temporal    SpO2: 100%    Weight: 176 lb 9.6 oz (80.1 kg)    Height: 5' 3\" (1.6 m)      no apparent distress  S1 and S2 normal, no murmurs, clicks, gallops or rubs. Regular rate and rhythm. Chest is clear; no wheezes or rales. No edema or JVD. Abdominal exam: soft, nontender, nondistended, no masses or organomegaly  no rebound tenderness noted  obese. 1. Truncal obesity  Noted  likely related to diabetes meds insulin    2. Migraine without aura and without status migrainosus, not intractable  Worse from withdrawal paxil so restart now  - butalbital-acetaminophen-caffeine (FIORICET, ESGIC) -40 mg per tablet; Take 1 Tablet by mouth three (3) times daily as needed for Headache. Indications: tension headache  Dispense: 60 Tablet; Refill: 3    3.  Encounter for immunization  now  - INFLUENZA, FLUARIX, FLULAVAL, FLUZONE (AGE 6 MO+), AFLURIA(AGE 3Y+) IM, PF, 0.5 ML

## 2022-11-21 NOTE — PROGRESS NOTES
Reviewed record in preparation for visit and have obtained necessary documentation. Identified pt with two pt identifiers(name and ). Chief Complaint   Patient presents with    Follow-up     Patient states shes been getting dizzy when she stands up, bends over and has been having some depression      Blood pressure (!) 175/71, pulse (!) 117, temperature 97.9 °F (36.6 °C), temperature source Temporal, resp. rate 20, height 5' 3\" (1.6 m), weight 176 lb 9.6 oz (80.1 kg), SpO2 100 %. Health Maintenance Due   Topic Date Due    Hepatitis C Test  Never done    Hepatitis B Vaccine (1 of 3 - Risk 3-dose series) Never done    Shingles Vaccine (1 of 2) Never done    DTaP/Tdap/Td  (1 - Tdap) Never done    COVID-19 Vaccine (3 - Moderna risk series) 12/15/2021    Yearly Flu Vaccine (1) 2022    Hemoglobin A1C    2022       Ms. Claudean Ralph has a reminder for a \"due or due soon\" health maintenance. I have asked that she discuss this further with her primary care provider for follow-up on this health maintenance. Coordination of Care Questionnaire:  :     1) Have you been to an emergency room, urgent care clinic since your last visit? no   Hospitalized since your last visit? no             2) Have you seen or consulted any other health care providers outside of [address] [place] [address] since your last visit? Suyapa altamirano     3) In the event something were to happen to you and you were unable to speak on your behalf, do you have an Advance Directive/ Living Will in place stating your wishes?  NO

## 2022-12-05 LAB — HBA1C MFR BLD HPLC: 10.9 %

## 2022-12-14 ENCOUNTER — APPOINTMENT (OUTPATIENT)
Dept: GENERAL RADIOLOGY | Age: 57
End: 2022-12-14
Attending: EMERGENCY MEDICINE
Payer: MEDICARE

## 2022-12-14 ENCOUNTER — HOSPITAL ENCOUNTER (EMERGENCY)
Age: 57
Discharge: HOME | End: 2022-12-14
Attending: EMERGENCY MEDICINE
Payer: MEDICARE

## 2022-12-14 ENCOUNTER — APPOINTMENT (OUTPATIENT)
Dept: CT IMAGING | Age: 57
End: 2022-12-14
Attending: EMERGENCY MEDICINE
Payer: MEDICARE

## 2022-12-14 VITALS
WEIGHT: 171 LBS | SYSTOLIC BLOOD PRESSURE: 176 MMHG | HEART RATE: 87 BPM | BODY MASS INDEX: 30.3 KG/M2 | HEIGHT: 63 IN | OXYGEN SATURATION: 99 % | DIASTOLIC BLOOD PRESSURE: 89 MMHG | RESPIRATION RATE: 16 BRPM | TEMPERATURE: 98.7 F

## 2022-12-14 DIAGNOSIS — E11.65 TYPE 2 DIABETES MELLITUS WITH HYPERGLYCEMIA, UNSPECIFIED WHETHER LONG TERM INSULIN USE (HCC): ICD-10-CM

## 2022-12-14 DIAGNOSIS — J06.9 ACUTE UPPER RESPIRATORY INFECTION: ICD-10-CM

## 2022-12-14 DIAGNOSIS — R10.10 UPPER ABDOMINAL PAIN: ICD-10-CM

## 2022-12-14 DIAGNOSIS — R11.2 NAUSEA AND VOMITING, UNSPECIFIED VOMITING TYPE: Primary | ICD-10-CM

## 2022-12-14 LAB
ALBUMIN SERPL-MCNC: 4.1 G/DL (ref 3.5–5)
ALBUMIN/GLOB SERPL: 1.2 {RATIO} (ref 1.1–2.2)
ALP SERPL-CCNC: 100 U/L (ref 45–117)
ALT SERPL-CCNC: 26 U/L (ref 12–78)
ANION GAP SERPL CALC-SCNC: 8 MMOL/L (ref 5–15)
APPEARANCE UR: CLEAR
AST SERPL-CCNC: 19 U/L (ref 15–37)
BACTERIA URNS QL MICRO: NEGATIVE /HPF
BASOPHILS # BLD: 0.1 K/UL (ref 0–0.1)
BASOPHILS NFR BLD: 2 % (ref 0–1)
BILIRUB SERPL-MCNC: 0.4 MG/DL (ref 0.2–1)
BILIRUB UR QL: NEGATIVE
BUN SERPL-MCNC: 26 MG/DL (ref 6–20)
BUN/CREAT SERPL: 17 (ref 12–20)
CALCIUM SERPL-MCNC: 10.6 MG/DL (ref 8.5–10.1)
CHLORIDE SERPL-SCNC: 99 MMOL/L (ref 97–108)
CO2 SERPL-SCNC: 30 MMOL/L (ref 21–32)
COLOR UR: ABNORMAL
CREAT SERPL-MCNC: 1.5 MG/DL (ref 0.55–1.02)
DIFFERENTIAL METHOD BLD: ABNORMAL
EOSINOPHIL # BLD: 0 K/UL (ref 0–0.4)
EOSINOPHIL NFR BLD: 0 % (ref 0–7)
EPITH CASTS URNS QL MICRO: ABNORMAL /LPF
ERYTHROCYTE [DISTWIDTH] IN BLOOD BY AUTOMATED COUNT: 14.2 % (ref 11.5–14.5)
FLUAV RNA SPEC QL NAA+PROBE: NOT DETECTED
FLUBV RNA SPEC QL NAA+PROBE: NOT DETECTED
GLOBULIN SER CALC-MCNC: 3.5 G/DL (ref 2–4)
GLUCOSE SERPL-MCNC: 302 MG/DL (ref 65–100)
GLUCOSE UR STRIP.AUTO-MCNC: 250 MG/DL
HCT VFR BLD AUTO: 32.7 % (ref 35–47)
HGB BLD-MCNC: 10.8 G/DL (ref 11.5–16)
HGB UR QL STRIP: ABNORMAL
IMM GRANULOCYTES # BLD AUTO: 0 K/UL (ref 0–0.04)
IMM GRANULOCYTES NFR BLD AUTO: 0 % (ref 0–0.5)
KETONES UR QL STRIP.AUTO: NEGATIVE MG/DL
LEUKOCYTE ESTERASE UR QL STRIP.AUTO: NEGATIVE
LIPASE SERPL-CCNC: 56 U/L (ref 73–393)
LYMPHOCYTES # BLD: 0.6 K/UL (ref 0.8–3.5)
LYMPHOCYTES NFR BLD: 10 % (ref 12–49)
MCH RBC QN AUTO: 26.6 PG (ref 26–34)
MCHC RBC AUTO-ENTMCNC: 33 G/DL (ref 30–36.5)
MCV RBC AUTO: 80.5 FL (ref 80–99)
MONOCYTES # BLD: 0.7 K/UL (ref 0–1)
MONOCYTES NFR BLD: 12 % (ref 5–13)
NEUTS BAND NFR BLD MANUAL: 3 %
NEUTS SEG # BLD: 4.5 K/UL (ref 1.8–8)
NEUTS SEG NFR BLD: 73 % (ref 32–75)
NITRITE UR QL STRIP.AUTO: NEGATIVE
NRBC # BLD: 0 K/UL (ref 0–0.01)
NRBC BLD-RTO: 0 PER 100 WBC
PH UR STRIP: 6.5 [PH] (ref 5–8)
PLATELET # BLD AUTO: 204 K/UL (ref 150–400)
PLATELET COMMENTS,PCOM: ABNORMAL
PMV BLD AUTO: 10.9 FL (ref 8.9–12.9)
POTASSIUM SERPL-SCNC: 4.6 MMOL/L (ref 3.5–5.1)
PROT SERPL-MCNC: 7.6 G/DL (ref 6.4–8.2)
PROT UR STRIP-MCNC: NEGATIVE MG/DL
RBC # BLD AUTO: 4.06 M/UL (ref 3.8–5.2)
RBC #/AREA URNS HPF: >100 /HPF (ref 0–5)
RBC MORPH BLD: ABNORMAL
SARS-COV-2, COV2: NOT DETECTED
SODIUM SERPL-SCNC: 137 MMOL/L (ref 136–145)
SP GR UR REFRACTOMETRY: 1.01 (ref 1–1.03)
UROBILINOGEN UR QL STRIP.AUTO: 0.2 EU/DL (ref 0.2–1)
WBC # BLD AUTO: 5.9 K/UL (ref 3.6–11)
WBC URNS QL MICRO: ABNORMAL /HPF (ref 0–4)

## 2022-12-14 PROCEDURE — 85025 COMPLETE CBC W/AUTO DIFF WBC: CPT

## 2022-12-14 PROCEDURE — 71045 X-RAY EXAM CHEST 1 VIEW: CPT

## 2022-12-14 PROCEDURE — 87636 SARSCOV2 & INF A&B AMP PRB: CPT

## 2022-12-14 PROCEDURE — 74011250636 HC RX REV CODE- 250/636: Performed by: EMERGENCY MEDICINE

## 2022-12-14 PROCEDURE — 81001 URINALYSIS AUTO W/SCOPE: CPT

## 2022-12-14 PROCEDURE — 74176 CT ABD & PELVIS W/O CONTRAST: CPT

## 2022-12-14 PROCEDURE — 96374 THER/PROPH/DIAG INJ IV PUSH: CPT

## 2022-12-14 PROCEDURE — 80053 COMPREHEN METABOLIC PANEL: CPT

## 2022-12-14 PROCEDURE — 93005 ELECTROCARDIOGRAM TRACING: CPT

## 2022-12-14 PROCEDURE — 83690 ASSAY OF LIPASE: CPT

## 2022-12-14 PROCEDURE — 99285 EMERGENCY DEPT VISIT HI MDM: CPT

## 2022-12-14 PROCEDURE — 36415 COLL VENOUS BLD VENIPUNCTURE: CPT

## 2022-12-14 PROCEDURE — 74011250637 HC RX REV CODE- 250/637: Performed by: EMERGENCY MEDICINE

## 2022-12-14 RX ORDER — ONDANSETRON 4 MG/1
4 TABLET, ORALLY DISINTEGRATING ORAL
Qty: 30 TABLET | Refills: 0 | Status: SHIPPED | OUTPATIENT
Start: 2022-12-14

## 2022-12-14 RX ORDER — ONDANSETRON 2 MG/ML
4 INJECTION INTRAMUSCULAR; INTRAVENOUS ONCE
Status: COMPLETED | OUTPATIENT
Start: 2022-12-14 | End: 2022-12-14

## 2022-12-14 RX ORDER — DICYCLOMINE HYDROCHLORIDE 10 MG/5ML
20 SOLUTION ORAL EVERY 6 HOURS
Qty: 200 ML | Refills: 0 | Status: SHIPPED | OUTPATIENT
Start: 2022-12-14 | End: 2022-12-19

## 2022-12-14 RX ORDER — BENZONATATE 100 MG/1
100 CAPSULE ORAL
Qty: 30 CAPSULE | Refills: 0 | Status: SHIPPED | OUTPATIENT
Start: 2022-12-14

## 2022-12-14 RX ORDER — ACETAMINOPHEN 325 MG/1
650 TABLET ORAL
Status: COMPLETED | OUTPATIENT
Start: 2022-12-14 | End: 2022-12-14

## 2022-12-14 RX ADMIN — ACETAMINOPHEN 650 MG: 325 TABLET ORAL at 22:04

## 2022-12-14 RX ADMIN — SODIUM CHLORIDE 1000 ML: 9 INJECTION, SOLUTION INTRAVENOUS at 20:10

## 2022-12-14 RX ADMIN — ONDANSETRON 4 MG: 2 INJECTION INTRAMUSCULAR; INTRAVENOUS at 20:09

## 2022-12-15 LAB
ATRIAL RATE: 92 BPM
CALCULATED P AXIS, ECG09: 36 DEGREES
CALCULATED R AXIS, ECG10: -3 DEGREES
CALCULATED T AXIS, ECG11: 50 DEGREES
DIAGNOSIS, 93000: NORMAL
P-R INTERVAL, ECG05: 144 MS
Q-T INTERVAL, ECG07: 354 MS
QRS DURATION, ECG06: 70 MS
QTC CALCULATION (BEZET), ECG08: 437 MS
VENTRICULAR RATE, ECG03: 92 BPM

## 2022-12-15 NOTE — ED PROVIDER NOTES
EMERGENCY DEPARTMENT HISTORY AND PHYSICAL EXAM      Date: 12/14/2022  Patient Name: Danilo Benedict    History of Presenting Illness     Chief Complaint   Patient presents with    Abdominal Pain    Vomiting     Emesis x 1 yesterday , x2 today with abd pain       History Provided By:     HPI: Danilo Benedict, 56 y.o. female with PMHx significant for HTN, DM II, CKD s/p kidney transplant, presents to the ED with cc of N/V abdominal pain x 2 days. Pt reports one episode of emesis yesterday and two today. Pt states that she has generalized abdominal pain. No exacerbating or alleviating factors. Pt had one loose stool this morning. Pt also reports a cough and congestion x 2 days. No known sick contacts. En route, pt was found to be febrile with a temp of 100.5F and hyperglycemic with BG in the 300's. There are no other complaints, changes, or physical findings at this time. PCP: Margret Woods MD    No current facility-administered medications on file prior to encounter. Current Outpatient Medications on File Prior to Encounter   Medication Sig Dispense Refill    butalbital-acetaminophen-caffeine (FIORICET, ESGIC) -40 mg per tablet Take 1 Tablet by mouth three (3) times daily as needed for Headache. Indications: tension headache 60 Tablet 3    liraglutide (VICTOZA) 0.6 mg/0.1 mL (18 mg/3 mL) pnij 1.8 mg by SubCUTAneous route daily. 9 mL 11    metFORMIN ER (GLUCOPHAGE XR) 500 mg tablet Take 1 Tablet by mouth two (2) times daily (after meals). 180 Tablet 3    Insulin Needles, Disposable, (Unifine Pentips) 32 gauge x 5/32\" ndle USE TO INJECT INSULIN 5 TIMES DAILY 500 Pen Needle 3    atorvastatin (LIPITOR) 40 mg tablet TAKE 1 TABLET BY MOUTH EVERY DAY 90 Tablet 1    insulin aspart, niacinamide, (Fiasp FlexTouch U-100 Insulin) 100 unit/mL (3 mL) inpn Inject 16 units before meals + 4 units for every 50 mg/dl above 150 mg/dl.   Max 65 units/day (Patient taking differently: Inject 16 units before meals + 4 units for every 50 mg/dl above 150 mg/dl. Max 65 units/day) 60 mL 3    insulin detemir U-100 (Levemir FlexTouch U-100 Insuln) 100 unit/mL (3 mL) inpn Inject 60 units twice daily 105 mL 3    fenofibrate nanocrystallized (TRICOR) 145 mg tablet TAKE 1 TABLET BY MOUTH EVERY DAY 30 Tablet 3    PARoxetine mesylate,menop.sym, 7.5 mg cap paroxetine mesylate (menopausal symptoms suppressant) 7.5 mg capsule   TAKE 1 CAPSULE BY MOUTH EVERY DAY      diclofenac (VOLTAREN) 1 % gel 2 (TWO) GRAM APPLY TO AFFECTED AREA FOUR TIMES DAILY 100 g 5    Eliquis 5 mg tablet Take 1 Tab by mouth two (2) times a day. labetaloL (NORMODYNE) 200 mg tablet Take 1 Tab by mouth nightly. 360 Tab 1    K-Phos-NeutraL 250 mg tablet TAKE 1 TABLET BY MOUTH EVERY DAY      magnesium oxide (MAG-OX) 400 mg tablet Take 1 Tab by mouth daily. (Patient taking differently: Take 1,200 mg by mouth two (2) times a day.) 90 Tab 3    pantoprazole (PROTONIX) 40 mg tablet TAKE ONE TABLET BY MOUTH ONCE DAILY 90 Tab 1    Blood-Glucose Meter, Drum-type (ACCU-CHEK COMPACT PLUS CARE) kit Test blood sugar 4 times daily 1 Kit 0    Blood Sugar Diagnostic, Drum (ACCU-CHEK COMPACT PLUS TEST) strp Test blood sugar 4 times daily 100 Strip 3    albuterol (PROVENTIL HFA, VENTOLIN HFA, PROAIR HFA) 90 mcg/actuation inhaler Take 2 Puffs by inhalation every four (4) hours as needed for Wheezing. (Patient not taking: Reported on 11/21/2022) 1 Inhaler 3    predniSONE (DELTASONE) 5 mg tablet Take 1 Tab by mouth daily. 11    cinacalcet (SENSIPAR) 30 mg tablet Take 30 mg by mouth daily. aspirin delayed-release 81 mg tablet Take  by mouth daily. mycophenolate mofetil (CELLCEPT) 250 mg capsule Take 250 mg by mouth. 2 tabs in the am and 4 tabs in the pm      tacrolimus (PROGRAF) 1 mg capsule Take 2 mg by mouth.  Indications: taking 1 mg in am and 2 mg at QHS         Past History     Past Medical History:  Past Medical History:   Diagnosis Date    Anemia     Arthritis     Asthma 4/8/2014    Rx for same    Chronic kidney disease 2012    Diabetes ([facility] [address] 28yo    Rx to control    GERD (gastroesophageal reflux disease)     Headache(784.0)     Hepatitis B infection     Hypertension     Rx for same    Kidney transplanted 04/2017    Left shoulder pain 11/5/2021    Sebaceous cyst 8/27/2019    Tear of left rotator cuff 11/5/2021    Thromboembolus ([facility] [address] 2017       Past Surgical History:  Past Surgical History:   Procedure Laterality Date    COLONOSCOPY N/A 11/1/2016    COLONOSCOPY performed by Akira Cantu MD at [address]  2021    [facility]    [address] [facility] [place]    laparoscopic    HX COLONOSCOPY      HX ENDOSCOPY      HX MOHS PROCEDURES Left     HX OTHER SURGICAL  7/30/15    excision of left Axilla Hidradenitis    HX RENAL BIOPSY  2011    HX RENAL TRANSPLANT  04/05/2017    HX UROLOGICAL  2012    KIDNEY BX RIGHT - pt states she has only had one kidney bx as of 10/31/2016    HX UROLOGICAL  2017    transplant    HX VASCULAR ACCESS  1/2013    CHEST CATHETER FOR DIALYSIS    HX VASCULAR ACCESS Right     A-V fistula    HX WISDOM TEETH EXTRACTION      [place] ANESTH,SURGERY OF SHOULDER  2015       Family History:  Family History   Problem Relation Age of Onset    Hypertension Mother     OSTEOARTHRITIS Mother     Hypertension Sister     Diabetes Maternal Grandfather     Cancer Father         UNKNOWN    Hypertension Maternal Grandmother     Diabetes Son     Asthma Son     Cancer Maternal Aunt         UNKNOWN    Breast Cancer Maternal Aunt     Asthma Brother     Anesth Problems Neg Hx        Social History:  Social History     Tobacco Use    Smoking status: Never    Smokeless tobacco: Never   Substance Use Topics    Alcohol use: No     Alcohol/week: 0.0 standard drinks    Drug use: No       Allergies:   Allergies   Allergen Reactions    Latex Shortness of Breath    Keflex [Cephalexin] Nausea and Vomiting     Stomach cramping    Tramadol Itching         Review of Systems   Review of Systems   Constitutional:  Positive for fever. Respiratory:  Positive for cough. Gastrointestinal:  Positive for abdominal pain, nausea and vomiting. Neurological:  Positive for light-headedness. All other systems reviewed and are negative. Physical Exam   Physical Exam  Vitals and nursing note reviewed. Constitutional:       General: She is not in acute distress. Appearance: Normal appearance. Comments: Uncomfortable appearing   HENT:      Head: Normocephalic and atraumatic. Nose: Nose normal.      Mouth/Throat:      Mouth: Mucous membranes are moist.   Eyes:      Extraocular Movements: Extraocular movements intact. Conjunctiva/sclera: Conjunctivae normal.   Cardiovascular:      Rate and Rhythm: Normal rate and regular rhythm. Pulses: Normal pulses. Heart sounds: Normal heart sounds. Pulmonary:      Effort: Pulmonary effort is normal. No respiratory distress. Breath sounds: Normal breath sounds. Abdominal:      General: Abdomen is flat. Bowel sounds are normal. There is no distension. Palpations: Abdomen is soft. Tenderness: There is generalized abdominal tenderness. There is no right CVA tenderness, left CVA tenderness, guarding or rebound. Hernia: No hernia is present. Musculoskeletal:         General: Normal range of motion. Cervical back: Normal range of motion and neck supple. Skin:     General: Skin is warm and dry. Capillary Refill: Capillary refill takes less than 2 seconds. Neurological:      General: No focal deficit present. Mental Status: She is alert and oriented to person, place, and time.    Psychiatric:         Mood and Affect: Mood normal.         Behavior: Behavior normal.           Diagnostic Study Results     Labs -     Recent Results (from the past 12 hour(s))   COVID-19 WITH INFLUENZA A/B    Collection Time: 12/14/22  8:02 PM   Result Value Ref Range    SARS-CoV-2 by PCR Not detected NOTD Influenza A by PCR Not detected NOTD      Influenza B by PCR Not detected NOTD     METABOLIC PANEL, COMPREHENSIVE    Collection Time: 12/14/22  8:09 PM   Result Value Ref Range    Sodium 137 136 - 145 mmol/L    Potassium 4.6 3.5 - 5.1 mmol/L    Chloride 99 97 - 108 mmol/L    CO2 30 21 - 32 mmol/L    Anion gap 8 5 - 15 mmol/L    Glucose 302 (H) 65 - 100 mg/dL    BUN 26 (H) 6 - 20 MG/DL    Creatinine 1.50 (H) 0.55 - 1.02 MG/DL    BUN/Creatinine ratio 17 12 - 20      eGFR 41 (L) >60 ml/min/1.73m2    Calcium 10.6 (H) 8.5 - 10.1 MG/DL    Bilirubin, total 0.4 0.2 - 1.0 MG/DL    ALT (SGPT) 26 12 - 78 U/L    AST (SGOT) 19 15 - 37 U/L    Alk. phosphatase 100 45 - 117 U/L    Protein, total 7.6 6.4 - 8.2 g/dL    Albumin 4.1 3.5 - 5.0 g/dL    Globulin 3.5 2.0 - 4.0 g/dL    A-G Ratio 1.2 1.1 - 2.2     CBC WITH AUTOMATED DIFF    Collection Time: 12/14/22  8:09 PM   Result Value Ref Range    WBC 5.9 3.6 - 11.0 K/uL    RBC 4.06 3.80 - 5.20 M/uL    HGB 10.8 (L) 11.5 - 16.0 g/dL    HCT 32.7 (L) 35.0 - 47.0 %    MCV 80.5 80.0 - 99.0 FL    MCH 26.6 26.0 - 34.0 PG    MCHC 33.0 30.0 - 36.5 g/dL    RDW 14.2 11.5 - 14.5 %    PLATELET 533 238 - 448 K/uL    MPV 10.9 8.9 - 12.9 FL    NRBC 0.0 0  WBC    ABSOLUTE NRBC 0.00 0.00 - 0.01 K/uL    NEUTROPHILS 73 32 - 75 %    BAND NEUTROPHILS 3 %    LYMPHOCYTES 10 (L) 12 - 49 %    MONOCYTES 12 5 - 13 %    EOSINOPHILS 0 0 - 7 %    BASOPHILS 2 (H) 0 - 1 %    IMMATURE GRANULOCYTES 0 0.0 - 0.5 %    ABS. NEUTROPHILS 4.5 1.8 - 8.0 K/UL    ABS. LYMPHOCYTES 0.6 (L) 0.8 - 3.5 K/UL    ABS. MONOCYTES 0.7 0.0 - 1.0 K/UL    ABS. EOSINOPHILS 0.0 0.0 - 0.4 K/UL    ABS. BASOPHILS 0.1 0.0 - 0.1 K/UL    ABS. IMM.  GRANS. 0.0 0.00 - 0.04 K/UL    DF MANUAL      PLATELET COMMENTS ADEQUATE PLATELETS      RBC COMMENTS ANISOCYTOSIS  1+       LIPASE    Collection Time: 12/14/22  8:09 PM   Result Value Ref Range    Lipase 56 (L) 73 - 393 U/L   URINALYSIS W/ RFLX MICROSCOPIC    Collection Time: 12/14/22  9:43 PM Result Value Ref Range    Color YELLOW/STRAW      Appearance CLEAR CLEAR      Specific gravity 1.010 1.003 - 1.030      pH (UA) 6.5 5.0 - 8.0      Protein Negative NEG mg/dL    Glucose 250 (A) NEG mg/dL    Ketone Negative NEG mg/dL    Bilirubin Negative NEG      Blood LARGE (A) NEG      Urobilinogen 0.2 0.2 - 1.0 EU/dL    Nitrites Negative NEG      Leukocyte Esterase Negative NEG     URINE MICROSCOPIC ONLY    Collection Time: 12/14/22  9:43 PM   Result Value Ref Range    WBC 0-4 0 - 4 /hpf    RBC >100 (H) 0 - 5 /hpf    Epithelial cells FEW FEW /lpf    Bacteria Negative NEG /hpf       Radiologic Studies -   CT ABD PELV WO CONT   Final Result   No acute process. XR CHEST SNGL V   Final Result   Clear lungs. CT Results  (Last 48 hours)                 12/14/22 2305  CT ABD PELV WO CONT Final result    Impression:  No acute process. Narrative:  CT ABDOMEN AND PELVIS WITHOUT CONTRAST. 12/14/2022 11:05 PM        INDICATION: Abdominal pain, nausea, vomiting. COMPARISON: 1/22/2020, 12/5/2016. MRI pelvis 9/21/2020, pelvic ultrasound   9/4/2020. TECHNIQUE: CT of the abdomen and pelvis was performed without contrast. CT dose   reduction was achieved through use of a standardized protocol tailored for this   examination and automatic exposure control for dose modulation. FINDINGS:   Abdomen: There is end-stage atrophy of the bilateral kidneys, and a transplant   kidney in the right lower quadrant. Post cholecystectomy. The lung bases are   clear. The heart size is normal. Mitral annular calcifications are mild. The   unenhanced distal esophagus, stomach, duodenum, liver, pancreas, spleen, and   adrenals are normal.       Pelvis: An oval, circumscribed, right, anterior, extraperitoneal mass measures   51 x 52 x 54 mm. It has decreased from 1/22/2020. It was new between 2016 and   2020, as well as the transplant kidney. An old postoperative hematoma is   favored.  It has mass effect on the bladder, displacing it left posterolaterally. Diverticulosis is mild to moderate throughout the colon. The unenhanced small   bowel, ileocecal junction, appendix, colon are normal. No free air or fluid, and   no abdominopelvic lymphadenopathy. CXR Results  (Last 48 hours)                 12/14/22 2126  XR CHEST SNGL V Final result    Impression:  Clear lungs. Narrative:  PORTABLE CHEST RADIOGRAPH/S: 12/14/2022 9:26 PM       INDICATION: Cough. COMPARISON: 12/14/2018. TECHNIQUE: Portable frontal semiupright radiograph/s of the chest.       FINDINGS:    The lungs are clear. The central airways are patent. No pneumothorax or pleural   effusion. Medical Decision Making   I am the first provider for this patient. I reviewed the vital signs, available nursing notes, past medical history, past surgical history, family history and social history. Vital Signs-Reviewed the patient's vital signs. Patient Vitals for the past 12 hrs:   Temp Pulse Resp BP SpO2   12/14/22 2128 -- 90 16 (!) 213/97 99 %   12/14/22 1949 99.8 °F (37.7 °C) 91 20 (!) 150/74 99 %         ED Course:   Initial assessment performed. The patients presenting problems have been discussed, and they are in agreement with the care plan formulated and outlined with them. I have encouraged them to ask questions as they arise throughout their visit. ED Course as of 12/14/22 2332   Wed Dec 14, 2022   2004 EKG, 12 LEAD, INITIAL  Normal sinus rhythm, rate 92, QRS 70, Qtc 437, no acute st abnormalities [SJ]   2149 Flu and Covid negative. CXR is clear. Glucose is 302. Will give IVF bolus and recheck. [SJ]   2318 No leukocytosis, UA is negative for infection. [SJ]   5497 Ct scan shows no acute abnormalities. Suspect viral illness. Will prescribe zofran, bentyl, and tessalon perles.   [SJ]      ED Course User Index  [SJ] Mya Nieto MD         Specific return precautions provided as well as instructions to return to the ED should sx worsen at any time. Vital signs stable for discharge. I have also put together some discharge instructions for them that include: 1) educational information regarding their diagnosis, 2) how to care for their diagnosis at home, as well a 3) list of reasons why they would want to return to the ED prior to their follow-up appointment, should their condition change. Critical Care Time:   0    Disposition:  Home    Return to ED if worse     Diagnosis     Clinical Impression:   1. Nausea and vomiting, unspecified vomiting type    2. Upper abdominal pain    3. Type 2 diabetes mellitus with hyperglycemia, unspecified whether long term insulin use ([facility] [address]    4.  Acute upper respiratory infection

## 2022-12-19 RX ORDER — INSULIN DETEMIR 100 [IU]/ML
INJECTION, SOLUTION SUBCUTANEOUS
Qty: 105 ML | Refills: 3 | Status: SHIPPED | OUTPATIENT
Start: 2022-12-19

## 2022-12-22 RX ORDER — APIXABAN 5 MG/1
5 TABLET, FILM COATED ORAL 2 TIMES DAILY
Status: CANCELLED | OUTPATIENT
Start: 2022-12-22

## 2022-12-23 DIAGNOSIS — I34.0 NONRHEUMATIC MITRAL VALVE REGURGITATION: Primary | ICD-10-CM

## 2022-12-23 RX ORDER — APIXABAN 5 MG/1
5 TABLET, FILM COATED ORAL 2 TIMES DAILY
Qty: 180 TABLET | Refills: 3 | Status: SHIPPED | OUTPATIENT
Start: 2022-12-23

## 2022-12-23 NOTE — TELEPHONE ENCOUNTER
Refilled per Vo per NP    Future Appointments   Date Time Provider Trisha Pinedai   1/9/2023  9:00 AM Allen Marroquin NP [facility] BS AMB   2/27/2023  2:10 PM Stephanie Nieto MD RDE [facility] BS AMB   5/25/2023  8:00 AM Allyn Reese MD [facility] BS AMB

## 2023-01-06 ENCOUNTER — HOSPITAL ENCOUNTER (OUTPATIENT)
Dept: MAMMOGRAPHY | Age: 58
Discharge: HOME | End: 2023-01-06
Attending: INTERNAL MEDICINE
Payer: MEDICARE

## 2023-01-06 DIAGNOSIS — Z12.31 SCREENING MAMMOGRAM FOR HIGH-RISK PATIENT: ICD-10-CM

## 2023-01-06 PROCEDURE — 77067 SCR MAMMO BI INCL CAD: CPT

## 2023-01-12 ENCOUNTER — TELEPHONE (OUTPATIENT)
Dept: CARDIOLOGY CLINIC | Age: 58
End: 2023-01-12

## 2023-01-12 NOTE — TELEPHONE ENCOUNTER
LVM requesting a return call to reschedule 2/16/2023 appointment with Humble Arevalo NP to next available at [facility] or [facility]. Provider covering [facility].

## 2023-01-17 ENCOUNTER — TELEPHONE (OUTPATIENT)
Dept: CARDIOLOGY CLINIC | Age: 58
End: 2023-01-17

## 2023-01-17 NOTE — TELEPHONE ENCOUNTER
Dr. Sage Ocampo patient, former Dr. Benton Rodriguez patient     Received refill request on fenofibrate/tricor    This medication is contraindicated for CrCl<30 and her last CrCl was 41, it's above 30 but getting closer. Her TG were normal on last labs so I recommend we stop fenofibrate/tricor and then recheck her cholesterol in 3 months to see if she can go without it. If she is agreeable please order lipid panel to be done in 3 months and remove tricor from med list.    Also, needs appt with me rescheduled. See Aziza's note from 1/12/23.     Future Appointments   Date Time Provider Trisha Culver   2/13/2023  9:00 AM HATTIE ECHAVARRIA BS AMB   2/27/2023  2:10 PM Elva FridayMD [facility] BS AMB   5/25/2023  8:00 AM Jos Reese MD [facility] BS AMB

## 2023-02-06 DIAGNOSIS — E78.5 DYSLIPIDEMIA: ICD-10-CM

## 2023-02-06 RX ORDER — ATORVASTATIN CALCIUM 40 MG/1
TABLET, FILM COATED ORAL
Qty: 90 TABLET | Refills: 1 | Status: SHIPPED | OUTPATIENT
Start: 2023-02-06

## 2023-02-10 DIAGNOSIS — E78.5 DYSLIPIDEMIA: ICD-10-CM

## 2023-02-10 DIAGNOSIS — E11.8 TYPE 2 DIABETES MELLITUS WITH COMPLICATION, WITH LONG-TERM CURRENT USE OF INSULIN (HCC): ICD-10-CM

## 2023-02-10 DIAGNOSIS — Z79.4 TYPE 2 DIABETES MELLITUS WITH COMPLICATION, WITH LONG-TERM CURRENT USE OF INSULIN (HCC): ICD-10-CM

## 2023-02-10 DIAGNOSIS — I10 ESSENTIAL HYPERTENSION: ICD-10-CM

## 2023-02-14 ENCOUNTER — OFFICE VISIT (OUTPATIENT)
Dept: CARDIOLOGY CLINIC | Age: 58
End: 2023-02-14
Payer: MEDICARE

## 2023-02-14 VITALS
HEIGHT: 63 IN | WEIGHT: 177 LBS | BODY MASS INDEX: 31.36 KG/M2 | HEART RATE: 77 BPM | RESPIRATION RATE: 16 BRPM | OXYGEN SATURATION: 97 % | SYSTOLIC BLOOD PRESSURE: 130 MMHG | DIASTOLIC BLOOD PRESSURE: 80 MMHG

## 2023-02-14 DIAGNOSIS — I73.9 PAD (PERIPHERAL ARTERY DISEASE) (HCC): ICD-10-CM

## 2023-02-14 DIAGNOSIS — I34.0 NONRHEUMATIC MITRAL VALVE REGURGITATION: Primary | ICD-10-CM

## 2023-02-14 DIAGNOSIS — Z94.0 RENAL TRANSPLANT RECIPIENT: ICD-10-CM

## 2023-02-14 DIAGNOSIS — I10 BENIGN ESSENTIAL HTN: ICD-10-CM

## 2023-02-14 PROCEDURE — 3079F DIAST BP 80-89 MM HG: CPT | Performed by: INTERNAL MEDICINE

## 2023-02-14 PROCEDURE — G8417 CALC BMI ABV UP PARAM F/U: HCPCS | Performed by: INTERNAL MEDICINE

## 2023-02-14 PROCEDURE — G9899 SCRN MAM PERF RSLTS DOC: HCPCS | Performed by: INTERNAL MEDICINE

## 2023-02-14 PROCEDURE — 3075F SYST BP GE 130 - 139MM HG: CPT | Performed by: INTERNAL MEDICINE

## 2023-02-14 PROCEDURE — 3017F COLORECTAL CA SCREEN DOC REV: CPT | Performed by: INTERNAL MEDICINE

## 2023-02-14 PROCEDURE — 99213 OFFICE O/P EST LOW 20 MIN: CPT | Performed by: INTERNAL MEDICINE

## 2023-02-14 PROCEDURE — G0463 HOSPITAL OUTPT CLINIC VISIT: HCPCS | Performed by: INTERNAL MEDICINE

## 2023-02-14 PROCEDURE — G8427 DOCREV CUR MEDS BY ELIG CLIN: HCPCS | Performed by: INTERNAL MEDICINE

## 2023-02-14 PROCEDURE — G9717 DOC PT DX DEP/BP F/U NT REQ: HCPCS | Performed by: INTERNAL MEDICINE

## 2023-02-14 RX ORDER — INSULIN ASPART INJECTION 100 [IU]/ML
INJECTION, SOLUTION SUBCUTANEOUS
Qty: 60 ML | Refills: 3 | Status: SHIPPED | OUTPATIENT
Start: 2023-02-14

## 2023-02-14 NOTE — LETTER
Patient:  Shannan Gary   YOB: 1965  Date of Visit: 2/14/2023      Dear Lawyer Kenan MD  [facility]  Via In Basket:    Ms. Saray Lay is a 57 yo F seen in the past by Dr. Gil Jasso with history of essential hypertension, mitral regurgitation just to a mild degree, right leg DVT on Eliquis followed by Dr. Evaristo Cummings at the [facility] transplant Center, end stage renal disease status post right renal transplant in 2017 followed by Dr. Constantino Jang and Dr. Evaristo Cummings, on insulin for diabetes, dyslipidemia, history of gastritis/esophageal stricture, PAD with right ICA stenosis. On her last visit due to chest discomfort and shortness of breath, the plan was to do the stress test.  Apparently this was not scheduled and she no longer is having chest pain. Her breathing has been improved as well. She denies any palpitations. She has been compliant with her medications. She denies any changes in health. She is compensated on exam with clear lungs and no lower extremity edema. Assessment and Plan:   1. Shortness of breath. The plan last time was for a stress test; however, her symptoms have improved. No additional cardiac evaluation is indicated at this time and she will follow up with [facility] in six months. 2. Mitral regurgitation. This has been to a mild degree. 3. End stage renal disease, status post right renal transplant in 2017. Followed by nephrology, Dr. Constantino Jang and Dr. Evaristo Cummings at [facility]. 4. Diabetes. On insulin and oral agents. 5. Dyslipidemia. Tolerating statin. 6. Essential hypertension. Blood pressure is controlled. Adjustments per nephrology given her renal transplant. 7. PAD. Right ICA stenosis noted on Duplex in 2021.   8. Dyslipidemia. Tolerating statin.       If you have questions, please do not hesitate to call me    Sincerely,      Seymour Woods MD

## 2023-02-14 NOTE — PROGRESS NOTES
[facility] [place]: [address]  [phone]    History of Present Illness:   Ms. Cristóbal Esqueda is a 55 yo F seen in the past by Dr. Karlynn Lanes with history of essential hypertension, mitral regurgitation just to a mild degree, right leg DVT on Eliquis followed by Dr. Cervantes Staff at the [facility] transplant Center, end stage renal disease status post right renal transplant in 2017 followed by Dr. Karyn Pena and Dr. Cervantes Staff, on insulin for diabetes, dyslipidemia, history of gastritis/esophageal stricture, PAD with right ICA stenosis. On her last visit due to chest discomfort and shortness of breath, the plan was to do the stress test.  Apparently this was not scheduled and she no longer is having chest pain. Her breathing has been improved as well. She denies any palpitations. She has been compliant with her medications. She denies any changes in health. She is compensated on exam with clear lungs and no lower extremity edema. Assessment and Plan:   1. Shortness of breath. The plan last time was for a stress test; however, her symptoms have improved. No additional cardiac evaluation is indicated at this time and she will follow up with [facility] in six months. 2. Mitral regurgitation. This has been to a mild degree. 3. End stage renal disease, status post right renal transplant in 2017. Followed by nephrology, Dr. Karyn Pena and Dr. Cervantes Staff at [facility]. 4. Diabetes. On insulin and oral agents. 5. Dyslipidemia. Tolerating statin. 6. Essential hypertension. Blood pressure is controlled. Adjustments per nephrology given her renal transplant. 7. PAD. Right ICA stenosis noted on Duplex in 2021.   8. Dyslipidemia. Tolerating statin.             Echo 4/21 - EF 60-65%, mod cLVH, no obvious PFO but image quality not adequate to definitively evaluate for tiny PFO  Carotid duplex 4/21 - left ICA normal, <50% right ICA stenosis   Echo 9/20 EF 65% gr1 dd mild cLVH mild MR  5/18 TTE - normal LVEF, mild MR   5/17 TTE - normal LVEF, mod MR   2/16 TTE - LVEF 55 % to 60 %, no WMA, grade 1 dd, mod MR  3/14 TTE LVEF 55 % to 60 %, no WMA, mild cLVH, mild to mod MR  1/13 TTE LVEF 55%, no WMA, moderate increased wall thickness, grade 1 diastolic dysfunction, moderate MR    Soc no tob, no etoh  Fhx no early CAD      She  has a past medical history of Anemia, Arthritis, Asthma (4/8/2014), Chronic kidney disease (2012), Diabetes ([address] (28yo), GERD (gastroesophageal reflux disease), Headache(784.0), Hepatitis B infection, Hypertension, Kidney transplanted (04/2017), Left shoulder pain (11/5/2021), Sebaceous cyst (8/27/2019), Tear of left rotator cuff (11/5/2021), and Thromboembolus ([address] (2017). She has no past medical history of Adverse effect of anesthesia, Glomerulosclerosis due to secondary diabetes ([address] or Sleep apnea. All other systems negative except as above. PE  Vitals:    02/14/23 0847   BP: 130/80   Pulse: 77   Resp: 16   SpO2: 97%   Weight: 177 lb (80.3 kg)   Height: 5' 3\" (1.6 m)      Body mass index is 31.35 kg/m².    General appearance - alert, well appearing, and in no distress  Mental status - affect appropriate to mood  Eyes - sclera anicteric, moist mucous membranes  Neck - supple, no JVD  Chest - clear to auscultation, no wheezes, rales or rhonchi  Heart - normal rate, regular rhythm, normal S1, S2, no murmurs, rubs, clicks or gallops  Abdomen - soft, nontender, nondistended, no masses or organomegaly  Neurological - no focal deficit  Extremities - peripheral pulses normal, no pedal edema      Recent Labs:  Lab Results   Component Value Date/Time    Cholesterol, total 147 12/20/2021 09:12 AM    HDL Cholesterol 56 12/20/2021 09:12 AM    LDL, calculated 69 12/20/2021 09:12 AM    LDL, calculated 45.8 04/14/2021 01:36 AM    Triglyceride 123 12/20/2021 09:12 AM    CHOL/HDL Ratio 2.0 04/14/2021 01:36 AM     Lab Results   Component Value Date/Time    Creatinine (POC) 9.6 (H) 04/09/2013 08:16 AM    Creatinine 1.50 (H) 12/14/2022 08:09 PM     Lab Results   Component Value Date/Time    BUN 26 (H) 12/14/2022 08:09 PM    BUN (POC) 75 (H) 04/09/2013 08:16 AM     Lab Results   Component Value Date/Time    Potassium 4.6 12/14/2022 08:09 PM     Lab Results   Component Value Date/Time    Hemoglobin A1c 10.0 (H) 12/20/2021 09:12 AM    Hemoglobin A1c, External 10.9 12/05/2022 12:00 AM     Lab Results   Component Value Date/Time    Hemoglobin (POC) 8.8 (L) 04/09/2013 08:16 AM    HGB 10.8 (L) 12/14/2022 08:09 PM     Lab Results   Component Value Date/Time    PLATELET 888 38/79/7314 08:09 PM       Reviewed:  Past Medical History:   Diagnosis Date    Anemia     Arthritis     Asthma 4/8/2014    Rx for same    Chronic kidney disease 2012    Diabetes ([address] 28yo    Rx to control    GERD (gastroesophageal reflux disease)     Headache(784.0)     Hepatitis B infection     Hypertension     Rx for same    Kidney transplanted 04/2017    Left shoulder pain 11/5/2021    Sebaceous cyst 8/27/2019    Tear of left rotator cuff 11/5/2021    Thromboembolus ([facility] 2017     Social History     Tobacco Use   Smoking Status Never   Smokeless Tobacco Never     Social History     Substance and Sexual Activity   Alcohol Use No    Alcohol/week: 0.0 standard drinks     Allergies   Allergen Reactions    Latex Shortness of Breath    Keflex [Cephalexin] Nausea and Vomiting     Stomach cramping    Tramadol Itching       Current Outpatient Medications   Medication Sig    atorvastatin (LIPITOR) 40 mg tablet TAKE 1 TABLET BY MOUTH EVERY DAY    Eliquis 5 mg tablet Take 1 Tablet by mouth two (2) times a day.     insulin detemir U-100 (Levemir FlexTouch U-100 Insuln) 100 unit/mL (3 mL) inpn Inject 60 units twice daily    ondansetron (ZOFRAN ODT) 4 mg disintegrating tablet Take 1 Tablet by mouth every eight (8) hours as needed for Nausea or Vomiting.    benzonatate (Tessalon Perles) 100 mg capsule Take 1 Capsule by mouth three (3) times daily as needed for Cough for up to 30 doses.    butalbital-acetaminophen-caffeine (FIORICET, ESGIC) -40 mg per tablet Take 1 Tablet by mouth three (3) times daily as needed for Headache. Indications: tension headache    liraglutide (VICTOZA) 0.6 mg/0.1 mL (18 mg/3 mL) pnij 1.8 mg by SubCUTAneous route daily. metFORMIN ER (GLUCOPHAGE XR) 500 mg tablet Take 1 Tablet by mouth two (2) times daily (after meals). Insulin Needles, Disposable, (Unifine Pentips) 32 gauge x 5/32\" ndle USE TO INJECT INSULIN 5 TIMES DAILY    insulin aspart, niacinamide, (Fiasp FlexTouch U-100 Insulin) 100 unit/mL (3 mL) inpn Inject 16 units before meals + 4 units for every 50 mg/dl above 150 mg/dl. Max 65 units/day (Patient taking differently: Inject 16 units before meals + 4 units for every 50 mg/dl above 150 mg/dl. Max 65 units/day)    fenofibrate nanocrystallized (TRICOR) 145 mg tablet TAKE 1 TABLET BY MOUTH EVERY DAY    PARoxetine mesylate,menop.sym, 7.5 mg cap paroxetine mesylate (menopausal symptoms suppressant) 7.5 mg capsule   TAKE 1 CAPSULE BY MOUTH EVERY DAY    labetaloL (NORMODYNE) 200 mg tablet Take 1 Tab by mouth nightly. K-Phos-NeutraL 250 mg tablet TAKE 1 TABLET BY MOUTH EVERY DAY    magnesium oxide (MAG-OX) 400 mg tablet Take 1 Tab by mouth daily. (Patient taking differently: Take 1,200 mg by mouth two (2) times a day.)    pantoprazole (PROTONIX) 40 mg tablet TAKE ONE TABLET BY MOUTH ONCE DAILY    Blood-Glucose Meter, Drum-type (ACCU-CHEK COMPACT PLUS CARE) kit Test blood sugar 4 times daily    Blood Sugar Diagnostic, Drum (ACCU-CHEK COMPACT PLUS TEST) strp Test blood sugar 4 times daily    predniSONE (DELTASONE) 5 mg tablet Take 1 Tab by mouth daily. cinacalcet (SENSIPAR) 30 mg tablet Take 30 mg by mouth daily. aspirin delayed-release 81 mg tablet Take  by mouth daily. mycophenolate mofetil (CELLCEPT) 250 mg capsule Take 250 mg by mouth. 2 tabs in the am and 4 tabs in the pm    tacrolimus (PROGRAF) 1 mg capsule Take 2 mg by mouth. Indications: taking 1 mg in am and 2 mg at QHS    diclofenac (VOLTAREN) 1 % gel 2 (TWO) GRAM APPLY TO AFFECTED AREA FOUR TIMES DAILY    albuterol (PROVENTIL HFA, VENTOLIN HFA, PROAIR HFA) 90 mcg/actuation inhaler Take 2 Puffs by inhalation every four (4) hours as needed for Wheezing. (Patient not taking: No sig reported)     No current facility-administered medications for this visit.        Christiano Bianchi MD  [facility] heart and Vascular [place]  [address] [facility] [address]  [address]

## 2023-03-01 ENCOUNTER — TELEPHONE (OUTPATIENT)
Dept: ENDOCRINOLOGY | Age: 58
End: 2023-03-01

## 2023-03-01 NOTE — TELEPHONE ENCOUNTER
Can you call her as she hasn't read my enGreet message from 2/27:    You had an appointment today at 2:10pm and we had it down that you confirmed and you had your labs drawn so I wanted to see if you are ok or if something happened. I'm happy to try and reschedule you when I have a cancellation in the next 1-2 weeks. Let me know when you have a chance.

## 2023-03-02 NOTE — TELEPHONE ENCOUNTER
LVM asking pt to call office back to confirm or deny appt for tomorrow with Dr Jeanne Bhagat at 10:50 AM

## 2023-03-02 NOTE — TELEPHONE ENCOUNTER
Sent her the following message through [employer]:    Can you come at 10:50am or 1:30pm tomorrow, Friday 3/3/23?

## 2023-03-02 NOTE — TELEPHONE ENCOUNTER
Patient notified of message per  and voiced understanding of what was read to them. Pt states she forgot about appt even though she had a reminder on the Friday before. Ms Shanice Castañeda and asked that  send a date and time for an appt via "[employer]hart or phone call.

## 2023-03-04 ENCOUNTER — TELEPHONE (OUTPATIENT)
Dept: ENDOCRINOLOGY | Age: 58
End: 2023-03-04

## 2023-03-04 NOTE — TELEPHONE ENCOUNTER
Called and left her a voicemail to see if she can come on Monday 3/6/23 at 9:50am or 3:10pm for a visit in person or virtual.  I also sent this over [employer] and asked her to let us know over [employer] or by calling the office on Monday if one of these will work for her.

## 2023-03-06 ENCOUNTER — VIRTUAL VISIT (OUTPATIENT)
Dept: ENDOCRINOLOGY | Age: 58
End: 2023-03-06

## 2023-03-06 NOTE — PROGRESS NOTES
I was running 45 min behind and called and spoke with her at 3:55pm and asked her if I could try to do the virtual visit after 5pm and she said she had to go to the store but she may be able to do it then. I didn't finish my in person visits until 5:30 and tried her twice and got her voicemail both times so I left her a message to see if she can come at 3:50pm tomorrow in person or virtual and sent her this message over [employer] too. This encounter was created in error - please disregard.

## 2023-03-07 ENCOUNTER — VIRTUAL VISIT (OUTPATIENT)
Dept: ENDOCRINOLOGY | Age: 58
End: 2023-03-07
Payer: MEDICARE

## 2023-03-07 DIAGNOSIS — E11.8 TYPE 2 DIABETES MELLITUS WITH COMPLICATION, WITH LONG-TERM CURRENT USE OF INSULIN (HCC): Primary | ICD-10-CM

## 2023-03-07 DIAGNOSIS — E78.5 DYSLIPIDEMIA: ICD-10-CM

## 2023-03-07 DIAGNOSIS — I10 ESSENTIAL HYPERTENSION: ICD-10-CM

## 2023-03-07 DIAGNOSIS — Z79.4 TYPE 2 DIABETES MELLITUS WITH COMPLICATION, WITH LONG-TERM CURRENT USE OF INSULIN (HCC): Primary | ICD-10-CM

## 2023-03-07 PROCEDURE — 3017F COLORECTAL CA SCREEN DOC REV: CPT | Performed by: INTERNAL MEDICINE

## 2023-03-07 PROCEDURE — G9717 DOC PT DX DEP/BP F/U NT REQ: HCPCS | Performed by: INTERNAL MEDICINE

## 2023-03-07 PROCEDURE — G8427 DOCREV CUR MEDS BY ELIG CLIN: HCPCS | Performed by: INTERNAL MEDICINE

## 2023-03-07 PROCEDURE — 99214 OFFICE O/P EST MOD 30 MIN: CPT | Performed by: INTERNAL MEDICINE

## 2023-03-07 PROCEDURE — 3046F HEMOGLOBIN A1C LEVEL >9.0%: CPT | Performed by: INTERNAL MEDICINE

## 2023-03-07 PROCEDURE — 95251 CONT GLUC MNTR ANALYSIS I&R: CPT | Performed by: INTERNAL MEDICINE

## 2023-03-07 PROCEDURE — G9899 SCRN MAM PERF RSLTS DOC: HCPCS | Performed by: INTERNAL MEDICINE

## 2023-03-07 PROCEDURE — 2022F DILAT RTA XM EVC RTNOPTHY: CPT | Performed by: INTERNAL MEDICINE

## 2023-03-07 RX ORDER — INSULIN DETEMIR 100 [IU]/ML
INJECTION, SOLUTION SUBCUTANEOUS
Qty: 105 ML | Refills: 3
Start: 2023-03-07

## 2023-03-07 NOTE — PATIENT INSTRUCTIONS
1) Your Hemoglobin A1c (3 month test of blood sugar) is the exact same at 9.2%. 2) Do your best to get your fiasp before lunch everyday. 3) Increase your levemir to 65 units before breakfast but decrease to 55 units at night to see if this helps with highs during the day and lows at night. 4) Your liver, kidney, urine and cholesterol are all normal.    5) Please come for a follow up visit on 6/16/23 at 2:10pm  in our [place] office. 6) We will just draw a Hemoglobin A1c in the office next time and you won't any additional labs.

## 2023-03-07 NOTE — PROGRESS NOTES
Chief Complaint   Patient presents with    Diabetes       **THIS IS A VIRTUAL VISIT VIA A VIDEO SYNCHRONOUS DISCUSSION. PATIENT AGREED TO HAVE THEIR CARE DELIVERED OVER A [facility]. ME VIDEO VISIT IN PLACE OF THEIR REGULARLY SCHEDULED OFFICE VISIT**    History of Present Illness: Arturo Taylor is a 56 y.o. female here for follow up of diabetes. Weight down 3 lbs since last visit in 8/22. Review of her [address] [place] [address] over the past 14 days shows 53% time in range with 5% low and the rest high. She tends to have lows overnight, especially after correcting for highs. She is still having spikes in the middle of the day after lunch as she continues to dose after eating. Compliant with rest of meds below. she has the following indications to continue treatment with [facility] Sek[facility]:  1) she has type 2 diabetes (E11.65) and is on an intensive insulin regimen with 5 injections per day  2) she tests her blood sugar 4 times per day and makes treatment decisions off her blood sugar readings and off freestyle sekou sensor readings  3) she requires frequent adjustments to her insulin injection doses based on her freestyle sekou sensor readings  4) she has benefitted from therapeutic continuous glucose monitoring and I recommend that she begin this  5) she is seen in my office every 4-6 months        Current Outpatient Medications   Medication Sig    insulin aspart, niacinamide, (Fiasp FlexTouch U-100 Insulin) 100 unit/mL (3 mL) inpn Inject 16 units before meals + 4 units for every 50 mg/dl above 150 mg/dl. Max 65 units/day    atorvastatin (LIPITOR) 40 mg tablet TAKE 1 TABLET BY MOUTH EVERY DAY    Eliquis 5 mg tablet Take 1 Tablet by mouth two (2) times a day.     insulin detemir U-100 (Levemir FlexTouch U-100 Insuln) 100 unit/mL (3 mL) inpn Inject 60 units twice daily    ondansetron (ZOFRAN ODT) 4 mg disintegrating tablet Take 1 Tablet by mouth every eight (8) hours as needed for Nausea or Vomiting.    benzonatate (Tessalon Perles) 100 mg capsule Take 1 Capsule by mouth three (3) times daily as needed for Cough for up to 30 doses. butalbital-acetaminophen-caffeine (FIORICET, ESGIC) -40 mg per tablet Take 1 Tablet by mouth three (3) times daily as needed for Headache. Indications: tension headache    liraglutide (VICTOZA) 0.6 mg/0.1 mL (18 mg/3 mL) pnij 1.8 mg by SubCUTAneous route daily. metFORMIN ER (GLUCOPHAGE XR) 500 mg tablet Take 1 Tablet by mouth two (2) times daily (after meals). Insulin Needles, Disposable, (Unifine Pentips) 32 gauge x 5/32\" ndle USE TO INJECT INSULIN 5 TIMES DAILY    PARoxetine mesylate,menop.sym, 7.5 mg cap paroxetine mesylate (menopausal symptoms suppressant) 7.5 mg capsule   TAKE 1 CAPSULE BY MOUTH EVERY DAY    diclofenac (VOLTAREN) 1 % gel 2 (TWO) GRAM APPLY TO AFFECTED AREA FOUR TIMES DAILY    labetaloL (NORMODYNE) 200 mg tablet Take 1 Tab by mouth nightly. K-Phos-NeutraL 250 mg tablet TAKE 1 TABLET BY MOUTH EVERY DAY    magnesium oxide (MAG-OX) 400 mg tablet Take 1 Tab by mouth daily. (Patient taking differently: Take 1,200 mg by mouth two (2) times a day.)    pantoprazole (PROTONIX) 40 mg tablet TAKE ONE TABLET BY MOUTH ONCE DAILY    Blood Sugar Diagnostic, Drum (ACCU-CHEK COMPACT PLUS TEST) strp Test blood sugar 4 times daily    predniSONE (DELTASONE) 5 mg tablet Take 1 Tab by mouth daily. cinacalcet (SENSIPAR) 30 mg tablet Take 30 mg by mouth daily. aspirin delayed-release 81 mg tablet Take  by mouth daily. mycophenolate mofetil (CELLCEPT) 250 mg capsule Take 250 mg by mouth. 2 tabs in the am and 4 tabs in the pm    tacrolimus (PROGRAF) 1 mg capsule Take 2 mg by mouth. Indications: taking 1 mg in am and 2 mg at QHS     No current facility-administered medications for this visit.      Allergies   Allergen Reactions    Latex Shortness of Breath    Keflex [Cephalexin] Nausea and Vomiting     Stomach cramping    Tramadol Itching     Review of Systems: PER HPI    Physical Examination:  - GENERAL: NCAT, Appears well nourished   - EYES: EOMI, non-icteric, no proptosis   - Ear/Nose/Throat: NCAT, no visible inflammation or masses   - CARDIOVASCULAR: no cyanosis, no visible JVD   - RESPIRATORY: respiratory effort normal without any distress or labored breathing   - MUSCULOSKELETAL: Normal ROM of neck and upper extremities observed   - SKIN: No rash on face  - NEUROLOGIC:  No facial asymmetry (Cranial nerve 7 motor function), No gaze palsy   - PSYCHIATRIC: Normal affect, Normal insight and judgement       Data Reviewed:   Component      Latest Ref Rng & Units 2/14/2023 2/14/2023 2/14/2023 2/14/2023           9:20 AM  9:20 AM  9:20 AM  9:20 AM   Glucose      70 - 99 mg/dL   186 (H)    BUN      6 - 24 mg/dL   22    Creatinine      0.57 - 1.00 mg/dL   1.12 (H)    eGFR      >59 mL/min/1.73   58 (L)    BUN/Creatinine ratio      9 - 23   20    Sodium      134 - 144 mmol/L   140    Potassium      3.5 - 5.2 mmol/L   4.2    Chloride      96 - 106 mmol/L   99    CO2      20 - 29 mmol/L   27    Calcium      8.7 - 10.2 mg/dL   9.1    Protein, total      6.0 - 8.5 g/dL   6.3    Albumin      3.8 - 4.9 g/dL   4.3    GLOBULIN, TOTAL      1.5 - 4.5 g/dL   2.0    A-G Ratio      1.2 - 2.2   2.2    Bilirubin, total      0.0 - 1.2 mg/dL   0.4    Alk. phosphatase      44 - 121 IU/L   120    AST      0 - 40 IU/L   11    ALT      0 - 32 IU/L   15    Cholesterol, total      100 - 199 mg/dL    163   Triglyceride      0 - 149 mg/dL    132   HDL Cholesterol      >39 mg/dL    53   VLDL, calculated      5 - 40 mg/dL    23   LDL, calculated      0 - 99 mg/dL    87   Creatinine, urine random      Not Estab. mg/dL  61.0     Microalbumin, urine      Not Estab. ug/mL  <3.0     Microalbumin/Creat. Ratio      0 - 29 mg/g creat  <5     Hemoglobin A1c, (calculated)      4.8 - 5.6 % 9.2 (H)      Estimated average glucose      mg/dL 217          Assessment/Plan:     1.  Type 2 diabetes mellitus with complication, with long-term current use of insulin ([facility]): her most recent Hgb A1c was 9.2% in 2/23 stable from 8/22 up from 8.8% in 4/22 down from 10% in 12/21 down from 10.8% in 7/21 up from 8.4% in 5/21 down from 9.6% in 4/21 up from 8.2% in 1/21 down from 9.1% in 11/20 up from 9.2% in 8/20 up from 8.2% in 12/19. She is not taking fiasp before lunch and this is her biggest spike that is keeping her A1c up so will still work on this. Will increase levemir in the morning to help with spikes during the day and decrease dose at night to avoid lows overnight.  - increase levemir to 65 units in the morning and decrease to 55 units at night  - cont metformin  mg 1 tab bid  - cont victoza 1.8 mg daily  - cont fiasp 16 units before meals + 4 units for every 50 mg/dl above 150 mg/dl  - check bs 4 times per day due to fluctuating sugars  - cont freestyle jaycee  - foot exam done 8/22  - microalbumin 30 12/21, down to <5 in 2/23  - optho UTD 10/21  - check A1c by POC at next visit          2. Essential hypertension: BP at goal < 140/90 at last visit in 8/22  -  cont current regimen for now        3. Dyslipidemia: LDL 69 in 10/19 and 41 in 11/20 and 54 in 1/21 and 45 in 4/21 and 54 in 5/21 and 69 in 12/21 and 72 in 4/22 and 87 in 2/23 on atorva 40  - cont atorva 40 mg daily   - check lipids in fall 2023         Patient Instructions   1) Your Hemoglobin A1c (3 month test of blood sugar) is the exact same at 9.2%. 2) Do your best to get your fiasp before lunch everyday. 3) Increase your levemir to 65 units before breakfast but decrease to 55 units at night to see if this helps with highs during the day and lows at night. 4) Your liver, kidney, urine and cholesterol are all normal.    5) Please come for a follow up visit on 6/16/23 at 2:10pm  in our [facility] office. 6) We will just draw a Hemoglobin A1c in the office next time and you won't any additional labs.         Follow-up and Dispositions    Return 6/16/23 at 2:10pm. Scar Delgado, was evaluated through a synchronous (real-time) audio-video encounter. The patient (or guardian if applicable) is aware that this is a billable service, which includes applicable co-pays. This Virtual Visit was conducted with patient's (and/or legal guardian's) consent. The visit was conducted pursuant to the emergency declaration under the [address] [place] [address] and the [employer] General Act. Patient identification was verified, and a caregiver was present when appropriate. The patient was located at: Home: [place] [phone]  The provider was located at: Facility (Appt Department): [facility]          --Yessenia Marinelli MD on 3/7/2023 at 4:10 PM    An electronic signature was used to authenticate this note.       Copy sent to:  Maria L Pugh MD as PCP - General (Internal Medicine)  Shona Pederson MD as Physician (Cardiology)  Kecia Francois MD (Nephrology)

## 2023-04-07 ENCOUNTER — OFFICE VISIT (OUTPATIENT)
Dept: INTERNAL MEDICINE CLINIC | Age: 58
End: 2023-04-07

## 2023-04-07 PROBLEM — N95.0 POSTMENOPAUSAL BLEEDING: Status: ACTIVE | Noted: 2021-09-28

## 2023-04-07 PROBLEM — E10.3293 MILD NONPROLIFERATIVE DIABETIC RETINOPATHY OF BOTH EYES WITHOUT MACULAR EDEMA ASSOCIATED WITH TYPE 1 DIABETES MELLITUS (HCC): Status: ACTIVE | Noted: 2018-05-07

## 2023-04-07 PROBLEM — R77.8 ELEVATED TROPONIN LEVEL: Status: ACTIVE | Noted: 2023-04-07

## 2023-04-07 PROBLEM — N18.31 STAGE 3A CHRONIC KIDNEY DISEASE (HCC): Status: ACTIVE | Noted: 2021-10-22
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
## 2023-04-07 PROBLEM — R87.619 ABNORMAL CERVICAL PAPANICOLAOU SMEAR: Status: ACTIVE | Noted: 2022-03-01

## 2023-04-07 PROBLEM — S82.899A FRACTURE OF ANKLE: Status: ACTIVE | Noted: 2023-04-07

## 2023-04-07 PROBLEM — E83.52 HYPERCALCEMIA: Status: ACTIVE | Noted: 2020-03-04

## 2023-04-07 PROBLEM — H52.4 BILATERAL PRESBYOPIA: Status: ACTIVE | Noted: 2018-05-07

## 2023-04-07 PROBLEM — T14.8XXA SUPERFICIAL BRUISING: Status: ACTIVE | Noted: 2023-04-07

## 2023-04-07 PROBLEM — K59.00 CONSTIPATION: Status: ACTIVE | Noted: 2023-04-07

## 2023-04-07 PROBLEM — R79.89 ELEVATED TROPONIN LEVEL: Status: ACTIVE | Noted: 2023-04-07

## 2023-04-22 DIAGNOSIS — E78.5 DYSLIPIDEMIA: Primary | ICD-10-CM

## 2023-04-24 ENCOUNTER — TELEPHONE (OUTPATIENT)
Dept: INTERNAL MEDICINE CLINIC | Age: 58
End: 2023-04-24

## 2023-04-27 ENCOUNTER — TELEPHONE (OUTPATIENT)
Dept: INTERNAL MEDICINE CLINIC | Age: 58
End: 2023-04-27

## 2023-05-03 LAB — CREATININE, EXTERNAL: 1.17

## 2023-05-16 RX ORDER — PEN NEEDLE, DIABETIC 32GX 5/32"
NEEDLE, DISPOSABLE MISCELLANEOUS
Qty: 500 EACH | Refills: 3 | Status: SHIPPED | OUTPATIENT
Start: 2023-05-16

## 2023-06-29 RX ORDER — METFORMIN HYDROCHLORIDE 500 MG/1
500 TABLET, EXTENDED RELEASE ORAL 2 TIMES DAILY
Qty: 180 TABLET | Refills: 3 | Status: SHIPPED | OUTPATIENT
Start: 2023-06-29

## 2023-09-11 RX ORDER — LIRAGLUTIDE 6 MG/ML
INJECTION SUBCUTANEOUS
Qty: 9 ML | Refills: 11 | Status: SHIPPED | OUTPATIENT
Start: 2023-09-11

## 2023-09-26 ENCOUNTER — TELEPHONE (OUTPATIENT)
Age: 58
End: 2023-09-26

## 2023-09-26 ENCOUNTER — OFFICE VISIT (OUTPATIENT)
Age: 58
End: 2023-09-26
Payer: MEDICARE

## 2023-09-26 VITALS
TEMPERATURE: 97 F | RESPIRATION RATE: 19 BRPM | OXYGEN SATURATION: 98 % | WEIGHT: 181 LBS | BODY MASS INDEX: 32.07 KG/M2 | HEIGHT: 63 IN

## 2023-09-26 DIAGNOSIS — M62.838 CERVICAL PARASPINAL MUSCLE SPASM: ICD-10-CM

## 2023-09-26 DIAGNOSIS — Z23 NEED FOR INFLUENZA VACCINATION: ICD-10-CM

## 2023-09-26 DIAGNOSIS — M77.8 RIGHT SHOULDER TENDONITIS: Primary | ICD-10-CM

## 2023-09-26 DIAGNOSIS — M75.81 ROTATOR CUFF TENDONITIS, RIGHT: ICD-10-CM

## 2023-09-26 DIAGNOSIS — E78.5 DYSLIPIDEMIA: ICD-10-CM

## 2023-09-26 LAB
CHOLEST SERPL-MCNC: 172 MG/DL
HDLC SERPL-MCNC: 56 MG/DL
HDLC SERPL: 3.1 (ref 0–5)
LDLC SERPL CALC-MCNC: 72.6 MG/DL (ref 0–100)
TRIGL SERPL-MCNC: 217 MG/DL
VLDLC SERPL CALC-MCNC: 43.4 MG/DL

## 2023-09-26 PROCEDURE — G8417 CALC BMI ABV UP PARAM F/U: HCPCS | Performed by: NURSE PRACTITIONER

## 2023-09-26 PROCEDURE — 1036F TOBACCO NON-USER: CPT | Performed by: NURSE PRACTITIONER

## 2023-09-26 PROCEDURE — PBSHW INFLUENZA, FLUCELVAX, (AGE 6 MO+), IM, PF, 0.5 ML: Performed by: NURSE PRACTITIONER

## 2023-09-26 PROCEDURE — 3017F COLORECTAL CA SCREEN DOC REV: CPT | Performed by: NURSE PRACTITIONER

## 2023-09-26 PROCEDURE — G8427 DOCREV CUR MEDS BY ELIG CLIN: HCPCS | Performed by: NURSE PRACTITIONER

## 2023-09-26 PROCEDURE — 90674 CCIIV4 VAC NO PRSV 0.5 ML IM: CPT | Performed by: NURSE PRACTITIONER

## 2023-09-26 PROCEDURE — 99213 OFFICE O/P EST LOW 20 MIN: CPT | Performed by: NURSE PRACTITIONER

## 2023-09-26 RX ORDER — LIDOCAINE 50 MG/G
1 PATCH TOPICAL DAILY
Qty: 10 PATCH | Refills: 0 | Status: SHIPPED | OUTPATIENT
Start: 2023-09-26 | End: 2023-10-06

## 2023-09-26 RX ORDER — TIZANIDINE 4 MG/1
4 TABLET ORAL NIGHTLY PRN
Qty: 10 TABLET | Refills: 0 | Status: SHIPPED | OUTPATIENT
Start: 2023-09-26

## 2023-09-26 SDOH — ECONOMIC STABILITY: FOOD INSECURITY: WITHIN THE PAST 12 MONTHS, YOU WORRIED THAT YOUR FOOD WOULD RUN OUT BEFORE YOU GOT MONEY TO BUY MORE.: SOMETIMES TRUE

## 2023-09-26 SDOH — ECONOMIC STABILITY: HOUSING INSECURITY
IN THE LAST 12 MONTHS, WAS THERE A TIME WHEN YOU DID NOT HAVE A STEADY PLACE TO SLEEP OR SLEPT IN A SHELTER (INCLUDING NOW)?: NO

## 2023-09-26 SDOH — ECONOMIC STABILITY: INCOME INSECURITY: HOW HARD IS IT FOR YOU TO PAY FOR THE VERY BASICS LIKE FOOD, HOUSING, MEDICAL CARE, AND HEATING?: SOMEWHAT HARD

## 2023-09-26 SDOH — ECONOMIC STABILITY: FOOD INSECURITY: WITHIN THE PAST 12 MONTHS, THE FOOD YOU BOUGHT JUST DIDN'T LAST AND YOU DIDN'T HAVE MONEY TO GET MORE.: SOMETIMES TRUE

## 2023-09-26 SDOH — ECONOMIC STABILITY: TRANSPORTATION INSECURITY
IN THE PAST 12 MONTHS, HAS LACK OF TRANSPORTATION KEPT YOU FROM MEETINGS, WORK, OR FROM GETTING THINGS NEEDED FOR DAILY LIVING?: NO

## 2023-09-26 ASSESSMENT — ENCOUNTER SYMPTOMS
ABDOMINAL DISTENTION: 0
DIARRHEA: 0
CONSTIPATION: 0
COUGH: 0
BACK PAIN: 0
SHORTNESS OF BREATH: 0

## 2023-09-26 NOTE — PROGRESS NOTES
I have reviewed all needed documentation in preparation for visit. Verified patient by name and date of birth  Chief Complaint   Patient presents with    Shoulder Pain     Pain in right arm- can't raise his hand- pain is going to the neck start a 6 weeks- fasting- no concerns        There were no vitals filed for this visit. Health Maintenance Due   Topic Date Due    Hepatitis B vaccine (1 of 3 - 3-dose series) Never done    Hepatitis C screen  Never done    DTaP/Tdap/Td vaccine (1 - Tdap) Never done    Shingles vaccine (1 of 2) Never done    Pneumococcal 0-64 years Vaccine (2 - PCV) 02/01/2014    COVID-19 Vaccine (5 - Moderna risk series) 02/22/2022    Diabetic retinal exam  10/15/2022    Flu vaccine (1) 08/01/2023    Annual Wellness Visit (AWV)  08/03/2023    Diabetic foot exam  08/26/2023    A1C test (Diabetic or Prediabetic)  09/16/2023       1. \"Have you been to the ER, urgent care clinic since your last visit? Hospitalized since your last visit? \" No    2. \"Have you seen or consulted any other health care providers outside of the [address] [place] [address] since your last visit? \" No     3. For patients aged 43-73: Has the patient had a colonoscopy / FIT/ Cologuard? Yes - no Care Gap present      If the patient is female:    4. For patients aged 43-66: Has the patient had a mammogram within the past 2 years? Yes - no Care Gap present      5. For patients aged 21-65: Has the patient had a pap smear?  Yes - no Care Gap present        Kody Jones, CMA

## 2023-09-26 NOTE — TELEPHONE ENCOUNTER
Identified patient 2 identifiers verified. [employer] [facility] needs prior authorization. [address] will fax over authorization form and information. Patient can be reached at [phone].

## 2023-09-28 ENCOUNTER — TELEPHONE (OUTPATIENT)
Age: 58
End: 2023-09-28

## 2023-09-28 NOTE — TELEPHONE ENCOUNTER
----- Message from Joey Gibbs RN sent at 9/28/2023  9:59 AM EDT -----    ----- Message -----  From: TANIA Nugent - NP  Sent: 9/28/2023   7:44 AM EDT  To: Joey Gibbs RN; Ishmael Bending    She has a 10/5 with Red Altjerson, I don't think she needs to see me a week later unless I am missing something.     See you all soon!!!    Future Appointments  10/5/2023  9:40 AM    MD EMILY Phillip              BS AMB  10/13/2023 3:00 PM    TANIA Nugent -# EMILY FERNANDEZ AMB  10/19/2023 7:45 AM    MD TIMOTEO Lagos                BS AMB  2/9/2024   1:50 PM    Julien Olivarez MD      RDE [place] [address]         BS AMB

## 2023-09-28 NOTE — TELEPHONE ENCOUNTER
Attempted to reach patient by telephone. A message was left for return call. [employer].      Future Appointments   Date Time Provider [address]  [facility]   10/5/2023  9:40 AM MD EMILY Cabrera BS AMB   10/19/2023  7:45 AM MD TIMOTEO Ivory BS AMB   2/9/2024  1:50 PM Ioana Kim MD RDE [address] BS AMB

## 2023-09-28 NOTE — RESULT ENCOUNTER NOTE
Dear Ms. Patterson,  Good News! Your test results are stable. Please continue to work on diet and exercise. Please let me know if you have any questions.    Best Regards,  TANIA Butts NP

## 2023-10-05 ENCOUNTER — OFFICE VISIT (OUTPATIENT)
Age: 58
End: 2023-10-05
Payer: MEDICARE

## 2023-10-05 VITALS
OXYGEN SATURATION: 97 % | HEART RATE: 79 BPM | HEIGHT: 63 IN | WEIGHT: 181.6 LBS | SYSTOLIC BLOOD PRESSURE: 128 MMHG | DIASTOLIC BLOOD PRESSURE: 82 MMHG | BODY MASS INDEX: 32.18 KG/M2

## 2023-10-05 DIAGNOSIS — Z94.0 KIDNEY TRANSPLANT STATUS: ICD-10-CM

## 2023-10-05 DIAGNOSIS — E78.2 MIXED HYPERLIPIDEMIA: ICD-10-CM

## 2023-10-05 DIAGNOSIS — N18.6 ESRD (END STAGE RENAL DISEASE) (HCC): ICD-10-CM

## 2023-10-05 DIAGNOSIS — R07.89 CHEST PAIN, NON-CARDIAC: Primary | ICD-10-CM

## 2023-10-05 DIAGNOSIS — I10 BENIGN ESSENTIAL HTN: ICD-10-CM

## 2023-10-05 PROCEDURE — 99214 OFFICE O/P EST MOD 30 MIN: CPT | Performed by: INTERNAL MEDICINE

## 2023-10-05 RX ORDER — ALBUTEROL SULFATE 90 UG/1
2 AEROSOL, METERED RESPIRATORY (INHALATION)
COMMUNITY
Start: 2018-08-30

## 2023-10-05 ASSESSMENT — PATIENT HEALTH QUESTIONNAIRE - PHQ9
SUM OF ALL RESPONSES TO PHQ QUESTIONS 1-9: 0
1. LITTLE INTEREST OR PLEASURE IN DOING THINGS: 0
SUM OF ALL RESPONSES TO PHQ QUESTIONS 1-9: 0
SUM OF ALL RESPONSES TO PHQ QUESTIONS 1-9: 0
SUM OF ALL RESPONSES TO PHQ9 QUESTIONS 1 & 2: 0
SUM OF ALL RESPONSES TO PHQ QUESTIONS 1-9: 0
2. FEELING DOWN, DEPRESSED OR HOPELESS: 0

## 2023-10-05 NOTE — PROGRESS NOTES
[facility] [place]: Sonia Leiva  [phone]  History of Present Illness:   Ms. Debra Styles is a 65 yo F seen in the past by Dr. Sneha Cardenas with history of essential hypertension, mitral regurgitation just to a mild degree, right leg DVT on Eliquis followed by Dr. Aleida Newsome at the [facility] transplant Center, end stage renal disease status post right renal transplant in 2017 followed by Dr. Lacy Schwartz and Dr. Aleida Newsome, on insulin for diabetes, dyslipidemia, history of gastritis/esophageal stricture, PAD with right ICA stenosis. Since her last visit, overall she has been doing okay. She has had occasional chest discomfort that is substernal, a little bit to the right that can happen during the day or at night. Usually she massages the area and it resolves. This is nonexertional.  Her breathing overall has been stable. She does note increased external stressors and some depression. She has been compliant with her medications. She is compensated on exam with clear lungs and no lower extremity edema. Her weight is slightly up. Talked about trying to get in more routine exercise and activity. Assessment and Plan:   1. Chest pain. Atypical for cardiac etiology and no changes made. Most consistent with likely musculoskeletal etiology. We did talk about if this became more exertional in nature, a stress test would be reasonable, but not indicated at this time. 2. Mitral regurgitation. Has been to a mild degree. 3. End stage renal disease, status post right renal transplant in 2017. Followed by nephrology, Dr. Lacy Schwartz and Dr. Aleida Newsome at [facility]. 4. Diabetes. On insulin and oral agents. 5. Dyslipidemia. Tolerating statin. 6. Essential hypertension. Blood pressure is controlled. Adjustments per nephrology given renal transplant. 7. PAD. Right ICA stenosis noted on Duplex in 2021.           Echo 4/21 - EF 60-65%, mod cLVH, no obvious PFO but image quality not adequate to definitively evaluate

## 2023-11-15 ENCOUNTER — APPOINTMENT (OUTPATIENT)
Facility: HOSPITAL | Age: 58
End: 2023-11-15
Payer: MEDICARE

## 2023-11-15 ENCOUNTER — HOSPITAL ENCOUNTER (EMERGENCY)
Facility: HOSPITAL | Age: 58
Discharge: HOME | End: 2023-11-15
Attending: EMERGENCY MEDICINE
Payer: MEDICARE

## 2023-11-15 VITALS
TEMPERATURE: 98.6 F | WEIGHT: 187.44 LBS | SYSTOLIC BLOOD PRESSURE: 147 MMHG | HEIGHT: 63 IN | HEART RATE: 87 BPM | RESPIRATION RATE: 26 BRPM | DIASTOLIC BLOOD PRESSURE: 72 MMHG | OXYGEN SATURATION: 100 % | BODY MASS INDEX: 33.21 KG/M2

## 2023-11-15 DIAGNOSIS — U07.1 COVID: ICD-10-CM

## 2023-11-15 DIAGNOSIS — N39.0 URINARY TRACT INFECTION WITHOUT HEMATURIA, SITE UNSPECIFIED: Primary | ICD-10-CM

## 2023-11-15 LAB
ALBUMIN SERPL-MCNC: 4 G/DL (ref 3.5–5)
ALBUMIN/GLOB SERPL: 1.2 (ref 1.1–2.2)
ALP SERPL-CCNC: 117 U/L (ref 45–117)
ALT SERPL-CCNC: 24 U/L (ref 12–78)
ANION GAP SERPL CALC-SCNC: 7 MMOL/L (ref 5–15)
APPEARANCE UR: CLEAR
AST SERPL-CCNC: 28 U/L (ref 15–37)
BACTERIA URNS QL MICRO: ABNORMAL /HPF
BASOPHILS # BLD: 0 K/UL (ref 0–0.1)
BASOPHILS NFR BLD: 0 % (ref 0–1)
BILIRUB SERPL-MCNC: 0.6 MG/DL (ref 0.2–1)
BILIRUB UR QL: NEGATIVE
BUN SERPL-MCNC: 23 MG/DL (ref 6–20)
BUN/CREAT SERPL: 21 (ref 12–20)
CALCIUM SERPL-MCNC: 9.8 MG/DL (ref 8.5–10.1)
CHLORIDE SERPL-SCNC: 98 MMOL/L (ref 97–108)
CO2 SERPL-SCNC: 29 MMOL/L (ref 21–32)
COLOR UR: ABNORMAL
CREAT SERPL-MCNC: 1.1 MG/DL (ref 0.55–1.02)
DIFFERENTIAL METHOD BLD: ABNORMAL
EKG ATRIAL RATE: 91 BPM
EKG DIAGNOSIS: NORMAL
EKG P AXIS: 27 DEGREES
EKG P-R INTERVAL: 136 MS
EKG Q-T INTERVAL: 342 MS
EKG QRS DURATION: 70 MS
EKG QTC CALCULATION (BAZETT): 420 MS
EKG R AXIS: 6 DEGREES
EKG T AXIS: 32 DEGREES
EKG VENTRICULAR RATE: 91 BPM
EOSINOPHIL # BLD: 0.1 K/UL (ref 0–0.4)
EOSINOPHIL NFR BLD: 1 % (ref 0–7)
EPITH CASTS URNS QL MICRO: ABNORMAL /LPF
ERYTHROCYTE [DISTWIDTH] IN BLOOD BY AUTOMATED COUNT: 16 % (ref 11.5–14.5)
FLUAV RNA SPEC QL NAA+PROBE: NOT DETECTED
FLUBV RNA SPEC QL NAA+PROBE: NOT DETECTED
GLOBULIN SER CALC-MCNC: 3.4 G/DL (ref 2–4)
GLUCOSE SERPL-MCNC: 201 MG/DL (ref 65–100)
GLUCOSE UR STRIP.AUTO-MCNC: NEGATIVE MG/DL
HCT VFR BLD AUTO: 35.2 % (ref 35–47)
HGB BLD-MCNC: 11.3 G/DL (ref 11.5–16)
HGB UR QL STRIP: NEGATIVE
IMM GRANULOCYTES # BLD AUTO: 0 K/UL (ref 0–0.04)
IMM GRANULOCYTES NFR BLD AUTO: 0 % (ref 0–0.5)
KETONES UR QL STRIP.AUTO: NEGATIVE MG/DL
LACTATE SERPL-SCNC: 1.6 MMOL/L (ref 0.4–2)
LEUKOCYTE ESTERASE UR QL STRIP.AUTO: ABNORMAL
LYMPHOCYTES # BLD: 1.2 K/UL (ref 0.8–3.5)
LYMPHOCYTES NFR BLD: 12 % (ref 12–49)
MCH RBC QN AUTO: 26.6 PG (ref 26–34)
MCHC RBC AUTO-ENTMCNC: 32.1 G/DL (ref 30–36.5)
MCV RBC AUTO: 82.8 FL (ref 80–99)
MONOCYTES # BLD: 1 K/UL (ref 0–1)
MONOCYTES NFR BLD: 10 % (ref 5–13)
NEUTS SEG # BLD: 7.8 K/UL (ref 1.8–8)
NEUTS SEG NFR BLD: 77 % (ref 32–75)
NITRITE UR QL STRIP.AUTO: POSITIVE
NRBC # BLD: 0 K/UL (ref 0–0.01)
NRBC BLD-RTO: 0 PER 100 WBC
PH UR STRIP: 7 (ref 5–8)
PLATELET # BLD AUTO: 168 K/UL (ref 150–400)
PMV BLD AUTO: 10.2 FL (ref 8.9–12.9)
POTASSIUM SERPL-SCNC: 5.8 MMOL/L (ref 3.5–5.1)
PROT SERPL-MCNC: 7.4 G/DL (ref 6.4–8.2)
PROT UR STRIP-MCNC: NEGATIVE MG/DL
RBC # BLD AUTO: 4.25 M/UL (ref 3.8–5.2)
RBC #/AREA URNS HPF: ABNORMAL /HPF (ref 0–5)
SARS-COV-2 RNA RESP QL NAA+PROBE: DETECTED
SODIUM SERPL-SCNC: 134 MMOL/L (ref 136–145)
SP GR UR REFRACTOMETRY: 1.01 (ref 1–1.03)
TROPONIN I SERPL HS-MCNC: 9 NG/L (ref 0–51)
URINE CULTURE IF INDICATED: ABNORMAL
UROBILINOGEN UR QL STRIP.AUTO: 0.2 EU/DL (ref 0.2–1)
WBC # BLD AUTO: 10.2 K/UL (ref 3.6–11)
WBC URNS QL MICRO: ABNORMAL /HPF (ref 0–4)

## 2023-11-15 PROCEDURE — 96375 TX/PRO/DX INJ NEW DRUG ADDON: CPT

## 2023-11-15 PROCEDURE — 96361 HYDRATE IV INFUSION ADD-ON: CPT

## 2023-11-15 PROCEDURE — 99285 EMERGENCY DEPT VISIT HI MDM: CPT

## 2023-11-15 PROCEDURE — 6370000000 HC RX 637 (ALT 250 FOR IP): Performed by: EMERGENCY MEDICINE

## 2023-11-15 PROCEDURE — 87636 SARSCOV2 & INF A&B AMP PRB: CPT

## 2023-11-15 PROCEDURE — 74176 CT ABD & PELVIS W/O CONTRAST: CPT

## 2023-11-15 PROCEDURE — 96365 THER/PROPH/DIAG IV INF INIT: CPT

## 2023-11-15 PROCEDURE — 93005 ELECTROCARDIOGRAM TRACING: CPT | Performed by: EMERGENCY MEDICINE

## 2023-11-15 PROCEDURE — 80053 COMPREHEN METABOLIC PANEL: CPT

## 2023-11-15 PROCEDURE — 83605 ASSAY OF LACTIC ACID: CPT

## 2023-11-15 PROCEDURE — 36415 COLL VENOUS BLD VENIPUNCTURE: CPT

## 2023-11-15 PROCEDURE — 87040 BLOOD CULTURE FOR BACTERIA: CPT

## 2023-11-15 PROCEDURE — 71045 X-RAY EXAM CHEST 1 VIEW: CPT

## 2023-11-15 PROCEDURE — 6360000002 HC RX W HCPCS: Performed by: EMERGENCY MEDICINE

## 2023-11-15 PROCEDURE — 81001 URINALYSIS AUTO W/SCOPE: CPT

## 2023-11-15 PROCEDURE — 84484 ASSAY OF TROPONIN QUANT: CPT

## 2023-11-15 PROCEDURE — 85025 COMPLETE CBC W/AUTO DIFF WBC: CPT

## 2023-11-15 PROCEDURE — 2580000003 HC RX 258: Performed by: EMERGENCY MEDICINE

## 2023-11-15 RX ORDER — ACETAMINOPHEN 500 MG
1000 TABLET ORAL
Status: DISCONTINUED | OUTPATIENT
Start: 2023-11-15 | End: 2023-11-15 | Stop reason: HOSPADM

## 2023-11-15 RX ORDER — CEFDINIR 300 MG/1
300 CAPSULE ORAL 2 TIMES DAILY
Qty: 20 CAPSULE | Refills: 0 | Status: SHIPPED | OUTPATIENT
Start: 2023-11-15 | End: 2023-11-25

## 2023-11-15 RX ORDER — ONDANSETRON 2 MG/ML
4 INJECTION INTRAMUSCULAR; INTRAVENOUS ONCE
Status: COMPLETED | OUTPATIENT
Start: 2023-11-15 | End: 2023-11-15

## 2023-11-15 RX ORDER — 0.9 % SODIUM CHLORIDE 0.9 %
1000 INTRAVENOUS SOLUTION INTRAVENOUS ONCE
Status: COMPLETED | OUTPATIENT
Start: 2023-11-15 | End: 2023-11-15

## 2023-11-15 RX ORDER — CEFDINIR 300 MG/1
300 CAPSULE ORAL 2 TIMES DAILY
Qty: 20 CAPSULE | Refills: 0 | Status: SHIPPED | OUTPATIENT
Start: 2023-11-15 | End: 2023-11-15

## 2023-11-15 RX ORDER — ACETAMINOPHEN 325 MG/1
650 TABLET ORAL
Status: COMPLETED | OUTPATIENT
Start: 2023-11-15 | End: 2023-11-15

## 2023-11-15 RX ADMIN — SODIUM CHLORIDE 1000 ML: 9 INJECTION, SOLUTION INTRAVENOUS at 17:02

## 2023-11-15 RX ADMIN — ONDANSETRON 4 MG: 2 INJECTION INTRAMUSCULAR; INTRAVENOUS at 18:52

## 2023-11-15 RX ADMIN — PIPERACILLIN AND TAZOBACTAM 4500 MG: 4; .5 INJECTION, POWDER, LYOPHILIZED, FOR SOLUTION INTRAVENOUS at 18:54

## 2023-11-15 RX ADMIN — ACETAMINOPHEN 650 MG: 325 TABLET ORAL at 17:02

## 2023-11-15 ASSESSMENT — PAIN DESCRIPTION - LOCATION
LOCATION: ABDOMEN
LOCATION: ABDOMEN
LOCATION: HEAD

## 2023-11-15 ASSESSMENT — PAIN - FUNCTIONAL ASSESSMENT
PAIN_FUNCTIONAL_ASSESSMENT: ACTIVITIES ARE NOT PREVENTED
PAIN_FUNCTIONAL_ASSESSMENT: 0-10
PAIN_FUNCTIONAL_ASSESSMENT: 0-10

## 2023-11-15 ASSESSMENT — PAIN DESCRIPTION - FREQUENCY: FREQUENCY: CONTINUOUS

## 2023-11-15 ASSESSMENT — PAIN SCALES - GENERAL
PAINLEVEL_OUTOF10: 10
PAINLEVEL_OUTOF10: 5

## 2023-11-15 ASSESSMENT — PAIN DESCRIPTION - PAIN TYPE: TYPE: ACUTE PAIN

## 2023-11-15 ASSESSMENT — PAIN DESCRIPTION - DESCRIPTORS: DESCRIPTORS: ACHING

## 2023-11-15 ASSESSMENT — PAIN DESCRIPTION - ONSET: ONSET: ON-GOING

## 2023-11-15 NOTE — ED NOTES
Patient up to [facility] and voided. Has a headache and still feeling sick.   Has persistent nausea     Denise Boggs  11/15/23 4549

## 2023-11-15 NOTE — ED NOTES
Patient aware of need for urine, but patient does not feel she needs to go.       Devika Johnson RN  11/15/23 9587

## 2023-11-15 NOTE — ED NOTES
Patient has intermittent nausea but no vomiting since arrival. Appears tired     Denise Hernandez  11/15/23 2491

## 2023-11-15 NOTE — ED NOTES
Pt transported to CT scan with [employer] by [employer] at now.       Maine Little RN  11/15/23 8106

## 2023-11-15 NOTE — ED TRIAGE NOTES
Pt reports abd pain N/V that began last night, pt also reports some light headedness. Had kidney transplant in 2017.  Pt was given 4mg Zofran en route

## 2023-11-15 NOTE — ED NOTES
Patient states she still feels nauseated and has abdominal pain but has been resting.  IV fluid bolus continue     [place], [place]  11/15/23 1810

## 2023-11-16 NOTE — ED NOTES
Bedside shift change report given to [address] (oncoming nurse) by me (offgoing nurse). Report included the following information Nurse Handoff Report, ED Encounter Summary, and ED SBAR.        Jyotsna Garcia RN  11/15/23 6253

## 2023-11-19 LAB
BACTERIA SPEC CULT: NORMAL
BACTERIA SPEC CULT: NORMAL
SERVICE CMNT-IMP: NORMAL
SERVICE CMNT-IMP: NORMAL

## 2023-11-27 PROBLEM — I82.409 DEEP VENOUS THROMBOSIS (HCC): Status: ACTIVE | Noted: 2023-11-27

## 2023-11-27 PROBLEM — E11.3393 MODERATE NONPROLIFERATIVE DIABETIC RETINOPATHY OF BOTH EYES WITHOUT MACULAR EDEMA ASSOCIATED WITH TYPE 2 DIABETES MELLITUS (HCC): Status: ACTIVE | Noted: 2023-11-27

## 2023-12-12 ENCOUNTER — NURSE TRIAGE (OUTPATIENT)
Dept: OTHER | Facility: CLINIC | Age: 58
End: 2023-12-12

## 2023-12-12 ENCOUNTER — OFFICE VISIT (OUTPATIENT)
Age: 58
End: 2023-12-12
Payer: MEDICARE

## 2023-12-12 VITALS
OXYGEN SATURATION: 94 % | SYSTOLIC BLOOD PRESSURE: 151 MMHG | HEART RATE: 85 BPM | TEMPERATURE: 97 F | WEIGHT: 180 LBS | DIASTOLIC BLOOD PRESSURE: 78 MMHG | RESPIRATION RATE: 20 BRPM | BODY MASS INDEX: 31.89 KG/M2 | HEIGHT: 63 IN

## 2023-12-12 DIAGNOSIS — M62.838 MUSCLE SPASM: ICD-10-CM

## 2023-12-12 DIAGNOSIS — M62.838 CERVICAL PARASPINAL MUSCLE SPASM: Primary | ICD-10-CM

## 2023-12-12 DIAGNOSIS — M77.8 RIGHT SHOULDER TENDONITIS: ICD-10-CM

## 2023-12-12 PROCEDURE — 1036F TOBACCO NON-USER: CPT | Performed by: NURSE PRACTITIONER

## 2023-12-12 PROCEDURE — 99213 OFFICE O/P EST LOW 20 MIN: CPT | Performed by: NURSE PRACTITIONER

## 2023-12-12 PROCEDURE — G8417 CALC BMI ABV UP PARAM F/U: HCPCS | Performed by: NURSE PRACTITIONER

## 2023-12-12 PROCEDURE — G8427 DOCREV CUR MEDS BY ELIG CLIN: HCPCS | Performed by: NURSE PRACTITIONER

## 2023-12-12 PROCEDURE — 3078F DIAST BP <80 MM HG: CPT | Performed by: NURSE PRACTITIONER

## 2023-12-12 PROCEDURE — 3017F COLORECTAL CA SCREEN DOC REV: CPT | Performed by: NURSE PRACTITIONER

## 2023-12-12 PROCEDURE — G8482 FLU IMMUNIZE ORDER/ADMIN: HCPCS | Performed by: NURSE PRACTITIONER

## 2023-12-12 PROCEDURE — 3077F SYST BP >= 140 MM HG: CPT | Performed by: NURSE PRACTITIONER

## 2023-12-12 RX ORDER — TIZANIDINE 4 MG/1
4 TABLET ORAL NIGHTLY PRN
Qty: 10 TABLET | Refills: 0 | Status: SHIPPED | OUTPATIENT
Start: 2023-12-12

## 2023-12-12 ASSESSMENT — ENCOUNTER SYMPTOMS: BACK PAIN: 0

## 2023-12-12 NOTE — TELEPHONE ENCOUNTER
[place]    Received call from Lefty Charles at [facility] with [employer]. Subjective: Caller states \" Neck pain, pain in right arm. \"     Current Symptoms:   Neck pain been off and on for 2 months    When the neck pain comes it lasts for about 1 minute. \"Right side hand and arm, been seen for this already by a hand doctor and a shoulder doctor. Got a shot for this. \"  Shoulder and arm pain interfere with day to day activities. Onset: 2 months ago; waxing and waning    Associated Symptoms: reduced activity    Pain Severity: 5/10; sharp, aching; waxing and waning    Temperature: Denies     What has been tried: None    Recommended disposition: Go to Office Now    Care advice provided, patient verbalizes understanding; denies any other questions or concerns; instructed to call back for any new or worsening symptoms. Patient/Caller agrees with recommended disposition; writer provided warm transfer to Jack Callahan at [facility] for appointment scheduling    Attention Provider: Thank you for allowing me to participate in the care of your patient. The patient was connected to triage in response to information provided to the ECC/PSC. Please do not respond through this encounter as the response is not directed to a shared pool.     Reason for Disposition   SEVERE pain (e.g., excruciating, unable to do any normal activities)    Protocols used: Neck Pain or Stiffness-ADULT-[place]

## 2023-12-14 ENCOUNTER — TRANSCRIBE ORDERS (OUTPATIENT)
Facility: HOSPITAL | Age: 58
End: 2023-12-14

## 2023-12-14 DIAGNOSIS — Z12.31 VISIT FOR SCREENING MAMMOGRAM: Primary | ICD-10-CM

## 2023-12-27 DIAGNOSIS — I34.0 NONRHEUMATIC MITRAL (VALVE) INSUFFICIENCY: ICD-10-CM

## 2023-12-27 RX ORDER — APIXABAN 5 MG/1
5 TABLET, FILM COATED ORAL 2 TIMES DAILY
Qty: 180 TABLET | Refills: 3 | OUTPATIENT
Start: 2023-12-27

## 2023-12-27 NOTE — TELEPHONE ENCOUNTER
Patient called, verified with 2 identifiers. This nurse explained that per dr. Navi Dominguez notes on 10-05-23 \"right leg DVT on Eliquis followed by Dr. Tisha Walker at the [facility] transplant Center\" . Patient voiced understanding and will call dr. Tisha Wakler to refill medication.

## 2024-01-04 NOTE — TELEPHONE ENCOUNTER
Refill request received from [facility] for   Requested Prescriptions     Pending Prescriptions Disp Refills    pantoprazole (PROTONIX) 20 MG tablet       Sig: Take 1 tablet by mouth daily     Last office visit: 12/12/2023   Next office visit: 1/11/2024     Routed to Dr Kendell Delgado for review.

## 2024-01-05 RX ORDER — PANTOPRAZOLE SODIUM 20 MG/1
20 TABLET, DELAYED RELEASE ORAL DAILY
Qty: 90 TABLET | Refills: 1 | Status: SHIPPED | OUTPATIENT
Start: 2024-01-05

## 2024-01-08 ENCOUNTER — HOSPITAL ENCOUNTER (OUTPATIENT)
Age: 59
Discharge: HOME | End: 2024-01-11
Payer: MEDICARE

## 2024-01-08 ENCOUNTER — HOSPITAL ENCOUNTER (OUTPATIENT)
Facility: HOSPITAL | Age: 59
Discharge: HOME | End: 2024-01-11
Attending: INTERNAL MEDICINE
Payer: MEDICARE

## 2024-01-08 VITALS — BODY MASS INDEX: 31.54 KG/M2 | HEIGHT: 63 IN | WEIGHT: 178 LBS

## 2024-01-08 DIAGNOSIS — M62.838 CERVICAL PARASPINAL MUSCLE SPASM: ICD-10-CM

## 2024-01-08 DIAGNOSIS — Z12.31 VISIT FOR SCREENING MAMMOGRAM: ICD-10-CM

## 2024-01-08 PROCEDURE — 77063 BREAST TOMOSYNTHESIS BI: CPT

## 2024-01-08 PROCEDURE — 72050 X-RAY EXAM NECK SPINE 4/5VWS: CPT

## 2024-01-11 ENCOUNTER — OFFICE VISIT (OUTPATIENT)
Age: 59
End: 2024-01-11
Payer: MEDICARE

## 2024-01-11 VITALS
DIASTOLIC BLOOD PRESSURE: 82 MMHG | WEIGHT: 178.2 LBS | OXYGEN SATURATION: 90 % | TEMPERATURE: 97.5 F | RESPIRATION RATE: 14 BRPM | BODY MASS INDEX: 31.57 KG/M2 | HEART RATE: 93 BPM | SYSTOLIC BLOOD PRESSURE: 132 MMHG | HEIGHT: 63 IN

## 2024-01-11 DIAGNOSIS — I10 HYPERTENSION, UNSPECIFIED TYPE: ICD-10-CM

## 2024-01-11 DIAGNOSIS — Z79.4 TYPE 2 DIABETES MELLITUS WITHOUT COMPLICATION, WITH LONG-TERM CURRENT USE OF INSULIN (HCC): ICD-10-CM

## 2024-01-11 DIAGNOSIS — Z00.00 MEDICARE ANNUAL WELLNESS VISIT, SUBSEQUENT: Primary | ICD-10-CM

## 2024-01-11 DIAGNOSIS — E11.9 TYPE 2 DIABETES MELLITUS WITHOUT COMPLICATION, WITH LONG-TERM CURRENT USE OF INSULIN (HCC): ICD-10-CM

## 2024-01-11 DIAGNOSIS — G43.919 INTRACTABLE MIGRAINE WITHOUT STATUS MIGRAINOSUS, UNSPECIFIED MIGRAINE TYPE: ICD-10-CM

## 2024-01-11 PROCEDURE — 99213 OFFICE O/P EST LOW 20 MIN: CPT | Performed by: INTERNAL MEDICINE

## 2024-01-11 RX ORDER — SUMATRIPTAN 100 MG/1
TABLET, FILM COATED ORAL
Qty: 12 TABLET | Refills: 5 | Status: SHIPPED | OUTPATIENT
Start: 2024-01-11

## 2024-01-11 RX ORDER — BUTALBITAL, ACETAMINOPHEN AND CAFFEINE 50; 325; 40 MG/1; MG/1; MG/1
1 TABLET ORAL 3 TIMES DAILY PRN
Qty: 180 TABLET | Refills: 0 | Status: SHIPPED | OUTPATIENT
Start: 2024-01-11

## 2024-01-11 ASSESSMENT — LIFESTYLE VARIABLES
HOW MANY STANDARD DRINKS CONTAINING ALCOHOL DO YOU HAVE ON A TYPICAL DAY: 1 OR 2
HOW OFTEN DO YOU HAVE A DRINK CONTAINING ALCOHOL: MONTHLY OR LESS

## 2024-01-11 ASSESSMENT — PATIENT HEALTH QUESTIONNAIRE - PHQ9
SUM OF ALL RESPONSES TO PHQ QUESTIONS 1-9: 5
4. FEELING TIRED OR HAVING LITTLE ENERGY: 1
6. FEELING BAD ABOUT YOURSELF - OR THAT YOU ARE A FAILURE OR HAVE LET YOURSELF OR YOUR FAMILY DOWN: 1
5. POOR APPETITE OR OVEREATING: 0
7. TROUBLE CONCENTRATING ON THINGS, SUCH AS READING THE NEWSPAPER OR WATCHING TELEVISION: 1
SUM OF ALL RESPONSES TO PHQ QUESTIONS 1-9: 5
2. FEELING DOWN, DEPRESSED OR HOPELESS: 1
1. LITTLE INTEREST OR PLEASURE IN DOING THINGS: 0
SUM OF ALL RESPONSES TO PHQ9 QUESTIONS 1 & 2: 1
8. MOVING OR SPEAKING SO SLOWLY THAT OTHER PEOPLE COULD HAVE NOTICED. OR THE OPPOSITE, BEING SO FIGETY OR RESTLESS THAT YOU HAVE BEEN MOVING AROUND A LOT MORE THAN USUAL: 0
SUM OF ALL RESPONSES TO PHQ QUESTIONS 1-9: 5
SUM OF ALL RESPONSES TO PHQ QUESTIONS 1-9: 5
9. THOUGHTS THAT YOU WOULD BE BETTER OFF DEAD, OR OF HURTING YOURSELF: 0
3. TROUBLE FALLING OR STAYING ASLEEP: 1

## 2024-01-11 NOTE — PATIENT INSTRUCTIONS
\"Exercise & Physical Activity: Your Everyday Guide\" from [employer]. Call 1-155.992.1170 or search [employer] online.  You need 8521-5911 mg of calcium and 1044-8270 IU of vitamin D per day. It is possible to meet your calcium requirement with diet alone, but a vitamin D supplement is usually necessary to meet this goal.  When exposed to the sun, use a sunscreen that protects against both UVA and UVB radiation with an SPF of 30 or greater. Reapply every 2 to 3 hours or after sweating, drying off with a towel, or swimming.  Always wear a seat belt when traveling in a car. Always wear a helmet when riding a bicycle or motorcycle.

## 2024-01-12 RX ORDER — INSULIN ASPART INJECTION 100 [IU]/ML
INJECTION, SOLUTION SUBCUTANEOUS
Qty: 60 ML | Refills: 3 | Status: SHIPPED | OUTPATIENT
Start: 2024-01-12

## 2024-01-30 NOTE — TELEPHONE ENCOUNTER
Requested Prescriptions     Signed Prescriptions Disp Refills    apixaban (ELIQUIS) 5 MG TABS tablet 60 tablet 5     Sig: Take 1 tablet by mouth 2 times daily     Authorizing Provider: QUIQUE CAMERON     Ordering User: SHANNON SZYMANSKI per MD    Future Appointments   Date Time Provider Department Center   2/9/2024  1:50 PM Praful Buckner MD RDE [facility] BS AMB   4/11/2024  9:00 AM Winter Hinds, APRN - NP [facility] BS AMB

## 2024-02-09 ENCOUNTER — TELEMEDICINE (OUTPATIENT)
Age: 59
End: 2024-02-09

## 2024-02-09 DIAGNOSIS — I10 ESSENTIAL (PRIMARY) HYPERTENSION: ICD-10-CM

## 2024-02-09 DIAGNOSIS — E11.9 TYPE 2 DIABETES MELLITUS WITHOUT COMPLICATION, WITH LONG-TERM CURRENT USE OF INSULIN (HCC): Primary | ICD-10-CM

## 2024-02-09 DIAGNOSIS — Z79.4 TYPE 2 DIABETES MELLITUS WITHOUT COMPLICATION, WITH LONG-TERM CURRENT USE OF INSULIN (HCC): Primary | ICD-10-CM

## 2024-02-09 DIAGNOSIS — E78.2 MIXED HYPERLIPIDEMIA: ICD-10-CM

## 2024-02-09 RX ORDER — SEMAGLUTIDE 0.68 MG/ML
INJECTION, SOLUTION SUBCUTANEOUS
Qty: 9 ML | Refills: 3 | Status: SHIPPED | OUTPATIENT
Start: 2024-02-09

## 2024-02-09 RX ORDER — INSULIN ASPART INJECTION 100 [IU]/ML
INJECTION, SOLUTION SUBCUTANEOUS
Qty: 90 ML | Refills: 3 | Status: SHIPPED | OUTPATIENT
Start: 2024-02-09

## 2024-02-09 NOTE — PROGRESS NOTES
Chief Complaint   Patient presents with    Diabetes     MyChart pt instructed to wait for link      Other     PCP and pharmacy confirmed       **THIS IS A VIRTUAL VISIT VIA A VIDEO SYNCHRONOUS DISCUSSION. PATIENT AGREED TO HAVE THEIR CARE DELIVERED OVER A MYCHART VIDEO VISIT IN PLACE OF THEIR REGULARLY SCHEDULED OFFICE VISIT**    History of Present Illness: Loli Patterson is a 58 y.o. female here for follow up of diabetes.  Weight stable since last visit in 6/23 at 178 lbs.  She has been under a lot of stress with her mother's health as she has been in and out of the hospital and a rehab facility.  Has been dosing levemir 65 units in the morning and 55 units at night and review of her sherrell shows her avg sugar is 226 with 41% in range and 20% high and 39% very high.  She thinks a lot of the high sugars are from stress.  She would like try changing the victoza to ozempic to help better lower her A1c and weight.  Compliant with BP and lipid regimen.    she has the following indications to continue treatment with Freestyle Sherrell:   1) she has type 2 diabetes (E11.65) and is on an intensive insulin regimen with 5 injections per day   2) she tests her blood sugar 4 times per day and makes treatment decisions off her blood sugar readings and off freestyle sherrell sensor readings   3) she requires frequent adjustments to her insulin injection doses based on her freestyle sherrell sensor readings   4) she has benefitted from therapeutic continuous glucose monitoring and I recommend that she begin this   5) she is seen in my office every 4-6 months        Current Outpatient Medications   Medication Sig    apixaban (ELIQUIS) 5 MG TABS tablet Take 1 tablet by mouth 2 times daily    FIASP FLEXTOUCH 100 UNIT/ML SOPN inject 16 units under the skin EVERY DAY plus 4 UNITS FOR every 50 mg/dl blood glucose is above 150. max 65 units PER DAY    butalbital-acetaminophen-caffeine (FIORICET, ESGIC) -40 MG per tablet Take 1 tablet by

## 2024-02-09 NOTE — PATIENT INSTRUCTIONS
1) Increase your levemir to 75 units in the morning as 2 shots of 40+35 units back to back in different locations and increase your dose at night to 60 units at 2 shots of 30+30 back to back in different locations to improve absorption.    2) Dose your fiasp based on the scale below:  Blood sugar  Insulin dose          Less than 90  Eat first, 15 units       20 units    151-200  25 units      201-250  30 units     251-300  35 units      301-350  40 units      351-400  45 units  401-450  50 units  451-500  55 units  Over 500  60 units    3) I sent once a week ozempic 0.5 mg to replace the victoza.   It doesn't matter if this is before or after a meal. Watch out for any severe abdominal pain that goes to the back and is associated with nausea or vomiting as this may be due to pancreatitis which is the most severe but rarest side effect of this medication.  Please notify me if this occurs.       4) Please come for a follow up visit on 5/31/24 at 2:10pm in our [place] office.  Our office is located at:  [facility], Medical Office Building II (TWO), 3rd floor, [address]  [address] [address]    5) I put an order directly into the [facility] system to repeat your labs in the 1-2 weeks prior to your next visit so just ask for the order under my name and make a note on your calendar to have these done at that time.  This order was also put directly into the [employer] portal.  Go under menu and my record and scroll down to upcoming tests and procedures and you are welcome to print this off or bring a copy of the order using the [employer] ines on your phone to the lab. Thanks!            
see h/p

## 2024-02-13 ENCOUNTER — TELEPHONE (OUTPATIENT)
Age: 59
End: 2024-02-13

## 2024-02-13 RX ORDER — INSULIN GLARGINE 100 [IU]/ML
INJECTION, SOLUTION SUBCUTANEOUS
Qty: 120 ML | Refills: 3 | Status: SHIPPED | OUTPATIENT
Start: 2024-02-13

## 2024-02-13 NOTE — TELEPHONE ENCOUNTER
Cora from [facility] LVM stating Levemir is no longer covered. Pharmacist states Semglee, Toujeo or Tresiba are preferred. She asked that Dr Buckner send a replacement.

## 2024-02-13 NOTE — TELEPHONE ENCOUNTER
Please let patient know of this call and that I will be sending semglee in place of levemir and this will be dosed the same way as the levemir.

## 2024-04-05 ENCOUNTER — TELEPHONE (OUTPATIENT)
Age: 59
End: 2024-04-05

## 2024-04-05 RX ORDER — INSULIN ASPART 100 [IU]/ML
INJECTION, SOLUTION INTRAVENOUS; SUBCUTANEOUS
Qty: 90 ML | Refills: 3 | Status: SHIPPED | OUTPATIENT
Start: 2024-04-05

## 2024-04-05 NOTE — TELEPHONE ENCOUNTER
Pt LVM stating Fiasp is no longer available at [facility] pharmacy and asked that an alternative be sent.  Contacted [facility] and was told they can't see what is covered. Perla asked that a new insulin be sent and she will try and process it.

## 2024-04-05 NOTE — TELEPHONE ENCOUNTER
Please call Johnathan and find out if novolog goes through and if so, let her know that she will dose this the same as the fiasp.

## 2024-04-05 NOTE — TELEPHONE ENCOUNTER
Melvi at [facility] pharmacy stated they have no received it yet, it takes a while for the RX to come through. Informed her I'll call back Monday.

## 2024-05-17 DIAGNOSIS — E78.2 MIXED HYPERLIPIDEMIA: ICD-10-CM

## 2024-05-17 DIAGNOSIS — Z79.4 TYPE 2 DIABETES MELLITUS WITHOUT COMPLICATION, WITH LONG-TERM CURRENT USE OF INSULIN (HCC): ICD-10-CM

## 2024-05-17 DIAGNOSIS — E11.9 TYPE 2 DIABETES MELLITUS WITHOUT COMPLICATION, WITH LONG-TERM CURRENT USE OF INSULIN (HCC): ICD-10-CM

## 2024-05-17 DIAGNOSIS — I10 ESSENTIAL (PRIMARY) HYPERTENSION: ICD-10-CM

## 2024-06-10 ENCOUNTER — TELEPHONE (OUTPATIENT)
Age: 59
End: 2024-06-10

## 2024-06-10 RX ORDER — METFORMIN HYDROCHLORIDE 500 MG/1
500 TABLET, EXTENDED RELEASE ORAL 2 TIMES DAILY
Qty: 180 TABLET | Refills: 0 | Status: SHIPPED | OUTPATIENT
Start: 2024-06-10

## 2024-06-10 NOTE — TELEPHONE ENCOUNTER
Please call her and let her know I refilled her metformin for 90 days with no refills.  I saw she was a no-show for her visit on 5/31/24 and wasn't sure if she forgot about the appointment or if something happened or if she doesn't plan on coming backc.    Provided she would like to come back and see me, Please go and have your labs drawn at your earliest convenience using the order on file under my name at [facility].  Once the results are back, I will offer you a visit in the 1-2 weeks after they return when I have a cancellation.    If she does not plan on coming back, she can get future refills from her current provider.

## 2024-06-11 NOTE — TELEPHONE ENCOUNTER
Patient notified of message per Dr. Buckner and voiced understanding of what was read to them.   Pt states she forgot about the appt on 05/31/2024 as she has been all over the place since her mother is in the hospital. She states she had labs drawn at the transplant center yesterday and will have the results faxed to Dr Buckner.

## 2024-06-12 RX ORDER — PANTOPRAZOLE SODIUM 20 MG/1
20 TABLET, DELAYED RELEASE ORAL DAILY
Qty: 90 TABLET | Refills: 1 | Status: SHIPPED | OUTPATIENT
Start: 2024-06-12

## 2024-06-13 NOTE — TELEPHONE ENCOUNTER
I received her labs and offered her a visit on 6/14/24 at 11:50am which she accepted as a virtual visit.

## 2024-06-14 ENCOUNTER — TELEPHONE (OUTPATIENT)
Age: 59
End: 2024-06-14

## 2024-06-14 ENCOUNTER — TELEMEDICINE (OUTPATIENT)
Age: 59
End: 2024-06-14

## 2024-06-14 DIAGNOSIS — E78.2 MIXED HYPERLIPIDEMIA: ICD-10-CM

## 2024-06-14 DIAGNOSIS — Z79.4 TYPE 2 DIABETES MELLITUS WITH STAGE 2 CHRONIC KIDNEY DISEASE, WITH LONG-TERM CURRENT USE OF INSULIN (HCC): ICD-10-CM

## 2024-06-14 DIAGNOSIS — N18.2 TYPE 2 DIABETES MELLITUS WITH STAGE 2 CHRONIC KIDNEY DISEASE, WITH LONG-TERM CURRENT USE OF INSULIN (HCC): ICD-10-CM

## 2024-06-14 DIAGNOSIS — E11.22 TYPE 2 DIABETES MELLITUS WITH STAGE 2 CHRONIC KIDNEY DISEASE, WITH LONG-TERM CURRENT USE OF INSULIN (HCC): ICD-10-CM

## 2024-06-14 DIAGNOSIS — I10 ESSENTIAL (PRIMARY) HYPERTENSION: ICD-10-CM

## 2024-06-14 DIAGNOSIS — Z79.4 TYPE 2 DIABETES MELLITUS WITH HYPERGLYCEMIA, WITH LONG-TERM CURRENT USE OF INSULIN (HCC): Primary | ICD-10-CM

## 2024-06-14 DIAGNOSIS — E11.65 TYPE 2 DIABETES MELLITUS WITH HYPERGLYCEMIA, WITH LONG-TERM CURRENT USE OF INSULIN (HCC): Primary | ICD-10-CM

## 2024-06-14 RX ORDER — ATORVASTATIN CALCIUM 80 MG/1
80 TABLET, FILM COATED ORAL DAILY
COMMUNITY
Start: 2024-06-11

## 2024-06-14 NOTE — PATIENT INSTRUCTIONS
1) Increase your morning dose of levemir to 80 units by taking 40+40 back to back in different locations and continue 60 units at night as 30+30 back to back.    2) Stay on ozempic 1 mg weekly and metformin 1 tab twice daily and the fiasp sliding scale.    3) I agree with increasing the lipitor to 80 mg daily.    4) Please come for a follow up visit on 10/30/24 at 1:50pm in our [place] office.  The address is [address] [place] [address].  [place] [phone] in the [facility] ([facility])     5) I put an order directly into the [facility] system to repeat your labs in the 1-2 weeks prior to your next visit so just ask for the order under my name and make a note on your calendar to have these done at that time.  This order was also put directly into the [employer] portal.  Go under menu and my record and scroll down to upcoming tests and procedures and you are welcome to print this off or bring a copy of the order using the [employer] ines on your phone to the lab. Thanks!

## 2024-06-14 NOTE — PROGRESS NOTES
billable service, which includes applicable co-pays. This Virtual Visit was conducted with patient's (and/or legal guardian's) consent. Patient identification was verified, and a caregiver was present when appropriate.   The patient was located at Home: [address] [place] [id number]  Provider was located at Facility (Appt Dept): [address]  [place] [address]  Confirm you are appropriately licensed, registered, or certified to deliver care in the state where the patient is located as indicated above. If you are not or unsure, please re-schedule the visit: Yes, I confirm.          --Praful Buckner MD on 6/14/2024 at 1:25 PM    An electronic signature was used to authenticate this note.       Copy sent to:  Kendell Delgado MD Jeanette Shelton, NP with transplant clinic  Dr. See Vincent

## 2024-06-14 NOTE — TELEPHONE ENCOUNTER
----- Message from Dominique King sent at 6/14/2024  8:59 AM EDT -----  Regarding: RE: add on for tomorrow?  6/14/2024  8:59 AM  Pt confirmed     Dominique King    ----- Message -----  From: Praful Buckner MD  Sent: 6/13/2024   3:08 PM EDT  To: Dominique DANIELSON King  Subject: RE: add on for tomorrow?                         Ok.  Just let me know if she confirms this or not.  Thanks!  ----- Message -----  From: Dominique King  Sent: 6/13/2024   2:50 PM EDT  To: Praful Buckner MD; Dominique DANIELSON King; #  Subject: RE: add on for tomorrow?                         6/13/2024  2:49 PM    Pt scheduled; Pt placed me on hold but hung up; I tried to call her back to make sure that she was okay with that and her MB is full. Will try back a little later    Thanks,  Dominique King    ----- Message -----  From: Dominique King  Sent: 6/13/2024   2:23 PM EDT  To: Praful Buckner MD; #  Subject: RE: add on for tomorrow?                         6/13/2024  2:23 PM    Working on this  Dominique DANIELSON Fernando    ----- Message -----  From: Praful Buckner MD  Sent: 6/13/2024   2:21 PM EDT  To: #  Subject: add on for tomorrow?                             Can she take the opening at 11:50am tomorrow in person or virtually?

## 2024-08-14 RX ORDER — APIXABAN 5 MG/1
5 TABLET, FILM COATED ORAL 2 TIMES DAILY
Qty: 60 TABLET | Refills: 5 | OUTPATIENT
Start: 2024-08-14

## 2024-08-14 NOTE — TELEPHONE ENCOUNTER
Requested Prescriptions     Refused Prescriptions Disp Refills    ELIQUIS 5 MG TABS tablet [Pharmacy Med Name: Eliquis 5 mg tablet] 60 tablet 5     Sig: TAKE 1 TABLET BY MOUTH TWICE DAILY     Refused By: SHANNON SZYMANSKI     Reason for Refusal: Duplicate Request     VO per MD    Future Appointments   Date Time Provider Department Center   10/30/2024  1:50 PM Praful Buckner MD RDE [facility] BS AMB

## 2024-08-14 NOTE — TELEPHONE ENCOUNTER
Requested Prescriptions     Signed Prescriptions Disp Refills    apixaban (ELIQUIS) 5 MG TABS tablet 60 tablet 0     Sig: Take 1 tablet by mouth 2 times daily Call and schedule follow up with Dr. Cameron or Paige SERRANO NP for future refills. [phone].     Authorizing Provider: QUIQUE CAMERON     Ordering User: SHANNON SZYMANSKI MD    Future Appointments   Date Time Provider Department Center   10/30/2024  1:50 PM Praful Buckner MD RDE [facility] BS AMB

## 2024-08-16 RX ORDER — APIXABAN 5 MG/1
5 TABLET, FILM COATED ORAL 2 TIMES DAILY
Qty: 60 TABLET | Refills: 5 | Status: SHIPPED | OUTPATIENT
Start: 2024-08-16

## 2024-08-16 NOTE — TELEPHONE ENCOUNTER
Requested Prescriptions     Signed Prescriptions Disp Refills    ELIQUIS 5 MG TABS tablet 60 tablet 5     Sig: TAKE 1 TABLET BY MOUTH TWICE DAILY     Authorizing Provider: QUIQUE CAMERON     Ordering User: SHANNON SZYMANSKI MD    Future Appointments   Date Time Provider Department Center   9/4/2024  2:40 PM Winter Hinds APRN - [facility] AMB   10/30/2024  1:50 PM Praful Buckner MD RDE [facility] BS AMB

## 2024-08-29 ENCOUNTER — TELEPHONE (OUTPATIENT)
Age: 59
End: 2024-08-29

## 2024-08-29 NOTE — TELEPHONE ENCOUNTER
----- Message from Edith JESUS sent at 8/29/2024  3:45 PM EDT -----  Regarding: ECC Appointment Request  ECC Appointment Request    Patient needs appointment for ECC Appointment Type: Annual Visit.    Patient Requested Dates(s):Monday early morning or Friday afternoon  Provider Name:Kendell Delgado MD    Reason for Appointment Request: Established Patient - No appointments available during search  --------------------------------------------------------------------------------------------------------------------------    Relationship to Patient: Self     Call Back Information: OK to leave message on voicemail  Preferred Call Back Number: Phone [phone]

## 2024-08-30 NOTE — TELEPHONE ENCOUNTER
Patient requested an appointment for an AWV. I informed her that she already had an AWV this year on 01/11/24 and will not be eligible for another until 01/12/2025. I asked the patient if there was anything else she needed to be seen for and she stated no.

## 2024-09-03 NOTE — PROGRESS NOTES
11/15/2023 05:20 PM       Reviewed:  Past Medical History:   Diagnosis Date    Anemia     Arthritis     Asthma 4/8/2014    Rx for same    Chronic kidney disease 2012    Depression     Diabetes (HCC) 28yo    Rx to control    GERD (gastroesophageal reflux disease)     Headache(784.0)     Hepatitis B infection     Hypertension     Rx for same    Kidney transplanted 04/2017    Left shoulder pain 11/5/2021    Sebaceous cyst 8/27/2019    Tear of left rotator cuff 11/5/2021    Thromboembolus (HCC) 2017    Type 2 diabetes mellitus without complication ([facility])      Social History     Tobacco Use   Smoking Status Never   Smokeless Tobacco Never     Social History     Substance and Sexual Activity   Alcohol Use No     Allergies   Allergen Reactions    Latex Shortness Of Breath    Cephalexin Nausea And Vomiting     Stomach cramping    Tramadol Itching       Current Outpatient Medications   Medication Sig    ELIQUIS 5 MG TABS tablet TAKE 1 TABLET BY MOUTH TWICE DAILY    atorvastatin (LIPITOR) 80 MG tablet Take 1 tablet by mouth daily    pantoprazole (PROTONIX) 20 MG tablet TAKE 1 TABLET BY MOUTH EVERY DAY    metFORMIN (GLUCOPHAGE-XR) 500 MG extended release tablet TAKE 1 TABLET BY MOUTH TWICE DAILY    NOVOLOG FLEXPEN 100 UNIT/ML injection pen Inject 20 units under the skin EVERY DAY plus 5 UNITS FOR every 50 mg/dl blood glucose is above 150. max 100 units PER DAY--replaces fiasp    Insulin Pen Needle 32G X 4 MM MISC USE TO INJECT INSULIN 5 TIMES DAILY    SEMGLEE 100 UNIT/ML injection pen Inject 75 units in the morning and 60 units at night--replaces levemir    insulin detemir (LEVEMIR FLEXPEN) 100 UNIT/ML injection pen Inject 75 units in the morning and 60 units at night    Insulin Aspart, w/Niacinamide, (FIASP FLEXTOUCH) 100 UNIT/ML SOPN inject 20 units under the skin EVERY DAY plus 5 UNITS FOR every 50 mg/dl blood glucose is above 150. max 100 units PER DAY    OZEMPIC, 0.25 OR 0.5 MG/DOSE, 2 MG/3ML SOPN Inject 0.5 mg

## 2024-09-04 ENCOUNTER — OFFICE VISIT (OUTPATIENT)
Age: 59
End: 2024-09-04
Payer: MEDICARE

## 2024-09-04 VITALS
OXYGEN SATURATION: 99 % | SYSTOLIC BLOOD PRESSURE: 142 MMHG | HEART RATE: 92 BPM | HEIGHT: 63 IN | DIASTOLIC BLOOD PRESSURE: 76 MMHG | BODY MASS INDEX: 30.09 KG/M2 | WEIGHT: 169.8 LBS

## 2024-09-04 DIAGNOSIS — I65.21 STENOSIS OF RIGHT CAROTID ARTERY: ICD-10-CM

## 2024-09-04 DIAGNOSIS — R07.9 CHEST PAIN, UNSPECIFIED TYPE: ICD-10-CM

## 2024-09-04 DIAGNOSIS — I34.0 NONRHEUMATIC MITRAL VALVE REGURGITATION: ICD-10-CM

## 2024-09-04 DIAGNOSIS — I10 PRIMARY HYPERTENSION: Primary | ICD-10-CM

## 2024-09-04 PROCEDURE — 3017F COLORECTAL CA SCREEN DOC REV: CPT | Performed by: NURSE PRACTITIONER

## 2024-09-04 PROCEDURE — 3077F SYST BP >= 140 MM HG: CPT | Performed by: NURSE PRACTITIONER

## 2024-09-04 PROCEDURE — 93010 ELECTROCARDIOGRAM REPORT: CPT | Performed by: NURSE PRACTITIONER

## 2024-09-04 PROCEDURE — 3078F DIAST BP <80 MM HG: CPT | Performed by: NURSE PRACTITIONER

## 2024-09-04 PROCEDURE — G8417 CALC BMI ABV UP PARAM F/U: HCPCS | Performed by: NURSE PRACTITIONER

## 2024-09-04 PROCEDURE — 99214 OFFICE O/P EST MOD 30 MIN: CPT | Performed by: NURSE PRACTITIONER

## 2024-09-04 PROCEDURE — G8427 DOCREV CUR MEDS BY ELIG CLIN: HCPCS | Performed by: NURSE PRACTITIONER

## 2024-09-04 PROCEDURE — 93005 ELECTROCARDIOGRAM TRACING: CPT | Performed by: NURSE PRACTITIONER

## 2024-09-04 PROCEDURE — 1036F TOBACCO NON-USER: CPT | Performed by: NURSE PRACTITIONER

## 2024-09-04 ASSESSMENT — PATIENT HEALTH QUESTIONNAIRE - PHQ9
1. LITTLE INTEREST OR PLEASURE IN DOING THINGS: NOT AT ALL
SUM OF ALL RESPONSES TO PHQ QUESTIONS 1-9: 0
SUM OF ALL RESPONSES TO PHQ9 QUESTIONS 1 & 2: 0
2. FEELING DOWN, DEPRESSED OR HOPELESS: NOT AT ALL
SUM OF ALL RESPONSES TO PHQ QUESTIONS 1-9: 0

## 2024-09-04 NOTE — PATIENT INSTRUCTIONS
----- Message from Wilder Brown MD sent at 12/11/2019  4:04 PM EST -----  Can we please start the patient on Bactrim DS in preparation for Friday? You will be scheduled for a stress echo after your appointment today.    Please wear comfortable clothing (shorts or pants with a shirt or blouse) and walking/athletic shoes.    Do not eat or drink anything, except water, for at least 2 hours prior to your test.    Please hold your labetalol the night before and the morning of your stress test   Please do not take your insulin the morning of your stress test   Do not take fioricet within 24 hours of test.

## 2024-10-14 LAB — HBA1C MFR BLD HPLC: 13 %

## 2024-10-14 RX ORDER — METFORMIN HCL 500 MG
500 TABLET, EXTENDED RELEASE 24 HR ORAL 2 TIMES DAILY
Qty: 180 TABLET | Refills: 3 | Status: SHIPPED | OUTPATIENT
Start: 2024-10-14

## 2024-10-15 NOTE — PROGRESS NOTES
Patient is here for Pre Op Clearance:    Date of Surgery: November 6, 2024  Name of Surgeon: Merged with [facility]  Indication: Ptosis bilateral  Nature of proposed Surgery: Correction of the ptosis  Type of Anestheis; local with MAC    Recovery anticipation and care:    Pertinent Hx:  Cardiac: As per cardiology  Renal: Renal function stable, CKD level as per BMP  Anasethesia hx: No hx previous anaesthesia related problem  Endo: not pertinent except documented  Hemato: no pertinent blood disorder, hx of blood transfuciton  Dental: No obvious mouth infection  Pre-operative-she has a normal functioning mechanical mitral valve.  Euvolemic on examination.  She also has known history of chronic atrial fibrillation rate controlled without medication.  She has been symptomatic with rate control agent-but no prolonged pauses or evidence of chronotropic incompetence.  For her mitral valve recommend bridging with Lovenox prior to eye surgery-if Coumadin needs to be discontinued 5 days prior to procedure and at least 2 days post until INR is therapeutic. it would be the safest if the patient can have the procedure under twilight's sedation-she has had previous tachy bradycardia responses..   Pre Op Labs; reviewed CBC CMP  Patient medically cleared for surgery with recommendation as above  Being treated for UTI 7 days course of Keflex    Bridging with Lovenox will be taken care of by cardiology as per patient   Pt being discharged home with family and [facility] set up. Discharge instructions reviewed with pt at bedside, all questions answered. Hard script for narcotic given to pt and placed in her belongings bag. IV removed. Telemetry box removed. All belongings packed up. Pt taken down to main lobby via wheelchair.

## 2024-10-16 DIAGNOSIS — I10 ESSENTIAL (PRIMARY) HYPERTENSION: ICD-10-CM

## 2024-10-16 DIAGNOSIS — Z79.4 TYPE 2 DIABETES MELLITUS WITH STAGE 2 CHRONIC KIDNEY DISEASE, WITH LONG-TERM CURRENT USE OF INSULIN (HCC): ICD-10-CM

## 2024-10-16 DIAGNOSIS — E11.22 TYPE 2 DIABETES MELLITUS WITH STAGE 2 CHRONIC KIDNEY DISEASE, WITH LONG-TERM CURRENT USE OF INSULIN (HCC): ICD-10-CM

## 2024-10-16 DIAGNOSIS — Z79.4 TYPE 2 DIABETES MELLITUS WITH HYPERGLYCEMIA, WITH LONG-TERM CURRENT USE OF INSULIN (HCC): ICD-10-CM

## 2024-10-16 DIAGNOSIS — E78.2 MIXED HYPERLIPIDEMIA: ICD-10-CM

## 2024-10-16 DIAGNOSIS — E11.65 TYPE 2 DIABETES MELLITUS WITH HYPERGLYCEMIA, WITH LONG-TERM CURRENT USE OF INSULIN (HCC): ICD-10-CM

## 2024-10-16 DIAGNOSIS — N18.2 TYPE 2 DIABETES MELLITUS WITH STAGE 2 CHRONIC KIDNEY DISEASE, WITH LONG-TERM CURRENT USE OF INSULIN (HCC): ICD-10-CM

## 2024-10-30 ENCOUNTER — OFFICE VISIT (OUTPATIENT)
Age: 59
End: 2024-10-30
Payer: MEDICARE

## 2024-10-30 VITALS
HEART RATE: 91 BPM | HEIGHT: 63 IN | SYSTOLIC BLOOD PRESSURE: 129 MMHG | DIASTOLIC BLOOD PRESSURE: 67 MMHG | BODY MASS INDEX: 29.59 KG/M2 | WEIGHT: 167 LBS

## 2024-10-30 DIAGNOSIS — E11.65 TYPE 2 DIABETES MELLITUS WITH HYPERGLYCEMIA, WITH LONG-TERM CURRENT USE OF INSULIN (HCC): Primary | ICD-10-CM

## 2024-10-30 DIAGNOSIS — E78.2 MIXED HYPERLIPIDEMIA: ICD-10-CM

## 2024-10-30 DIAGNOSIS — I10 ESSENTIAL (PRIMARY) HYPERTENSION: ICD-10-CM

## 2024-10-30 DIAGNOSIS — Z79.4 TYPE 2 DIABETES MELLITUS WITH HYPERGLYCEMIA, WITH LONG-TERM CURRENT USE OF INSULIN (HCC): Primary | ICD-10-CM

## 2024-10-30 PROCEDURE — 2022F DILAT RTA XM EVC RTNOPTHY: CPT | Performed by: INTERNAL MEDICINE

## 2024-10-30 PROCEDURE — G8417 CALC BMI ABV UP PARAM F/U: HCPCS | Performed by: INTERNAL MEDICINE

## 2024-10-30 PROCEDURE — 3046F HEMOGLOBIN A1C LEVEL >9.0%: CPT | Performed by: INTERNAL MEDICINE

## 2024-10-30 PROCEDURE — 3074F SYST BP LT 130 MM HG: CPT | Performed by: INTERNAL MEDICINE

## 2024-10-30 PROCEDURE — G8484 FLU IMMUNIZE NO ADMIN: HCPCS | Performed by: INTERNAL MEDICINE

## 2024-10-30 PROCEDURE — G8427 DOCREV CUR MEDS BY ELIG CLIN: HCPCS | Performed by: INTERNAL MEDICINE

## 2024-10-30 PROCEDURE — G2211 COMPLEX E/M VISIT ADD ON: HCPCS | Performed by: INTERNAL MEDICINE

## 2024-10-30 PROCEDURE — 3078F DIAST BP <80 MM HG: CPT | Performed by: INTERNAL MEDICINE

## 2024-10-30 PROCEDURE — 3017F COLORECTAL CA SCREEN DOC REV: CPT | Performed by: INTERNAL MEDICINE

## 2024-10-30 PROCEDURE — 99214 OFFICE O/P EST MOD 30 MIN: CPT | Performed by: INTERNAL MEDICINE

## 2024-10-30 PROCEDURE — 1036F TOBACCO NON-USER: CPT | Performed by: INTERNAL MEDICINE

## 2024-10-30 RX ORDER — INSULIN GLARGINE 100 [IU]/ML
INJECTION, SOLUTION SUBCUTANEOUS
Qty: 120 ML | Refills: 3 | Status: SHIPPED | OUTPATIENT
Start: 2024-10-30

## 2024-10-30 NOTE — PATIENT INSTRUCTIONS
1) You will take semglee for the long acting insulin and you will stop the levemir.  Take 80 units in the morning as 40+40 back to back in different locations and also take 80 units at night as 40+40.    2) You will dose the fiasp for your fast acting insulin based on the scale below:  Blood sugar  Insulin dose          Less than 90  Eat first, 15 units       20 units    151-200  25 units      201-250  30 units     251-300  35 units      301-350  40 units      351-400  45 units  401-450  50 units  451-500  55 units  Over 500  60 units    3) Keep taking metformin 1 tab twice daily and ozempic 0.5 mg every Wednesday.    4) Your blood pressure looks good.

## 2024-10-30 NOTE — PROGRESS NOTES
night as 40+40.    2) You will dose the fiasp for your fast acting insulin based on the scale below:  Blood sugar  Insulin dose          Less than 90  Eat first, 15 units       20 units    151-200  25 units      201-250  30 units     251-300  35 units      301-350  40 units      351-400  45 units  401-450  50 units  451-500  55 units  Over 500  60 units    3) Keep taking metformin 1 tab twice daily and ozempic 0.5 mg every Wednesday.    4) Your blood pressure looks good.          Return 11/20/24 at 12:10pm.        Copy sent to:  Kendell Delgado MD Jeanette Shelton, NP with transplant clinic  Dr. See Vincent

## 2024-11-20 ENCOUNTER — OFFICE VISIT (OUTPATIENT)
Age: 59
End: 2024-11-20
Payer: MEDICARE

## 2024-11-20 VITALS
WEIGHT: 167.4 LBS | HEIGHT: 63 IN | HEART RATE: 86 BPM | SYSTOLIC BLOOD PRESSURE: 138 MMHG | DIASTOLIC BLOOD PRESSURE: 59 MMHG | BODY MASS INDEX: 29.66 KG/M2

## 2024-11-20 DIAGNOSIS — Z79.4 TYPE 2 DIABETES MELLITUS WITH HYPERGLYCEMIA, WITH LONG-TERM CURRENT USE OF INSULIN (HCC): Primary | ICD-10-CM

## 2024-11-20 DIAGNOSIS — E78.2 MIXED HYPERLIPIDEMIA: ICD-10-CM

## 2024-11-20 DIAGNOSIS — E11.65 TYPE 2 DIABETES MELLITUS WITH HYPERGLYCEMIA, WITH LONG-TERM CURRENT USE OF INSULIN (HCC): Primary | ICD-10-CM

## 2024-11-20 DIAGNOSIS — I10 ESSENTIAL (PRIMARY) HYPERTENSION: ICD-10-CM

## 2024-11-20 PROCEDURE — 95251 CONT GLUC MNTR ANALYSIS I&R: CPT | Performed by: INTERNAL MEDICINE

## 2024-11-20 PROCEDURE — G8427 DOCREV CUR MEDS BY ELIG CLIN: HCPCS | Performed by: INTERNAL MEDICINE

## 2024-11-20 PROCEDURE — 99214 OFFICE O/P EST MOD 30 MIN: CPT | Performed by: INTERNAL MEDICINE

## 2024-11-20 PROCEDURE — G8417 CALC BMI ABV UP PARAM F/U: HCPCS | Performed by: INTERNAL MEDICINE

## 2024-11-20 PROCEDURE — 3075F SYST BP GE 130 - 139MM HG: CPT | Performed by: INTERNAL MEDICINE

## 2024-11-20 PROCEDURE — 3078F DIAST BP <80 MM HG: CPT | Performed by: INTERNAL MEDICINE

## 2024-11-20 PROCEDURE — 2022F DILAT RTA XM EVC RTNOPTHY: CPT | Performed by: INTERNAL MEDICINE

## 2024-11-20 PROCEDURE — 3046F HEMOGLOBIN A1C LEVEL >9.0%: CPT | Performed by: INTERNAL MEDICINE

## 2024-11-20 PROCEDURE — G2211 COMPLEX E/M VISIT ADD ON: HCPCS | Performed by: INTERNAL MEDICINE

## 2024-11-20 PROCEDURE — G8484 FLU IMMUNIZE NO ADMIN: HCPCS | Performed by: INTERNAL MEDICINE

## 2024-11-20 PROCEDURE — 3017F COLORECTAL CA SCREEN DOC REV: CPT | Performed by: INTERNAL MEDICINE

## 2024-11-20 PROCEDURE — 1036F TOBACCO NON-USER: CPT | Performed by: INTERNAL MEDICINE

## 2024-11-20 RX ORDER — INSULIN GLARGINE 100 [IU]/ML
INJECTION, SOLUTION SUBCUTANEOUS
Qty: 120 ML | Refills: 3 | Status: SHIPPED | OUTPATIENT
Start: 2024-11-20

## 2024-11-20 NOTE — PROGRESS NOTES
Chief Complaint   Patient presents with    Diabetes     PCP and pharmacy confirmed     History of Present Illness: Loli Patterson is a 59 y.o. female here for follow up of diabetes.  Weight stable since last visit in 10/24.  She has been very compliant with her semglee and fiasp since her last visit 3 weeks ago and review of her sherrell data over the past 2 weeks shows an avg sugar of 144 with 59% in range, 17% high and 10% very high and 9% low and 5% very low.  Most of her lows have been overnight so we agreed to decrease her evening semglee to help with this. She is spiking easily in the evening due to her carb intake at night and will watch this or proactive take more fiasp by 5 units for this meal.  Compliant with BP and lipid regimen.   Has noticed less neuropathy symptoms with lower sugars.    she has the following indications to continue treatment with Freestyle Sherrell:   1) she has type 2 diabetes (E11.65) and is on an intensive insulin regimen with 5 injections per day   2) she tests her blood sugar 4 times per day and makes treatment decisions off her blood sugar readings and off freestyle sherrell sensor readings   3) she requires frequent adjustments to her insulin injection doses based on her freestyle sherrell sensor readings   4) she has benefitted from therapeutic continuous glucose monitoring and I recommend that she begin this   5) she is seen in my office every 4-6 months          Current Outpatient Medications   Medication Sig    SEMGLEE 100 UNIT/ML injection pen Inject 80 units in the morning and 70 units at night--replaces levemir--Dose change 11/20/24--updated med list--did not send prescription to the pharmacy    metFORMIN (GLUCOPHAGE-XR) 500 MG extended release tablet TAKE 1 TABLET BY MOUTH TWICE DAILY    ELIQUIS 5 MG TABS tablet TAKE 1 TABLET BY MOUTH TWICE DAILY    atorvastatin (LIPITOR) 80 MG tablet Take 1 tablet by mouth daily    pantoprazole (PROTONIX) 20 MG tablet TAKE 1 TABLET BY MOUTH EVERY

## 2024-11-20 NOTE — PATIENT INSTRUCTIONS
1) Keep taking semglee 80 units in the morning as 40+40 but decrease the evening dose to 70 units as 35+35.    2) Keep the fiasp scale the same BUT if you know you are eating more carbs like on Thanksgiving or All or you know you are having dessert, plan on taking 5 more units than what the scale says to prevent a high sugar.    3) Overall your time in range is much better at 59% up from 18% last time.  Our goal is to get this over 70%    4) Your blood pressure is under control.

## 2024-11-27 ENCOUNTER — TELEPHONE (OUTPATIENT)
Age: 59
End: 2024-11-27

## 2024-11-27 RX ORDER — INSULIN DEGLUDEC 100 U/ML
INJECTION, SOLUTION SUBCUTANEOUS
Qty: 105 ML | Refills: 3 | Status: SHIPPED | OUTPATIENT
Start: 2024-11-27

## 2024-11-27 NOTE — TELEPHONE ENCOUNTER
Cora from [facility] pharmacy LVM stating Semglee is on back order and she does not know when it'll be in stock. She stated the generic glargine is not covered but Toujeo and Tresiba are covered. She asked that a new RX be sent.

## 2024-11-27 NOTE — TELEPHONE ENCOUNTER
Please call patient to let her know that semglee is not currently available and I will be changing her to tresiba instead.  She will continue to dose 80 units in the morning and 70 units at night.  If she has more low blood sugar with changing from semglee to tresiba, she should let me know as sometimes this insulin is stronger and she may not need to use as much of this.

## 2024-11-27 NOTE — TELEPHONE ENCOUNTER
LVM asking pt to return call. Also informed that the message has been sent to her in a [employer] message.

## 2024-11-29 ENCOUNTER — COMMUNITY OUTREACH (OUTPATIENT)
Age: 59
End: 2024-11-29

## 2025-01-03 RX ORDER — PANTOPRAZOLE SODIUM 20 MG/1
20 TABLET, DELAYED RELEASE ORAL DAILY
Qty: 90 TABLET | Refills: 1 | OUTPATIENT
Start: 2025-01-03

## 2025-01-03 NOTE — TELEPHONE ENCOUNTER
Left  and sent [employer] message informing patient that the refill request for pantoprazole (PROTONIX) 20 MG tablet was refused as she is in need of an appointment before this medication can be refilled. Left number for patient to return call to schedule an appointment.

## 2025-01-07 ENCOUNTER — OFFICE VISIT (OUTPATIENT)
Age: 60
End: 2025-01-07
Payer: MEDICARE

## 2025-01-07 VITALS
HEART RATE: 88 BPM | OXYGEN SATURATION: 96 % | TEMPERATURE: 97.4 F | DIASTOLIC BLOOD PRESSURE: 75 MMHG | HEIGHT: 63 IN | WEIGHT: 165 LBS | SYSTOLIC BLOOD PRESSURE: 138 MMHG | RESPIRATION RATE: 18 BRPM | BODY MASS INDEX: 29.23 KG/M2

## 2025-01-07 DIAGNOSIS — N18.6 ESRD (END STAGE RENAL DISEASE) (HCC): ICD-10-CM

## 2025-01-07 DIAGNOSIS — N18.2 TYPE 2 DIABETES MELLITUS WITH STAGE 2 CHRONIC KIDNEY DISEASE, WITH LONG-TERM CURRENT USE OF INSULIN (HCC): ICD-10-CM

## 2025-01-07 DIAGNOSIS — K52.9 GASTROENTERITIS: Primary | ICD-10-CM

## 2025-01-07 DIAGNOSIS — Z79.4 TYPE 2 DIABETES MELLITUS WITH STAGE 2 CHRONIC KIDNEY DISEASE, WITH LONG-TERM CURRENT USE OF INSULIN (HCC): ICD-10-CM

## 2025-01-07 DIAGNOSIS — E11.22 TYPE 2 DIABETES MELLITUS WITH STAGE 2 CHRONIC KIDNEY DISEASE, WITH LONG-TERM CURRENT USE OF INSULIN (HCC): ICD-10-CM

## 2025-01-07 PROCEDURE — 3078F DIAST BP <80 MM HG: CPT | Performed by: INTERNAL MEDICINE

## 2025-01-07 PROCEDURE — G8417 CALC BMI ABV UP PARAM F/U: HCPCS | Performed by: INTERNAL MEDICINE

## 2025-01-07 PROCEDURE — 3017F COLORECTAL CA SCREEN DOC REV: CPT | Performed by: INTERNAL MEDICINE

## 2025-01-07 PROCEDURE — 3046F HEMOGLOBIN A1C LEVEL >9.0%: CPT | Performed by: INTERNAL MEDICINE

## 2025-01-07 PROCEDURE — 3075F SYST BP GE 130 - 139MM HG: CPT | Performed by: INTERNAL MEDICINE

## 2025-01-07 PROCEDURE — 1036F TOBACCO NON-USER: CPT | Performed by: INTERNAL MEDICINE

## 2025-01-07 PROCEDURE — 2022F DILAT RTA XM EVC RTNOPTHY: CPT | Performed by: INTERNAL MEDICINE

## 2025-01-07 PROCEDURE — G8427 DOCREV CUR MEDS BY ELIG CLIN: HCPCS | Performed by: INTERNAL MEDICINE

## 2025-01-07 PROCEDURE — 99213 OFFICE O/P EST LOW 20 MIN: CPT | Performed by: INTERNAL MEDICINE

## 2025-01-07 PROCEDURE — M1308 PR FLU IMMUNIZE NO ADMIN: HCPCS | Performed by: INTERNAL MEDICINE

## 2025-01-07 PROCEDURE — G2211 COMPLEX E/M VISIT ADD ON: HCPCS | Performed by: INTERNAL MEDICINE

## 2025-01-07 SDOH — ECONOMIC STABILITY: FOOD INSECURITY: WITHIN THE PAST 12 MONTHS, YOU WORRIED THAT YOUR FOOD WOULD RUN OUT BEFORE YOU GOT MONEY TO BUY MORE.: NEVER TRUE

## 2025-01-07 SDOH — ECONOMIC STABILITY: FOOD INSECURITY: WITHIN THE PAST 12 MONTHS, THE FOOD YOU BOUGHT JUST DIDN'T LAST AND YOU DIDN'T HAVE MONEY TO GET MORE.: NEVER TRUE

## 2025-01-07 SDOH — ECONOMIC STABILITY: INCOME INSECURITY: HOW HARD IS IT FOR YOU TO PAY FOR THE VERY BASICS LIKE FOOD, HOUSING, MEDICAL CARE, AND HEATING?: NOT HARD AT ALL

## 2025-01-07 ASSESSMENT — PATIENT HEALTH QUESTIONNAIRE - PHQ9
9. THOUGHTS THAT YOU WOULD BE BETTER OFF DEAD, OR OF HURTING YOURSELF: NOT AT ALL
6. FEELING BAD ABOUT YOURSELF - OR THAT YOU ARE A FAILURE OR HAVE LET YOURSELF OR YOUR FAMILY DOWN: NOT AT ALL
1. LITTLE INTEREST OR PLEASURE IN DOING THINGS: NOT AT ALL
7. TROUBLE CONCENTRATING ON THINGS, SUCH AS READING THE NEWSPAPER OR WATCHING TELEVISION: SEVERAL DAYS
SUM OF ALL RESPONSES TO PHQ QUESTIONS 1-9: 7
5. POOR APPETITE OR OVEREATING: MORE THAN HALF THE DAYS
8. MOVING OR SPEAKING SO SLOWLY THAT OTHER PEOPLE COULD HAVE NOTICED. OR THE OPPOSITE, BEING SO FIGETY OR RESTLESS THAT YOU HAVE BEEN MOVING AROUND A LOT MORE THAN USUAL: NOT AT ALL
3. TROUBLE FALLING OR STAYING ASLEEP: SEVERAL DAYS
4. FEELING TIRED OR HAVING LITTLE ENERGY: MORE THAN HALF THE DAYS
SUM OF ALL RESPONSES TO PHQ QUESTIONS 1-9: 7
2. FEELING DOWN, DEPRESSED OR HOPELESS: SEVERAL DAYS
SUM OF ALL RESPONSES TO PHQ QUESTIONS 1-9: 7
SUM OF ALL RESPONSES TO PHQ9 QUESTIONS 1 & 2: 1
SUM OF ALL RESPONSES TO PHQ QUESTIONS 1-9: 7
10. IF YOU CHECKED OFF ANY PROBLEMS, HOW DIFFICULT HAVE THESE PROBLEMS MADE IT FOR YOU TO DO YOUR WORK, TAKE CARE OF THINGS AT HOME, OR GET ALONG WITH OTHER PEOPLE: NOT DIFFICULT AT ALL

## 2025-01-07 NOTE — PROGRESS NOTES
I have reviewed all needed documentation in preparation for visit. Verified patient by name and date of birth  Chief Complaint   Patient presents with    Hemorrhoids       There were no vitals filed for this visit.    Health Maintenance Due   Topic Date Due    Hepatitis C screen  Never done    Hepatitis B vaccine (1 of 3 - 19+ 3-dose series) Never done    DTaP/Tdap/Td vaccine (1 - Tdap) Never done    Shingles vaccine (1 of 2) Never done    Pneumococcal 0-64 years Vaccine (2 of 2 - PCV) 02/01/2014    Diabetic retinal exam  10/15/2022    A1C test (Diabetic or Prediabetic)  06/16/2024    Flu vaccine (1) 08/01/2024    COVID-19 Vaccine (5 - 2023-24 season) 09/01/2024    Lipids  09/26/2024     \"Have you been to the ER, urgent care clinic since your last visit?  Hospitalized since your last visit?\"    NO    “Have you seen or consulted any other health care providers [facility] since [facility] last visit?”    NO            Click Here for Release of Records Request         Rikki Buckner [facility]

## 2025-01-07 NOTE — PROGRESS NOTES
(S) Loli Patterson is a 59 y.o. female with complaint of gastrointestinal symptoms of watery diarrhea, dehydration for 13 days. No blood in stool.  For unknown reason she had a change in bowel habits somewhere between 10 and 14 days ago with increased frequency of loose stools just mucus and and brown stool no blood little bit of gas no real abdominal pain to started to develop rectal pain related to going frequently denies any bright red blood per rectum she did not take any antidiarrheals she had no change in her diet only change in meds was a change in her insulin dose otherwise no change no exposure no one else has been ill she says she has not had fever or chills with it and and and mostly feels okay the rectal pain has let up some and the loose stools have let up some they are not quite as bad exam shows a soft abdomen bowel sounds are active rectal exam there is no external hemorrhoids and no palpable internal hemorrhoids are felt in impression is gastroenteritis recommend high fluid intake can try as needed Imodium use of Preparation H or Anusol type cream to help with the rectal pain follow-up if not better  (O) Physical exam reveals the patient appears well. Hydration status: mildly dehydrated. Abdomen: abdomen is soft without significant tenderness, masses, organomegaly or guarding..    (A) Viral Gastroenteritis    (P) I have recommended soups, juices, water, and advance diet as tolerated. Return office visit if symptoms persist or worsen; I have alerted the patient to call if high fever, dehydration, marked weakness, fainting, increased abdominal pain, blood in stool or vomit.  Loli was seen today for hemorrhoids.    Diagnoses and all orders for this visit:    Gastroenteritis    ESRD (end stage renal disease) (HCC)    Type 2 diabetes mellitus with stage 2 chronic kidney disease, with long-term current use of insulin (HCC)      Hemorrhoid cream as needed

## 2025-01-08 LAB
ALBUMIN SERPL-MCNC: 4.2 G/DL (ref 3.8–4.9)
ALP SERPL-CCNC: 109 IU/L (ref 44–121)
ALT SERPL-CCNC: 16 IU/L (ref 0–32)
AST SERPL-CCNC: 16 IU/L (ref 0–40)
BILIRUB SERPL-MCNC: 0.3 MG/DL (ref 0–1.2)
BUN SERPL-MCNC: 16 MG/DL (ref 6–24)
BUN/CREAT SERPL: 14 (ref 9–23)
CALCIUM SERPL-MCNC: 9.6 MG/DL (ref 8.7–10.2)
CHLORIDE SERPL-SCNC: 102 MMOL/L (ref 96–106)
CHOLEST SERPL-MCNC: 152 MG/DL (ref 100–199)
CO2 SERPL-SCNC: 24 MMOL/L (ref 20–29)
CREAT SERPL-MCNC: 1.12 MG/DL (ref 0.57–1)
EGFRCR SERPLBLD CKD-EPI 2021: 57 ML/MIN/1.73
GLOBULIN SER CALC-MCNC: 2.2 G/DL (ref 1.5–4.5)
GLUCOSE SERPL-MCNC: 160 MG/DL (ref 70–99)
HBA1C MFR BLD: 9.2 % (ref 4.8–5.6)
HDLC SERPL-MCNC: 45 MG/DL
IMP & REVIEW OF LAB RESULTS: NORMAL
LDLC SERPL CALC-MCNC: 69 MG/DL (ref 0–99)
Lab: NORMAL
POTASSIUM SERPL-SCNC: 4.4 MMOL/L (ref 3.5–5.2)
PROT SERPL-MCNC: 6.4 G/DL (ref 6–8.5)
REPORT: NORMAL
REPORT: NORMAL
SODIUM SERPL-SCNC: 141 MMOL/L (ref 134–144)
TRIGL SERPL-MCNC: 237 MG/DL (ref 0–149)
VLDLC SERPL CALC-MCNC: 38 MG/DL (ref 5–40)

## 2025-01-20 RX ORDER — PANTOPRAZOLE SODIUM 20 MG/1
20 TABLET, DELAYED RELEASE ORAL DAILY
Qty: 90 TABLET | Refills: 1 | Status: SHIPPED | OUTPATIENT
Start: 2025-01-20

## 2025-02-02 RX ORDER — SEMAGLUTIDE 0.68 MG/ML
INJECTION, SOLUTION SUBCUTANEOUS
Qty: 9 ML | Refills: 3 | Status: SHIPPED | OUTPATIENT
Start: 2025-02-02

## 2025-02-05 RX ORDER — APIXABAN 5 MG/1
5 TABLET, FILM COATED ORAL 2 TIMES DAILY
Qty: 60 TABLET | Refills: 5 | Status: SHIPPED | OUTPATIENT
Start: 2025-02-05

## 2025-02-20 ENCOUNTER — TELEPHONE (OUTPATIENT)
Age: 60
End: 2025-02-20

## 2025-02-20 RX ORDER — INSULIN GLARGINE 300 U/ML
INJECTION, SOLUTION SUBCUTANEOUS
Qty: 40.5 ML | Refills: 3 | Status: SHIPPED | OUTPATIENT
Start: 2025-02-20

## 2025-02-20 NOTE — TELEPHONE ENCOUNTER
Pharmacy called and LVM 2/20 @ 11:42 am    They are checking in to make sure we received a pa requestfor their tresiba     Pt# [phone]

## 2025-02-20 NOTE — TELEPHONE ENCOUNTER
Can you call her and have her find out which long acting insulin is covered in place of tresiba as I won't be doing a prior authorization to stay on this?  Previously she was on semglee but I had to change her to tresiba in November.  Have her call her insurance to see if lantus, semglee, basaglar or toujeo is covered and either let me know over [employer] or call us back so I can make this switch.

## 2025-02-20 NOTE — TELEPHONE ENCOUNTER
Spoke to the pt and informed her of the message per Dr. Buckner. Patient understood with no further questions.

## 2025-02-20 NOTE — TELEPHONE ENCOUNTER
We had the following [employer] exchange:    To clarify, this is Dr. Buckner, not Dr. Daniel, who was your old endocrinologist.      Thanks for letting me know this.  I will send toujeo to your pharmacy and you will continue to dose 80 units in the morning and 70 units at night.  ===View-only below this line===      ----- Message -----       From:Loli MARIE Yesenia       Sent:2/20/2025  4:09 PM EST         To:Dr. Praful Buckner MD    Subject:Medication change for Insurance     Hi  my insurance will only cover Lantus and Toujeo. Thank you

## 2025-02-26 ENCOUNTER — OFFICE VISIT (OUTPATIENT)
Age: 60
End: 2025-02-26

## 2025-02-26 VITALS
HEART RATE: 86 BPM | BODY MASS INDEX: 30.51 KG/M2 | HEIGHT: 63 IN | DIASTOLIC BLOOD PRESSURE: 62 MMHG | WEIGHT: 172.2 LBS | SYSTOLIC BLOOD PRESSURE: 124 MMHG

## 2025-02-26 DIAGNOSIS — E78.2 MIXED HYPERLIPIDEMIA: ICD-10-CM

## 2025-02-26 DIAGNOSIS — E11.65 TYPE 2 DIABETES MELLITUS WITH HYPERGLYCEMIA, WITH LONG-TERM CURRENT USE OF INSULIN (HCC): Primary | ICD-10-CM

## 2025-02-26 DIAGNOSIS — Z79.4 TYPE 2 DIABETES MELLITUS WITH HYPERGLYCEMIA, WITH LONG-TERM CURRENT USE OF INSULIN (HCC): Primary | ICD-10-CM

## 2025-02-26 DIAGNOSIS — I10 ESSENTIAL (PRIMARY) HYPERTENSION: ICD-10-CM

## 2025-02-26 NOTE — PROGRESS NOTES
Chief Complaint   Patient presents with    Diabetes     PCP and pharmacy confirmed  Pt consented to use of RITO     History of Present Illness: Loli Patterson is a 59 y.o. female here for follow up of diabetes.  Weight up 5 lbs since last visit in 11/24.  Review of her sherrell data over the past 90 days shows 56% in range, 30% high, 14% very high, 0% low and 0% very low.    she has the following indications to continue treatment with Freestyle Sherrell:   1) she has type 2 diabetes (E11.65) and is on an intensive insulin regimen with 5 injections per day   2) she tests her blood sugar 4 times per day and makes treatment decisions off her blood sugar readings and off freestyle sherrell sensor readings   3) she requires frequent adjustments to her insulin injection doses based on her freestyle sherrell sensor readings   4) she has benefitted from therapeutic continuous glucose monitoring and I recommend that she begin this   5) she is seen in my office every 4-6 months        History of Present Illness  The patient is a 59-year-old female here for follow-up of diabetes.    She has not yet received notification from her pharmacy regarding the new prescription for Toujeo. She continues to use Tresiba, with a supply of 8 boxes remaining. She reports that Tresiba has been effective in managing her blood glucose levels. She occasionally forgets to administer Fiasp prior to meals and believes that her diet may also contribute to her blood glucose fluctuations. She has not increased her Ozempic dosage beyond 0.5 mg and experiences occasional gastrointestinal symptoms such as upset stomach, nausea, abdominal pain, diarrhea, and constipation. She administers Ozempic on Wednesdays, sometimes in the morning and other times in the evening. She has 3 doses remaining in her current pen. She reports experiencing hunger during the day. She underwent an eye examination in 2024 and has a follow-up appointment scheduled in 6 months. Her current

## 2025-02-26 NOTE — PATIENT INSTRUCTIONS
1) Take a dose of 0.5 mg and then another dose of 0.25 mg back to back in different locations today and again next week to use up this pen.  I sent the 1 mg pens to the pharmacy to start in 3 weeks.    2) Use up the tresiba that you have and I sent toujeo to use in the future as your long acting insulin and stay on 80 in the morning and 70 at night.  Stay on fiasp 20 units before meals plus the scale.    3) As you go up on the ozempic if you are finding that your sugars are going under 90 2 or more times a week then decrease the tresiba/toujeo by 5 units with each dose as needed.      4) Your creatinine (kidney test) is stable at 1.1.    5) Your blood pressure looks great.

## 2025-02-27 ENCOUNTER — TRANSCRIBE ORDERS (OUTPATIENT)
Facility: HOSPITAL | Age: 60
End: 2025-02-27

## 2025-02-27 DIAGNOSIS — Z12.31 ENCOUNTER FOR MAMMOGRAM TO ESTABLISH BASELINE MAMMOGRAM: Primary | ICD-10-CM

## 2025-03-06 ENCOUNTER — HOSPITAL ENCOUNTER (OUTPATIENT)
Facility: HOSPITAL | Age: 60
Discharge: HOME | End: 2025-03-09
Attending: INTERNAL MEDICINE
Payer: MEDICARE

## 2025-03-06 VITALS — WEIGHT: 171 LBS | HEIGHT: 63 IN | BODY MASS INDEX: 30.3 KG/M2

## 2025-03-06 DIAGNOSIS — Z12.31 ENCOUNTER FOR MAMMOGRAM TO ESTABLISH BASELINE MAMMOGRAM: ICD-10-CM

## 2025-03-06 PROCEDURE — 77067 SCR MAMMO BI INCL CAD: CPT

## 2025-03-17 ENCOUNTER — OFFICE VISIT (OUTPATIENT)
Age: 60
End: 2025-03-17
Payer: MEDICARE

## 2025-03-17 ENCOUNTER — ANCILLARY PROCEDURE (OUTPATIENT)
Age: 60
End: 2025-03-17
Payer: MEDICARE

## 2025-03-17 VITALS
DIASTOLIC BLOOD PRESSURE: 90 MMHG | HEIGHT: 63 IN | BODY MASS INDEX: 30.29 KG/M2 | HEART RATE: 68 BPM | SYSTOLIC BLOOD PRESSURE: 166 MMHG

## 2025-03-17 DIAGNOSIS — I65.21 STENOSIS OF RIGHT CAROTID ARTERY: ICD-10-CM

## 2025-03-17 DIAGNOSIS — Z94.0 KIDNEY TRANSPLANT STATUS: ICD-10-CM

## 2025-03-17 DIAGNOSIS — R07.89 CHEST PAIN, NON-CARDIAC: Primary | ICD-10-CM

## 2025-03-17 DIAGNOSIS — N18.6 ESRD (END STAGE RENAL DISEASE) (HCC): ICD-10-CM

## 2025-03-17 DIAGNOSIS — E78.2 MIXED HYPERLIPIDEMIA: ICD-10-CM

## 2025-03-17 DIAGNOSIS — I10 BENIGN ESSENTIAL HTN: ICD-10-CM

## 2025-03-17 LAB
VAS LEFT CCA DIST EDV: 13 CM/S
VAS LEFT CCA DIST PSV: 48.7 CM/S
VAS LEFT CCA PROX EDV: 16.5 CM/S
VAS LEFT CCA PROX PSV: 77.5 CM/S
VAS LEFT ECA EDV: 0 CM/S
VAS LEFT ECA PSV: 56.6 CM/S
VAS LEFT ICA DIST EDV: 24.4 CM/S
VAS LEFT ICA DIST PSV: 70.6 CM/S
VAS LEFT ICA MID EDV: 20 CM/S
VAS LEFT ICA MID PSV: 64.4 CM/S
VAS LEFT ICA PROX EDV: 13 CM/S
VAS LEFT ICA PROX PSV: 39.2 CM/S
VAS LEFT ICA/CCA PSV: 1.4 NO UNITS
VAS LEFT VERTEBRAL EDV: 14.2 CM/S
VAS LEFT VERTEBRAL PSV: 59.2 CM/S
VAS RIGHT CCA DIST EDV: 10.9 CM/S
VAS RIGHT CCA DIST PSV: 48.2 CM/S
VAS RIGHT CCA PROX EDV: 16.4 CM/S
VAS RIGHT CCA PROX PSV: 70.2 CM/S
VAS RIGHT ECA EDV: 0 CM/S
VAS RIGHT ECA PSV: 43.5 CM/S
VAS RIGHT ICA DIST EDV: 25.2 CM/S
VAS RIGHT ICA DIST PSV: 77.5 CM/S
VAS RIGHT ICA MID EDV: 45.9 CM/S
VAS RIGHT ICA MID PSV: 16.3 CM/S
VAS RIGHT ICA PROX EDV: 13.2 CM/S
VAS RIGHT ICA PROX PSV: 43.6 CM/S
VAS RIGHT ICA/CCA PSV: 1.6 NO UNITS
VAS RIGHT VERTEBRAL EDV: 11.3 CM/S
VAS RIGHT VERTEBRAL PSV: 42.7 CM/S

## 2025-03-17 PROCEDURE — 99214 OFFICE O/P EST MOD 30 MIN: CPT | Performed by: INTERNAL MEDICINE

## 2025-03-17 PROCEDURE — 93880 EXTRACRANIAL BILAT STUDY: CPT | Performed by: INTERNAL MEDICINE

## 2025-03-17 PROCEDURE — G2211 COMPLEX E/M VISIT ADD ON: HCPCS | Performed by: INTERNAL MEDICINE

## 2025-03-17 PROCEDURE — 3079F DIAST BP 80-89 MM HG: CPT | Performed by: INTERNAL MEDICINE

## 2025-03-17 PROCEDURE — G8417 CALC BMI ABV UP PARAM F/U: HCPCS | Performed by: INTERNAL MEDICINE

## 2025-03-17 PROCEDURE — 1036F TOBACCO NON-USER: CPT | Performed by: INTERNAL MEDICINE

## 2025-03-17 PROCEDURE — G8427 DOCREV CUR MEDS BY ELIG CLIN: HCPCS | Performed by: INTERNAL MEDICINE

## 2025-03-17 PROCEDURE — 3077F SYST BP >= 140 MM HG: CPT | Performed by: INTERNAL MEDICINE

## 2025-03-17 PROCEDURE — 3017F COLORECTAL CA SCREEN DOC REV: CPT | Performed by: INTERNAL MEDICINE

## 2025-03-17 ASSESSMENT — PATIENT HEALTH QUESTIONNAIRE - PHQ9
SUM OF ALL RESPONSES TO PHQ QUESTIONS 1-9: 0
1. LITTLE INTEREST OR PLEASURE IN DOING THINGS: NOT AT ALL
SUM OF ALL RESPONSES TO PHQ QUESTIONS 1-9: 0
2. FEELING DOWN, DEPRESSED OR HOPELESS: NOT AT ALL
SUM OF ALL RESPONSES TO PHQ QUESTIONS 1-9: 0
SUM OF ALL RESPONSES TO PHQ QUESTIONS 1-9: 0

## 2025-03-17 NOTE — PROGRESS NOTES
1. Have you been to the ER, urgent care clinic since your last visit?  Hospitalized since your last visit?No    2. Have you seen or consulted any other health care providers outside of the [facility] since your last visit?  Include any pap smears or colon screening. No    
FOR every 50 mg/dl blood glucose is above 150. max 100 units PER DAY    butalbital-acetaminophen-caffeine (FIORICET, ESGIC) -40 MG per tablet Take 1 tablet by mouth 3 times daily as needed for Headaches    SUMAtriptan (IMITREX) 100 MG tablet Take one by mouth at migraine onset, and one 2 hours later if not entirely symptom-free. Max: 2 tabs in 24 hours    aspirin 81 MG EC tablet Take by mouth daily    cinacalcet (SENSIPAR) 30 MG tablet Take 1 tablet by mouth daily    diclofenac sodium (VOLTAREN) 1 % GEL 2 (TWO) GRAM APPLY TO AFFECTED AREA FOUR TIMES DAILY    K-Phos-Neutral (K PHOS NEUTRAL) 155-852-130 MG tablet Take 1 tablet by mouth daily    labetalol (NORMODYNE) 200 MG tablet Take 1 tablet by mouth 2 times daily    magnesium oxide (MAG-OX) 400 MG tablet Take 2 tablets by mouth 2 times daily    mycophenolate (CELLCEPT) 250 MG capsule Take 1 capsule by mouth    PARoxetine Mesylate 7.5 MG CAPS paroxetine mesylate (menopausal symptoms suppressant) 7.5 mg capsule   TAKE 1 CAPSULE BY MOUTH EVERY DAY    predniSONE (DELTASONE) 5 MG tablet Take 1 tablet by mouth daily    tacrolimus (PROGRAF) 1 MG capsule Take 2 capsules by mouth daily     No current facility-administered medications for this visit.       Jenaro Miguel MD  [facility] [place]  [address], [address]  [place] [address]

## 2025-04-10 RX ORDER — PEN NEEDLE, DIABETIC 32GX 5/32"
NEEDLE, DISPOSABLE MISCELLANEOUS
Qty: 500 EACH | Refills: 3 | Status: SHIPPED | OUTPATIENT
Start: 2025-04-10

## 2025-05-14 DIAGNOSIS — I10 ESSENTIAL (PRIMARY) HYPERTENSION: ICD-10-CM

## 2025-05-14 DIAGNOSIS — Z79.4 TYPE 2 DIABETES MELLITUS WITH HYPERGLYCEMIA, WITH LONG-TERM CURRENT USE OF INSULIN (HCC): ICD-10-CM

## 2025-05-14 DIAGNOSIS — E11.65 TYPE 2 DIABETES MELLITUS WITH HYPERGLYCEMIA, WITH LONG-TERM CURRENT USE OF INSULIN (HCC): ICD-10-CM

## 2025-05-14 DIAGNOSIS — E78.2 MIXED HYPERLIPIDEMIA: ICD-10-CM

## 2025-05-15 ENCOUNTER — HOSPITAL ENCOUNTER (OUTPATIENT)
Facility: HOSPITAL | Age: 60
Discharge: HOME | End: 2025-05-18

## 2025-05-15 ENCOUNTER — OFFICE VISIT (OUTPATIENT)
Age: 60
End: 2025-05-15
Payer: MEDICARE

## 2025-05-15 VITALS
RESPIRATION RATE: 17 BRPM | BODY MASS INDEX: 29.59 KG/M2 | DIASTOLIC BLOOD PRESSURE: 75 MMHG | SYSTOLIC BLOOD PRESSURE: 130 MMHG | HEART RATE: 88 BPM | TEMPERATURE: 97.6 F | WEIGHT: 167 LBS | HEIGHT: 63 IN | OXYGEN SATURATION: 99 %

## 2025-05-15 DIAGNOSIS — K52.9 CHRONIC DIARRHEA: Primary | ICD-10-CM

## 2025-05-15 PROCEDURE — 1036F TOBACCO NON-USER: CPT | Performed by: PHYSICIAN ASSISTANT

## 2025-05-15 PROCEDURE — G8427 DOCREV CUR MEDS BY ELIG CLIN: HCPCS | Performed by: PHYSICIAN ASSISTANT

## 2025-05-15 PROCEDURE — G8417 CALC BMI ABV UP PARAM F/U: HCPCS | Performed by: PHYSICIAN ASSISTANT

## 2025-05-15 PROCEDURE — 3075F SYST BP GE 130 - 139MM HG: CPT | Performed by: PHYSICIAN ASSISTANT

## 2025-05-15 PROCEDURE — 99213 OFFICE O/P EST LOW 20 MIN: CPT | Performed by: PHYSICIAN ASSISTANT

## 2025-05-15 PROCEDURE — 3017F COLORECTAL CA SCREEN DOC REV: CPT | Performed by: PHYSICIAN ASSISTANT

## 2025-05-15 PROCEDURE — G2211 COMPLEX E/M VISIT ADD ON: HCPCS | Performed by: PHYSICIAN ASSISTANT

## 2025-05-15 PROCEDURE — 3078F DIAST BP <80 MM HG: CPT | Performed by: PHYSICIAN ASSISTANT

## 2025-05-15 RX ORDER — PSYLLIUM HUSK 0.4 G
1 CAPSULE ORAL DAILY
COMMUNITY
Start: 2025-05-15

## 2025-05-15 SDOH — ECONOMIC STABILITY: FOOD INSECURITY: WITHIN THE PAST 12 MONTHS, YOU WORRIED THAT YOUR FOOD WOULD RUN OUT BEFORE YOU GOT MONEY TO BUY MORE.: NEVER TRUE

## 2025-05-15 SDOH — ECONOMIC STABILITY: FOOD INSECURITY: WITHIN THE PAST 12 MONTHS, THE FOOD YOU BOUGHT JUST DIDN'T LAST AND YOU DIDN'T HAVE MONEY TO GET MORE.: NEVER TRUE

## 2025-05-15 ASSESSMENT — ENCOUNTER SYMPTOMS
ABDOMINAL PAIN: 0
BLOOD IN STOOL: 0
DIARRHEA: 0
SHORTNESS OF BREATH: 0
COUGH: 0

## 2025-05-15 NOTE — PROGRESS NOTES
Loli Patterson is a 60 y.o. female  Chief Complaint   Patient presents with    Pain     Having rouble using the bathroom- pressure seating on the toilet - colon issues      Vitals:    05/15/25 0808   BP: 130/75   BP Site: Right Upper Arm   Patient Position: Sitting   BP Cuff Size: Medium Adult   Pulse: 88   Resp: 17   Temp: 97.6 °F (36.4 °C)   TempSrc: Temporal   SpO2: 99%   Weight: 75.8 kg (167 lb)   Height: 1.6 m (5' 3\")      Wt Readings from Last 3 Encounters:   05/15/25 75.8 kg (167 lb)   03/06/25 77.6 kg (171 lb)   02/26/25 78.1 kg (172 lb 3.2 oz)     BMI Readings from Last 3 Encounters:   05/15/25 29.58 kg/m²   03/17/25 30.29 kg/m²   03/06/25 30.29 kg/m²     Health Maintenance Due   Topic Date Due    Hepatitis C screen  Never done    Hepatitis B vaccine (1 of 3 - 19+ 3-dose series) Never done    DTaP/Tdap/Td vaccine (1 - Tdap) Never done    Shingles vaccine (1 of 2) Never done    Pneumococcal 50+ years Vaccine (2 of 2 - PCV) 02/01/2014    COVID-19 Vaccine (5 - 2024-25 season) 09/01/2024    Annual Wellness Visit (Medicare)  01/11/2025    A1C test (Diabetic or Prediabetic)  04/07/2025     HPI  Diarrhea issues at last visit with Dr. Delgado 1/7/25. Now reports \"Constipation\" x 3 weeks but having BM daily and on reviewing [place] Stool Chart: Type 4-6 stools daily. Some difficulty passing stool. Has not taken anything OTC for this.   Has colonoscopy planned soon.   Eating more fruit & vegetables x 1 month.     ROS  Review of Systems   Constitutional:  Negative for fever.   Respiratory:  Negative for cough and shortness of breath.    Cardiovascular:  Negative for chest pain and palpitations.   Gastrointestinal:  Negative for abdominal pain, blood in stool and diarrhea.   Neurological:  Negative for headaches.   Psychiatric/Behavioral:  Negative for dysphoric mood.      EXAM  Physical Exam  Vitals and nursing note reviewed.   Constitutional:       General: She is not in acute distress.     Appearance: Normal

## 2025-05-15 NOTE — PROGRESS NOTES
I have reviewed all needed documentation in preparation for visit. Verified patient by name and date of birth  Chief Complaint   Patient presents with    Pain     Having rouble using the bathroom- pressure seating on the toilet - colon issues        Vitals:    05/15/25 0808   BP: 130/75   BP Site: Right Upper Arm   Patient Position: Sitting   BP Cuff Size: Medium Adult   Pulse: 88   Resp: 17   Temp: 97.6 °F (36.4 °C)   TempSrc: Temporal   SpO2: 99%   Weight: 75.8 kg (167 lb)   Height: 1.6 m (5' 3\")       Health Maintenance Due   Topic Date Due    Hepatitis C screen  Never done    Hepatitis B vaccine (1 of 3 - 19+ 3-dose series) Never done    DTaP/Tdap/Td vaccine (1 - Tdap) Never done    Shingles vaccine (1 of 2) Never done    Pneumococcal 50+ years Vaccine (2 of 2 - PCV) 02/01/2014    COVID-19 Vaccine (5 - 2024-25 season) 09/01/2024    Annual Wellness Visit (Medicare)  01/11/2025    A1C test (Diabetic or Prediabetic)  04/07/2025     \"Have you been to the ER, urgent care clinic since your last visit?  Hospitalized since your last visit?\"    NO    “Have you seen or consulted any other health care providers outside of [facility] since your last visit?”    NO            Click Here for Release of Records Request         Cydney delaney CMA

## 2025-05-16 LAB
ALBUMIN SERPL-MCNC: 3.8 G/DL (ref 3.5–5)
ALBUMIN/GLOB SERPL: 1.3 (ref 1.1–2.2)
ALP SERPL-CCNC: 138 U/L (ref 45–117)
ALT SERPL-CCNC: 22 U/L (ref 12–78)
ANION GAP SERPL CALC-SCNC: 6 MMOL/L (ref 2–12)
AST SERPL-CCNC: 14 U/L (ref 15–37)
BILIRUB SERPL-MCNC: 0.5 MG/DL (ref 0.2–1)
BUN SERPL-MCNC: 22 MG/DL (ref 6–20)
BUN/CREAT SERPL: 15 (ref 12–20)
CALCIUM SERPL-MCNC: 10.8 MG/DL (ref 8.5–10.1)
CHLORIDE SERPL-SCNC: 104 MMOL/L (ref 97–108)
CHOLEST SERPL-MCNC: 200 MG/DL
CO2 SERPL-SCNC: 27 MMOL/L (ref 21–32)
CREAT SERPL-MCNC: 1.42 MG/DL (ref 0.55–1.02)
CREAT UR-MCNC: 228 MG/DL
EST. AVERAGE GLUCOSE BLD GHB EST-MCNC: 298 MG/DL
GLOBULIN SER CALC-MCNC: 3 G/DL (ref 2–4)
GLUCOSE SERPL-MCNC: 296 MG/DL (ref 65–100)
HBA1C MFR BLD: 12 % (ref 4–5.6)
HDLC SERPL-MCNC: 48 MG/DL
HDLC SERPL: 4.2 (ref 0–5)
LDLC SERPL CALC-MCNC: 98.6 MG/DL (ref 0–100)
MICROALBUMIN UR-MCNC: 2.31 MG/DL
MICROALBUMIN/CREAT UR-RTO: 10 MG/G (ref 0–30)
POTASSIUM SERPL-SCNC: 4.4 MMOL/L (ref 3.5–5.1)
PROT SERPL-MCNC: 6.8 G/DL (ref 6.4–8.2)
SODIUM SERPL-SCNC: 137 MMOL/L (ref 136–145)
SPECIMEN HOLD: NORMAL
TRIGL SERPL-MCNC: 267 MG/DL
VLDLC SERPL CALC-MCNC: 53.4 MG/DL

## 2025-05-22 ENCOUNTER — TELEPHONE (OUTPATIENT)
Age: 60
End: 2025-05-22

## 2025-05-22 NOTE — TELEPHONE ENCOUNTER
Left message on pt's voicemail asking her to read her unread message on [employer] when she gets a chance.

## 2025-05-22 NOTE — TELEPHONE ENCOUNTER
Please call pt to let her know she has an unread message in [employer]:  If you reach her, please just read the message to her but if you don't then it's fine to leave a message asking them to check [employer] to read the message at their earliest convenience.    Great.  I'll put you down and will see you then.  I will keep the appointment on the books for 6/25/25 just in case we need it. Thanks!      Previous Messages    ----- Message -----       From:Loli Patterson       Sent:5/20/2025  7:04 AM EDT         To:User Message Message List    Subject:sooner appointment    Good morning  Mr. Buckner. So I can keep my regular appointment on 5/28 if it’s still available. No class that day

## 2025-05-28 ENCOUNTER — RESULTS FOLLOW-UP (OUTPATIENT)
Age: 60
End: 2025-05-28

## 2025-05-28 ENCOUNTER — OFFICE VISIT (OUTPATIENT)
Age: 60
End: 2025-05-28
Payer: MEDICARE

## 2025-05-28 VITALS
WEIGHT: 167.6 LBS | OXYGEN SATURATION: 100 % | DIASTOLIC BLOOD PRESSURE: 74 MMHG | BODY MASS INDEX: 29.7 KG/M2 | SYSTOLIC BLOOD PRESSURE: 130 MMHG | RESPIRATION RATE: 16 BRPM | HEART RATE: 83 BPM | HEIGHT: 63 IN

## 2025-05-28 DIAGNOSIS — E11.65 TYPE 2 DIABETES MELLITUS WITH HYPERGLYCEMIA, WITH LONG-TERM CURRENT USE OF INSULIN (HCC): Primary | ICD-10-CM

## 2025-05-28 DIAGNOSIS — E78.2 MIXED HYPERLIPIDEMIA: ICD-10-CM

## 2025-05-28 DIAGNOSIS — I10 ESSENTIAL (PRIMARY) HYPERTENSION: ICD-10-CM

## 2025-05-28 DIAGNOSIS — Z79.4 TYPE 2 DIABETES MELLITUS WITH HYPERGLYCEMIA, WITH LONG-TERM CURRENT USE OF INSULIN (HCC): Primary | ICD-10-CM

## 2025-05-28 PROCEDURE — G8427 DOCREV CUR MEDS BY ELIG CLIN: HCPCS | Performed by: INTERNAL MEDICINE

## 2025-05-28 PROCEDURE — 3078F DIAST BP <80 MM HG: CPT | Performed by: INTERNAL MEDICINE

## 2025-05-28 PROCEDURE — G8417 CALC BMI ABV UP PARAM F/U: HCPCS | Performed by: INTERNAL MEDICINE

## 2025-05-28 PROCEDURE — G2211 COMPLEX E/M VISIT ADD ON: HCPCS | Performed by: INTERNAL MEDICINE

## 2025-05-28 PROCEDURE — 99214 OFFICE O/P EST MOD 30 MIN: CPT | Performed by: INTERNAL MEDICINE

## 2025-05-28 PROCEDURE — 95251 CONT GLUC MNTR ANALYSIS I&R: CPT | Performed by: INTERNAL MEDICINE

## 2025-05-28 PROCEDURE — 2022F DILAT RTA XM EVC RTNOPTHY: CPT | Performed by: INTERNAL MEDICINE

## 2025-05-28 PROCEDURE — 3046F HEMOGLOBIN A1C LEVEL >9.0%: CPT | Performed by: INTERNAL MEDICINE

## 2025-05-28 PROCEDURE — 3017F COLORECTAL CA SCREEN DOC REV: CPT | Performed by: INTERNAL MEDICINE

## 2025-05-28 PROCEDURE — 3075F SYST BP GE 130 - 139MM HG: CPT | Performed by: INTERNAL MEDICINE

## 2025-05-28 PROCEDURE — 1036F TOBACCO NON-USER: CPT | Performed by: INTERNAL MEDICINE

## 2025-05-28 RX ORDER — METFORMIN HYDROCHLORIDE 500 MG/1
500 TABLET, EXTENDED RELEASE ORAL
Qty: 180 TABLET | Refills: 3 | Status: SHIPPED | OUTPATIENT
Start: 2025-05-28

## 2025-05-28 NOTE — PATIENT INSTRUCTIONS
1) Please decrease the metformin to just 1 tab at night and stop the morning dose and see if this helps with loose stools during the day.    2) Stay on 1 mg of ozempic once a week and toujeo 80 units in the morning and 70 units at night.    3) Do your best to check your heather before each meal and at bedtime and dose your fiasp based on the scale below:    Before meals  Blood sugar  Insulin dose          Less than 90  Eat first, 15 units       20 units    151-200  25 units      201-250  30 units     251-300  35 units      301-350  40 units      351-400  45 units  401-450  50 units  451-500  55 units  Over 500  60 units    Bedtime (in addition to 70 of toujeo)  Blood sugar  Insulin dose          151-200  5 units      201-250  10 units     251-300  15 units      301-350  20 units      351-400  25 units  401-450  30 units  451-500  35 units  Over 500  40 units    4) BUN and creatinine are markers of kidney function.  Your values were slightly high due to mild dehydration. Drink at least 4 8oz glasses of water everyday to stay hydrated to prevent your levels from going higher.

## 2025-05-28 NOTE — PROGRESS NOTES
Chief Complaint   Patient presents with    Diabetes    Medication Refill     [facility]    Other     Patient has given her consent for her provider to use RITO AI during today's visit       History of Present Illness: Loli Patterson is a 60 y.o. female here for follow up of diabetes.  Weight down 5 lbs since last visit in 2/25.  Review of her sherrell data over the past 14 days shows 26% in range, 42% high, 25% very high, 6% low and 1% very low.      she has the following indications to continue treatment with Freestyle Sherrell:   1) she has type 2 diabetes (E11.65) and is on an intensive insulin regimen with 5 injections per day   2) she tests her blood sugar 4 times per day and makes treatment decisions off her blood sugar readings and off freestyle sherrell sensor readings   3) she requires frequent adjustments to her insulin injection doses based on her freestyle sherrell sensor readings   4) she has benefitted from therapeutic continuous glucose monitoring and I recommend that she begin this   5) she is seen in my office every 4-6 months          History of Present Illness  The patient is a 60-year-old female here for follow-up of diabetes.    She reports experiencing significant stress over the past 3 months due to personal circumstances, including caring for her mother, managing her son's needs, and attempting to return to school. This stress has led to feelings of depression. She acknowledges that her blood glucose levels have been elevated but attributes this to a lack of monitoring due to financial constraints preventing her from purchasing a new Sherrell sensor. She was without the Sherrell sensor for approximately 2 months and did not frequently check her blood glucose levels during this period. She continued to administer insulin based on her physical sensations rather than actual blood glucose readings.    She has been experiencing diarrhea and abdominal pain, which she believes are not related to the increase in

## 2025-06-25 ENCOUNTER — OFFICE VISIT (OUTPATIENT)
Age: 60
End: 2025-06-25
Payer: MEDICARE

## 2025-06-25 VITALS
HEIGHT: 63 IN | TEMPERATURE: 97.8 F | SYSTOLIC BLOOD PRESSURE: 169 MMHG | OXYGEN SATURATION: 98 % | RESPIRATION RATE: 18 BRPM | BODY MASS INDEX: 29.98 KG/M2 | DIASTOLIC BLOOD PRESSURE: 76 MMHG | HEART RATE: 87 BPM | WEIGHT: 169.2 LBS

## 2025-06-25 VITALS
BODY MASS INDEX: 29.98 KG/M2 | HEIGHT: 63 IN | SYSTOLIC BLOOD PRESSURE: 128 MMHG | DIASTOLIC BLOOD PRESSURE: 70 MMHG | OXYGEN SATURATION: 97 % | HEART RATE: 86 BPM | WEIGHT: 169.2 LBS

## 2025-06-25 DIAGNOSIS — E78.2 MIXED HYPERLIPIDEMIA: ICD-10-CM

## 2025-06-25 DIAGNOSIS — I82.499 DEEP VEIN THROMBOSIS (DVT) OF OTHER VEIN OF LOWER EXTREMITY, UNSPECIFIED CHRONICITY, UNSPECIFIED LATERALITY (HCC): ICD-10-CM

## 2025-06-25 DIAGNOSIS — Z79.4 TYPE 2 DIABETES MELLITUS WITH HYPERGLYCEMIA, WITH LONG-TERM CURRENT USE OF INSULIN (HCC): Primary | ICD-10-CM

## 2025-06-25 DIAGNOSIS — G43.919 INTRACTABLE MIGRAINE WITHOUT STATUS MIGRAINOSUS, UNSPECIFIED MIGRAINE TYPE: ICD-10-CM

## 2025-06-25 DIAGNOSIS — E11.65 TYPE 2 DIABETES MELLITUS WITH HYPERGLYCEMIA, WITH LONG-TERM CURRENT USE OF INSULIN (HCC): Primary | ICD-10-CM

## 2025-06-25 DIAGNOSIS — R42 DIZZINESS: ICD-10-CM

## 2025-06-25 DIAGNOSIS — I73.9 PAD (PERIPHERAL ARTERY DISEASE): ICD-10-CM

## 2025-06-25 DIAGNOSIS — I10 ESSENTIAL (PRIMARY) HYPERTENSION: ICD-10-CM

## 2025-06-25 DIAGNOSIS — Z94.0 HISTORY OF RENAL TRANSPLANT: ICD-10-CM

## 2025-06-25 DIAGNOSIS — I10 PRIMARY HYPERTENSION: Primary | ICD-10-CM

## 2025-06-25 PROCEDURE — G2211 COMPLEX E/M VISIT ADD ON: HCPCS | Performed by: STUDENT IN AN ORGANIZED HEALTH CARE EDUCATION/TRAINING PROGRAM

## 2025-06-25 PROCEDURE — 1036F TOBACCO NON-USER: CPT | Performed by: INTERNAL MEDICINE

## 2025-06-25 PROCEDURE — G2211 COMPLEX E/M VISIT ADD ON: HCPCS | Performed by: INTERNAL MEDICINE

## 2025-06-25 PROCEDURE — 3078F DIAST BP <80 MM HG: CPT | Performed by: INTERNAL MEDICINE

## 2025-06-25 PROCEDURE — 99214 OFFICE O/P EST MOD 30 MIN: CPT | Performed by: STUDENT IN AN ORGANIZED HEALTH CARE EDUCATION/TRAINING PROGRAM

## 2025-06-25 PROCEDURE — 1036F TOBACCO NON-USER: CPT | Performed by: STUDENT IN AN ORGANIZED HEALTH CARE EDUCATION/TRAINING PROGRAM

## 2025-06-25 PROCEDURE — G8417 CALC BMI ABV UP PARAM F/U: HCPCS | Performed by: STUDENT IN AN ORGANIZED HEALTH CARE EDUCATION/TRAINING PROGRAM

## 2025-06-25 PROCEDURE — 3077F SYST BP >= 140 MM HG: CPT | Performed by: STUDENT IN AN ORGANIZED HEALTH CARE EDUCATION/TRAINING PROGRAM

## 2025-06-25 PROCEDURE — G8427 DOCREV CUR MEDS BY ELIG CLIN: HCPCS | Performed by: STUDENT IN AN ORGANIZED HEALTH CARE EDUCATION/TRAINING PROGRAM

## 2025-06-25 PROCEDURE — 95251 CONT GLUC MNTR ANALYSIS I&R: CPT | Performed by: INTERNAL MEDICINE

## 2025-06-25 PROCEDURE — 3078F DIAST BP <80 MM HG: CPT | Performed by: STUDENT IN AN ORGANIZED HEALTH CARE EDUCATION/TRAINING PROGRAM

## 2025-06-25 PROCEDURE — 99214 OFFICE O/P EST MOD 30 MIN: CPT | Performed by: INTERNAL MEDICINE

## 2025-06-25 PROCEDURE — 3074F SYST BP LT 130 MM HG: CPT | Performed by: INTERNAL MEDICINE

## 2025-06-25 PROCEDURE — 3017F COLORECTAL CA SCREEN DOC REV: CPT | Performed by: INTERNAL MEDICINE

## 2025-06-25 PROCEDURE — 2022F DILAT RTA XM EVC RTNOPTHY: CPT | Performed by: INTERNAL MEDICINE

## 2025-06-25 PROCEDURE — G8417 CALC BMI ABV UP PARAM F/U: HCPCS | Performed by: INTERNAL MEDICINE

## 2025-06-25 PROCEDURE — 3046F HEMOGLOBIN A1C LEVEL >9.0%: CPT | Performed by: INTERNAL MEDICINE

## 2025-06-25 PROCEDURE — G8427 DOCREV CUR MEDS BY ELIG CLIN: HCPCS | Performed by: INTERNAL MEDICINE

## 2025-06-25 PROCEDURE — 3017F COLORECTAL CA SCREEN DOC REV: CPT | Performed by: STUDENT IN AN ORGANIZED HEALTH CARE EDUCATION/TRAINING PROGRAM

## 2025-06-25 RX ORDER — BUTALBITAL, ACETAMINOPHEN AND CAFFEINE 50; 325; 40 MG/1; MG/1; MG/1
1 TABLET ORAL 3 TIMES DAILY PRN
Qty: 180 TABLET | Refills: 0 | Status: SHIPPED | OUTPATIENT
Start: 2025-06-25

## 2025-06-25 RX ORDER — SUMATRIPTAN SUCCINATE 100 MG/1
TABLET ORAL
Qty: 12 TABLET | Refills: 5 | Status: SHIPPED | OUTPATIENT
Start: 2025-06-25

## 2025-06-25 RX ORDER — INSULIN GLARGINE 300 U/ML
INJECTION, SOLUTION SUBCUTANEOUS
Qty: 40.5 ML | Refills: 3 | Status: SHIPPED | OUTPATIENT
Start: 2025-06-25

## 2025-06-25 RX ORDER — HYDROCHLOROTHIAZIDE 12.5 MG/1
CAPSULE ORAL
Qty: 2 EACH | Refills: 11 | Status: CANCELLED | OUTPATIENT
Start: 2025-06-25

## 2025-06-25 ASSESSMENT — ENCOUNTER SYMPTOMS
SHORTNESS OF BREATH: 0
WHEEZING: 0
SINUS PAIN: 0
VOMITING: 0
DIARRHEA: 0
NAUSEA: 0
CONSTIPATION: 0
EYE REDNESS: 0
COUGH: 0
ABDOMINAL PAIN: 0

## 2025-06-25 NOTE — PATIENT INSTRUCTIONS
Please check your blood pressure at home and take your readings to next doctors appointment    Drink at least 80 ounces of water per day and can cut with pedialyte    Come back in August for blood pressure follow up

## 2025-06-25 NOTE — PROGRESS NOTES
I have reviewed all needed documentation in preparation for visit. Verified patient by name and date of birth  Chief Complaint   Patient presents with    Establish Care    Dizziness     When she bends over; and gets dizzy when standing       Vitals:    06/25/25 1512   BP: (!) 169/76   BP Site: Left Upper Arm   Patient Position: Sitting   BP Cuff Size: Medium Adult   Pulse: 87   Resp: 18   Temp: 97.8 °F (36.6 °C)   TempSrc: Temporal   SpO2: 98%   Weight: 76.7 kg (169 lb 3.2 oz)   Height: 1.6 m (5' 3\")       Health Maintenance Due   Topic Date Due    Hepatitis C screen  Never done    Hepatitis B vaccine (1 of 3 - 19+ 3-dose series) Never done    DTaP/Tdap/Td vaccine (1 - Tdap) Never done    Shingles vaccine (1 of 2) Never done    Pneumococcal 50+ years Vaccine (2 of 2 - PCV) 02/01/2014    COVID-19 Vaccine (5 - 2024-25 season) 09/01/2024    Annual Wellness Visit (Medicare)  01/11/2025     \"Have you been to the ER, urgent care clinic since your last visit?  Hospitalized since your last visit?\"    NO    “Have you seen or consulted any other health care providers outside of [facility] since your last visit?”    NO            Click Here for Release of Records Request         JANINE Bolden

## 2025-06-25 NOTE — PATIENT INSTRUCTIONS
1) Stay on 1 mg of ozempic once a week and metformin 1 tab at night only.    2) Slightly decrease your toujeo to 75 units in the morning (40+35) and 65 units at night (35+30)    3) Do your best to check your heather before each meal and at bedtime and dose your fiasp based on the scale below:    Before meals  Blood sugar  Insulin dose          Less than 90  Eat first, 15 units       20 units    151-200  25 units      201-250  30 units     251-300  35 units      301-350  40 units      351-400  45 units  401-450  50 units  451-500  55 units  Over 500  60 units    Bedtime (in addition to 65 of toujeo) OR if you are NOT EATING DURING THE DAY  Blood sugar  Insulin dose          151-200  5 units      201-250  10 units     251-300  15 units      301-350  20 units      351-400  25 units  401-450  30 units  451-500  35 units  Over 500  40 units    **If you are over 200 during the day, you need to correct with fiasp to bring your sugar back down**    4) Call [employer] and see if you can upgrade to the heather 3+ sensors that are changed every 15 days and if so then they will fax me a form and I can fill this out.    5) Look into the Beta bionic pump [email or web address] and see if this may be something you want to pursue and if so, then let me know and I can start working on this.    6) Your blood pressure looks great.

## 2025-06-25 NOTE — PROGRESS NOTES
Loli Patterson is a 60 y.o. female  Chief Complaint   Patient presents with    Establish Care    Dizziness     When she bends over; and gets dizzy when standing     Establish Care      HPI    She reports positional dizziness when getting up from a seated to standing position, no room spinning. Says she does not drink very much water. She is on labetalol. BP in office elevated however says it is normal at home.     DMII: Follows with endocrinology, has CGM, last A1c in May was 12% which is increased from 9.2% in May.     HTN: BP today elevated at 169/76. Currently on labetalol 200mg BID.     Renal transplant recipient: last alb creat nornal. Last CMP Creat was elevated at 1.42 with GFR of 43. On tacrolimus and prednisone and cellcept.     History of DVT on Eliquis    Mammo March 2025, normal.     Colonoscopy: 11.1.16 has repeat in September, holding eliquis 2 days prior.     Follows with hand specialist for deQuervain's tenosynovitis.     ROS  Review of Systems   Constitutional:  Negative for chills and fever.   HENT:  Negative for sinus pain.    Eyes:  Negative for redness.   Respiratory:  Negative for cough, shortness of breath and wheezing.    Cardiovascular:  Negative for chest pain.   Gastrointestinal:  Negative for abdominal pain, constipation, diarrhea, nausea and vomiting.   Endocrine: Negative for polyuria.   Genitourinary:  Negative for dysuria.   Musculoskeletal:  Negative for arthralgias and myalgias.   Neurological:  Negative for light-headedness and headaches.   Psychiatric/Behavioral:  Negative for agitation and dysphoric mood. The patient is not nervous/anxious.          Vitals:    06/25/25 1512   BP: (!) 169/76   Pulse: 87   Resp: 18   Temp: 97.8 °F (36.6 °C)   SpO2: 98%      Wt Readings from Last 3 Encounters:   06/25/25 76.7 kg (169 lb 3.2 oz)   06/25/25 76.7 kg (169 lb 3.2 oz)   05/28/25 76 kg (167 lb 9.6 oz)       EXAM  Physical Exam  Vitals and nursing note reviewed.   Constitutional:

## 2025-06-25 NOTE — PROGRESS NOTES
Chief Complaint   Patient presents with    Discuss Medications     Pt will like to pssibly discussing starting an insulin pump    Diabetes    Other     Patient has given her consent for her provider to use RITO AI during today's visit      Medication Refill     [facility]      History of Present Illness: Loli Patterson is a 60 y.o. female here for follow up of diabetes.  Weight up 2 lbs since last visit in 5/25.  Review of her sherrell data over the past 14 days shows 32% in range, 36% high, 31% very high, 1% low and 0% very low.      she has the following indications to continue treatment with Freestyle Sherrell:   1) she has type 2 diabetes (E11.65) and is on an intensive insulin regimen with 5 injections per day   2) she tests her blood sugar 4 times per day and makes treatment decisions off her blood sugar readings and off freestyle sherrell sensor readings   3) she requires frequent adjustments to her insulin injection doses based on her freestyle sherrell sensor readings   4) she has benefitted from therapeutic continuous glucose monitoring and I recommend that she begin this   5) she is seen in my office every 4-6 months          History of Present Illness  The patient is a 60-year-old female here for follow-up of diabetes.    She reports no changes in her medication regimen since the last consultation. Over the past two weeks, she has experienced elevated stress levels, which she believes have contributed to an increase in her blood sugar levels. Despite attempts to manage her stress, she finds it challenging. She took a break after her last visit but has since resumed her usual activities.    She has been using a scale to adjust her insulin dosage based on her blood sugar levels. If her blood sugar reaches 300, she administers 80 units of long-acting insulin but refrains from using fast-acting insulin when she is not eating. She occasionally checks her blood sugar levels later in the day. Last night, she

## (undated) DEVICE — TUBING HYDR IRR --